# Patient Record
Sex: FEMALE | Race: WHITE | NOT HISPANIC OR LATINO | Employment: PART TIME | URBAN - NONMETROPOLITAN AREA
[De-identification: names, ages, dates, MRNs, and addresses within clinical notes are randomized per-mention and may not be internally consistent; named-entity substitution may affect disease eponyms.]

---

## 2017-01-12 ENCOUNTER — HOSPITAL ENCOUNTER (EMERGENCY)
Facility: HOSPITAL | Age: 27
Discharge: HOME | End: 2017-01-12
Payer: COMMERCIAL

## 2017-01-12 VITALS
OXYGEN SATURATION: 99 % | RESPIRATION RATE: 16 BRPM | HEART RATE: 92 BPM | SYSTOLIC BLOOD PRESSURE: 129 MMHG | DIASTOLIC BLOOD PRESSURE: 80 MMHG

## 2017-01-12 DIAGNOSIS — S33.5XXA SPRAIN OF LUMBAR REGION, INITIAL ENCOUNTER: ICD-10-CM

## 2017-01-12 PROCEDURE — 25000125 ZZHC RX 250

## 2017-01-12 PROCEDURE — 99214 OFFICE O/P EST MOD 30 MIN: CPT | Mod: 25

## 2017-01-12 PROCEDURE — 99213 OFFICE O/P EST LOW 20 MIN: CPT

## 2017-01-12 PROCEDURE — 96372 THER/PROPH/DIAG INJ SC/IM: CPT

## 2017-01-12 RX ORDER — KETOROLAC TROMETHAMINE 10 MG/1
10 TABLET, FILM COATED ORAL EVERY 6 HOURS PRN
Qty: 20 TABLET | Refills: 0 | Status: SHIPPED | OUTPATIENT
Start: 2017-01-12 | End: 2017-02-27

## 2017-01-12 RX ORDER — KETOROLAC TROMETHAMINE 30 MG/ML
60 INJECTION, SOLUTION INTRAMUSCULAR; INTRAVENOUS ONCE
Status: COMPLETED | OUTPATIENT
Start: 2017-01-12 | End: 2017-01-12

## 2017-01-12 RX ADMIN — KETOROLAC TROMETHAMINE 60 MG: 30 INJECTION, SOLUTION INTRAMUSCULAR; INTRAVENOUS at 18:49

## 2017-01-12 ASSESSMENT — ENCOUNTER SYMPTOMS
FEVER: 0
CONFUSION: 0
ABDOMINAL PAIN: 0
ARTHRALGIAS: 0
EYE REDNESS: 0
DIFFICULTY URINATING: 0
HEADACHES: 0
BACK PAIN: 1
SHORTNESS OF BREATH: 0
NECK STIFFNESS: 0
COLOR CHANGE: 0

## 2017-01-12 NOTE — ED AVS SNAPSHOT
[facility]    [address]    [place] [address]    Phone:  [phone]                                       Jennifer Tatum   MRN: [id number]    Department:  [facility]   Date of Visit:  1/12/2017           Patient Information     Date Of Birth          1990        Your diagnoses for this visit were:     Sprain of lumbar region, initial encounter        You were seen by Brittany Hurtado APRN FNCIRILO.      Follow-up Information     Follow up with Cornell Herron MD In 1 week.    Specialty:  Family Practice    Why:  if not improving or back to baseline    Contact information:    [facility]  [address]  [place] [address] [phone]          Follow up with [facility].    Specialty:  EMERGENCY MEDICINE    Why:  As needed, If symptoms worsen    Contact information:    [address] [place] [address] [phone]    Additional information:    From [facility]: Take [address]. Turn left at [address]. Turn left at the first stoplight on [address]. At the first stop sign, take a right onto [address]. Take a left into the parking lot and continue through until you reach the North enterance of the building.       From [place]: Take [address]. Take the [address] ramp towards [place]. Turn left onto [address]. Take a slight right onto [address]. Turn left at the first stoplight on [address]. At the first stop sign, take a right onto [address]. Take a left into the parking lot and continue through until you reach the North enterance of the building.       From [place]: Take [address]. Take a right at [address]. At the first stop sign, take a right onto [address]. Take a left into the parking lot and continue through until you reach the North enterance of the building.         Discharge Instructions       See attached for home care  Rest,  ice, stretch  Take medications as needed as prescribed  PT - they will call you   F/U with PCP if not improving or back to base line in 1 week  Return to  with worsening symptoms.           Back Sprain or Strain    Injury to the muscles (strain) or ligaments (sprain) around the spine can be troubling. Injury may occur after a sudden forceful twisting or bending force such as in a car accident, after a simple awkward movement, or after lifting something heavy with poor body positioning. In any case, muscle spasm is often present and adds to the pain.  Thankfully, most people feel better in 1 to 2 weeks, and most of the rest in 1 to 2 months. Most people can remain active. Unless you had a forceful physical injury such as a car accident or fall, X-rays are usually not ordered for the first evaluation of a back sprain or strain. If pain continues and does not respond to medical treatment, your healthcare provider may order X-rays and other tests.  Home care  The following guidelines will help you care for your injury at home:    When in bed, try to find a comfortable position. A firm mattress is best. Try lying flat on your back with pillows under your knees. You can also try lying on your side with your knees bent up toward your chest and a pillow between your knees.    Don't sit for long periods. Try not to take long car rides or take other trips that have you sitting for a long time. This puts more stress on the lower back than standing or walking.    During the first 24 to 72 hours after an injury or flare-up, apply an ice pack to the painful area for 20 minutes. Then remove it for 20 minutes. Do this for 60 to 90 minutes, or several times a day, This will reduce swelling and pain. Be sure to wrap the ice pack in a thin towel or plastic to protect your skin.    You can start with ice, then switch to heat. Heat from a hot shower, hot bath, or heating pad reduces swelling and pain and works well for muscle spasms.  Put heat on the painful area for 20 minutes, then remove for 20 minutes. Do this for 60 to 90 minutes, or several times a day. Do not use a heating pad while sleeping. It can burn the skin.    You can alternate the ice and heat. Talk with your healthcare provider to find out the best treatment or therapy for your back pain.    Therapeutic massage will help relax the back muscles without stretching them.    Be aware of safe lifting methods. Do not lift anything over 15 pounds until all of the pain is gone.  Medicines  Talk to your healthcare provider before using medicines, especially if you have other health problems or are taking other medicines.    You may use acetaminophen or ibuprofen to control pain, unless another pain medicine was prescribed. If you have chronic conditions like diabetes, liver or kidney disease, stomach ulcers, or gastrointestinal bleeding, or are taking blood-thinner medicines, talk with your doctor before taking any medicines.    Be careful if you are given prescription medicines, narcotics, or medicine for muscle spasm. They can cause drowsiness, and affect your coordination, reflexes, and judgment. Do not drive or operate heavy machinery.  Follow-up care  Follow up with your healthcare provider or this facility as advised. You may need physical therapy or more tests if your symptoms get worse.  If you had X-rays today, they didn t show any broken bones, breaks, or fractures. Sometimes fractures don t show up on the first X-ray. Bruises and sprains can sometimes hurt as much as a fracture. These injuries can take time to heal completely. If your symptoms don t improve or they get worse, talk with your healthcare provider. You may need a repeat X-ray.  Call 911  Call for emergency care if any of the following occur:    Trouble breathing    Confused    Very drowsy or trouble awakening    Fainting or loss of consciousness    Rapid or very slow heart rate    Loss of bowel or bladder  control  When to seek medical advice  Call your healthcare provider right away if any of the following occur:    Pain gets worse or spreads to your arms or legs    Weakness or numbness in one or both arms or legs    Numbness in the groin or genital area    2507-2862 [employer]. [address] [place] [address]. All rights reserved. This information is not intended as a substitute for professional medical care. Always follow your healthcare professional's instructions.          Future Appointments        Provider Department Dept Phone Center    2/3/2017 9:00 AM Cornell Herron MD [facility] [phone] [facility]         Review of your medicines      START taking        Dose / Directions Last dose taken    ketorolac 10 MG tablet   Commonly known as:  TORADOL   Dose:  10 mg   Quantity:  20 tablet        Take 1 tablet (10 mg) by mouth every 6 hours as needed for moderate pain   Refills:  0        tiZANidine 4 MG tablet   Commonly known as:  ZANAFLEX   Dose:  4 mg   Quantity:  20 tablet        Take 1 tablet (4 mg) by mouth 3 times daily as needed for muscle spasms (MUSCLE SPASM)   Refills:  0          Our records show that you are taking the medicines listed below. If these are incorrect, please call your family doctor or clinic.        Dose / Directions Last dose taken    * amphetamine-dextroamphetamine 20 MG per 24 hr capsule   Commonly known as:  ADDERALL XR   Dose:  20 mg   Quantity:  30 capsule        Take 1 capsule (20 mg) by mouth daily   Refills:  0        * amphetamine-dextroamphetamine 20 MG per 24 hr capsule   Commonly known as:  ADDERALL XR   Dose:  20 mg   Quantity:  30 capsule        Take 1 capsule (20 mg) by mouth daily   Refills:  0        * amphetamine-dextroamphetamine 20 MG per 24 hr capsule   Commonly known as:  ADDERALL XR   Dose:  20 mg   Quantity:  60 capsule        Take 1 capsule (20 mg) by mouth daily   Refills:  0        citalopram 40 MG tablet  "  Commonly known as:  celeXA   Dose:  40 mg   Quantity:  30 tablet        Take 1 tablet (40 mg) by mouth daily   Refills:  5        IBUPROFEN PO        Refills:  0        traZODone 50 MG tablet   Commonly known as:  DESYREL   Quantity:  90 tablet        1/2-3 TAB AT BEDTIME AS DIRECTED   Refills:  3        TYLENOL PO        Refills:  0        * Notice:  This list has 3 medication(s) that are the same as other medications prescribed for you. Read the directions carefully, and ask your doctor or other care provider to review them with you.            Prescriptions were sent or printed at these locations (2 Prescriptions)                   [id number] [place] [facility] [address]   [address], [place] [address]    Telephone:  [phone]   Fax:  [phone]   Hours:                  E-Prescribed (2 of 2)         ketorolac (TORADOL) 10 MG tablet               tiZANidine (ZANAFLEX) 4 MG tablet                Orders Needing Specimen Collection     None      Pending Results     No orders found from 1/11/2017 to 1/13/2017.            Pending Culture Results     No orders found from 1/11/2017 to 1/13/2017.            Thank you for choosing [facility]       Thank you for choosing [facility] for your care. Our goal is always to provide you with excellent care. Hearing back from our patients is one way we can continue to improve our services. Please take a few minutes to complete the written survey that you may receive in the mail after you visit with us. Thank you!        [employer] Information     [employer] lets you send messages to your doctor, view your test results, renew your prescriptions, schedule appointments and more. To sign up, go to [email or web address] . Click on \"Log in\" on the left side of the screen, which will take you to the Welcome page. Then click on \"Sign up Now\" on the right side of the page.     You will be asked to enter the access code listed below, as well as some personal information. " Please follow the directions to create your username and password.     Your access code is: [email or web address]  Expires: 2017  6:55 PM     Your access code will  in 90 days. If you need help or a new code, please call your [facility] clinic or [phone].        Care EveryWhere ID     This is your Care EveryWhere ID. This could be used by other organizations to access your [facility] medical records  [id number]        After Visit Summary       This is your record. Keep this with you and show to your community pharmacist(s) and doctor(s) at your next visit.

## 2017-01-12 NOTE — ED AVS SNAPSHOT
[facility]    [address]    [place] [address]    Phone:  [phone]                                       Jennifer Tatum   MRN: [id number]    Department:  [facility]   Date of Visit:  1/12/2017           After Visit Summary Signature Page     I have received my discharge instructions, and my questions have been answered. I have discussed any challenges I see with this plan with the nurse or doctor.    ..........................................................................................................................................  Patient/Patient Representative Signature      ..........................................................................................................................................  Patient Representative Print Name and Relationship to Patient    ..................................................               ................................................  Date                                            Time    ..........................................................................................................................................  Reviewed by Signature/Title    ...................................................              ..............................................  Date                                                            Time

## 2017-01-13 NOTE — ED NOTES
Pt presents with lower back pain after lying on Aunt's couch. States while driving her right foot was going numb. Has a hx of back problems.

## 2017-01-13 NOTE — DISCHARGE INSTRUCTIONS
See attached for home care  Rest, ice, stretch  Take medications as needed as prescribed  PT - they will call you   F/U with PCP if not improving or back to base line in 1 week  Return to  with worsening symptoms.           Back Sprain or Strain    Injury to the muscles (strain) or ligaments (sprain) around the spine can be troubling. Injury may occur after a sudden forceful twisting or bending force such as in a car accident, after a simple awkward movement, or after lifting something heavy with poor body positioning. In any case, muscle spasm is often present and adds to the pain.  Thankfully, most people feel better in 1 to 2 weeks, and most of the rest in 1 to 2 months. Most people can remain active. Unless you had a forceful physical injury such as a car accident or fall, X-rays are usually not ordered for the first evaluation of a back sprain or strain. If pain continues and does not respond to medical treatment, your healthcare provider may order X-rays and other tests.  Home care  The following guidelines will help you care for your injury at home:    When in bed, try to find a comfortable position. A firm mattress is best. Try lying flat on your back with pillows under your knees. You can also try lying on your side with your knees bent up toward your chest and a pillow between your knees.    Don't sit for long periods. Try not to take long car rides or take other trips that have you sitting for a long time. This puts more stress on the lower back than standing or walking.    During the first 24 to 72 hours after an injury or flare-up, apply an ice pack to the painful area for 20 minutes. Then remove it for 20 minutes. Do this for 60 to 90 minutes, or several times a day, This will reduce swelling and pain. Be sure to wrap the ice pack in a thin towel or plastic to protect your skin.    You can start with ice, then switch to heat. Heat from a hot shower, hot bath, or heating pad reduces swelling and pain  and works well for muscle spasms. Put heat on the painful area for 20 minutes, then remove for 20 minutes. Do this for 60 to 90 minutes, or several times a day. Do not use a heating pad while sleeping. It can burn the skin.    You can alternate the ice and heat. Talk with your healthcare provider to find out the best treatment or therapy for your back pain.    Therapeutic massage will help relax the back muscles without stretching them.    Be aware of safe lifting methods. Do not lift anything over 15 pounds until all of the pain is gone.  Medicines  Talk to your healthcare provider before using medicines, especially if you have other health problems or are taking other medicines.    You may use acetaminophen or ibuprofen to control pain, unless another pain medicine was prescribed. If you have chronic conditions like diabetes, liver or kidney disease, stomach ulcers, or gastrointestinal bleeding, or are taking blood-thinner medicines, talk with your doctor before taking any medicines.    Be careful if you are given prescription medicines, narcotics, or medicine for muscle spasm. They can cause drowsiness, and affect your coordination, reflexes, and judgment. Do not drive or operate heavy machinery.  Follow-up care  Follow up with your healthcare provider or this facility as advised. You may need physical therapy or more tests if your symptoms get worse.  If you had X-rays today, they didn t show any broken bones, breaks, or fractures. Sometimes fractures don t show up on the first X-ray. Bruises and sprains can sometimes hurt as much as a fracture. These injuries can take time to heal completely. If your symptoms don t improve or they get worse, talk with your healthcare provider. You may need a repeat X-ray.  Call 911  Call for emergency care if any of the following occur:    Trouble breathing    Confused    Very drowsy or trouble awakening    Fainting or loss of consciousness    Rapid or very slow heart  rate    Loss of bowel or bladder control  When to seek medical advice  Call your healthcare provider right away if any of the following occur:    Pain gets worse or spreads to your arms or legs    Weakness or numbness in one or both arms or legs    Numbness in the groin or genital area    9959-0764 [employer]. [address] [place] [address]. All rights reserved. This information is not intended as a substitute for professional medical care. Always follow your healthcare professional's instructions.

## 2017-01-13 NOTE — ED PROVIDER NOTES
History     Chief Complaint   Patient presents with     Back Pain     The history is provided by the patient. No  was used.     Jennifer Tatum is a 26 year old female who presents to  for evaluation of low back pain. Onset of sx 2 days ago, no injury, states she slept on her aunt's couch, woke up with pain, now rated 8 on 1-10 scale, with some radiation down right leg. Denies saddle paresthesia, no change in bowel or bladder, no fever. Has tried ibuprofen, tylenol, heat, minimal improvement. Does report recent hx of work related back injury, however did have full recovery.     I have reviewed the Medications, Allergies, Past Medical and Surgical History, and Social History in the Epic system.    Review of Systems   Constitutional: Negative for fever.   HENT: Negative for congestion.    Eyes: Negative for redness.   Respiratory: Negative for shortness of breath.    Cardiovascular: Negative for chest pain.   Gastrointestinal: Negative for abdominal pain.   Genitourinary: Negative for difficulty urinating.   Musculoskeletal: Positive for back pain. Negative for arthralgias and neck stiffness.   Skin: Negative for color change.   Neurological: Negative for headaches.   Psychiatric/Behavioral: Negative for confusion.       Physical Exam   BP: 129/80 mmHg  Pulse: 92  Resp: 16  SpO2: 99 %  Physical Exam   Constitutional: She is oriented to person, place, and time. No distress.   HENT:   Head: Normocephalic and atraumatic.   Eyes: Conjunctivae are normal.   Neck: Normal range of motion.   Cardiovascular: Normal rate and regular rhythm.    Pulmonary/Chest: Effort normal. No respiratory distress.   Abdominal: Soft.   Musculoskeletal: She exhibits tenderness. She exhibits no edema.        Lumbar back: She exhibits decreased range of motion, tenderness and pain.        Back:    PTP area as illustrated. Decreased ROM.    Neurological: She is alert and oriented to person, place, and time. She has normal  "strength. No sensory deficit.   Reflex Scores:       Patellar reflexes are 2+ on the right side and 2+ on the left side.  Skin: Skin is warm and dry. She is not diaphoretic.   Nursing note and vitals reviewed.      ED Course   Procedures          Assessments & Plan (with Medical Decision Making)   Pt presents with low back pain, no mechanism of injury, \"slept on aunt's couch\". Exam as above. Findings consistent with strain. IM toradol given while in the UC. rx for home. Referral for PT. [facility] discharge instructions given. Pt discharged in stable condition.     I have reviewed the nursing notes.    I have reviewed the findings, diagnosis, plan and need for follow up with the patient.    Discharge Medication List as of 1/12/2017  6:55 PM      START taking these medications    Details   ketorolac (TORADOL) 10 MG tablet Take 1 tablet (10 mg) by mouth every 6 hours as needed for moderate pain, Disp-20 tablet, R-0, E-Prescribe      tiZANidine (ZANAFLEX) 4 MG tablet Take 1 tablet (4 mg) by mouth 3 times daily as needed for muscle spasms (MUSCLE SPASM), Disp-20 tablet, R-0, E-Prescribe             Final diagnoses:   Sprain of lumbar region, initial encounter     See attached for home care  Rest, ice, stretch  Take medications as needed as prescribed  PT - they will call you   F/U with PCP if not improving or back to base line in 1 week  Return to UC with worsening symptoms.   1/12/2017   [facility]      Brittany Hurtado APRN FNP  01/13/17 1334  "

## 2017-01-23 ENCOUNTER — TELEPHONE (OUTPATIENT)
Dept: FAMILY MEDICINE | Facility: OTHER | Age: 27
End: 2017-01-23

## 2017-01-23 DIAGNOSIS — F43.10 PTSD (POST-TRAUMATIC STRESS DISORDER): ICD-10-CM

## 2017-01-23 DIAGNOSIS — F33.9 RECURRENT MAJOR DEPRESSIVE DISORDER, REMISSION STATUS UNSPECIFIED (H): Primary | ICD-10-CM

## 2017-01-25 RX ORDER — CITALOPRAM HYDROBROMIDE 40 MG/1
TABLET ORAL
Qty: 30 TABLET | Refills: 1 | Status: SHIPPED | OUTPATIENT
Start: 2017-01-25 | End: 2017-02-27

## 2017-01-25 NOTE — TELEPHONE ENCOUNTER
Celexa 40mg tab     Last Written Prescription Date: 11/28/2016  Last Fill Quantity: 30, # refills: 0  Last Office Visit with [facility] primary care provider:  11/23/2016   Next 5 appointments (look out 90 days)     Feb 03, 2017  9:00 AM   (Arrive by 8:45 AM)   Office Visit with Cornell Herron MD   [facility]    [address] [place] [address] [facility] [address]   [phone]                   Last PHQ-9 score on record=   PHQ-9 SCORE 11/3/2016   Total Score -   Total Score 14

## 2017-02-06 ENCOUNTER — OFFICE VISIT (OUTPATIENT)
Dept: FAMILY MEDICINE | Facility: OTHER | Age: 27
End: 2017-02-06
Attending: FAMILY MEDICINE
Payer: COMMERCIAL

## 2017-02-06 VITALS
SYSTOLIC BLOOD PRESSURE: 124 MMHG | TEMPERATURE: 98.4 F | HEART RATE: 73 BPM | BODY MASS INDEX: 30.95 KG/M2 | WEIGHT: 186 LBS | DIASTOLIC BLOOD PRESSURE: 74 MMHG | RESPIRATION RATE: 16 BRPM

## 2017-02-06 DIAGNOSIS — F33.0 MAJOR DEPRESSIVE DISORDER, RECURRENT EPISODE, MILD (H): ICD-10-CM

## 2017-02-06 DIAGNOSIS — F41.9 ANXIETY: ICD-10-CM

## 2017-02-06 DIAGNOSIS — F90.0 ATTENTION DEFICIT HYPERACTIVITY DISORDER (ADHD), PREDOMINANTLY INATTENTIVE TYPE: Primary | ICD-10-CM

## 2017-02-06 DIAGNOSIS — R41.3 MEMORY DEFICITS: ICD-10-CM

## 2017-02-06 DIAGNOSIS — M54.41 ACUTE RIGHT-SIDED LOW BACK PAIN WITH RIGHT-SIDED SCIATICA: ICD-10-CM

## 2017-02-06 LAB
BASOPHILS # BLD AUTO: 0.1 10E9/L (ref 0–0.2)
BASOPHILS NFR BLD AUTO: 0.5 %
DIFFERENTIAL METHOD BLD: NORMAL
EOSINOPHIL # BLD AUTO: 0.1 10E9/L (ref 0–0.7)
EOSINOPHIL NFR BLD AUTO: 0.8 %
ERYTHROCYTE [DISTWIDTH] IN BLOOD BY AUTOMATED COUNT: 13.2 % (ref 10–15)
HCG SERPL QL: NEGATIVE
HCT VFR BLD AUTO: 39.5 % (ref 35–47)
HGB BLD-MCNC: 13.2 G/DL (ref 11.7–15.7)
IMM GRANULOCYTES # BLD: 0 10E9/L (ref 0–0.4)
IMM GRANULOCYTES NFR BLD: 0.2 %
LYMPHOCYTES # BLD AUTO: 2.6 10E9/L (ref 0.8–5.3)
LYMPHOCYTES NFR BLD AUTO: 24.2 %
MCH RBC QN AUTO: 28.8 PG (ref 26.5–33)
MCHC RBC AUTO-ENTMCNC: 33.4 G/DL (ref 31.5–36.5)
MCV RBC AUTO: 86 FL (ref 78–100)
MONOCYTES # BLD AUTO: 0.7 10E9/L (ref 0–1.3)
MONOCYTES NFR BLD AUTO: 6.3 %
NEUTROPHILS # BLD AUTO: 7.2 10E9/L (ref 1.6–8.3)
NEUTROPHILS NFR BLD AUTO: 68 %
NRBC # BLD AUTO: 0 10*3/UL
NRBC BLD AUTO-RTO: 0 /100
PLATELET # BLD AUTO: 236 10E9/L (ref 150–450)
RBC # BLD AUTO: 4.59 10E12/L (ref 3.8–5.2)
TSH SERPL DL<=0.05 MIU/L-ACNC: 1.19 MU/L (ref 0.4–4)
WBC # BLD AUTO: 10.7 10E9/L (ref 4–11)

## 2017-02-06 PROCEDURE — 84443 ASSAY THYROID STIM HORMONE: CPT | Performed by: FAMILY MEDICINE

## 2017-02-06 PROCEDURE — 85025 COMPLETE CBC W/AUTO DIFF WBC: CPT | Performed by: FAMILY MEDICINE

## 2017-02-06 PROCEDURE — 99000 SPECIMEN HANDLING OFFICE-LAB: CPT | Performed by: FAMILY MEDICINE

## 2017-02-06 PROCEDURE — 82746 ASSAY OF FOLIC ACID SERUM: CPT | Performed by: FAMILY MEDICINE

## 2017-02-06 PROCEDURE — 84703 CHORIONIC GONADOTROPIN ASSAY: CPT | Performed by: FAMILY MEDICINE

## 2017-02-06 PROCEDURE — 82306 VITAMIN D 25 HYDROXY: CPT | Performed by: FAMILY MEDICINE

## 2017-02-06 PROCEDURE — 82607 VITAMIN B-12: CPT | Performed by: FAMILY MEDICINE

## 2017-02-06 PROCEDURE — 99215 OFFICE O/P EST HI 40 MIN: CPT | Performed by: FAMILY MEDICINE

## 2017-02-06 PROCEDURE — 99212 OFFICE O/P EST SF 10 MIN: CPT

## 2017-02-06 PROCEDURE — 36415 COLL VENOUS BLD VENIPUNCTURE: CPT | Performed by: FAMILY MEDICINE

## 2017-02-06 PROCEDURE — 72100 X-RAY EXAM L-S SPINE 2/3 VWS: CPT | Mod: TC

## 2017-02-06 RX ORDER — DEXTROAMPHETAMINE SACCHARATE, AMPHETAMINE ASPARTATE MONOHYDRATE, DEXTROAMPHETAMINE SULFATE AND AMPHETAMINE SULFATE 5; 5; 5; 5 MG/1; MG/1; MG/1; MG/1
20 CAPSULE, EXTENDED RELEASE ORAL DAILY
Qty: 30 CAPSULE | Refills: 0 | Status: SHIPPED | OUTPATIENT
Start: 2017-02-06 | End: 2017-02-27

## 2017-02-06 RX ORDER — DEXTROAMPHETAMINE SACCHARATE, AMPHETAMINE ASPARTATE MONOHYDRATE, DEXTROAMPHETAMINE SULFATE AND AMPHETAMINE SULFATE 5; 5; 5; 5 MG/1; MG/1; MG/1; MG/1
20 CAPSULE, EXTENDED RELEASE ORAL DAILY
Qty: 30 CAPSULE | Refills: 0 | Status: SHIPPED | OUTPATIENT
Start: 2017-03-06 | End: 2017-02-27

## 2017-02-06 RX ORDER — DEXTROAMPHETAMINE SACCHARATE, AMPHETAMINE ASPARTATE MONOHYDRATE, DEXTROAMPHETAMINE SULFATE AND AMPHETAMINE SULFATE 5; 5; 5; 5 MG/1; MG/1; MG/1; MG/1
20 CAPSULE, EXTENDED RELEASE ORAL DAILY
Qty: 60 CAPSULE | Refills: 0 | Status: SHIPPED | OUTPATIENT
Start: 2017-04-06 | End: 2017-02-27

## 2017-02-06 ASSESSMENT — PAIN SCALES - GENERAL: PAINLEVEL: NO PAIN (0)

## 2017-02-06 NOTE — PROGRESS NOTES
SUBJECTIVE:                                                    Jennifer Tatum is a 26 year old female who presents to clinic today for the following health issues:      Medication Followup of Adderall    Taking Medication as prescribed: yes    Side Effects:  None    Medication Helping Symptoms:  Yes    Last several months--  More mood swings   Edgy- more irritable   Some increase Short term memory issues- seems to be related aways of how she got home and what happened after work.  What to sleep more- more fatigue.   No stressors  No head trauma   Some HA- same intensity and same frequency  Pt is stressed- first anniversary of her still born child   and her starting to try for another child       Pt c/o increase back pain and pain in right leg. Had it in past and got some better now worse in last several months  Always has low back pain with periodic pain in right leg to foot at times- no weakness  Uses heat on back and has been to PT- does stretches.   Wants something done.     Pt is more depressed and  anxious- seems to be stemming from still born child's death a year ago.   Has been to counseling in past and has not helped.Taking all her meds and feels that her adderall is working all day          Problem list and histories reviewed & adjusted, as indicated.  Additional history: as documented    Problem list, Medication list, Allergies, and Medical/Social/Surgical histories reviewed in [facility] and updated as appropriate.    ROS:  C: NEGATIVE for fever, chills, change in weight  E/M: NEGATIVE for ear, mouth and throat problems  R: NEGATIVE for significant cough or SOB  CV: NEGATIVE for chest pain, palpitations or peripheral edema    OBJECTIVE:                                                    /74 mmHg  Pulse 73  Temp(Src) 98.4  F (36.9  C)  Resp 16  Wt 186 lb (84.369 kg)  Body mass index is 30.95 kg/(m^2).   GENERAL: healthy, alert, well nourished, well hydrated, no distress  MS: extremities- no  gross deformities noted, no edema strength and DTR equal and symmetrical   BACK: no CVA tenderness, midline and  paralumbar tenderness, stiffness on ROM   PSYCH: Alert and oriented times 3; speech- coherent , normal rate and volume; able to articulate logical thoughts, able to abstract reason, no tangential thoughts, no hallucinations or delusions, affect- anxious and cries with talking with her on dtr .     Results for orders placed or performed in visit on 02/06/17 (from the past 24 hour(s))   CBC with platelets differential   Result Value Ref Range    WBC 10.7 4.0 - 11.0 10e9/L    RBC Count 4.59 3.8 - 5.2 10e12/L    Hemoglobin 13.2 11.7 - 15.7 g/dL    Hematocrit 39.5 35.0 - 47.0 %    MCV 86 78 - 100 fl    MCH 28.8 26.5 - 33.0 pg    MCHC 33.4 31.5 - 36.5 g/dL    RDW 13.2 10.0 - 15.0 %    Platelet Count 236 150 - 450 10e9/L    Diff Method Automated Method     % Neutrophils 68.0 %    % Lymphocytes 24.2 %    % Monocytes 6.3 %    % Eosinophils 0.8 %    % Basophils 0.5 %    % Immature Granulocytes 0.2 %    Nucleated RBCs 0 0 /100    Absolute Neutrophil 7.2 1.6 - 8.3 10e9/L    Absolute Lymphocytes 2.6 0.8 - 5.3 10e9/L    Absolute Monocytes 0.7 0.0 - 1.3 10e9/L    Absolute Eosinophils 0.1 0.0 - 0.7 10e9/L    Absolute Basophils 0.1 0.0 - 0.2 10e9/L    Abs Immature Granulocytes 0.0 0 - 0.4 10e9/L    Absolute Nucleated RBC 0.0      XR lumbar spine - negative      ASSESSMENT/PLAN:                                                    1. Attention deficit hyperactivity disorder (ADHD), predominantly inattentive type  Stable-- overall.  Will keep on current dose.   - amphetamine-dextroamphetamine (ADDERALL XR) 20 MG per 24 hr capsule; Take 1 capsule (20 mg) by mouth daily  Dispense: 60 capsule; Refill: 0  - amphetamine-dextroamphetamine (ADDERALL XR) 20 MG per 24 hr capsule; Take 1 capsule (20 mg) by mouth daily  Dispense: 30 capsule; Refill: 0    2. 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  Discussed counseling and she deferred.  Discussed not increasing meds now and just get thru this week and anniversity this Friday. Try to do something memorable for family .  Work on exercise/stress reduction etc  Will check few labs.   - Vitamin B12  - Vitamin D Deficiency  - TSH  - Folate  - CBC with platelets differential    3. Acute right-sided low back pain with right-sided sciatica  XR ok - discussed MRI/NICOLAS/Meds/more PT and if she wants to be pregnant then some can not be done or have to hold on pregnancy. She will talk with erh SO and call if wants to do something that we talked about  - HCG qualitative  - XR Lumbar Spine 2/3 Views    4. Major depressive disorder, recurrent episode, mild (H)  Some worse - discussed - see above.  Need to get thru first Birthday of stillborn     5. Anxiety  As above.     50minutes was spent with patient and over 50%  of this time was spent on counseling patient regarding  illness, medication and / or treatment  options, coordinating further cares and follow ups that are needed along with resource material that will be helpful in the treatment of these issues.         See Patient Instructions    Cornell Herron MD  [facility]

## 2017-02-06 NOTE — MR AVS SNAPSHOT
After Visit Summary   2/6/2017    Jennifer Tatum    MRN: [id number]           Patient Information     Date Of Birth          1990        Visit Information        Provider Department      2/6/2017 3:00 PM Cornell Herron MD [facility]        Today's Diagnoses     Attention deficit hyperactivity disorder (ADHD), predominantly inattentive type    -  1     Memory deficits         Acute right-sided low back pain with right-sided sciatica         Major depressive disorder, recurrent episode, mild (H)         Anxiety           Care Instructions    Call or return if getting worse.         Follow-ups after your visit        Your next 10 appointments already scheduled     May 02, 2017  3:00 PM   (Arrive by 2:45 PM)   Office Visit with Cornell Herron MD   [facility] ([facility])    [address]  [facility] [address]   [phone]           Bring a current list of meds and any records pertaining to this visit.  For Physicals, please bring immunization records and any forms needing to be filled out.  Please arrive 10 minutes early to complete paperwork.              Who to contact     If you have questions or need follow up information about today's clinic visit or your schedule please contact [facility] directly at [phone].  Normal or non-critical lab and imaging results will be communicated to you by MyChart, letter or phone within 4 business days after the clinic has received the results. If you do not hear from us within 7 days, please contact the clinic through MyChart or phone. If you have a critical or abnormal lab result, we will notify you by phone as soon as possible.  Submit refill requests through [employer] or call your pharmacy and they will forward the refill request to us. Please allow 3 business days for your refill to be completed.          Additional Information About Your Visit        MyChart Information     [employer] lets you send  "messages to your doctor, view your test results, renew your prescriptions, schedule appointments and more. To sign up, go to [email or web address] . Click on \"Log in\" on the left side of the screen, which will take you to the Welcome page. Then click on \"Sign up Now\" on the right side of the page.     You will be asked to enter the access code listed below, as well as some personal information. Please follow the directions to create your username and password.     Your access code is: [email or web address]  Expires: 2017  6:55 PM     Your access code will  in 90 days. If you need help or a new code, please call your [place] clinic or [phone].        Care EveryWhere ID     This is your [facility] EveryWhere ID. This could be used by other organizations to access your [place] medical records  [id number]        Your Vitals Were     Pulse Temperature Respirations             73 98.4  F (36.9  C) 16          Blood Pressure from Last 3 Encounters:   17 124/74   17 129/80   16 122/75    Weight from Last 3 Encounters:   17 186 lb (84.369 kg)   16 186 lb (84.369 kg)   16 182 lb (82.555 kg)              We Performed the Following     CBC with platelets differential     Folate     HCG qualitative     TSH     Vitamin B12     Vitamin D Deficiency     XR Lumbar Spine 2/3 Views          Where to get your medicines      Some of these will need a paper prescription and others can be bought over the counter.  Ask your nurse if you have questions.     Bring a paper prescription for each of these medications    - amphetamine-dextroamphetamine 20 MG per 24 hr capsule  - amphetamine-dextroamphetamine 20 MG per 24 hr capsule  - amphetamine-dextroamphetamine 20 MG per 24 hr capsule       Primary Care Provider Office Phone # Fax #    Cornell Herron [phone]       [facility] [address] [place] [address]        Thank you!     Thank you for choosing [facility]  for your care. Our goal is always to provide you with excellent care. Hearing back from our patients is one way we can continue to improve our services. Please take a few minutes to complete the written survey that you may receive in the mail after your visit with us. Thank you!             Your Updated Medication List - Protect others around you: Learn how to safely use, store and throw away your medicines at www.disposemymeds.org.          This list is accurate as of: 2/6/17  4:55 PM.  Always use your most recent med list.                   Brand Name Dispense Instructions for use    * amphetamine-dextroamphetamine 20 MG per 24 hr capsule    ADDERALL XR    30 capsule    Take 1 capsule (20 mg) by mouth daily       * amphetamine-dextroamphetamine 20 MG per 24 hr capsule   Start taking on:  3/6/2017    ADDERALL XR    30 capsule    Take 1 capsule (20 mg) by mouth daily       * amphetamine-dextroamphetamine 20 MG per 24 hr capsule   Start taking on:  4/6/2017    ADDERALL XR    60 capsule    Take 1 capsule (20 mg) by mouth daily       citalopram 40 MG tablet    celeXA    30 tablet    TAKE ONE TABLET BY MOUTH ONCE DAILY       IBUPROFEN PO          ketorolac 10 MG tablet    TORADOL    20 tablet    Take 1 tablet (10 mg) by mouth every 6 hours as needed for moderate pain       traZODone 50 MG tablet    DESYREL    90 tablet    1/2-3 TAB AT BEDTIME AS DIRECTED       TYLENOL PO          * Notice:  This list has 3 medication(s) that are the same as other medications prescribed for you. Read the directions carefully, and ask your doctor or other care provider to review them with you.

## 2017-02-06 NOTE — TELEPHONE ENCOUNTER
adderall      Last Written Prescription Date: 1/7/17  Last Fill Quantity: 60,  # refills: 0   Last Office Visit with G, P or [facility] prescribing provider: 11/23/16                                         Next 5 appointments (look out 90 days)     Feb 06, 2017  3:00 PM   (Arrive by 2:45 PM)   Office Visit with Cornell Herron MD   [facility] ([facility])    [address] [place] [address]   [phone]

## 2017-02-06 NOTE — NURSING NOTE
"Chief Complaint   Patient presents with     Attention Deficit Disorder       Initial /74 mmHg  Pulse 73  Temp(Src) 98.4  F (36.9  C)  Resp 16  Wt 186 lb (84.369 kg) Estimated body mass index is 30.95 kg/(m^2) as calculated from the following:    Height as of 11/3/16: 5' 5\" (1.651 m).    Weight as of this encounter: 186 lb (84.369 kg).  Medication Reconciliation: complete  "

## 2017-02-07 LAB
FOLATE SERPL-MCNC: 18.5 NG/ML
VIT B12 SERPL-MCNC: 492 PG/ML (ref 193–986)

## 2017-02-08 LAB — DEPRECATED CALCIDIOL+CALCIFEROL SERPL-MC: 39 UG/L (ref 20–75)

## 2017-02-27 ENCOUNTER — TELEPHONE (OUTPATIENT)
Dept: FAMILY MEDICINE | Facility: OTHER | Age: 27
End: 2017-02-27

## 2017-02-27 ENCOUNTER — OFFICE VISIT (OUTPATIENT)
Dept: FAMILY MEDICINE | Facility: OTHER | Age: 27
End: 2017-02-27
Attending: FAMILY MEDICINE
Payer: COMMERCIAL

## 2017-02-27 VITALS
TEMPERATURE: 99.1 F | HEIGHT: 65 IN | DIASTOLIC BLOOD PRESSURE: 70 MMHG | BODY MASS INDEX: 31.65 KG/M2 | WEIGHT: 190 LBS | SYSTOLIC BLOOD PRESSURE: 102 MMHG | HEART RATE: 78 BPM

## 2017-02-27 DIAGNOSIS — F33.9 RECURRENT MAJOR DEPRESSIVE DISORDER, REMISSION STATUS UNSPECIFIED (H): ICD-10-CM

## 2017-02-27 DIAGNOSIS — Z33.1 PREGNANT STATE, INCIDENTAL: ICD-10-CM

## 2017-02-27 DIAGNOSIS — N91.2 AMENORRHEA: Primary | ICD-10-CM

## 2017-02-27 DIAGNOSIS — O09.291 H/O INCOMPETENT CERVIX, CURRENTLY PREGNANT, FIRST TRIMESTER: ICD-10-CM

## 2017-02-27 DIAGNOSIS — F43.10 PTSD (POST-TRAUMATIC STRESS DISORDER): ICD-10-CM

## 2017-02-27 LAB — HCG SERPL QL: POSITIVE

## 2017-02-27 PROCEDURE — 84703 CHORIONIC GONADOTROPIN ASSAY: CPT | Performed by: FAMILY MEDICINE

## 2017-02-27 PROCEDURE — 99214 OFFICE O/P EST MOD 30 MIN: CPT | Performed by: FAMILY MEDICINE

## 2017-02-27 PROCEDURE — 99212 OFFICE O/P EST SF 10 MIN: CPT

## 2017-02-27 RX ORDER — CITALOPRAM HYDROBROMIDE 40 MG/1
40 TABLET ORAL DAILY
Qty: 90 TABLET | Refills: 3 | Status: SHIPPED | OUTPATIENT
Start: 2017-02-27 | End: 2017-05-30

## 2017-02-27 RX ORDER — SWAB
1 SWAB, NON-MEDICATED MISCELLANEOUS DAILY
Qty: 90 EACH | Refills: 3 | COMMUNITY
Start: 2017-02-27 | End: 2017-05-11

## 2017-02-27 ASSESSMENT — PAIN SCALES - GENERAL: PAINLEVEL: NO PAIN (0)

## 2017-02-27 NOTE — NURSING NOTE
"Chief Complaint   Patient presents with     Amenorrhea       Initial /70 (BP Location: Right arm, Cuff Size: Adult Regular)  Pulse 78  Temp 99.1  F (37.3  C)  Ht 5' 5\" (1.651 m)  Wt 190 lb (86.2 kg)  LMP 01/23/2017 (Exact Date)  BMI 31.62 kg/m2 Estimated body mass index is 31.62 kg/(m^2) as calculated from the following:    Height as of this encounter: 5' 5\" (1.651 m).    Weight as of this encounter: 190 lb (86.2 kg).  Medication Reconciliation: complete     Gilbert Titus      "

## 2017-02-27 NOTE — MR AVS SNAPSHOT
After Visit Summary   2/27/2017    Jennifer Tatum    MRN: [id number]           Patient Information     Date Of Birth          1990        Visit Information        Provider Department      2/27/2017 11:45 AM Cornell Herron MD [facility]        Today's Diagnoses     Amenorrhea    -  1    Pregnant state, incidental        Recurrent major depressive disorder, remission status unspecified (H)        PTSD (post-traumatic stress disorder)        H/O incompetent cervix, currently pregnant, first trimester          Care Instructions    5 weeks gestation     Due date October 31,2017    CONGRATS!!!        Follow-ups after your visit        Additional Services     OB/GYN REFERRAL       Your provider has referred you to: Dr Hill     Please be aware that coverage of these services is subject to the terms and limitations of your health insurance plan.  Call member services at your health plan with any benefit or coverage questions.      Please bring the following with you to your appointment:    (1) Any X-Rays, CTs or MRIs which have been performed.  Contact the facility where they were done to arrange for  prior to your scheduled appointment.   (2) List of current medications   (3) This referral request   (4) Any documents/labs given to you for this referral                  Your next 10 appointments already scheduled     May 02, 2017  3:00 PM CDT   (Arrive by 2:45 PM)   Office Visit with MD Sud[facility] Gleason ([facility])    [address] [place] [address]  [place] [address]   [phone]           Bring a current list of meds and any records pertaining to this visit.  For Physicals, please bring immunization records and any forms needing to be filled out.  Please arrive 10 minutes early to complete paperwork.              Who to contact     If you have questions or need follow up information about today's clinic visit or your schedule please contact [facility] [place] directly at [phone].  Normal or non-critical lab and imaging results will be communicated to you by MyChart, letter or phone within 4 business days after the clinic has received the results. If you do not hear from us within 7 days, please contact the clinic through MyChart or phone. If you have a critical or abnormal lab result, we will notify you by phone as soon as possible.  Submit refill requests through [employer] or call your pharmacy and they will forward the refill request to us. Please allow 3 business days for your refill to be completed.          Additional Information About Your Visit        [employer] Information     [employer] lets you send messages to your doctor, view your test results, renew your prescriptions, schedule appointments and more. To sign up, go to [email or web address] . Click on \"Log in\" on the left side of the screen, which will take you to the Welcome page. Then click on \"Sign up Now\" on the right side of the page.     You will be asked to enter the access code listed below, as well as some personal information. Please follow the directions to create your username and password.     Your access code is: [email or web address]  Expires: 2017  6:55 PM     Your access code will  in 90 days. If you need help or a new code, please call your [place] clinic or [phone].        Care EveryWhere ID     This is your Care EveryWhere ID. This could be used by other organizations to access your [place] medical records  [id number]        Your Vitals Were     Pulse Temperature Height Last Period BMI (Body Mass Index)       78 99.1  F (37.3  C) 5' 5\" (1.651 m) 2017 (Exact Date) 31.62 kg/m2        Blood Pressure from Last 3 Encounters:   17 102/70   17 124/74   17 129/80    Weight from Last 3 Encounters:   17 190 lb (86.2 kg)   17 186 lb (84.4 kg)   16 186 lb (84.4 kg)              We Performed the Following     HCG qualitative     " OB/GYN REFERRAL          Today's Medication Changes          These changes are accurate as of: 2/27/17 12:15 PM.  If you have any questions, ask your nurse or doctor.               These medicines have changed or have updated prescriptions.        Dose/Directions    citalopram 40 MG tablet   Commonly known as:  celeXA   This may have changed:  See the new instructions.   Used for:  Recurrent major depressive disorder, remission status unspecified (H), PTSD (post-traumatic stress disorder)   Changed by:  Cornell Herron MD        Dose:  40 mg   Take 1 tablet (40 mg) by mouth daily   Quantity:  90 tablet   Refills:  3         Stop taking these medicines if you haven't already. Please contact your care team if you have questions.     amphetamine-dextroamphetamine 20 MG per 24 hr capsule   Commonly known as:  ADDERALL XR   Stopped by:  Cornell Herron MD           IBUPROFEN PO   Stopped by:  Cornell Herron MD           ketorolac 10 MG tablet   Commonly known as:  TORADOL   Stopped by:  Cornell Herron MD           traZODone 50 MG tablet   Commonly known as:  DESYREL   Stopped by:  Cornell Herron MD                Where to get your medicines      These medications were sent to [id number] - [place], [place] - [address]   [address] , [place] [address]     Phone:  [phone]     citalopram 40 MG tablet                Primary Care Provider Office Phone # Fax #    Cornell Herron [phone]       [facility] [address] [place], [place] [address] [address]        Thank you!     Thank you for choosing [facility]  for your care. Our goal is always to provide you with excellent care. Hearing back from our patients is one way we can continue to improve our services. Please take a few minutes to complete the written survey that you may receive in the mail after your visit with us. Thank you!             Your Updated Medication List - Protect others around you: Learn how to safely  use, store and throw away your medicines at www.disposemymeds.org.          This list is accurate as of: 2/27/17 12:15 PM.  Always use your most recent med list.                   Brand Name Dispense Instructions for use    citalopram 40 MG tablet    celeXA    90 tablet    Take 1 tablet (40 mg) by mouth daily       prenatal multivitamin  with iron 28-0.8 MG Tabs     90 each    Take 1 tablet by mouth daily       TYLENOL PO

## 2017-02-27 NOTE — PROGRESS NOTES
"  SUBJECTIVE:                                                    Jennifer Tatum is a 26 year old female who presents to clinic today for the following health issues:      Pregnancy screen      Duration: last menstrual period 1/23/17    Description (location/character/radiation): nipple pain    Intensity:  moderate    Accompanying signs and symptoms: none    History (similar episodes/previous evaluation): missed period    Precipitating or alleviating factors: None    Therapies tried and outcome: None    Pt is trying to get pregnant    Last pregnancy ended up with stillborn/IUFD in later 3rd trimester     Autopsy unremarkable for cause of IUFD           Problem list and histories reviewed & adjusted, as indicated.  Additional history: as documented    Problem list, Medication list, Allergies, and Medical/Social/Surgical histories reviewed in [facility] and updated as appropriate.    ROS:  C: NEGATIVE for fever, chills, change in weight    OBJECTIVE:                                                    /70 (BP Location: Right arm, Cuff Size: Adult Regular)  Pulse 78  Temp 99.1  F (37.3  C)  Ht 5' 5\" (1.651 m)  Wt 190 lb (86.2 kg)  LMP 01/23/2017 (Exact Date)  BMI 31.62 kg/m2  Body mass index is 31.62 kg/(m^2).   GENERAL: healthy, alert, well nourished, well hydrated, no distress  PSYCH: Alert and oriented times 3; speech- coherent , normal rate and volume; able to articulate logical thoughts, able to abstract reason, no tangential thoughts, no hallucinations or delusions, affect- normal but little anxious          Results for orders placed or performed in visit on 02/27/17 (from the past 24 hour(s))   HCG qualitative   Result Value Ref Range    HCG Qualitative Serum Positive (A) NEG       ASSESSMENT/PLAN:                                                        ICD-10-CM    1. Amenorrhea N91.2 HCG qualitative     HCG qualitative   2. Pregnant state, incidental Z33.1 Prenatal Vit-Fe Fumarate-FA (PRENATAL MULTIVITAMIN  WITH " IRON) 28-0.8 MG TABS     OB/GYN REFERRAL   3. Recurrent major depressive disorder, remission status unspecified (H) F33.9 citalopram (CELEXA) 40 MG tablet   4. PTSD (post-traumatic stress disorder) F43.10 citalopram (CELEXA) 40 MG tablet   5. H/O incompetent cervix, currently pregnant, first trimester O09.291 OB/GYN REFERRAL     5 WEEKS PREGNANT AND HAS DUE DATE OF 10/31/17.  PT IS ON PNV.  SHE IS QUITTING SMOKING. SHE IS TO TO STOP ADDERAL AND TRAZODONE. HIGHLY RECOMMEND STAYING ON CELEXA AND NEED TO WATCH CLOSELY SINCE HAD TERRIBLE LAST PREGNANCY. SHE IS HAPPY AND TRIED TO GET PREGNANT BUT IS LEGIMATELY SCARED. STILL HAS SOME PSTD/O DUE TO LAST PREGNANCY.     PT HAS HAD PREVIOUS VERY COMPLICATED LAST SURGERY WITH INCOMPETENT CERVIX / HYDRONEPHROSIS / NEPHROLITHIASIS- S/P NEPHROSTOMY TUBE AND WHICH ENDED UP WITH IUFD DURING EMERGENCY C/S.      PT WAS SEEING DR WOLFF LAST PREGNANCY AND NOW WANT TO GO TO DIFFERENT OB/GYN- SHE ASKED  FOR DR MARSHALL.  WILL GET HER APPT IN NEXT SEVERAL WEEKS DUE TO H/O CERVIX ISSUE AND THE ABOVE.      30 minutes was spent with patient and over 50%  of this time was spent on counseling patient regarding  illness, medication and / or treatment  options, coordinating further cares and follow ups that are needed along with resource material that will be helpful in the treatment of these issues.       RETURN TO ME AS NEEDED-- CONGRATS.       See Patient Instructions    Cornell Herron MD  [facility]

## 2017-02-27 NOTE — TELEPHONE ENCOUNTER
8:34 AM    Reason for Call: OVERBOOK    Patient is having the following symptoms: PT took a pregnancy test yesterday, and it was positive, she would like to be seen as soon as possible to confirm this, next available was 03/07/2017    The patient is requesting an appointment for as soon as possible with Dr. Sargent    Was an appointment offered for this call? yes    Preferred method for responding to this message:[phone]    If we cannot reach you directly, may we leave a detailed response at the number you provided? yes    Can this message wait until your PCP/provider returns, if unavailable today? PCP is in    Ana Lujan

## 2017-03-08 ENCOUNTER — TRANSFERRED RECORDS (OUTPATIENT)
Dept: HEALTH INFORMATION MANAGEMENT | Facility: CLINIC | Age: 27
End: 2017-03-08

## 2017-03-08 LAB
HPV ABSTRACT: NORMAL
PAP-ABSTRACT: NORMAL

## 2017-04-06 ENCOUNTER — OFFICE VISIT (OUTPATIENT)
Dept: FAMILY MEDICINE | Facility: OTHER | Age: 27
End: 2017-04-06
Attending: FAMILY MEDICINE
Payer: COMMERCIAL

## 2017-04-06 VITALS
WEIGHT: 195 LBS | BODY MASS INDEX: 32.45 KG/M2 | OXYGEN SATURATION: 97 % | DIASTOLIC BLOOD PRESSURE: 72 MMHG | SYSTOLIC BLOOD PRESSURE: 122 MMHG | HEART RATE: 95 BPM | RESPIRATION RATE: 17 BRPM | TEMPERATURE: 98.2 F

## 2017-04-06 DIAGNOSIS — O03.9 SPONTANEOUS MISCARRIAGE: Primary | ICD-10-CM

## 2017-04-06 PROCEDURE — 99213 OFFICE O/P EST LOW 20 MIN: CPT | Performed by: FAMILY MEDICINE

## 2017-04-06 PROCEDURE — 99212 OFFICE O/P EST SF 10 MIN: CPT

## 2017-04-06 ASSESSMENT — PAIN SCALES - GENERAL: PAINLEVEL: NO PAIN (0)

## 2017-04-06 NOTE — MR AVS SNAPSHOT
"              After Visit Summary   4/6/2017    Jennifer Tatum    MRN: [id number]           Patient Information     Date Of Birth          1990        Visit Information        Provider Department      4/6/2017 10:30 AM Cornell Herron MD [facility]        Today's Diagnoses     Spontaneous miscarriage    -  1       Follow-ups after your visit        Your next 10 appointments already scheduled     May 02, 2017  3:00 PM CDT   (Arrive by 2:45 PM)   Office Visit with Cornell Herron MD   [facility] ([facility])    [address] [place] [address]   [phone]           Bring a current list of meds and any records pertaining to this visit.  For Physicals, please bring immunization records and any forms needing to be filled out.  Please arrive 10 minutes early to complete paperwork.              Who to contact     If you have questions or need follow up information about today's clinic visit or your schedule please contact [facility] directly at [phone].  Normal or non-critical lab and imaging results will be communicated to you by Opeeplhart, letter or phone within 4 business days after the clinic has received the results. If you do not hear from us within 7 days, please contact the clinic through [employer] or phone. If you have a critical or abnormal lab result, we will notify you by phone as soon as possible.  Submit refill requests through Airpowered or call your pharmacy and they will forward the refill request to us. Please allow 3 business days for your refill to be completed.          Additional Information About Your Visit        [email or web address] Information     Airpowered lets you send messages to your doctor, view your test results, renew your prescriptions, schedule appointments and more. To sign up, go to [email or web address] . Click on \"Log in\" on the left side of the screen, which will take you to the Welcome page. Then click on \"Sign up Now\" on the right " side of the page.     You will be asked to enter the access code listed below, as well as some personal information. Please follow the directions to create your username and password.     Your access code is: [email or web address]  Expires: 2017  7:55 PM     Your access code will  in 90 days. If you need help or a new code, please call your [place] clinic or [phone].        Care EveryWhere ID     This is your Care EveryWhere ID. This could be used by other organizations to access your [place] medical records  [id number]        Your Vitals Were     Pulse Temperature Respirations Pulse Oximetry BMI (Body Mass Index)       95 98.2  F (36.8  C) 17 97% 32.45 kg/m2        Blood Pressure from Last 3 Encounters:   17 122/72   17 102/70   17 124/74    Weight from Last 3 Encounters:   17 195 lb (88.5 kg)   17 190 lb (86.2 kg)   17 186 lb (84.4 kg)              Today, you had the following     No orders found for display       Primary Care Provider Office Phone # Fax #    Cornell Herron [phone]       [address]  [place] [address]        Thank you!     Thank you for choosing [facility]  for your care. Our goal is always to provide you with excellent care. Hearing back from our patients is one way we can continue to improve our services. Please take a few minutes to complete the written survey that you may receive in the mail after your visit with us. Thank you!             Your Updated Medication List - Protect others around you: Learn how to safely use, store and throw away your medicines at www.disposemymeds.org.          This list is accurate as of: 17 10:58 AM.  Always use your most recent med list.                   Brand Name Dispense Instructions for use    citalopram 40 MG tablet    celeXA    90 tablet    Take 1 tablet (40 mg) by mouth daily       prenatal multivitamin  with iron 28-0.8 MG Tabs     90 each     Take 1 tablet by mouth daily Reported on 4/6/2017       TYLENOL PO

## 2017-04-06 NOTE — PROGRESS NOTES
SUBJECTIVE:                                                    Jennifer Tatum is a 26 year old female who presents to clinic today for the following health issues:        pregnancy       Duration: 1/23/17    Description (location/character/radiation): n/a    Intensity:  mild    Accompanying signs and symptoms: patient thought she had miscarriage and now is still pregnant, would like ultrasound ordered as she is leaving for [place] tomorrow AM    History (similar episodes/previous evaluation): [facility]    Precipitating or alleviating factors: None    Therapies tried and outcome: None    CareEvery where reviewed    Pt is confused on if still pregnant or not.      US and BHCG levels show she miscarried and that was discussed.             Problem list and histories reviewed & adjusted, as indicated.  Additional history: as documented    Labs reviewed in EPIC    ROS:  C: NEGATIVE for fever, chills, change in weight    OBJECTIVE:                                                    /72  Pulse 95  Temp 98.2  F (36.8  C)  Resp 17  Wt 195 lb (88.5 kg)  SpO2 97%  BMI 32.45 kg/m2  Body mass index is 32.45 kg/(m^2).   GENERAL: healthy, alert, well nourished, well hydrated, no distress  PSYCH: Alert and oriented times 3; speech- coherent , normal rate and volume; able to articulate logical thoughts, able to abstract reason, no tangential thoughts, no hallucinations or delusions, affect- anxious but good insight          ASSESSMENT/PLAN:                                                    (O03.9) Spontaneous miscarriage  (primary encounter diagnosis)  Comment: discussed findings and she understood better.   Plan: Reassurance and sympathy given . Hope she will feel better after [place] trip. F/u 5/2 as scheduled. No US needed and she is to f/u for one last BHCG next week with her OB/GYN when back from vacation .             Cornell Herron MD  [facility]  [place]

## 2017-04-06 NOTE — NURSING NOTE
"Chief Complaint   Patient presents with     Personal       Initial /72  Pulse 95  Temp 98.2  F (36.8  C)  Resp 17  Wt 195 lb (88.5 kg)  SpO2 97%  BMI 32.45 kg/m2 Estimated body mass index is 32.45 kg/(m^2) as calculated from the following:    Height as of 2/27/17: 5' 5\" (1.651 m).    Weight as of this encounter: 195 lb (88.5 kg).  Medication Reconciliation: complete  "

## 2017-05-02 ENCOUNTER — OFFICE VISIT (OUTPATIENT)
Dept: FAMILY MEDICINE | Facility: OTHER | Age: 27
End: 2017-05-02
Attending: FAMILY MEDICINE
Payer: COMMERCIAL

## 2017-05-02 VITALS
WEIGHT: 190 LBS | HEART RATE: 78 BPM | BODY MASS INDEX: 31.65 KG/M2 | TEMPERATURE: 98.5 F | DIASTOLIC BLOOD PRESSURE: 60 MMHG | HEIGHT: 65 IN | SYSTOLIC BLOOD PRESSURE: 110 MMHG

## 2017-05-02 DIAGNOSIS — F33.0 MAJOR DEPRESSIVE DISORDER, RECURRENT EPISODE, MILD (H): ICD-10-CM

## 2017-05-02 DIAGNOSIS — F90.0 ATTENTION-DEFICIT HYPERACTIVITY DISORDER, PREDOMINANTLY INATTENTIVE TYPE: Primary | ICD-10-CM

## 2017-05-02 DIAGNOSIS — F43.10 PTSD (POST-TRAUMATIC STRESS DISORDER): ICD-10-CM

## 2017-05-02 PROCEDURE — 99212 OFFICE O/P EST SF 10 MIN: CPT

## 2017-05-02 PROCEDURE — 99214 OFFICE O/P EST MOD 30 MIN: CPT | Performed by: FAMILY MEDICINE

## 2017-05-02 RX ORDER — DEXTROAMPHETAMINE SACCHARATE, AMPHETAMINE ASPARTATE MONOHYDRATE, DEXTROAMPHETAMINE SULFATE AND AMPHETAMINE SULFATE 5; 5; 5; 5 MG/1; MG/1; MG/1; MG/1
20 CAPSULE, EXTENDED RELEASE ORAL DAILY
Qty: 30 CAPSULE | Refills: 0 | Status: SHIPPED | OUTPATIENT
Start: 2017-05-02 | End: 2017-06-01

## 2017-05-02 RX ORDER — DEXTROAMPHETAMINE SACCHARATE, AMPHETAMINE ASPARTATE MONOHYDRATE, DEXTROAMPHETAMINE SULFATE AND AMPHETAMINE SULFATE 5; 5; 5; 5 MG/1; MG/1; MG/1; MG/1
20 CAPSULE, EXTENDED RELEASE ORAL DAILY
Qty: 30 CAPSULE | Refills: 0 | Status: SHIPPED | OUTPATIENT
Start: 2017-07-03 | End: 2017-08-02

## 2017-05-02 RX ORDER — BUPROPION HYDROCHLORIDE 150 MG/1
150 TABLET ORAL EVERY MORNING
Qty: 30 TABLET | Refills: 1 | Status: SHIPPED | OUTPATIENT
Start: 2017-05-02 | End: 2017-05-11

## 2017-05-02 RX ORDER — DEXTROAMPHETAMINE SACCHARATE, AMPHETAMINE ASPARTATE MONOHYDRATE, DEXTROAMPHETAMINE SULFATE AND AMPHETAMINE SULFATE 5; 5; 5; 5 MG/1; MG/1; MG/1; MG/1
20 CAPSULE, EXTENDED RELEASE ORAL DAILY
Qty: 30 CAPSULE | Refills: 0 | Status: SHIPPED | OUTPATIENT
Start: 2017-06-02 | End: 2017-07-02

## 2017-05-02 ASSESSMENT — ANXIETY QUESTIONNAIRES
6. BECOMING EASILY ANNOYED OR IRRITABLE: NEARLY EVERY DAY
2. NOT BEING ABLE TO STOP OR CONTROL WORRYING: NEARLY EVERY DAY
IF YOU CHECKED OFF ANY PROBLEMS ON THIS QUESTIONNAIRE, HOW DIFFICULT HAVE THESE PROBLEMS MADE IT FOR YOU TO DO YOUR WORK, TAKE CARE OF THINGS AT HOME, OR GET ALONG WITH OTHER PEOPLE: EXTREMELY DIFFICULT
3. WORRYING TOO MUCH ABOUT DIFFERENT THINGS: NEARLY EVERY DAY
7. FEELING AFRAID AS IF SOMETHING AWFUL MIGHT HAPPEN: NEARLY EVERY DAY
1. FEELING NERVOUS, ANXIOUS, OR ON EDGE: NEARLY EVERY DAY
GAD7 TOTAL SCORE: 21
5. BEING SO RESTLESS THAT IT IS HARD TO SIT STILL: NEARLY EVERY DAY

## 2017-05-02 ASSESSMENT — PATIENT HEALTH QUESTIONNAIRE - PHQ9: 5. POOR APPETITE OR OVEREATING: NEARLY EVERY DAY

## 2017-05-02 ASSESSMENT — PAIN SCALES - GENERAL: PAINLEVEL: NO PAIN (0)

## 2017-05-02 NOTE — NURSING NOTE
"Chief Complaint   Patient presents with     A.D.H.D     Depression       Initial /60  Pulse 78  Temp 98.5  F (36.9  C)  Ht 5' 5\" (1.651 m)  Wt 190 lb (86.2 kg)  BMI 31.62 kg/m2 Estimated body mass index is 31.62 kg/(m^2) as calculated from the following:    Height as of this encounter: 5' 5\" (1.651 m).    Weight as of this encounter: 190 lb (86.2 kg).  Medication Reconciliation: complete     Gilbert Titus      "

## 2017-05-02 NOTE — PROGRESS NOTES
"  SUBJECTIVE:                                                    Jennifer Tatum is a 26 year old female who presents to clinic today for the following health issues:      Abnormal Mood Symptoms      Duration: years    Description:  Depression: YES  Anxiety: YES  Panic attacks: YES     Accompanying signs and symptoms: see PHQ-9 and VINI scores    History (similar episodes/previous evaluation): recent miscarriage    Precipitating or alleviating factors: None    Therapies tried and outcome: Celexa (Citalopram)     PHQ-9 SCORE 6/17/2016 11/3/2016 5/2/2017   Total Score - - -   Total Score 15 14 23     VINI-7 SCORE 11/3/2016 5/2/2017   Total Score 17 21   Worse after miscarriage - had  Fetal demise  prior to that about a year ago.   Worsening depression and increase irritability at home and work  Poor sleep unless uses trazodone  Poor energy     Has been on Celexa for over a year  Was on Wellbutrin younger age  No daily exercise  Not seen counselor - is planning to call [facility]          Problem list and histories reviewed & adjusted, as indicated.  Additional history: as documented    Labs reviewed in EPIC    Reviewed and updated as needed this visit by clinical staff  Tobacco  Allergies  Meds  Med Hx  Surg Hx  Fam Hx  Soc Hx      Reviewed and updated as needed this visit by Provider         ROS:  C: NEGATIVE for fever, chills, change in weight  E/M: NEGATIVE for ear, mouth and throat problems  R: NEGATIVE for significant cough or SOB  CV: NEGATIVE for chest pain, palpitations or peripheral edema    OBJECTIVE:                                                    /60  Pulse 78  Temp 98.5  F (36.9  C)  Ht 5' 5\" (1.651 m)  Wt 190 lb (86.2 kg)  BMI 31.62 kg/m2  Body mass index is 31.62 kg/(m^2).   GENERAL: healthy, alert, well nourished, well hydrated, no distress  PSYCH: Alert and oriented times 3; speech- coherent , normal rate and volume; able to articulate logical thoughts, able to abstract reason, no " tangential thoughts, no hallucinations or delusions, affect- depressed/teary eyed          ASSESSMENT/PLAN:                                                    (F90.0) Attention-deficit hyperactivity disorder, predominantly inattentive type  (primary encounter diagnosis)  Comment: overall stable - main  Issue below. Will refill meds.   Plan: amphetamine-dextroamphetamine (ADDERALL XR) 20         MG per 24 hr capsule,         amphetamine-dextroamphetamine (ADDERALL XR) 20         MG per 24 hr capsule,         amphetamine-dextroamphetamine (ADDERALL XR) 20         MG per 24 hr capsule        F/u in 3 months     (F43.10) PTSD (post-traumatic stress disorder)  Comment: still playing a role here gabriele after miscarriage.   Plan: buPROPion (WELLBUTRIN XL) 150 MG 24 hr tablet        On sleeper- will confirm dose and if it is trazadone-- pt will call in. May still use and is working.  Needs counselign and will be calling [facility] tomorrow.     (F33.0) Major depressive disorder, recurrent episode, mild (H)  Comment: Will add Wellbutrin- on max dose overall of celexa. Need to be carefull since now on 3-4 agents for sleep/depression and adhd  Plan: buPROPion (WELLBUTRIN XL) 150 MG 24 hr tablet        Counseling ASAP, need to get daily exercise and full activities  Back in life.  She says will work on that.  F/u in10-14 days.       *25-30 minutes was spent with patient and over 50%  of this time was spent on counseling patient regarding  illness, medication and / or treatment  options, coordinating further cares and follow ups that are needed along with resource material that will be helpful in the treatment of these issues.     See Patient Instructions    Cornell Herron MD  [facility]

## 2017-05-02 NOTE — MR AVS SNAPSHOT
After Visit Summary   5/2/2017    Jennifer Tatum    MRN: [id number]           Patient Information     Date Of Birth          1990        Visit Information        Provider Department      5/2/2017 3:00 PM Cornell Herron MD [facility]        Today's Diagnoses     Attention-deficit hyperactivity disorder, predominantly inattentive type    -  1    PTSD (post-traumatic stress disorder)        Major depressive disorder, recurrent episode, mild (H)          Care Instructions    Call if getting worse    Call for counseling at [facility]    Call with dose of Trazadone.         Follow-ups after your visit        Your next 10 appointments already scheduled     May 16, 2017  9:30 AM CDT   (Arrive by 9:15 AM)   SHORT with Cornell Herron MD   [facility] ([facility])    [address] [address]   [phone]            Jul 31, 2017 10:30 AM CDT   (Arrive by 10:15 AM)   SHORT with Cornell Herron MD   [facility] ([facility])    [address] [address]   [phone]              Who to contact     If you have questions or need follow up information about today's clinic visit or your schedule please contact [facility] directly at [phone].  Normal or non-critical lab and imaging results will be communicated to you by Tradyohart, letter or phone within 4 business days after the clinic has received the results. If you do not hear from us within 7 days, please contact the clinic through Tradyohart or phone. If you have a critical or abnormal lab result, we will notify you by phone as soon as possible.  Submit refill requests through StudyEdge or call your pharmacy and they will forward the refill request to us. Please allow 3 business days for your refill to be completed.          Additional Information About Your Visit        TradyoharVR1 Information     StudyEdge lets you send messages to your doctor, view your test results,  "renew your prescriptions, schedule appointments and more. To sign up, go to [email or web address] . Click on \"Log in\" on the left side of the screen, which will take you to the Welcome page. Then click on \"Sign up Now\" on the right side of the page.     You will be asked to enter the access code listed below, as well as some personal information. Please follow the directions to create your username and password.     Your access code is: [email or web address]  Expires: 2017  4:02 PM     Your access code will  in 90 days. If you need help or a new code, please call your [place] clinic or [phone].        Care EveryWhere ID     This is your Care EveryWhere ID. This could be used by other organizations to access your [place] medical records  [id number]        Your Vitals Were     Pulse Temperature Height BMI (Body Mass Index)          78 98.5  F (36.9  C) 5' 5\" (1.651 m) 31.62 kg/m2         Blood Pressure from Last 3 Encounters:   17 110/60   17 122/72   17 102/70    Weight from Last 3 Encounters:   17 190 lb (86.2 kg)   17 195 lb (88.5 kg)   17 190 lb (86.2 kg)              Today, you had the following     No orders found for display         Today's Medication Changes          These changes are accurate as of: 17  5:06 PM.  If you have any questions, ask your nurse or doctor.               Start taking these medicines.        Dose/Directions    * amphetamine-dextroamphetamine 20 MG per 24 hr capsule   Commonly known as:  ADDERALL XR   Used for:  Attention-deficit hyperactivity disorder, predominantly inattentive type   Started by:  Cornell Herron MD        Dose:  20 mg   Take 1 capsule (20 mg) by mouth daily   Quantity:  30 capsule   Refills:  0       * amphetamine-dextroamphetamine 20 MG per 24 hr capsule   Commonly known as:  ADDERALL XR   Used for:  Attention-deficit hyperactivity disorder, predominantly inattentive type   Started by:  Cornell Herron MD        " Dose:  20 mg   Start taking on:  6/2/2017   Take 1 capsule (20 mg) by mouth daily   Quantity:  30 capsule   Refills:  0       * amphetamine-dextroamphetamine 20 MG per 24 hr capsule   Commonly known as:  ADDERALL XR   Used for:  Attention-deficit hyperactivity disorder, predominantly inattentive type   Started by:  Cornell Herron MD        Dose:  20 mg   Start taking on:  7/3/2017   Take 1 capsule (20 mg) by mouth daily   Quantity:  30 capsule   Refills:  0       buPROPion 150 MG 24 hr tablet   Commonly known as:  WELLBUTRIN XL   Used for:  PTSD (post-traumatic stress disorder), Major depressive disorder, recurrent episode, mild (H)   Started by:  Cornell Herron MD        Dose:  150 mg   Take 1 tablet (150 mg) by mouth every morning   Quantity:  30 tablet   Refills:  1       * Notice:  This list has 3 medication(s) that are the same as other medications prescribed for you. Read the directions carefully, and ask your doctor or other care provider to review them with you.         Where to get your medicines      These medications were sent to [id number] - [place] - [id number]   [address], [place] [address]     Phone:  [phone]     buPROPion 150 MG 24 hr tablet         Some of these will need a paper prescription and others can be bought over the counter.  Ask your nurse if you have questions.     Bring a paper prescription for each of these medications     amphetamine-dextroamphetamine 20 MG per 24 hr capsule    amphetamine-dextroamphetamine 20 MG per 24 hr capsule    amphetamine-dextroamphetamine 20 MG per 24 hr capsule                Primary Care Provider Office Phone # Fax #    Cornell Herron [phone]       [address]  [place] [address]        Thank you!     Thank you for choosing [facility]  for your care. Our goal is always to provide you with excellent care. Hearing back from our patients is one way we can continue to  improve our services. Please take a few minutes to complete the written survey that you may receive in the mail after your visit with us. Thank you!             Your Updated Medication List - Protect others around you: Learn how to safely use, store and throw away your medicines at www.disposemymeds.org.          This list is accurate as of: 5/2/17  5:06 PM.  Always use your most recent med list.                   Brand Name Dispense Instructions for use    * amphetamine-dextroamphetamine 20 MG per 24 hr capsule    ADDERALL XR    30 capsule    Take 1 capsule (20 mg) by mouth daily       * amphetamine-dextroamphetamine 20 MG per 24 hr capsule   Start taking on:  6/2/2017    ADDERALL XR    30 capsule    Take 1 capsule (20 mg) by mouth daily       * amphetamine-dextroamphetamine 20 MG per 24 hr capsule   Start taking on:  7/3/2017    ADDERALL XR    30 capsule    Take 1 capsule (20 mg) by mouth daily       buPROPion 150 MG 24 hr tablet    WELLBUTRIN XL    30 tablet    Take 1 tablet (150 mg) by mouth every morning       citalopram 40 MG tablet    celeXA    90 tablet    Take 1 tablet (40 mg) by mouth daily       prenatal multivitamin  with iron 28-0.8 MG Tabs     90 each    Take 1 tablet by mouth daily Reported on 4/6/2017       TYLENOL PO          * Notice:  This list has 3 medication(s) that are the same as other medications prescribed for you. Read the directions carefully, and ask your doctor or other care provider to review them with you.

## 2017-05-03 ASSESSMENT — PATIENT HEALTH QUESTIONNAIRE - PHQ9: SUM OF ALL RESPONSES TO PHQ QUESTIONS 1-9: 23

## 2017-05-03 ASSESSMENT — ANXIETY QUESTIONNAIRES: GAD7 TOTAL SCORE: 21

## 2017-05-09 ENCOUNTER — TELEPHONE (OUTPATIENT)
Dept: FAMILY MEDICINE | Facility: OTHER | Age: 27
End: 2017-05-09

## 2017-05-09 NOTE — TELEPHONE ENCOUNTER
I called. Took wellbutrin today.  Stop wellbutrin starting tomorrow( wed) .  Call me on how feels on Friday 3 days from now.  Consider Effexor at low dose. Will have to explain that would need to wean off before delivery if and when gets pregnant.

## 2017-05-09 NOTE — TELEPHONE ENCOUNTER
Pt is calling to talk to Dr. johnson's nurse about the new medication she got put on. Please call pt back

## 2017-05-09 NOTE — TELEPHONE ENCOUNTER
Has started to feel very paranoid with new medication wellbutrin feels like someone is watching her all the time, wakes up in middle of night and feels like someone is standing over her.

## 2017-05-10 ENCOUNTER — TELEPHONE (OUTPATIENT)
Dept: FAMILY MEDICINE | Facility: OTHER | Age: 27
End: 2017-05-10

## 2017-05-10 NOTE — TELEPHONE ENCOUNTER
Pt would like Dr. Herron's nurse to call her back. If she does not answer please leave a message.  Message sent to to pool.

## 2017-05-11 ENCOUNTER — OFFICE VISIT (OUTPATIENT)
Dept: FAMILY MEDICINE | Facility: OTHER | Age: 27
End: 2017-05-11
Attending: FAMILY MEDICINE
Payer: COMMERCIAL

## 2017-05-11 VITALS
TEMPERATURE: 97.6 F | HEART RATE: 88 BPM | DIASTOLIC BLOOD PRESSURE: 62 MMHG | BODY MASS INDEX: 31.65 KG/M2 | WEIGHT: 190 LBS | SYSTOLIC BLOOD PRESSURE: 104 MMHG | HEIGHT: 65 IN

## 2017-05-11 DIAGNOSIS — F43.10 PTSD (POST-TRAUMATIC STRESS DISORDER): ICD-10-CM

## 2017-05-11 DIAGNOSIS — F33.0 MAJOR DEPRESSIVE DISORDER, RECURRENT EPISODE, MILD (H): ICD-10-CM

## 2017-05-11 DIAGNOSIS — F41.9 ANXIETY: ICD-10-CM

## 2017-05-11 DIAGNOSIS — J01.20 ACUTE NON-RECURRENT ETHMOIDAL SINUSITIS: Primary | ICD-10-CM

## 2017-05-11 DIAGNOSIS — H65.01 RIGHT ACUTE SEROUS OTITIS MEDIA, RECURRENCE NOT SPECIFIED: ICD-10-CM

## 2017-05-11 PROCEDURE — 99212 OFFICE O/P EST SF 10 MIN: CPT

## 2017-05-11 PROCEDURE — 99214 OFFICE O/P EST MOD 30 MIN: CPT | Performed by: FAMILY MEDICINE

## 2017-05-11 RX ORDER — VENLAFAXINE HYDROCHLORIDE 37.5 MG/1
CAPSULE, EXTENDED RELEASE ORAL
Qty: 30 CAPSULE | Refills: 1 | Status: SHIPPED | OUTPATIENT
Start: 2017-05-11 | End: 2017-05-30

## 2017-05-11 RX ORDER — CEFPROZIL 500 MG/1
500 TABLET, FILM COATED ORAL 2 TIMES DAILY
Qty: 20 TABLET | Refills: 0 | Status: SHIPPED | OUTPATIENT
Start: 2017-05-11 | End: 2017-05-30

## 2017-05-11 ASSESSMENT — ANXIETY QUESTIONNAIRES
6. BECOMING EASILY ANNOYED OR IRRITABLE: NEARLY EVERY DAY
3. WORRYING TOO MUCH ABOUT DIFFERENT THINGS: NEARLY EVERY DAY
IF YOU CHECKED OFF ANY PROBLEMS ON THIS QUESTIONNAIRE, HOW DIFFICULT HAVE THESE PROBLEMS MADE IT FOR YOU TO DO YOUR WORK, TAKE CARE OF THINGS AT HOME, OR GET ALONG WITH OTHER PEOPLE: EXTREMELY DIFFICULT
7. FEELING AFRAID AS IF SOMETHING AWFUL MIGHT HAPPEN: NEARLY EVERY DAY
5. BEING SO RESTLESS THAT IT IS HARD TO SIT STILL: NEARLY EVERY DAY
1. FEELING NERVOUS, ANXIOUS, OR ON EDGE: NEARLY EVERY DAY
GAD7 TOTAL SCORE: 21
2. NOT BEING ABLE TO STOP OR CONTROL WORRYING: NEARLY EVERY DAY

## 2017-05-11 ASSESSMENT — PAIN SCALES - GENERAL: PAINLEVEL: NO PAIN (0)

## 2017-05-11 ASSESSMENT — PATIENT HEALTH QUESTIONNAIRE - PHQ9: 5. POOR APPETITE OR OVEREATING: NEARLY EVERY DAY

## 2017-05-11 NOTE — PATIENT INSTRUCTIONS
Depression Affects Your Mind and Body  Everyone feels sad or  blue  from time to time for a few days or weeks. Depression is when these feelings don't go away and they interfere with daily life.  Depression is a real illness. It makes you feel sad and helpless. It gets in the way of your life and relationships. It inhibits your ability to think and act. But, with help, you can feel better again.      When I was depressed, I felt awful. I was so tired all the time I could hardly think, but at night I couldn t fall asleep. My head hurt. My stomach hurt. I didn t know what was wrong with me.    Depression affects your whole body  Brain chemicals affect your body as well as your mood. So depression may do more than just make you feel low. You may also feel bad physically. Depression can:    Cause trouble with mental tasks such as remembering, concentrating, or making decisions    Make you feel nervous and jumpy    Cause trouble sleeping. Or you may sleep too much    Change your appetite    Cause headaches, stomachaches, or other aches and pains    Drain your body of energy  Depression and other illness  It is common for people who have chronic health problems to also have depression. It can often be hard to tell which one caused the other. A person might become depressed after finding out they have a health problem. But some studies suggest being depressed may make certain health problems more likely. And some depressed people stop taking care of themselves. This may make them more likely to get sick.    2381-0364 [employer]. [address] [place] [address]. All rights reserved. This information is not intended as a substitute for professional medical care. Always follow your healthcare professional's instructions.        Sinusitis (Antibiotic Treatment)    The sinuses are air-filled spaces within the bones of the face. They connect to the inside of the nose. Sinusitis is an inflammation of  the tissue lining the sinus cavity. Sinus inflammation can occur during a cold. It can also be due to allergies to pollens and other particles in the air. Sinusitis can cause symptoms of sinus congestion and fullness. A sinus infection causes fever, headache and facial pain. There is often green or yellow drainage from the nose or into the back of the throat (post-nasal drip). You have been given antibiotics to treat this condition.  Home care:    Take the full course of antibiotics as instructed. Do not stop taking them, even if you feel better.    Drink plenty of water, hot tea, and other liquids. This may help thin mucus. It also may promote sinus drainage.    Heat may help soothe painful areas of the face. Use a towel soaked in hot water. Or,  the shower and direct the hot spray onto your face. Using a vaporizer along with a menthol rub at night may also help.     An expectorant containing guaifenesin may help thin the mucus and promote drainage from the sinuses.    Over-the-counter decongestants may be used unless a similar medicine was prescribed. Nasal sprays work the fastest. Use one that contains phenylephrine or oxymetazoline. First blow the nose gently. Then use the spray. Do not use these medicines more often than directed on the label or symptoms may get worse. You may also use tablets containing pseudoephedrine. Avoid products that combine ingredients, because side effects may be increased. Read labels. You can also ask the pharmacist for help. (NOTE: Persons with high blood pressure should not use decongestants. They can raise blood pressure.)    Over-the-counter antihistamines may help if allergies contributed to your sinusitis.      Do not use nasal rinses or irrigation during an acute sinus infection, unless told to by your health care provider. Rinsing may spread the infection to other sinuses.    Use acetaminophen or ibuprofen to control pain, unless another pain medicine was prescribed.  (If you have chronic liver or kidney disease or ever had a stomach ulcer, talk with your doctor before using these medicines. Aspirin should never be used in anyone under 18 years of age who is ill with a fever. It may cause severe liver damage.)    Don't smoke. This can worsen symptoms.  Follow-up care  Follow up with your healthcare provider or our staff if you are not improving within the next week.  When to seek medical advice  Call your healthcare provider if any of these occur:    Facial pain or headache becoming more severe    Stiff neck    Unusual drowsiness or confusion    Swelling of the forehead or eyelids    Vision problems, including blurred or double vision    Fever of 100.4 F (38 C) or higher, or as directed by your healthcare provider    Seizure    Breathing problems    Symptoms not resolving within 10 days    1199-9811 [employer]. [address] [place] [address], [place] [address]. All rights reserved. This information is not intended as a substitute for professional medical care. Always follow your healthcare professional's instructions.

## 2017-05-11 NOTE — PROGRESS NOTES
"  SUBJECTIVE:                                                    Jennifer Tatum is a 26 year old female who presents to clinic today for the following health issues:      RESPIRATORY SYMPTOMS      Duration: 3 weeks    Description  nasal congestion, rhinorrhea, cough, fever, chills, ear pain right and hoarse voice    Severity: moderate    Accompanying signs and symptoms: None    History (predisposing factors):  tobacco abuse    Precipitating or alleviating factors: None    Therapies tried and outcome:  rest and fluids     Abnormal Mood Symptoms      Duration: years    Description:  Depression: YES  Anxiety: YES  Panic attacks: YES     Accompanying signs and symptoms: see PHQ-9 and VINI scores    History (similar episodes/previous evaluation): None    Precipitating or alleviating factors: None    Therapies tried and outcome: Celexa (Citalopram) and Wellbutrin (Bupropion)    Started on wellbutrin and got real paranoid     Paranoia better in last 2 days since off  PHQ-9 SCORE 11/3/2016 5/2/2017 5/11/2017   Total Score - - -   Total Score 14 23 25       VINI-7 SCORE 11/3/2016 5/2/2017 5/11/2017   Total Score 17 21 21           Starts counseling at [facility] on Monday next week            Problem list and histories reviewed & adjusted, as indicated.  Additional history: as documented        Reviewed and updated as needed this visit by clinical staff  Tobacco  Allergies  Meds  Med Hx  Surg Hx  Fam Hx  Soc Hx      Reviewed and updated as needed this visit by Provider         ROS:  C: NEGATIVE for fever, chills, change in weight  CV: NEGATIVE for chest pain, palpitations or peripheral edema    OBJECTIVE:                                                    /62  Pulse 88  Temp 97.6  F (36.4  C)  Ht 5' 5\" (1.651 m)  Wt 190 lb (86.2 kg)  BMI 31.62 kg/m2  Body mass index is 31.62 kg/(m^2).   GENERAL: healthy, alert, well nourished, well hydrated, no distress  HENT: ear canals- normal; TMs- normal; Nose- normal; " Mouth- no ulcers, no lesions  NECK: no tenderness, no adenopathy, no asymmetry, no masses, no stiffness; thyroid- normal to palpation  RESP: lungs clear to auscultation - no rales, no rhonchi, no wheezes  PSYCH: Alert and oriented times 3; speech- coherent , normal rate and volume; able to articulate logical thoughts, able to abstract reason, no tangential thoughts, no hallucinations or delusions, affect- depressed and flat affect.  Some anxiety. No SI         ASSESSMENT/PLAN:                                                    (J01.20) Acute non-recurrent ethmoidal sinusitis  (primary encounter diagnosis)  Comment: Will treat.   Plan: cefPROZIL (CEFZIL) 500 MG tablet        Symptomatic treatment was discussed along what is available for OTC medications for symptomatic relief.   Symptomatic treatment was discussed along when patient should call and/or come back into the clinic or go to ER/Urgent care. All questions answered.     (H65.01) Right acute serous otitis media, recurrence not specified  Comment: discussed   Plan: cefPROZIL (CEFZIL) 500 MG tablet        Afrin and how to use with ET exercises.     (F33.0) Major depressive disorder, recurrent episode, mild (H)  Comment: Long discussion on this and med options.   Plan: venlafaxine (EFFEXOR-XR) 37.5 MG 24 hr capsule        Has stopped Wellbutrin - feels better. Add Effexor as adjunct to celexa.  Risk and benefits of Effexor  was discussed and verbal consent to proceed was given.   F/u in 2-3 weeks     (F43.10) PTSD (post-traumatic stress disorder)  Comment: seein counselor on Monday - GREAT   Plan: venlafaxine (EFFEXOR-XR) 37.5 MG 24 hr capsule            (F41.9) Anxiety  Comment: as above   Plan: venlafaxine (EFFEXOR-XR) 37.5 MG 24 hr capsule                See Patient Instructions    Cornell Herron MD  [facility]

## 2017-05-11 NOTE — MR AVS SNAPSHOT
After Visit Summary   5/11/2017    Jennifer Tatum    MRN: [id number]           Patient Information     Date Of Birth          1990        Visit Information        Provider Department      5/11/2017 7:45 AM Cornell Herron MD [facility]        Today's Diagnoses     Acute non-recurrent ethmoidal sinusitis    -  1    Right acute serous otitis media, recurrence not specified        Major depressive disorder, recurrent episode, mild (H)        PTSD (post-traumatic stress disorder)        Anxiety          Care Instructions      Depression Affects Your Mind and Body  Everyone feels sad or  blue  from time to time for a few days or weeks. Depression is when these feelings don't go away and they interfere with daily life.  Depression is a real illness. It makes you feel sad and helpless. It gets in the way of your life and relationships. It inhibits your ability to think and act. But, with help, you can feel better again.      When I was depressed, I felt awful. I was so tired all the time I could hardly think, but at night I couldn t fall asleep. My head hurt. My stomach hurt. I didn t know what was wrong with me.    Depression affects your whole body  Brain chemicals affect your body as well as your mood. So depression may do more than just make you feel low. You may also feel bad physically. Depression can:    Cause trouble with mental tasks such as remembering, concentrating, or making decisions    Make you feel nervous and jumpy    Cause trouble sleeping. Or you may sleep too much    Change your appetite    Cause headaches, stomachaches, or other aches and pains    Drain your body of energy  Depression and other illness  It is common for people who have chronic health problems to also have depression. It can often be hard to tell which one caused the other. A person might become depressed after finding out they have a health problem. But some studies suggest being depressed may make  certain health problems more likely. And some depressed people stop taking care of themselves. This may make them more likely to get sick.    6593-8723 [employer]. [address] [place] [address], [place] [address]. All rights reserved. This information is not intended as a substitute for professional medical care. Always follow your healthcare professional's instructions.        Sinusitis (Antibiotic Treatment)    The sinuses are air-filled spaces within the bones of the face. They connect to the inside of the nose. Sinusitis is an inflammation of the tissue lining the sinus cavity. Sinus inflammation can occur during a cold. It can also be due to allergies to pollens and other particles in the air. Sinusitis can cause symptoms of sinus congestion and fullness. A sinus infection causes fever, headache and facial pain. There is often green or yellow drainage from the nose or into the back of the throat (post-nasal drip). You have been given antibiotics to treat this condition.  Home care:    Take the full course of antibiotics as instructed. Do not stop taking them, even if you feel better.    Drink plenty of water, hot tea, and other liquids. This may help thin mucus. It also may promote sinus drainage.    Heat may help soothe painful areas of the face. Use a towel soaked in hot water. Or,  the shower and direct the hot spray onto your face. Using a vaporizer along with a menthol rub at night may also help.     An expectorant containing guaifenesin may help thin the mucus and promote drainage from the sinuses.    Over-the-counter decongestants may be used unless a similar medicine was prescribed. Nasal sprays work the fastest. Use one that contains phenylephrine or oxymetazoline. First blow the nose gently. Then use the spray. Do not use these medicines more often than directed on the label or symptoms may get worse. You may also use tablets containing pseudoephedrine. Avoid products that combine  ingredients, because side effects may be increased. Read labels. You can also ask the pharmacist for help. (NOTE: Persons with high blood pressure should not use decongestants. They can raise blood pressure.)    Over-the-counter antihistamines may help if allergies contributed to your sinusitis.      Do not use nasal rinses or irrigation during an acute sinus infection, unless told to by your health care provider. Rinsing may spread the infection to other sinuses.    Use acetaminophen or ibuprofen to control pain, unless another pain medicine was prescribed. (If you have chronic liver or kidney disease or ever had a stomach ulcer, talk with your doctor before using these medicines. Aspirin should never be used in anyone under 18 years of age who is ill with a fever. It may cause severe liver damage.)    Don't smoke. This can worsen symptoms.  Follow-up care  Follow up with your healthcare provider or our staff if you are not improving within the next week.  When to seek medical advice  Call your healthcare provider if any of these occur:    Facial pain or headache becoming more severe    Stiff neck    Unusual drowsiness or confusion    Swelling of the forehead or eyelids    Vision problems, including blurred or double vision    Fever of 100.4 F (38 C) or higher, or as directed by your healthcare provider    Seizure    Breathing problems    Symptoms not resolving within 10 days    7217-8204 [employer]. [address] [place] [address]. All rights reserved. This information is not intended as a substitute for professional medical care. Always follow your healthcare professional's instructions.              Follow-ups after your visit        Your next 10 appointments already scheduled     May 16, 2017  9:30 AM CDT   (Arrive by 9:15 AM)   SHORT with Cornell Herron MD   [facility] ([facility])    [address] [place] [address] [place] [address]   [phone]            Jul 31, 2017  "10:30 AM CDT   (Arrive by 10:15 AM)   SHORT with Cornell Herron MD   [facility] ([facility])    [place] [address]   [phone]              Who to contact     If you have questions or need follow up information about today's clinic visit or your schedule please contact [facility] directly at [phone].  Normal or non-critical lab and imaging results will be communicated to you by [employer], letter or phone within 4 business days after the clinic has received the results. If you do not hear from us within 7 days, please contact the clinic through [employer] or phone. If you have a critical or abnormal lab result, we will notify you by phone as soon as possible.  Submit refill requests through [employer] or call your pharmacy and they will forward the refill request to us. Please allow 3 business days for your refill to be completed.          Additional Information About Your Visit        [employer] Information     [employer] lets you send messages to your doctor, view your test results, renew your prescriptions, schedule appointments and more. To sign up, go to [email or web address] . Click on \"Log in\" on the left side of the screen, which will take you to the Welcome page. Then click on \"Sign up Now\" on the right side of the page.     You will be asked to enter the access code listed below, as well as some personal information. Please follow the directions to create your username and password.     Your access code is: [email or web address]  Expires: 2017  4:02 PM     Your access code will  in 90 days. If you need help or a new code, please call your [place] clinic or [phone].        Care EveryWhere ID     This is your Care EveryWhere ID. This could be used by other organizations to access your [place] medical records  [id number]        Your Vitals Were     Pulse Temperature Height BMI (Body Mass Index)          88 97.6  F (36.4  C) 5' 5\" (1.651 m) 31.62 " kg/m2         Blood Pressure from Last 3 Encounters:   05/11/17 104/62   05/02/17 110/60   04/06/17 122/72    Weight from Last 3 Encounters:   05/11/17 190 lb (86.2 kg)   05/02/17 190 lb (86.2 kg)   04/06/17 195 lb (88.5 kg)              Today, you had the following     No orders found for display         Today's Medication Changes          These changes are accurate as of: 5/11/17  8:13 AM.  If you have any questions, ask your nurse or doctor.               Start taking these medicines.        Dose/Directions    cefPROZIL 500 MG tablet   Commonly known as:  CEFZIL   Used for:  Acute non-recurrent ethmoidal sinusitis, Right acute serous otitis media, recurrence not specified   Started by:  Cornell Herron MD        Dose:  500 mg   Take 1 tablet (500 mg) by mouth 2 times daily   Quantity:  20 tablet   Refills:  0       venlafaxine 37.5 MG 24 hr capsule   Commonly known as:  EFFEXOR-XR   Used for:  Major depressive disorder, recurrent episode, mild (H), PTSD (post-traumatic stress disorder), Anxiety   Started by:  Cornell Herron MD        Take 1 capsule daily   Quantity:  30 capsule   Refills:  1         Stop taking these medicines if you haven't already. Please contact your care team if you have questions.     buPROPion 150 MG 24 hr tablet   Commonly known as:  WELLBUTRIN XL   Stopped by:  Cornell Herron MD           prenatal multivitamin  with iron 28-0.8 MG Tabs   Stopped by:  Cornell Herron MD                Where to get your medicines      These medications were sent to [id number] - [place] - [id number]   [address], [place] [address]     Phone:  [phone]     cefPROZIL 500 MG tablet    venlafaxine 37.5 MG 24 hr capsule                Primary Care Provider Office Phone # Fax #    Cornell Herron [phone]       [facility] [address] [facility]  [place] [address]        Thank you!     Thank you for choosing [facility]  for your care. Our goal is  always to provide you with excellent care. Hearing back from our patients is one way we can continue to improve our services. Please take a few minutes to complete the written survey that you may receive in the mail after your visit with us. Thank you!             Your Updated Medication List - Protect others around you: Learn how to safely use, store and throw away your medicines at www.disposemymeds.org.          This list is accurate as of: 5/11/17  8:13 AM.  Always use your most recent med list.                   Brand Name Dispense Instructions for use    * amphetamine-dextroamphetamine 20 MG per 24 hr capsule    ADDERALL XR    30 capsule    Take 1 capsule (20 mg) by mouth daily       * amphetamine-dextroamphetamine 20 MG per 24 hr capsule   Start taking on:  6/2/2017    ADDERALL XR    30 capsule    Take 1 capsule (20 mg) by mouth daily       * amphetamine-dextroamphetamine 20 MG per 24 hr capsule   Start taking on:  7/3/2017    ADDERALL XR    30 capsule    Take 1 capsule (20 mg) by mouth daily       cefPROZIL 500 MG tablet    CEFZIL    20 tablet    Take 1 tablet (500 mg) by mouth 2 times daily       citalopram 40 MG tablet    celeXA    90 tablet    Take 1 tablet (40 mg) by mouth daily       TRAZODONE HCL PO      Take 50 mg by mouth At Bedtime       TYLENOL PO          venlafaxine 37.5 MG 24 hr capsule    EFFEXOR-XR    30 capsule    Take 1 capsule daily       * Notice:  This list has 3 medication(s) that are the same as other medications prescribed for you. Read the directions carefully, and ask your doctor or other care provider to review them with you.

## 2017-05-11 NOTE — NURSING NOTE
"Chief Complaint   Patient presents with     Ear Problem     Depression       Initial /62  Pulse 88  Temp 97.6  F (36.4  C)  Ht 5' 5\" (1.651 m)  Wt 190 lb (86.2 kg)  BMI 31.62 kg/m2 Estimated body mass index is 31.62 kg/(m^2) as calculated from the following:    Height as of this encounter: 5' 5\" (1.651 m).    Weight as of this encounter: 190 lb (86.2 kg).  Medication Reconciliation: complete     Gilbert Titus      "

## 2017-05-12 ASSESSMENT — PATIENT HEALTH QUESTIONNAIRE - PHQ9: SUM OF ALL RESPONSES TO PHQ QUESTIONS 1-9: 25

## 2017-05-12 ASSESSMENT — ANXIETY QUESTIONNAIRES: GAD7 TOTAL SCORE: 21

## 2017-05-15 ENCOUNTER — TRANSFERRED RECORDS (OUTPATIENT)
Dept: HEALTH INFORMATION MANAGEMENT | Facility: HOSPITAL | Age: 27
End: 2017-05-15

## 2017-05-30 ENCOUNTER — OFFICE VISIT (OUTPATIENT)
Dept: FAMILY MEDICINE | Facility: OTHER | Age: 27
End: 2017-05-30
Attending: FAMILY MEDICINE
Payer: COMMERCIAL

## 2017-05-30 VITALS
WEIGHT: 195 LBS | BODY MASS INDEX: 32.45 KG/M2 | RESPIRATION RATE: 17 BRPM | HEART RATE: 84 BPM | DIASTOLIC BLOOD PRESSURE: 70 MMHG | SYSTOLIC BLOOD PRESSURE: 121 MMHG | OXYGEN SATURATION: 96 % | TEMPERATURE: 96.7 F

## 2017-05-30 DIAGNOSIS — F43.10 PTSD (POST-TRAUMATIC STRESS DISORDER): ICD-10-CM

## 2017-05-30 DIAGNOSIS — M26.609 TMJ (TEMPOROMANDIBULAR JOINT SYNDROME): Primary | ICD-10-CM

## 2017-05-30 DIAGNOSIS — F33.9 RECURRENT MAJOR DEPRESSIVE DISORDER, REMISSION STATUS UNSPECIFIED (H): ICD-10-CM

## 2017-05-30 PROCEDURE — 99214 OFFICE O/P EST MOD 30 MIN: CPT | Performed by: FAMILY MEDICINE

## 2017-05-30 PROCEDURE — 99212 OFFICE O/P EST SF 10 MIN: CPT

## 2017-05-30 RX ORDER — CITALOPRAM HYDROBROMIDE 40 MG/1
20 TABLET ORAL DAILY
Qty: 90 TABLET | Refills: 3 | COMMUNITY
Start: 2017-05-30 | End: 2017-06-20

## 2017-05-30 RX ORDER — VENLAFAXINE HYDROCHLORIDE 75 MG/1
75 CAPSULE, EXTENDED RELEASE ORAL DAILY
Qty: 30 CAPSULE | Refills: 1 | Status: SHIPPED | OUTPATIENT
Start: 2017-05-30 | End: 2017-07-26

## 2017-05-30 RX ORDER — CYCLOBENZAPRINE HCL 10 MG
TABLET ORAL
Qty: 30 TABLET | Refills: 1 | Status: SHIPPED | OUTPATIENT
Start: 2017-05-30 | End: 2017-07-26

## 2017-05-30 ASSESSMENT — ANXIETY QUESTIONNAIRES
IF YOU CHECKED OFF ANY PROBLEMS ON THIS QUESTIONNAIRE, HOW DIFFICULT HAVE THESE PROBLEMS MADE IT FOR YOU TO DO YOUR WORK, TAKE CARE OF THINGS AT HOME, OR GET ALONG WITH OTHER PEOPLE: EXTREMELY DIFFICULT
6. BECOMING EASILY ANNOYED OR IRRITABLE: NEARLY EVERY DAY
GAD7 TOTAL SCORE: 21
1. FEELING NERVOUS, ANXIOUS, OR ON EDGE: NEARLY EVERY DAY
2. NOT BEING ABLE TO STOP OR CONTROL WORRYING: NEARLY EVERY DAY
5. BEING SO RESTLESS THAT IT IS HARD TO SIT STILL: NEARLY EVERY DAY
3. WORRYING TOO MUCH ABOUT DIFFERENT THINGS: NEARLY EVERY DAY
7. FEELING AFRAID AS IF SOMETHING AWFUL MIGHT HAPPEN: NEARLY EVERY DAY

## 2017-05-30 ASSESSMENT — PATIENT HEALTH QUESTIONNAIRE - PHQ9: 5. POOR APPETITE OR OVEREATING: NEARLY EVERY DAY

## 2017-05-30 ASSESSMENT — PAIN SCALES - GENERAL: PAINLEVEL: NO PAIN (0)

## 2017-05-30 NOTE — PATIENT INSTRUCTIONS
Self-Care for Temporomandibular Disorders (TMD)  You have temporomandibular disorder (TMD). This term describes a group of problems related to the temporomandibular joint (TMJ) and nearby muscles. The TMJ is located where the upper and lower jaws meet. Treatment will get your jaw back to normal function. But your care doesn t end there. Once you ve had TMD, it s important to avoid reinjury. Get in the habit of doing self-checks. This can make you aware of any symptoms that begin to return, so you can take action right away.    Doing self-checks  Make it a habit to assess your body a few times each day. Try writing yourself a reminder. Or set an alarm on your watch or computer. When doing a self-check, ask yourself:    Do I feel stressed?    Are my muscles tense?    Am I grinding or clenching my teeth?    Is my posture healthy for my body?    Is there anything I can do to make myself more comfortable?  If you answer  yes  to any of the questions above, you need to take action. Adjusting your posture or taking a short break can help prevent or relieve TMD symptoms.  Listening to your body  Many people get used to ignoring pain. But pain is a signal that your body needs care. To maintain your TMJ health:    Avoid hard or chewy foods. Even if you feel fine, eating such foods can trigger symptoms again.    Be aware of your body. Don t ignore TMD symptoms. The nagging pain in your neck or jaw may be a sign that you need care.    Be sure to keep follow-up appointments with your health care team.  Managing stress  Stress is a key factor in TMD. Stress can cause you to clench your muscles or grind your teeth. It can also affect your sleep, reducing your body s ability to heal. Here are a few tips to manage stress:    Learn ways to relax. Try listening to music or gently stretching. Take a few slow deep breaths. Or, close your eyes and imagine a place or object that is calming.    Get plenty of rest and sleep.    Set goals  you know you can attain.    Make time for people and things you enjoy.    Ask for help if you need it. Friends and family can run errands and cook meals for you.   Staying active  Activity helps the body in many ways. You stay looser and more relaxed. It also helps keep muscles and tissues conditioned. That way you can heal faster and make reinjury less likely. Here are some tips to get you started:    Talk to your health care provider before starting an exercise program.    Always warm up and stretch before each activity. This helps prevent injury.    Try walking or swimming. These activities are easy on your joints. They also benefit your heart and lungs.    Try yoga or esdras chi. These are relaxing activities known for reducing stress.    8638-4876 [employer]. [address] [place] [address], [place] [address]. All rights reserved. This information is not intended as a substitute for professional medical care. Always follow your healthcare professional's instructions.        Pain Relief Methods for Temporomandibular Disorders (TMD)  You have been diagnosed with temporomandibular disorder (TMD). This term describes a group of problems related to the temporomandibular joint (TMJ)and nearby muscles. The TMJ is located where the upper and lower jaws meet. TMD can cause painful and frustrating symptoms. But your health care provider can recommend various pain relief methods as part of your treatment. These may include medicines and certain types of therapy, like massage or gentle exercise.  Using medicines    Medicines may be prescribed to treat TMD. Others may be available over-the-counter. The medicine type and dosage will depend on the problem you have. For your safety, tell your health care provider if you are currently taking any medicines. Also mention any herbs or supplements you are using. Common medicines used to treat TMD include:    Anti-inflammatories and analgesics. These treat pain, inflammation,  osteoarthritis, and rheumatoid arthritis. Anti-inflammatories reduce swelling, heat, redness, and pain. They also help restore function. Analgesics reduce pain. Nonsteroidal anti-inflammatories (NSAIDs) relieve inflammation as well as pain.    Muscle relaxants. These treat myofascial pain. This is pain that occurs in the soft tissues or muscles around the TMJ. Muscle relaxants help ease muscle tension. This reduces pressure on the TMJ from tight jaw muscles.    Antidepressants. These can be used to reduce pain or bruxism (teeth grinding). At higher dosages, these medicines are used to treat depression. Given at low dosages, antidepressants help relieve TMD symptoms. They can reduce muscle pain. They also raise the level of serotonin, a body chemical that improves sleep. This in turn can decrease bruxism during the night.  Treating painful muscles  A trigger point is a painful spot in a tight muscle. It is often painful to the touch and may refer pain to other places. Your health care provider can focus on trigger points using:    Massage, both inside and outside the mouth. This relaxes muscles and improves circulation.    Palpation, which is applying pressure to points of the jaw and face with the fingers.    Cold spray and stretching of the muscles to relax them.    An anesthetic for pain relief. This may be given as an injection by your dentist.  Treating the joint  Therapy may focus directly on the TMJ. There are different ways to treat the joint:    A self-care program helps you treat and manage symptoms on your own. Your program may include exercises. It may also include using ice and heat to relieve pain.    Gentle manipulation reduces pain and restores range of motion. The health care provider uses his or her hands to relax muscles and ligaments around the joint.    Exercises strengthen muscles in the jaw and face.    Ultrasound uses sound waves to reduce pain and swelling. It also improves pain and  swelling.  Treating inflammation  When the joint is inflamed, movement becomes difficult -- even impossible at times. Your health care provider can help. Treatment may include:    Rest and gentle exercise to increase range of motion. One common exercise is to apply pressure to the jaw and resist the movement (isometric exercise).    A gel pack or ice wrapped in a towel applied for 10 to 20 minutes repeated 3 or 4 times a day. This eases swelling and reduces pain.    Massage and gentle manipulation as described above.    1242-0642 [employer]. [address] [place] [id number]. All rights reserved. This information is not intended as a substitute for professional medical care. Always follow your healthcare professional's instructions.        Dental Treatment for Temporomandibular Disorders (TMD)  You have been diagnosed with temporomandibular disorder (TMD).This term describes a group of problems related to the temporomandibular joint (TMJ) and nearby muscles. The TMJ is located where the upper and lower jaws meet and is part of a structural system that includes the teeth. Because the joint and teeth work together, a problem with your teeth and bite can be linked to TMD. If you grind your teeth or if you have a bad bite, your dentist may be able to help. If your teeth need to be realigned to improve your bite, you may be referred to an orthodontist.  If you grind or clench your teeth    Bruxism (teeth grinding) or clenching strains the TMJ and related muscles. If you have these habits during the day, doing self-checks can help you stop. But it s hard to control these habits when you re asleep. That s when the use of a splint can often help:    How a splint works. A splint is an appliance that fits in the mouth. It may also be called an orthotic or . There are different kinds of splints for different kinds of needs. A splint can keep the upper and lower teeth apart. This helps protect  tooth surfaces from grinding. A splint can also be made to reduce strain on the area.    Wearing and caring for your splint. To make a splint, your dentist or orthodontist may take impressions of your teeth. Then a splint will be made to fit your mouth. A splint:    May be worn during the day or only at night. Be sure to ask when and how often you should wear your splint.    Should be cleaned before you put it in and after you take it out. Ask your dentist or orthodontist how to clean the splint.    Should be kept in a protective case, away from the reach of children and pets. This helps keep the splint from getting dirty or broken.  If your bite is incorrect  Malocclusion means the jaws or teeth don t fit together properly. This can result in pain and problems with jaw function. If your jaws or teeth are out of alignment, orthodontic treatment may help. If your bad bite is due to missing or damaged teeth, you may receive restorative treatment.    Restorative treatment. A bad bite can be caused by missing or damaged teeth. A dentist can restore teeth in many ways:    A crown is made of ceramic, metal, or porcelain and metal. It is cemented in place to repair a broken or damaged tooth.    A bridge is a false tooth fused between 2 crowns.    A dental implant is an artificial tooth root. It is attached to the jawbone as a base for an artificial tooth.    Orthodontic treatment. Sometimes the upper and lower jaws are out of alignment. Or teeth are out of line, turned, crowded, or spaced too far apart. Your orthodontist can align teeth with braces and other devices. This helps provide a more comfortable bite.  If surgery is needed  Surgery is rarely needed for TMD. However, if other treatments haven t worked, you may be referred to an oral and maxillofacial surgeon. Talk to your dentist about whether surgery might be right for you.     [id number] [employer]. [address] [place] [address], [place] [address]. All  rights reserved. This information is not intended as a substitute for professional medical care. Always follow your healthcare professional's instructions.

## 2017-05-30 NOTE — MR AVS SNAPSHOT
After Visit Summary   5/30/2017    Jennifer Tatum    MRN: [id number]           Patient Information     Date Of Birth          1990        Visit Information        Provider Department      5/30/2017 8:30 AM Cornell Herron MD [facility]        Today's Diagnoses     TMJ (temporomandibular joint syndrome)    -  1    Recurrent major depressive disorder, remission status unspecified (H)        PTSD (post-traumatic stress disorder)          Care Instructions      Self-Care for Temporomandibular Disorders (TMD)  You have temporomandibular disorder (TMD). This term describes a group of problems related to the temporomandibular joint (TMJ) and nearby muscles. The TMJ is located where the upper and lower jaws meet. Treatment will get your jaw back to normal function. But your care doesn t end there. Once you ve had TMD, it s important to avoid reinjury. Get in the habit of doing self-checks. This can make you aware of any symptoms that begin to return, so you can take action right away.    Doing self-checks  Make it a habit to assess your body a few times each day. Try writing yourself a reminder. Or set an alarm on your watch or computer. When doing a self-check, ask yourself:    Do I feel stressed?    Are my muscles tense?    Am I grinding or clenching my teeth?    Is my posture healthy for my body?    Is there anything I can do to make myself more comfortable?  If you answer  yes  to any of the questions above, you need to take action. Adjusting your posture or taking a short break can help prevent or relieve TMD symptoms.  Listening to your body  Many people get used to ignoring pain. But pain is a signal that your body needs care. To maintain your TMJ health:    Avoid hard or chewy foods. Even if you feel fine, eating such foods can trigger symptoms again.    Be aware of your body. Don t ignore TMD symptoms. The nagging pain in your neck or jaw may be a sign that you need care.    Be  sure to keep follow-up appointments with your health care team.  Managing stress  Stress is a key factor in TMD. Stress can cause you to clench your muscles or grind your teeth. It can also affect your sleep, reducing your body s ability to heal. Here are a few tips to manage stress:    Learn ways to relax. Try listening to music or gently stretching. Take a few slow deep breaths. Or, close your eyes and imagine a place or object that is calming.    Get plenty of rest and sleep.    Set goals you know you can attain.    Make time for people and things you enjoy.    Ask for help if you need it. Friends and family can run errands and cook meals for you.   Staying active  Activity helps the body in many ways. You stay looser and more relaxed. It also helps keep muscles and tissues conditioned. That way you can heal faster and make reinjury less likely. Here are some tips to get you started:    Talk to your health care provider before starting an exercise program.    Always warm up and stretch before each activity. This helps prevent injury.    Try walking or swimming. These activities are easy on your joints. They also benefit your heart and lungs.    Try yoga or esdras chi. These are relaxing activities known for reducing stress.    3732-8926 The [employer]. [address] [place] [address], [place] [id number]. All rights reserved. This information is not intended as a substitute for professional medical care. Always follow your healthcare professional's instructions.        Pain Relief Methods for Temporomandibular Disorders (TMD)  You have been diagnosed with temporomandibular disorder (TMD). This term describes a group of problems related to the temporomandibular joint (TMJ)and nearby muscles. The TMJ is located where the upper and lower jaws meet. TMD can cause painful and frustrating symptoms. But your health care provider can recommend various pain relief methods as part of your treatment. These may include  medicines and certain types of therapy, like massage or gentle exercise.  Using medicines    Medicines may be prescribed to treat TMD. Others may be available over-the-counter. The medicine type and dosage will depend on the problem you have. For your safety, tell your health care provider if you are currently taking any medicines. Also mention any herbs or supplements you are using. Common medicines used to treat TMD include:    Anti-inflammatories and analgesics. These treat pain, inflammation, osteoarthritis, and rheumatoid arthritis. Anti-inflammatories reduce swelling, heat, redness, and pain. They also help restore function. Analgesics reduce pain. Nonsteroidal anti-inflammatories (NSAIDs) relieve inflammation as well as pain.    Muscle relaxants. These treat myofascial pain. This is pain that occurs in the soft tissues or muscles around the TMJ. Muscle relaxants help ease muscle tension. This reduces pressure on the TMJ from tight jaw muscles.    Antidepressants. These can be used to reduce pain or bruxism (teeth grinding). At higher dosages, these medicines are used to treat depression. Given at low dosages, antidepressants help relieve TMD symptoms. They can reduce muscle pain. They also raise the level of serotonin, a body chemical that improves sleep. This in turn can decrease bruxism during the night.  Treating painful muscles  A trigger point is a painful spot in a tight muscle. It is often painful to the touch and may refer pain to other places. Your health care provider can focus on trigger points using:    Massage, both inside and outside the mouth. This relaxes muscles and improves circulation.    Palpation, which is applying pressure to points of the jaw and face with the fingers.    Cold spray and stretching of the muscles to relax them.    An anesthetic for pain relief. This may be given as an injection by your dentist.  Treating the joint  Therapy may focus directly on the TMJ. There are  different ways to treat the joint:    A self-care program helps you treat and manage symptoms on your own. Your program may include exercises. It may also include using ice and heat to relieve pain.    Gentle manipulation reduces pain and restores range of motion. The health care provider uses his or her hands to relax muscles and ligaments around the joint.    Exercises strengthen muscles in the jaw and face.    Ultrasound uses sound waves to reduce pain and swelling. It also improves pain and swelling.  Treating inflammation  When the joint is inflamed, movement becomes difficult -- even impossible at times. Your health care provider can help. Treatment may include:    Rest and gentle exercise to increase range of motion. One common exercise is to apply pressure to the jaw and resist the movement (isometric exercise).    A gel pack or ice wrapped in a towel applied for 10 to 20 minutes repeated 3 or 4 times a day. This eases swelling and reduces pain.    Massage and gentle manipulation as described above.    0258-4425 The [employer]. [address] [place] [address]. All rights reserved. This information is not intended as a substitute for professional medical care. Always follow your healthcare professional's instructions.        Dental Treatment for Temporomandibular Disorders (TMD)  You have been diagnosed with temporomandibular disorder (TMD).This term describes a group of problems related to the temporomandibular joint (TMJ) and nearby muscles. The TMJ is located where the upper and lower jaws meet and is part of a structural system that includes the teeth. Because the joint and teeth work together, a problem with your teeth and bite can be linked to TMD. If you grind your teeth or if you have a bad bite, your dentist may be able to help. If your teeth need to be realigned to improve your bite, you may be referred to an orthodontist.  If you grind or clench your teeth    Bruxism (teeth  grinding) or clenching strains the TMJ and related muscles. If you have these habits during the day, doing self-checks can help you stop. But it s hard to control these habits when you re asleep. That s when the use of a splint can often help:    How a splint works. A splint is an appliance that fits in the mouth. It may also be called an orthotic or . There are different kinds of splints for different kinds of needs. A splint can keep the upper and lower teeth apart. This helps protect tooth surfaces from grinding. A splint can also be made to reduce strain on the area.    Wearing and caring for your splint. To make a splint, your dentist or orthodontist may take impressions of your teeth. Then a splint will be made to fit your mouth. A splint:    May be worn during the day or only at night. Be sure to ask when and how often you should wear your splint.    Should be cleaned before you put it in and after you take it out. Ask your dentist or orthodontist how to clean the splint.    Should be kept in a protective case, away from the reach of children and pets. This helps keep the splint from getting dirty or broken.  If your bite is incorrect  Malocclusion means the jaws or teeth don t fit together properly. This can result in pain and problems with jaw function. If your jaws or teeth are out of alignment, orthodontic treatment may help. If your bad bite is due to missing or damaged teeth, you may receive restorative treatment.    Restorative treatment. A bad bite can be caused by missing or damaged teeth. A dentist can restore teeth in many ways:    A crown is made of ceramic, metal, or porcelain and metal. It is cemented in place to repair a broken or damaged tooth.    A bridge is a false tooth fused between 2 crowns.    A dental implant is an artificial tooth root. It is attached to the jawbone as a base for an artificial tooth.    Orthodontic treatment. Sometimes the upper and lower jaws are out of  alignment. Or teeth are out of line, turned, crowded, or spaced too far apart. Your orthodontist can align teeth with braces and other devices. This helps provide a more comfortable bite.  If surgery is needed  Surgery is rarely needed for TMD. However, if other treatments haven t worked, you may be referred to an oral and maxillofacial surgeon. Talk to your dentist about whether surgery might be right for you.     7597-4210 [employer]. [address] [place] [address]. All rights reserved. This information is not intended as a substitute for professional medical care. Always follow your healthcare professional's instructions.                Follow-ups after your visit        Your next 10 appointments already scheduled     May 30, 2017  8:30 AM CDT   (Arrive by 8:15 AM)   SHORT with Cornell Herron MD   [facility] ([facility])    [address] [place] [address]   [phone]            Jul 31, 2017 10:30 AM CDT   (Arrive by 10:15 AM)   SHORT with Cornell Herron MD   [facility] ([facility])    [address] [place] [address]   [phone]              Who to contact     If you have questions or need follow up information about today's clinic visit or your schedule please contact [facility] directly at [phone].  Normal or non-critical lab and imaging results will be communicated to you by MyChart, letter or phone within 4 business days after the clinic has received the results. If you do not hear from us within 7 days, please contact the clinic through MyChart or phone. If you have a critical or abnormal lab result, we will notify you by phone as soon as possible.  Submit refill requests through [employer] or call your pharmacy and they will forward the refill request to us. Please allow 3 business days for your refill to be completed.          Additional Information About Your Visit        MyChart Information     [employer]  "lets you send messages to your doctor, view your test results, renew your prescriptions, schedule appointments and more. To sign up, go to [email or web address] . Click on \"Log in\" on the left side of the screen, which will take you to the Welcome page. Then click on \"Sign up Now\" on the right side of the page.     You will be asked to enter the access code listed below, as well as some personal information. Please follow the directions to create your username and password.     Your access code is: [email or web address]  Expires: 2017  4:02 PM     Your access code will  in 90 days. If you need help or a new code, please call your [facility] clinic or [phone].        Care EveryWhere ID     This is your Care EveryWhere ID. This could be used by other organizations to access your [facility] medical records  [id number]        Your Vitals Were     Pulse Temperature Respirations Pulse Oximetry BMI (Body Mass Index)       84 96.7  F (35.9  C) 17 96% 32.45 kg/m2        Blood Pressure from Last 3 Encounters:   17 121/70   17 104/62   17 110/60    Weight from Last 3 Encounters:   17 195 lb (88.5 kg)   17 190 lb (86.2 kg)   17 190 lb (86.2 kg)              Today, you had the following     No orders found for display         Today's Medication Changes          These changes are accurate as of: 17  8:27 AM.  If you have any questions, ask your nurse or doctor.               Start taking these medicines.        Dose/Directions    cyclobenzaprine 10 MG tablet   Commonly known as:  FLEXERIL   Used for:  TMJ (temporomandibular joint syndrome)   Started by:  Cornell Herron MD        2-1 tab at bedtime   Quantity:  30 tablet   Refills:  1         These medicines have changed or have updated prescriptions.        Dose/Directions    citalopram 40 MG tablet   Commonly known as:  celeXA   This may have changed:  how much to take   Used for:  Recurrent major depressive disorder, remission " status unspecified (H), PTSD (post-traumatic stress disorder)   Changed by:  Cornell Herron MD        Dose:  20 mg   Take 0.5 tablets (20 mg) by mouth daily   Quantity:  90 tablet   Refills:  3       venlafaxine 75 MG 24 hr capsule   Commonly known as:  EFFEXOR-XR   This may have changed:    - medication strength  - how much to take  - how to take this  - when to take this  - additional instructions   Used for:  Recurrent major depressive disorder, remission status unspecified (H), PTSD (post-traumatic stress disorder)   Changed by:  Cornell Herron MD        Dose:  75 mg   Take 1 capsule (75 mg) by mouth daily   Quantity:  30 capsule   Refills:  1            Where to get your medicines      These medications were sent to [id number] - [place] - [address] [address]  [address] [address], [place] [address]     Phone:  [phone]     cyclobenzaprine 10 MG tablet    venlafaxine 75 MG 24 hr capsule                Primary Care Provider Office Phone # Fax #    Cornell Herron [phone]       [facility] [address] [place] [address]  [place] [address]        Thank you!     Thank you for choosing [facility]  for your care. Our goal is always to provide you with excellent care. Hearing back from our patients is one way we can continue to improve our services. Please take a few minutes to complete the written survey that you may receive in the mail after your visit with us. Thank you!             Your Updated Medication List - Protect others around you: Learn how to safely use, store and throw away your medicines at www.disposemymeds.org.          This list is accurate as of: 5/30/17  8:27 AM.  Always use your most recent med list.                   Brand Name Dispense Instructions for use    * amphetamine-dextroamphetamine 20 MG per 24 hr capsule    ADDERALL XR    30 capsule    Take 1 capsule (20 mg) by mouth daily       * amphetamine-dextroamphetamine 20 MG per 24 hr capsule   Start  taking on:  6/2/2017    ADDERALL XR    30 capsule    Take 1 capsule (20 mg) by mouth daily       * amphetamine-dextroamphetamine 20 MG per 24 hr capsule   Start taking on:  7/3/2017    ADDERALL XR    30 capsule    Take 1 capsule (20 mg) by mouth daily       citalopram 40 MG tablet    celeXA    90 tablet    Take 0.5 tablets (20 mg) by mouth daily       cyclobenzaprine 10 MG tablet    FLEXERIL    30 tablet    1/2-1 tab at bedtime       TRAZODONE HCL PO      Take 50 mg by mouth At Bedtime       TYLENOL PO          venlafaxine 75 MG 24 hr capsule    EFFEXOR-XR    30 capsule    Take 1 capsule (75 mg) by mouth daily       * Notice:  This list has 3 medication(s) that are the same as other medications prescribed for you. Read the directions carefully, and ask your doctor or other care provider to review them with you.

## 2017-05-30 NOTE — NURSING NOTE
"Chief Complaint   Patient presents with     Depression       Initial /70  Pulse 84  Temp 96.7  F (35.9  C)  Resp 17  Wt 195 lb (88.5 kg)  SpO2 96%  BMI 32.45 kg/m2 Estimated body mass index is 32.45 kg/(m^2) as calculated from the following:    Height as of 5/11/17: 5' 5\" (1.651 m).    Weight as of this encounter: 195 lb (88.5 kg).  Medication Reconciliation: complete  "

## 2017-05-30 NOTE — PROGRESS NOTES
SUBJECTIVE:                                                    Jennifer Tatum is a 26 year old female who presents to clinic today for the following health issues:        Depression and Anxiety Follow-Up    Status since last visit: No change, bad     Other associated symptoms:None    Complicating factors:     Significant life event: No     Current substance abuse: None    Tried Wellbutrin with celexa - got side effects    Added low dose Effexor    Second appt [facility] today    Increase irritability at work- got in trouble several times at work    Anger and irritability worse        PHQ-9 SCORE 11/3/2016 5/2/2017 5/11/2017   Total Score - - -   Total Score 14 23 25     VINI-7 SCORE 11/3/2016 5/2/2017 5/11/2017   Total Score 17 21 21        PHQ-9  English      PHQ-9   Any Language     GAD7     PHQ-9 SCORE 5/2/2017 5/11/2017 5/30/2017   Total Score - - -   Total Score 23 25 25     VINI-7 SCORE 5/2/2017 5/11/2017 5/30/2017   Total Score 21 21 21         Increase TMJ sx R>L and grinding teeth at night more.       Amount of exercise or physical activity: 4-5 days/week for an average of 45-60 minutes    Problems taking medications regularly: No    Medication side effects: none    Diet: regular (no restrictions)          Problem list and histories reviewed & adjusted, as indicated.  Additional history: as documented        ROS:  C: NEGATIVE for fever, chills, change in weight    OBJECTIVE:                                                    /70  Pulse 84  Temp 96.7  F (35.9  C)  Resp 17  Wt 195 lb (88.5 kg)  SpO2 96%  BMI 32.45 kg/m2  Body mass index is 32.45 kg/(m^2).   GENERAL: healthy, alert, well nourished, well hydrated, no distress  PSYCH: Alert and oriented times 3; speech- coherent , normal rate and volume; able to articulate logical thoughts, able to abstract reason, no tangential thoughts, no hallucinations or delusions, affect- sad/anxious - lots on plate.  Right TMJ tenderness.             ASSESSMENT/PLAN:                                                        ICD-10-CM    1. TMJ (temporomandibular joint syndrome) M26.609 cyclobenzaprine (FLEXERIL) 10 MG tablet   2. Recurrent major depressive disorder, remission status unspecified (H) F33.9 citalopram (CELEXA) 40 MG tablet     venlafaxine (EFFEXOR-XR) 75 MG 24 hr capsule   3. PTSD (post-traumatic stress disorder) F43.10 citalopram (CELEXA) 40 MG tablet     venlafaxine (EFFEXOR-XR) 75 MG 24 hr capsule     Will decrease Celexa to 20mg from 40mg/ Increase Effeexor. Continue counseling- weekly.  Work on positive behaviors.  Need night splint for TMJ. Add Flexeril.  Soft foods etc.  handouts given  Work note given  to give leniency. F/u in 3 weeks. 25 minutes was spent with patient and over 50%  of this time was spent on counseling patient regarding  illness, medication and / or treatment  options, coordinating further cares and follow ups that are needed along with resource material that will be helpful in the treatment of these issues.     See Patient Instructions    Cornell Herron MD  [facility]

## 2017-05-31 ASSESSMENT — PATIENT HEALTH QUESTIONNAIRE - PHQ9: SUM OF ALL RESPONSES TO PHQ QUESTIONS 1-9: 25

## 2017-05-31 ASSESSMENT — ANXIETY QUESTIONNAIRES: GAD7 TOTAL SCORE: 21

## 2017-06-20 ENCOUNTER — OFFICE VISIT (OUTPATIENT)
Dept: FAMILY MEDICINE | Facility: OTHER | Age: 27
End: 2017-06-20
Attending: FAMILY MEDICINE
Payer: COMMERCIAL

## 2017-06-20 VITALS
DIASTOLIC BLOOD PRESSURE: 70 MMHG | HEIGHT: 65 IN | BODY MASS INDEX: 32.49 KG/M2 | HEART RATE: 60 BPM | WEIGHT: 195 LBS | SYSTOLIC BLOOD PRESSURE: 104 MMHG | TEMPERATURE: 97.7 F

## 2017-06-20 DIAGNOSIS — F33.0 MAJOR DEPRESSIVE DISORDER, RECURRENT EPISODE, MILD (H): Primary | ICD-10-CM

## 2017-06-20 DIAGNOSIS — F43.10 PTSD (POST-TRAUMATIC STRESS DISORDER): ICD-10-CM

## 2017-06-20 PROCEDURE — 99213 OFFICE O/P EST LOW 20 MIN: CPT | Performed by: FAMILY MEDICINE

## 2017-06-20 PROCEDURE — 99212 OFFICE O/P EST SF 10 MIN: CPT

## 2017-06-20 RX ORDER — VENLAFAXINE HYDROCHLORIDE 150 MG/1
150 CAPSULE, EXTENDED RELEASE ORAL DAILY
Qty: 30 CAPSULE | Refills: 1 | Status: SHIPPED | OUTPATIENT
Start: 2017-06-20 | End: 2017-09-01

## 2017-06-20 RX ORDER — VENLAFAXINE HYDROCHLORIDE 37.5 MG/1
CAPSULE, EXTENDED RELEASE ORAL
Qty: 7 CAPSULE | Refills: 0 | Status: SHIPPED | OUTPATIENT
Start: 2017-06-20 | End: 2017-07-26

## 2017-06-20 ASSESSMENT — ANXIETY QUESTIONNAIRES
5. BEING SO RESTLESS THAT IT IS HARD TO SIT STILL: NEARLY EVERY DAY
2. NOT BEING ABLE TO STOP OR CONTROL WORRYING: SEVERAL DAYS
3. WORRYING TOO MUCH ABOUT DIFFERENT THINGS: NEARLY EVERY DAY
7. FEELING AFRAID AS IF SOMETHING AWFUL MIGHT HAPPEN: NEARLY EVERY DAY
6. BECOMING EASILY ANNOYED OR IRRITABLE: NEARLY EVERY DAY
1. FEELING NERVOUS, ANXIOUS, OR ON EDGE: NEARLY EVERY DAY
IF YOU CHECKED OFF ANY PROBLEMS ON THIS QUESTIONNAIRE, HOW DIFFICULT HAVE THESE PROBLEMS MADE IT FOR YOU TO DO YOUR WORK, TAKE CARE OF THINGS AT HOME, OR GET ALONG WITH OTHER PEOPLE: VERY DIFFICULT
GAD7 TOTAL SCORE: 19

## 2017-06-20 ASSESSMENT — PATIENT HEALTH QUESTIONNAIRE - PHQ9: 5. POOR APPETITE OR OVEREATING: NEARLY EVERY DAY

## 2017-06-20 ASSESSMENT — PAIN SCALES - GENERAL: PAINLEVEL: NO PAIN (0)

## 2017-06-20 NOTE — MR AVS SNAPSHOT
After Visit Summary   6/20/2017    Jennifer Tatum    MRN: [id number]           Patient Information     Date Of Birth          1990        Visit Information        Provider Department      6/20/2017 7:45 AM Cornell Herron MD [facility]        Today's Diagnoses     Major depressive disorder, recurrent episode, mild (H)    -  1    PTSD (post-traumatic stress disorder)          Care Instructions      Depression: Tips to Help Yourself  As your healthcare providers help treat your depression, you can also help yourself. Keep in mind that your illness affects you emotionally, physically, mentally, and socially. So full recovery will take time. Take care of your body and your soul, and be patient with yourself as you get better.    Self-care    Educate yourself. Read about treatment and medicine options. If you have the energy, attend local conferences or support groups. Keep a list of useful websites and helpful books and use them as needed. This illness is not your fault. Don t blame yourself for your depression.    Manage early symptoms. If you notice symptoms returning, experience triggers, or identify other factors that may lead to a depressive episode, get help as soon as possible. Ask trusted friends and family to monitor your behavior and let you know if they see anything of concern.    Work with your provider. Find a provider you can trust. Communicate honestly with that person and share information on your treatment for depression and your reaction to medicines.    Be prepared for a crisis. Know what to do if you experience a crisis. Keep the phone number of a crisis hotline and know the location of your community's urgent care centers and the closest emergency department.    Hold off on big decisions. Depression can cloud your judgment. So wait until you feel better before making major life decisions, such as changing jobs, moving, or getting  or .    Be patient.  Recovering from depression is a process. Don t be discouraged if it takes some time to feel better.    Keep it simple. Depression saps your energy and concentration. So you won t be able to do all the things you used to do. Set small goals and do what you can.    Be with others. Don t isolate yourself--you ll only feel worse. Try to be with other people. And take part in fun activities when you can. Go to a movie, ballgame, Yazdanism service, or social event. Talk openly with people you can trust. And accept help when it s offered.  Take care of your body  People with depression often lose the desire to take care of themselves. That only makes their problems worse. During treatment and afterward, make a point to:    Exercise. It s a great way to take care of your body. And studies have shown that exercise helps fight depression.    Avoid drugs and alcohol. These may ease the pain in the short term. But they ll only make your problems worse in the long run.    Get relief from stress. Ask your healthcare provider for relaxation exercises and techniques to help relieve stress.    Eat right. A balanced and healthy diet helps keep your body healthy.  Date Last Reviewed: 1/1/2017 2000-2017 [employer]. [address] [place] [address]. All rights reserved. This information is not intended as a substitute for professional medical care. Always follow your healthcare professional's instructions.                Follow-ups after your visit        Your next 10 appointments already scheduled     Jul 31, 2017 10:30 AM CDT   (Arrive by 10:15 AM)   SHORT with Cornell Herron MD   [place] [facility] ([facility] )    [address] [place] [address]   [phone]              Who to contact     If you have questions or need follow up information about today's clinic visit or your schedule please contact [facility] directly at [phone].  Normal or non-critical  "lab and imaging results will be communicated to you by MyChart, letter or phone within 4 business days after the clinic has received the results. If you do not hear from us within 7 days, please contact the clinic through [employer] or phone. If you have a critical or abnormal lab result, we will notify you by phone as soon as possible.  Submit refill requests through [employer] or call your pharmacy and they will forward the refill request to us. Please allow 3 business days for your refill to be completed.          Additional Information About Your Visit        [employer] Information     [employer] lets you send messages to your doctor, view your test results, renew your prescriptions, schedule appointments and more. To sign up, go to [email or web address] . Click on \"Log in\" on the left side of the screen, which will take you to the Welcome page. Then click on \"Sign up Now\" on the right side of the page.     You will be asked to enter the access code listed below, as well as some personal information. Please follow the directions to create your username and password.     Your access code is: [email or web address]  Expires: 2017  4:02 PM     Your access code will  in 90 days. If you need help or a new code, please call your [place] clinic or [phone].        Care EveryWhere ID     This is your Care EveryWhere ID. This could be used by other organizations to access your [place] medical records  [id number]        Your Vitals Were     Pulse Temperature Height BMI (Body Mass Index)          60 97.7  F (36.5  C) 5' 5\" (1.651 m) 32.45 kg/m2         Blood Pressure from Last 3 Encounters:   17 104/70   17 121/70   17 104/62    Weight from Last 3 Encounters:   17 195 lb (88.5 kg)   17 195 lb (88.5 kg)   17 190 lb (86.2 kg)              Today, you had the following     No orders found for display         Today's Medication Changes          These changes are accurate as of: 17  8:04 " AM.  If you have any questions, ask your nurse or doctor.               These medicines have changed or have updated prescriptions.        Dose/Directions    * venlafaxine 75 MG 24 hr capsule   Commonly known as:  EFFEXOR-XR   This may have changed:  Another medication with the same name was added. Make sure you understand how and when to take each.   Used for:  Recurrent major depressive disorder, remission status unspecified (H), PTSD (post-traumatic stress disorder)   Changed by:  Cornell Herron MD        Dose:  75 mg   Take 1 capsule (75 mg) by mouth daily   Quantity:  30 capsule   Refills:  1       * venlafaxine 37.5 MG 24 hr capsule   Commonly known as:  EFFEXOR-XR   This may have changed:  You were already taking a medication with the same name, and this prescription was added. Make sure you understand how and when to take each.   Used for:  Major depressive disorder, recurrent episode, mild (H), PTSD (post-traumatic stress disorder)   Changed by:  Cornell Herron MD        Take 1 capsule daily for 7days with 75 mg capsule then go up to 150 mg capsule   Quantity:  7 capsule   Refills:  0       * venlafaxine 150 MG 24 hr capsule   Commonly known as:  EFFEXOR-XR   This may have changed:  You were already taking a medication with the same name, and this prescription was added. Make sure you understand how and when to take each.   Used for:  Major depressive disorder, recurrent episode, mild (H), PTSD (post-traumatic stress disorder)   Changed by:  Cornell Herron MD        Dose:  150 mg   Take 1 capsule (150 mg) by mouth daily   Quantity:  30 capsule   Refills:  1       * Notice:  This list has 3 medication(s) that are the same as other medications prescribed for you. Read the directions carefully, and ask your doctor or other care provider to review them with you.         Where to get your medicines      These medications were sent to [facility] [address] [place] [address], [place] - [address]   [id number] ,  [place] [address]     Phone:  [phone]     venlafaxine 150 MG 24 hr capsule    venlafaxine 37.5 MG 24 hr capsule                Primary Care Provider Office Phone # Fax #    Cornell Herron [phone]       [facility] [address] [place] [address]        Thank you!     Thank you for choosing [facility]  for your care. Our goal is always to provide you with excellent care. Hearing back from our patients is one way we can continue to improve our services. Please take a few minutes to complete the written survey that you may receive in the mail after your visit with us. Thank you!             Your Updated Medication List - Protect others around you: Learn how to safely use, store and throw away your medicines at www.disposemymeds.org.          This list is accurate as of: 6/20/17  8:04 AM.  Always use your most recent med list.                   Brand Name Dispense Instructions for use    * amphetamine-dextroamphetamine 20 MG per 24 hr capsule    ADDERALL XR    30 capsule    Take 1 capsule (20 mg) by mouth daily       * amphetamine-dextroamphetamine 20 MG per 24 hr capsule   Start taking on:  7/3/2017    ADDERALL XR    30 capsule    Take 1 capsule (20 mg) by mouth daily       citalopram 40 MG tablet    celeXA    90 tablet    Take 0.5 tablets (20 mg) by mouth daily       cyclobenzaprine 10 MG tablet    FLEXERIL    30 tablet    1/2-1 tab at bedtime       TRAZODONE HCL PO      Take 50 mg by mouth At Bedtime       TYLENOL PO          * venlafaxine 75 MG 24 hr capsule    EFFEXOR-XR    30 capsule    Take 1 capsule (75 mg) by mouth daily       * venlafaxine 37.5 MG 24 hr capsule    EFFEXOR-XR    7 capsule    Take 1 capsule daily for 7days with 75 mg capsule then go up to 150 mg capsule       * venlafaxine 150 MG 24 hr capsule    EFFEXOR-XR    30 capsule    Take 1 capsule (150 mg) by mouth daily       * Notice:  This list has 5 medication(s) that are the same as other  medications prescribed for you. Read the directions carefully, and ask your doctor or other care provider to review them with you.

## 2017-06-20 NOTE — PROGRESS NOTES
"  SUBJECTIVE:                                                    Jennifer Tatum is a 26 year old female who presents to clinic today for the following health issues:      Abnormal Mood Symptoms      Duration: months    Description:  Depression: YES  Anxiety: YES  Panic attacks: YES     Accompanying signs and symptoms: see PHQ-9 and IVNI scores    History (similar episodes/previous evaluation): None    Precipitating or alleviating factors: None    Therapies tried and outcome: Celexa (Citalopram) and Effexor XR (Venafaxine)     PHQ-9 SCORE 5/11/2017 5/30/2017 6/20/2017   Total Score - - -   Total Score 25 25 18     VINI-7 SCORE 5/11/2017 5/30/2017 6/20/2017   Total Score 21 21 19     [facility] counseling helping  No Side Effects from meds.  Overall some better     Problem list and histories reviewed & adjusted, as indicated.  Additional history: as documented        Reviewed and updated as needed this visit by clinical staff  Tobacco  Allergies  Meds  Med Hx  Surg Hx  Fam Hx  Soc Hx      Reviewed and updated as needed this visit by Provider         ROS:  C: NEGATIVE for fever, chills, change in weight  R: NEGATIVE for significant cough or SOB  CV: NEGATIVE for chest pain, palpitations or peripheral edema    OBJECTIVE:                                                    /70  Pulse 60  Temp 97.7  F (36.5  C)  Ht 5' 5\" (1.651 m)  Wt 195 lb (88.5 kg)  BMI 32.45 kg/m2  Body mass index is 32.45 kg/(m^2).   GENERAL: healthy, alert, well nourished, well hydrated, no distress  PSYCH: Alert and oriented times 3; speech- coherent , normal rate and volume; able to articulate logical thoughts, able to abstract reason, no tangential thoughts, no hallucinations or delusions, affect- normal- much improved insight and attitude- smiling          ASSESSMENT/PLAN:                                                        ICD-10-CM    1. Major depressive disorder, recurrent episode, mild (H) F33.0 venlafaxine (EFFEXOR-XR) 37.5 " MG 24 hr capsule     venlafaxine (EFFEXOR-XR) 150 MG 24 hr capsule   2. PTSD (post-traumatic stress disorder) F43.10 venlafaxine (EFFEXOR-XR) 37.5 MG 24 hr capsule     venlafaxine (EFFEXOR-XR) 150 MG 24 hr capsule     Doing much better - still suboptimal. Will stop Celexa and add Effexor or increase  To 150 mg.  F/u in 3-4 weeks. Continue counseling     See Patient Instructions    Cornell Herron MD  [facility]

## 2017-06-20 NOTE — NURSING NOTE
"Chief Complaint   Patient presents with     MOOD CHANGES       Initial /70  Pulse 60  Temp 97.7  F (36.5  C)  Ht 5' 5\" (1.651 m)  Wt 195 lb (88.5 kg)  BMI 32.45 kg/m2 Estimated body mass index is 32.45 kg/(m^2) as calculated from the following:    Height as of this encounter: 5' 5\" (1.651 m).    Weight as of this encounter: 195 lb (88.5 kg).  Medication Reconciliation: complete     Gilbert Titus      "

## 2017-06-20 NOTE — PATIENT INSTRUCTIONS
Depression: Tips to Help Yourself  As your healthcare providers help treat your depression, you can also help yourself. Keep in mind that your illness affects you emotionally, physically, mentally, and socially. So full recovery will take time. Take care of your body and your soul, and be patient with yourself as you get better.    Self-care    Educate yourself. Read about treatment and medicine options. If you have the energy, attend local conferences or support groups. Keep a list of useful websites and helpful books and use them as needed. This illness is not your fault. Don t blame yourself for your depression.    Manage early symptoms. If you notice symptoms returning, experience triggers, or identify other factors that may lead to a depressive episode, get help as soon as possible. Ask trusted friends and family to monitor your behavior and let you know if they see anything of concern.    Work with your provider. Find a provider you can trust. Communicate honestly with that person and share information on your treatment for depression and your reaction to medicines.    Be prepared for a crisis. Know what to do if you experience a crisis. Keep the phone number of a crisis hotline and know the location of your community's urgent care centers and the closest emergency department.    Hold off on big decisions. Depression can cloud your judgment. So wait until you feel better before making major life decisions, such as changing jobs, moving, or getting  or .    Be patient. Recovering from depression is a process. Don t be discouraged if it takes some time to feel better.    Keep it simple. Depression saps your energy and concentration. So you won t be able to do all the things you used to do. Set small goals and do what you can.    Be with others. Don t isolate yourself--you ll only feel worse. Try to be with other people. And take part in fun activities when you can. Go to a movie, ballgame,  Sikh service, or social event. Talk openly with people you can trust. And accept help when it s offered.  Take care of your body  People with depression often lose the desire to take care of themselves. That only makes their problems worse. During treatment and afterward, make a point to:    Exercise. It s a great way to take care of your body. And studies have shown that exercise helps fight depression.    Avoid drugs and alcohol. These may ease the pain in the short term. But they ll only make your problems worse in the long run.    Get relief from stress. Ask your healthcare provider for relaxation exercises and techniques to help relieve stress.    Eat right. A balanced and healthy diet helps keep your body healthy.  Date Last Reviewed: 1/1/2017 2000-2017 [employer] [address] [place] [address], [place] [address]. All rights reserved. This information is not intended as a substitute for professional medical care. Always follow your healthcare professional's instructions.

## 2017-06-21 ASSESSMENT — PATIENT HEALTH QUESTIONNAIRE - PHQ9: SUM OF ALL RESPONSES TO PHQ QUESTIONS 1-9: 18

## 2017-06-21 ASSESSMENT — ANXIETY QUESTIONNAIRES: GAD7 TOTAL SCORE: 19

## 2017-07-26 ENCOUNTER — OFFICE VISIT (OUTPATIENT)
Dept: FAMILY MEDICINE | Facility: OTHER | Age: 27
End: 2017-07-26
Attending: FAMILY MEDICINE
Payer: COMMERCIAL

## 2017-07-26 VITALS
SYSTOLIC BLOOD PRESSURE: 102 MMHG | BODY MASS INDEX: 33.82 KG/M2 | HEIGHT: 65 IN | HEART RATE: 78 BPM | WEIGHT: 203 LBS | DIASTOLIC BLOOD PRESSURE: 68 MMHG | TEMPERATURE: 97.4 F

## 2017-07-26 DIAGNOSIS — F90.0 ATTENTION DEFICIT HYPERACTIVITY DISORDER (ADHD), PREDOMINANTLY INATTENTIVE TYPE: Primary | ICD-10-CM

## 2017-07-26 DIAGNOSIS — G47.00 PERSISTENT INSOMNIA: ICD-10-CM

## 2017-07-26 DIAGNOSIS — F43.21 COMPLICATED GRIEVING: ICD-10-CM

## 2017-07-26 DIAGNOSIS — M26.609 TMJ (TEMPOROMANDIBULAR JOINT SYNDROME): ICD-10-CM

## 2017-07-26 DIAGNOSIS — F33.0 MAJOR DEPRESSIVE DISORDER, RECURRENT EPISODE, MILD (H): ICD-10-CM

## 2017-07-26 PROCEDURE — 99212 OFFICE O/P EST SF 10 MIN: CPT

## 2017-07-26 PROCEDURE — 99214 OFFICE O/P EST MOD 30 MIN: CPT | Performed by: FAMILY MEDICINE

## 2017-07-26 RX ORDER — TRAZODONE HYDROCHLORIDE 50 MG/1
50 TABLET, FILM COATED ORAL AT BEDTIME
Qty: 90 TABLET | Refills: 3 | Status: SHIPPED | OUTPATIENT
Start: 2017-07-26 | End: 2017-09-18

## 2017-07-26 RX ORDER — CYCLOBENZAPRINE HCL 10 MG
TABLET ORAL
Qty: 30 TABLET | Refills: 5 | Status: SHIPPED | OUTPATIENT
Start: 2017-07-26 | End: 2017-09-11

## 2017-07-26 RX ORDER — CYCLOBENZAPRINE HCL 10 MG
TABLET ORAL
Qty: 30 TABLET | Refills: 5 | Status: SHIPPED | OUTPATIENT
Start: 2017-07-26 | End: 2017-07-26

## 2017-07-26 RX ORDER — TRAZODONE HYDROCHLORIDE 50 MG/1
50 TABLET, FILM COATED ORAL AT BEDTIME
Qty: 90 TABLET | Refills: 3 | Status: SHIPPED | OUTPATIENT
Start: 2017-07-26 | End: 2017-07-26

## 2017-07-26 RX ORDER — DEXTROAMPHETAMINE SACCHARATE, AMPHETAMINE ASPARTATE MONOHYDRATE, DEXTROAMPHETAMINE SULFATE AND AMPHETAMINE SULFATE 5; 5; 5; 5 MG/1; MG/1; MG/1; MG/1
20 CAPSULE, EXTENDED RELEASE ORAL DAILY
Qty: 30 CAPSULE | Refills: 0 | Status: SHIPPED | OUTPATIENT
Start: 2017-09-01 | End: 2017-09-11

## 2017-07-26 RX ORDER — DEXTROAMPHETAMINE SACCHARATE, AMPHETAMINE ASPARTATE MONOHYDRATE, DEXTROAMPHETAMINE SULFATE AND AMPHETAMINE SULFATE 5; 5; 5; 5 MG/1; MG/1; MG/1; MG/1
20 CAPSULE, EXTENDED RELEASE ORAL DAILY
Qty: 30 CAPSULE | Refills: 0 | Status: SHIPPED | OUTPATIENT
Start: 2017-10-01 | End: 2017-10-31

## 2017-07-26 RX ORDER — DEXTROAMPHETAMINE SACCHARATE, AMPHETAMINE ASPARTATE MONOHYDRATE, DEXTROAMPHETAMINE SULFATE AND AMPHETAMINE SULFATE 5; 5; 5; 5 MG/1; MG/1; MG/1; MG/1
20 CAPSULE, EXTENDED RELEASE ORAL DAILY
Qty: 30 CAPSULE | Refills: 0 | Status: SHIPPED | OUTPATIENT
Start: 2017-08-01 | End: 2017-08-31

## 2017-07-26 ASSESSMENT — ANXIETY QUESTIONNAIRES
IF YOU CHECKED OFF ANY PROBLEMS ON THIS QUESTIONNAIRE, HOW DIFFICULT HAVE THESE PROBLEMS MADE IT FOR YOU TO DO YOUR WORK, TAKE CARE OF THINGS AT HOME, OR GET ALONG WITH OTHER PEOPLE: SOMEWHAT DIFFICULT
7. FEELING AFRAID AS IF SOMETHING AWFUL MIGHT HAPPEN: NEARLY EVERY DAY
5. BEING SO RESTLESS THAT IT IS HARD TO SIT STILL: NEARLY EVERY DAY
2. NOT BEING ABLE TO STOP OR CONTROL WORRYING: MORE THAN HALF THE DAYS
6. BECOMING EASILY ANNOYED OR IRRITABLE: NEARLY EVERY DAY
4. TROUBLE RELAXING: NEARLY EVERY DAY
1. FEELING NERVOUS, ANXIOUS, OR ON EDGE: MORE THAN HALF THE DAYS
3. WORRYING TOO MUCH ABOUT DIFFERENT THINGS: MORE THAN HALF THE DAYS
GAD7 TOTAL SCORE: 18

## 2017-07-26 ASSESSMENT — PAIN SCALES - GENERAL: PAINLEVEL: EXTREME PAIN (8)

## 2017-07-26 NOTE — TELEPHONE ENCOUNTER
Flexeril  Last office visit: 07/26/17 today  Last refill: 07/26/17 #30 with 5 refills  E-prescribing error occurred when medication was last sent to pharmacy. Please review and re-sign if appropriate.    Thank you.

## 2017-07-26 NOTE — PROGRESS NOTES
SUBJECTIVE:                                                    Jennifer Tatum is a 26 year old female who presents to clinic today for the following health issues:      Depression and Anxiety Follow-Up    Status since last visit: No change, was doing good and this last week went down again    Other associated symptoms:None    Complicating factors:     Significant life event: No     Current substance abuse: None    Had menses and may play a role    Stopped counseling     Feels that talking with a friend who has had 2 still- borns is more helpful    PHQ-9 SCORE 5/11/2017 5/30/2017 6/20/2017   Total Score - - -   Total Score 25 25 18     VINI-7 SCORE 5/11/2017 5/30/2017 6/20/2017   Total Score 21 21 19     PHQ-9 SCORE 5/30/2017 6/20/2017 7/26/2017   Total Score - - -   Total Score 25 18 19     VINI-7 SCORE 5/30/2017 6/20/2017 7/26/2017   Total Score 21 19 18         PHQ-9  English  PHQ-9   Any Language  GAD7      Amount of exercise or physical activity: 4-5 days/week for an average of 45-60 minutes    Problems taking medications regularly: No    Medication side effects: none  Diet: regular (no restrictions)    Medication Followup of adderall    Taking Medication as prescribed: yes    Side Effects:  None    Medication Helping Symptoms:  yes     Musculoskeletal problem/pain      Duration: months    Description  Location: right sided jaw pain    Intensity:  moderate    Accompanying signs and symptoms: unable to open mouth far, neck pain    History  Previous similar problem: YES  Previous evaluation:  none    Precipitating or alleviating factors:  Trauma or overuse: no   Aggravating factors include: overuse    Therapies tried and outcome: heat, ice, massage, stretching and mouth guard and chiropractor   Hard time opening mouth   Worse in last several months  Long h/o tmj issues  Not  Seen Pt in past    Ran out flexeril in past month     Chews lots of gum                 Problem list and histories reviewed & adjusted, as  "indicated.  Additional history: as documented        Reviewed and updated as needed this visit by clinical staffTobacco  Allergies  Meds  Med Hx  Surg Hx  Fam Hx  Soc Hx      Reviewed and updated as needed this visit by Provider         ROS:  C: NEGATIVE for fever, chills, change in weight  E/M: NEGATIVE for ear, mouth and throat problems  R: NEGATIVE for significant cough or SOB  CV: NEGATIVE for chest pain, palpitations or peripheral edema    OBJECTIVE:                                                    /68  Pulse 78  Temp 97.4  F (36.3  C)  Ht 5' 5\" (1.651 m)  Wt 203 lb (92.1 kg)  BMI 33.78 kg/m2  Body mass index is 33.78 kg/(m^2).   GENERAL: healthy, alert, well nourished, well hydrated, no distress  HENT: ear canals- normal; TMs- normal; Nose- normal; Mouth- no ulcers, no lesions  NECK: no tenderness, no adenopathy, no asymmetry, no masses, no stiffness; thyroid- normal to palpation  PSYCH: Alert and oriented times 3; speech- coherent , normal rate and volume; able to articulate logical thoughts, able to abstract reason, no tangential thoughts, no hallucinations or delusions, affect- normal         ASSESSMENT/PLAN:                                                    (F90.0) Attention deficit hyperactivity disorder (ADHD), predominantly inattentive type  (primary encounter diagnosis)  Comment: dong well and stable on meds   Plan: amphetamine-dextroamphetamine (ADDERALL XR) 20         MG per 24 hr capsule,         amphetamine-dextroamphetamine (ADDERALL XR) 20         MG per 24 hr capsule,         amphetamine-dextroamphetamine (ADDERALL XR) 20         MG per 24 hr capsule        See back in 3months    (M26.609) TMJ (temporomandibular joint syndrome)  Comment: Right side worse   Plan: cyclobenzaprine (FLEXERIL) 10 MG tablet,         PHYSICAL THERAPY REFERRAL        Add PT  NO GUM.  Continue current medications and behavioral changes.     (G47.00) Persistent insomnia  Comment: RF done  Plan: " traZODone (DESYREL) 50 MG tablet            (F43.29,  Z63.4) Complicated grieving  Comment: still issue.    Plan: hand outs on support group in [place]  And websites given.     MDD- stay on Effexor and see if improves with menses now resolving      See Patient Instructions    Cornell Herron MD  [facility]

## 2017-07-26 NOTE — NURSING NOTE
"Chief Complaint   Patient presents with     Depression     A.D.H.D       Initial /68  Pulse 78  Temp 97.4  F (36.3  C)  Ht 5' 5\" (1.651 m)  Wt 203 lb (92.1 kg)  BMI 33.78 kg/m2 Estimated body mass index is 33.78 kg/(m^2) as calculated from the following:    Height as of this encounter: 5' 5\" (1.651 m).    Weight as of this encounter: 203 lb (92.1 kg).  Medication Reconciliation: complete     Gilbert Titus      "

## 2017-07-26 NOTE — MR AVS SNAPSHOT
After Visit Summary   7/26/2017    Jennifer Tatum    MRN: [id number]           Patient Information     Date Of Birth          1990        Visit Information        Provider Department      7/26/2017 8:45 AM Cornell Herron MD [facility]        Today's Diagnoses     Attention deficit hyperactivity disorder (ADHD), predominantly inattentive type    -  1    TMJ (temporomandibular joint syndrome)        Persistent insomnia        Complicated grieving           Follow-ups after your visit        Additional Services     PHYSICAL THERAPY REFERRAL       *This order will print in the [facility] Central Scheduling Office*    [facility] provides Physical Therapy evaluation and treatment and many specialty services across the [place] system.  If requesting a specialty program, please choose from the list below.    Call [phone] to schedule [facility] at all locations, with the exception of [facility], please call [phone].     Treatment: Evaluation & Treatment  Special Instructions/Modalities: evaluate and treat   Special Programs: Good HEP     Please be aware that coverage of these services is subject to the terms and limitations of your health insurance plan.  Call member services at your health plan with any benefit or coverage questions.      **Note to Provider** To refer patients to therapy outside of the location list, change the order class to External Referral in the order composer.                  Follow-up notes from your care team     Return in 6 months (on 1/26/2018) for ADHD CHECK UP AND EXAM REQUIRED EVERY 6 MONTHS.      Your next 10 appointments already scheduled     Oct 30, 2017  9:45 AM CDT   (Arrive by 9:30 AM)   SHORT with Cornell Herron MD   [facility] ([facility] )    [address] [place] [address]   [phone]              Who to contact     If you  "have questions or need follow up information about today's clinic visit or your schedule please contact [facility] directly at [phone].  Normal or non-critical lab and imaging results will be communicated to you by MyChart, letter or phone within 4 business days after the clinic has received the results. If you do not hear from us within 7 days, please contact the clinic through [employer] or phone. If you have a critical or abnormal lab result, we will notify you by phone as soon as possible.  Submit refill requests through [employer] or call your pharmacy and they will forward the refill request to us. Please allow 3 business days for your refill to be completed.          Additional Information About Your Visit        [employer] Information     [employer] lets you send messages to your doctor, view your test results, renew your prescriptions, schedule appointments and more. To sign up, go to [email or web address] . Click on \"Log in\" on the left side of the screen, which will take you to the Welcome page. Then click on \"Sign up Now\" on the right side of the page.     You will be asked to enter the access code listed below, as well as some personal information. Please follow the directions to create your username and password.     Your access code is: [email or web address]  Expires: 2017  4:02 PM     Your access code will  in 90 days. If you need help or a new code, please call your [place] clinic or [phone].        Care EveryWhere ID     This is your Care EveryWhere ID. This could be used by other organizations to access your [place] medical records  [id number]        Your Vitals Were     Pulse Temperature Height BMI (Body Mass Index)          78 97.4  F (36.3  C) 5' 5\" (1.651 m) 33.78 kg/m2         Blood Pressure from Last 3 Encounters:   17 102/68   17 104/70   17 121/70    Weight from Last 3 Encounters:   17 203 lb (92.1 kg)   17 195 lb (88.5 kg)   17 195 " lb (88.5 kg)              We Performed the Following     PHYSICAL THERAPY REFERRAL          Today's Medication Changes          These changes are accurate as of: 7/26/17  9:30 AM.  If you have any questions, ask your nurse or doctor.               These medicines have changed or have updated prescriptions.        Dose/Directions    * amphetamine-dextroamphetamine 20 MG per 24 hr capsule   Commonly known as:  ADDERALL XR   This may have changed:  Another medication with the same name was added. Make sure you understand how and when to take each.   Used for:  Attention-deficit hyperactivity disorder, predominantly inattentive type   Changed by:  Cornell Herron MD        Dose:  20 mg   Take 1 capsule (20 mg) by mouth daily   Quantity:  30 capsule   Refills:  0       * amphetamine-dextroamphetamine 20 MG per 24 hr capsule   Commonly known as:  ADDERALL XR   This may have changed:  You were already taking a medication with the same name, and this prescription was added. Make sure you understand how and when to take each.   Used for:  Attention deficit hyperactivity disorder (ADHD), predominantly inattentive type   Changed by:  Cornell Herron MD        Dose:  20 mg   Start taking on:  8/1/2017   Take 1 capsule (20 mg) by mouth daily   Quantity:  30 capsule   Refills:  0       * amphetamine-dextroamphetamine 20 MG per 24 hr capsule   Commonly known as:  ADDERALL XR   This may have changed:  You were already taking a medication with the same name, and this prescription was added. Make sure you understand how and when to take each.   Used for:  Attention deficit hyperactivity disorder (ADHD), predominantly inattentive type   Changed by:  Cornell Herron MD        Dose:  20 mg   Start taking on:  9/1/2017   Take 1 capsule (20 mg) by mouth daily   Quantity:  30 capsule   Refills:  0       * amphetamine-dextroamphetamine 20 MG per 24 hr capsule   Commonly known as:  ADDERALL XR   This may have changed:  You were already  taking a medication with the same name, and this prescription was added. Make sure you understand how and when to take each.   Used for:  Attention deficit hyperactivity disorder (ADHD), predominantly inattentive type   Changed by:  Cornell Herron MD        Dose:  20 mg   Start taking on:  10/1/2017   Take 1 capsule (20 mg) by mouth daily   Quantity:  30 capsule   Refills:  0       venlafaxine 150 MG 24 hr capsule   Commonly known as:  EFFEXOR-XR   This may have changed:  Another medication with the same name was removed. Continue taking this medication, and follow the directions you see here.   Used for:  Major depressive disorder, recurrent episode, mild (H), PTSD (post-traumatic stress disorder)   Changed by:  Cornell Herron MD        Dose:  150 mg   Take 1 capsule (150 mg) by mouth daily   Quantity:  30 capsule   Refills:  1       * Notice:  This list has 4 medication(s) that are the same as other medications prescribed for you. Read the directions carefully, and ask your doctor or other care provider to review them with you.         Where to get your medicines      These medications were sent to [id number] - [place], [place] - [address] [facility] [place] [address] [id number] [facility] [address], [place] [address]     Phone:  [phone]     cyclobenzaprine 10 MG tablet    traZODone 50 MG tablet         Some of these will need a paper prescription and others can be bought over the counter.  Ask your nurse if you have questions.     Bring a paper prescription for each of these medications     amphetamine-dextroamphetamine 20 MG per 24 hr capsule    amphetamine-dextroamphetamine 20 MG per 24 hr capsule    amphetamine-dextroamphetamine 20 MG per 24 hr capsule                Primary Care Provider Office Phone # Fax #    Cornell Herron [phone]       [facility] [place] [address] [facility] [address] [place] [address]        Equal Access to Services     FAUSTINO YBARRA AH: Karen Jacobson, karlene castillo, nora pike  wade longkerwin zacharytrisha joya'aan ah. So [facility] [phone].    ATENCIÓN: Si habla cecilia, tiene a chen disposición servicios gratuitos de asistencia lingüística. Melani al [phone].    We comply with applicable federal civil rights laws and [place] laws. We do not discriminate on the basis of race, color, national origin, age, disability sex, sexual orientation or gender identity.            Thank you!     Thank you for choosing [facility]  for your care. Our goal is always to provide you with excellent care. Hearing back from our patients is one way we can continue to improve our services. Please take a few minutes to complete the written survey that you may receive in the mail after your visit with us. Thank you!             Your Updated Medication List - Protect others around you: Learn how to safely use, store and throw away your medicines at www.disposemymeds.org.          This list is accurate as of: 7/26/17  9:30 AM.  Always use your most recent med list.                   Brand Name Dispense Instructions for use Diagnosis    * amphetamine-dextroamphetamine 20 MG per 24 hr capsule    ADDERALL XR    30 capsule    Take 1 capsule (20 mg) by mouth daily    Attention-deficit hyperactivity disorder, predominantly inattentive type       * amphetamine-dextroamphetamine 20 MG per 24 hr capsule   Start taking on:  8/1/2017    ADDERALL XR    30 capsule    Take 1 capsule (20 mg) by mouth daily    Attention deficit hyperactivity disorder (ADHD), predominantly inattentive type       * amphetamine-dextroamphetamine 20 MG per 24 hr capsule   Start taking on:  9/1/2017    ADDERALL XR    30 capsule    Take 1 capsule (20 mg) by mouth daily    Attention deficit hyperactivity disorder (ADHD), predominantly inattentive type       * amphetamine-dextroamphetamine 20 MG per 24 hr capsule   Start taking on:  10/1/2017    ADDERALL XR    30 capsule    Take 1 capsule (20 mg) by mouth daily    Attention  deficit hyperactivity disorder (ADHD), predominantly inattentive type       cyclobenzaprine 10 MG tablet    FLEXERIL    30 tablet    1/2-1 tab at bedtime    TMJ (temporomandibular joint syndrome)       traZODone 50 MG tablet    DESYREL    90 tablet    Take 1 tablet (50 mg) by mouth At Bedtime    Persistent insomnia       TYLENOL PO           venlafaxine 150 MG 24 hr capsule    EFFEXOR-XR    30 capsule    Take 1 capsule (150 mg) by mouth daily    Major depressive disorder, recurrent episode, mild (H), PTSD (post-traumatic stress disorder)       * Notice:  This list has 4 medication(s) that are the same as other medications prescribed for you. Read the directions carefully, and ask your doctor or other care provider to review them with you.

## 2017-07-27 ASSESSMENT — PATIENT HEALTH QUESTIONNAIRE - PHQ9: SUM OF ALL RESPONSES TO PHQ QUESTIONS 1-9: 19

## 2017-07-27 ASSESSMENT — ANXIETY QUESTIONNAIRES: GAD7 TOTAL SCORE: 18

## 2017-08-03 ENCOUNTER — HOSPITAL ENCOUNTER (OUTPATIENT)
Dept: PHYSICAL THERAPY | Facility: HOSPITAL | Age: 27
Setting detail: THERAPIES SERIES
End: 2017-08-03
Attending: FAMILY MEDICINE
Payer: COMMERCIAL

## 2017-08-03 PROCEDURE — 97530 THERAPEUTIC ACTIVITIES: CPT | Mod: GP,59

## 2017-08-03 PROCEDURE — 97140 MANUAL THERAPY 1/> REGIONS: CPT | Mod: GP

## 2017-08-03 PROCEDURE — 97163 PT EVAL HIGH COMPLEX 45 MIN: CPT | Mod: GP

## 2017-08-03 PROCEDURE — 40000718 ZZHC STATISTIC PT DEPARTMENT ORTHO VISIT

## 2017-08-03 NOTE — PROGRESS NOTES
"   08/03/17 1512   General Information   Type of Visit Initial OP Ortho PT Evaluation   Start of Care Date 08/03/17   Referring Physician Cornell Herron MD   Orders Evaluate and Treat   Orders Comment \"good HEP\"   Date of Order 07/26/17   Insurance Type Blue Cross   Insurance Comments/Visits Authorized Blue Plus 40 visits authorized   Medical Diagnosis TMJ syndrome   Surgical/Medical history reviewed Yes   Precautions/Limitations no known precautions/limitations   Body Part(s)   Body Part(s) TMJ   Presentation and Etiology   Pertinent history of current problem (include personal factors and/or comorbidities that impact the POC) C/O TMJ issues, ongoing over entire lifetime. The past month it has worsened. Can hardly open her mouth now. Over the last five years both sides have clicked when she eats and now she cannot open the right side. She grinds her teeth. She got a mouthguard from [employer] and fit it to mouth, but it doesn't seem to help. She feels like she is choking on it, so she doesn't use it now. She used it faithfully for about 2 weeks but nothing changed. She gets headaches a lot. She just tries to go to sleep and hope she wakes up without a headache. She takes Excedrin when it is really really bad. She has not been to the dentist often. 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 She avoids eating hotdogs and burgers unless she can cut everything in small pieces. She also has a bad LB. She goes to the chirpractor. Last appointment was yesterday.    How/Where did it occur From insidious onset   Onset date of current episode/exacerbation 07/03/17   Chronicity Chronic   Pain rating (0-10 point scale) Best (/10);Worst (/10);Other   Best (/10) 1   Worst (/10) 4   Pain rating comment 5-6/10 right now.    Pain quality C. Aching   Frequency of pain/symptoms A. Constant   Pain/symptoms exacerbated by M. Other   Pain exacerbation comment opening mouth to eat   Pain/symptoms eased by D. Nothing "   Progression of symptoms since onset: Worsened   Fall Risk Screen   Fall screen completed by PT   Per patient - Fall 2 or more times in past year? No   Per patient - Fall with injury in past year? Yes   Is patient a fall risk? No   Functional Scales   Functional Scales Other   Other Scales  TMJ Disability Questionnaire, score = 58% = severe disability   TMJ Objective Findings   Joint noises Yes with pain   Joint lock Closed   Pain Opening;Brushing, flossing;Yawning   Associated symptoms Other  (headache and neck pain, LBP)   Headache Migraine   Habits Clenching   TMJ ROM Comments Right temporal bone cranially extended/internally rotated. Occiput cranially flexed/sheared anteriorly on C1 (19:7). Left SI jt locked and left LE 1 inch longer than right. Left ASIS, IC, PSIS, and IT all inferior relative to right. Right sacral sulcus deep and right SPENCER prominent. C7-L5=NSRRL.    Associated symptoms comment Patient's LBP relates direct to the TMJ dysfunction, as the foundation over which the jaw must function is un-level   Planned Therapy Interventions   Planned Therapy Interventions manual therapy;neuromuscular re-education   Planned Therapy Interventions Comment To achieve appropriate TMJ mechanics we must first level the patient's foundation at the pelvis, sacrum, and leg lengths   Clinical Impression   Criteria for Skilled Therapeutic Interventions Met yes, treatment indicated   PT Diagnosis TMJ syndrome mediated by mechanical dysfunction at temporal and occipital bones, pelvic, sacral, lumbar, thoracic, and cervical segments with functional leg length difference.   Clinical Presentation Evolving/Changing   Clinical Presentation Rationale TMJ Disability Questionnaire   Clinical Decision Making (Complexity) High complexity   Therapy Frequency 2 times/Week   Predicted Duration of Therapy Intervention (days/wks) 6 weeks   Risk & Benefits of therapy have been explained Yes   Patient, Family & other staff in agreement with  plan of care Yes   Clinical Impression Comments Findings consistent with cranial dysfunction compounded by the presence of an un-level foundation.   Education Assessment   Barriers to Learning No barriers   ORTHO GOALS   PT Ortho Eval Goals 1;2   Ortho Goal 1   Goal Identifier 1 Functional   Goal Description Improved TMJ Disability Questionnaire score to 40% or better   Target Date 09/14/17   Ortho Goal 2   Goal Identifier 2 Outcome   Goal Description Resolution of mechanical dysfunction   Target Date 09/14/17   Total Evaluation Time   Total Evaluation Time 15'     Giselle Munoz DPT

## 2017-08-16 ENCOUNTER — HOSPITAL ENCOUNTER (OUTPATIENT)
Dept: PHYSICAL THERAPY | Facility: HOSPITAL | Age: 27
Setting detail: THERAPIES SERIES
End: 2017-08-16
Attending: FAMILY MEDICINE
Payer: COMMERCIAL

## 2017-08-16 PROCEDURE — 40000718 ZZHC STATISTIC PT DEPARTMENT ORTHO VISIT

## 2017-08-16 PROCEDURE — 97140 MANUAL THERAPY 1/> REGIONS: CPT | Mod: GP

## 2017-08-31 ENCOUNTER — HOSPITAL ENCOUNTER (OUTPATIENT)
Dept: PHYSICAL THERAPY | Facility: HOSPITAL | Age: 27
Setting detail: THERAPIES SERIES
End: 2017-08-31
Attending: FAMILY MEDICINE
Payer: COMMERCIAL

## 2017-08-31 PROCEDURE — 97140 MANUAL THERAPY 1/> REGIONS: CPT | Mod: GP

## 2017-08-31 PROCEDURE — 40000718 ZZHC STATISTIC PT DEPARTMENT ORTHO VISIT

## 2017-09-01 DIAGNOSIS — F33.0 MAJOR DEPRESSIVE DISORDER, RECURRENT EPISODE, MILD (H): ICD-10-CM

## 2017-09-01 DIAGNOSIS — F43.10 PTSD (POST-TRAUMATIC STRESS DISORDER): ICD-10-CM

## 2017-09-01 RX ORDER — VENLAFAXINE HYDROCHLORIDE 150 MG/1
CAPSULE, EXTENDED RELEASE ORAL
Qty: 30 CAPSULE | Refills: 1 | Status: SHIPPED | OUTPATIENT
Start: 2017-09-01 | End: 2017-10-30

## 2017-09-05 ENCOUNTER — HOSPITAL ENCOUNTER (OUTPATIENT)
Dept: PHYSICAL THERAPY | Facility: HOSPITAL | Age: 27
Setting detail: THERAPIES SERIES
End: 2017-09-05
Attending: FAMILY MEDICINE
Payer: COMMERCIAL

## 2017-09-05 PROCEDURE — 97140 MANUAL THERAPY 1/> REGIONS: CPT | Mod: GP

## 2017-09-05 PROCEDURE — 40000718 ZZHC STATISTIC PT DEPARTMENT ORTHO VISIT

## 2017-09-11 ENCOUNTER — OFFICE VISIT (OUTPATIENT)
Dept: BEHAVIORAL HEALTH | Facility: OTHER | Age: 27
End: 2017-09-11
Attending: SOCIAL WORKER
Payer: COMMERCIAL

## 2017-09-11 ENCOUNTER — OFFICE VISIT (OUTPATIENT)
Dept: FAMILY MEDICINE | Facility: OTHER | Age: 27
End: 2017-09-11
Attending: FAMILY MEDICINE
Payer: COMMERCIAL

## 2017-09-11 VITALS
BODY MASS INDEX: 31.99 KG/M2 | HEART RATE: 102 BPM | OXYGEN SATURATION: 97 % | RESPIRATION RATE: 18 BRPM | DIASTOLIC BLOOD PRESSURE: 82 MMHG | SYSTOLIC BLOOD PRESSURE: 126 MMHG | TEMPERATURE: 97.6 F | HEIGHT: 65 IN | WEIGHT: 192 LBS

## 2017-09-11 DIAGNOSIS — F33.0 MAJOR DEPRESSIVE DISORDER, RECURRENT EPISODE, MILD (H): Primary | ICD-10-CM

## 2017-09-11 DIAGNOSIS — Z71.6 ENCOUNTER FOR SMOKING CESSATION COUNSELING: ICD-10-CM

## 2017-09-11 DIAGNOSIS — R69 DIAGNOSIS DEFERRED: Primary | ICD-10-CM

## 2017-09-11 PROCEDURE — 99212 OFFICE O/P EST SF 10 MIN: CPT

## 2017-09-11 PROCEDURE — 99213 OFFICE O/P EST LOW 20 MIN: CPT | Performed by: NURSE PRACTITIONER

## 2017-09-11 RX ORDER — VENLAFAXINE HYDROCHLORIDE 75 MG/1
225 CAPSULE, EXTENDED RELEASE ORAL DAILY
Qty: 90 CAPSULE | Refills: 1 | Status: CANCELLED | OUTPATIENT
Start: 2017-09-11

## 2017-09-11 RX ORDER — VENLAFAXINE HYDROCHLORIDE 75 MG/1
75 CAPSULE, EXTENDED RELEASE ORAL DAILY
Qty: 30 CAPSULE | Refills: 1 | Status: SHIPPED | OUTPATIENT
Start: 2017-09-11 | End: 2017-10-30

## 2017-09-11 ASSESSMENT — ANXIETY QUESTIONNAIRES
2. NOT BEING ABLE TO STOP OR CONTROL WORRYING: NEARLY EVERY DAY
1. FEELING NERVOUS, ANXIOUS, OR ON EDGE: NEARLY EVERY DAY
GAD7 TOTAL SCORE: 21
6. BECOMING EASILY ANNOYED OR IRRITABLE: NEARLY EVERY DAY
IF YOU CHECKED OFF ANY PROBLEMS ON THIS QUESTIONNAIRE, HOW DIFFICULT HAVE THESE PROBLEMS MADE IT FOR YOU TO DO YOUR WORK, TAKE CARE OF THINGS AT HOME, OR GET ALONG WITH OTHER PEOPLE: EXTREMELY DIFFICULT
3. WORRYING TOO MUCH ABOUT DIFFERENT THINGS: NEARLY EVERY DAY
5. BEING SO RESTLESS THAT IT IS HARD TO SIT STILL: NEARLY EVERY DAY
4. TROUBLE RELAXING: NEARLY EVERY DAY
7. FEELING AFRAID AS IF SOMETHING AWFUL MIGHT HAPPEN: NEARLY EVERY DAY

## 2017-09-11 ASSESSMENT — PATIENT HEALTH QUESTIONNAIRE - PHQ9: SUM OF ALL RESPONSES TO PHQ QUESTIONS 1-9: 25

## 2017-09-11 ASSESSMENT — PAIN SCALES - GENERAL: PAINLEVEL: NO PAIN (0)

## 2017-09-11 NOTE — MR AVS SNAPSHOT
After Visit Summary   9/11/2017    Jennifer Tatum    MRN: [id number]           Patient Information     Date Of Birth          1990        Visit Information        Provider Department      9/11/2017 11:30 AM Sab[facility] Veronica [facility]        Today's Diagnoses     Diagnosis deferred    -  1       Follow-ups after your visit        Your next 10 appointments already scheduled     Sep 13, 2017 11:00 AM CDT   (Arrive by 10:45 AM)   New Visit with Catherine Arcos [facility] ([facility] )    [address] [place], [place] [address] [address]   [phone]            Sep 14, 2017  3:00 PM CDT   Treatment with Giselle Munoz, PT   [facility] ([facility] )    [address] [place] [address]   [phone]            Sep 19, 2017 11:00 AM CDT   Treatment with Giselle Munoz, PT   [facility] ([facility] )    [address] [place] [address]   [phone]            Sep 19, 2017 11:30 AM CDT   (Arrive by 11:15 AM)   New Visit with JAK Mosqueda   [facility] ([facility] )    [address] [place], [place] [address] [address]   [phone]            Sep 21, 2017 11:00 AM CDT   Treatment with Giselle Munoz, PT   [facility] ([facility] )    [address] [place] [address]   [phone]            Sep 26, 2017 11:00 AM CDT   Treatment with Giselle Munoz, PT   [facility] ([facility] )    [address] [place] [address]   [phone]            Oct 02, 2017 11:20 AM CDT   (Arrive by 11:05 AM)   SHORT with JOHNSON Solo CNP   [facility] ([facility] )    [address] [place], [place] [address] [address]   [phone]            Oct 30, 2017  9:40 AM CDT   (Arrive by 9:25 AM)   SHORT with Cornell Herron MD   [facility] ([facility] )     "[address] [place] [address]   [phone]              Who to contact     If you have questions or need follow up information about today's clinic visit or your schedule please contact [facility] directly at [phone].  Normal or non-critical lab and imaging results will be communicated to you by MyChart, letter or phone within 4 business days after the clinic has received the results. If you do not hear from us within 7 days, please contact the clinic through MyChart or phone. If you have a critical or abnormal lab result, we will notify you by phone as soon as possible.  Submit refill requests through [employer]atplaces or call your pharmacy and they will forward the refill request to us. Please allow 3 business days for your refill to be completed.          Additional Information About Your Visit        [employer] Information     shopatplaces lets you send messages to your doctor, view your test results, renew your prescriptions, schedule appointments and more. To sign up, go to [email or web address] . Click on \"Log in\" on the left side of the screen, which will take you to the Welcome page. Then click on \"Sign up Now\" on the right side of the page.     You will be asked to enter the access code listed below, as well as some personal information. Please follow the directions to create your username and password.     Your access code is: [email or web address]  Expires: 12/10/2017 11:45 AM     Your access code will  in 90 days. If you need help or a new code, please call your [place] clinic or [phone].        Care EveryWhere ID     This is your Care EveryWhere ID. This could be used by other organizations to access your [place] medical records  [id number]         Blood Pressure from Last 3 Encounters:   17 126/82   17 102/68   17 104/70    Weight from Last 3 Encounters:   17 192 lb (87.1 kg)   17 203 lb (92.1 kg)   17 195 lb (88.5 kg)              Today, you had the " following     No orders found for display         Today's Medication Changes          These changes are accurate as of: 9/11/17  1:51 PM.  If you have any questions, ask your nurse or doctor.               These medicines have changed or have updated prescriptions.        Dose/Directions    * venlafaxine 150 MG 24 hr capsule   Commonly known as:  EFFEXOR-XR   This may have changed:  Another medication with the same name was added. Make sure you understand how and when to take each.   Used for:  Major depressive disorder, recurrent episode, mild (H), PTSD (post-traumatic stress disorder)   Changed by:  Cornell Herron MD        TAKE ONE CAPSULE BY MOUTH ONCE DAILY   Quantity:  30 capsule   Refills:  1       * venlafaxine 75 MG 24 hr capsule   Commonly known as:  EFFEXOR-XR   This may have changed:  You were already taking a medication with the same name, and this prescription was added. Make sure you understand how and when to take each.   Used for:  Major depressive disorder, recurrent episode, mild (H)   Changed by:  Nesha Penn APRN CNP        Dose:  75 mg   Take 1 capsule (75 mg) by mouth daily Add to the 150 mg for a total daily dose of 225 mg   Quantity:  30 capsule   Refills:  1       * Notice:  This list has 2 medication(s) that are the same as other medications prescribed for you. Read the directions carefully, and ask your doctor or other care provider to review them with you.      Stop taking these medicines if you haven't already. Please contact your care team if you have questions.     cyclobenzaprine 10 MG tablet   Commonly known as:  FLEXERIL   Stopped by:  Nesha Penn APRN CNP                Where to get your medicines      These medications were sent to [id number] - [place], [place] - [address]  [address], [place] [address]     Phone:  [phone]     venlafaxine 75 MG 24 hr capsule                Primary Care Provider Office Phone # Fax #    Cornell Herron  [phone]       [facility] [address]  [place] [address]        Equal Access to Services     MATT YBARRA : Hadii aad ku hadandresoscar Jacobson, jeniferda carloskanuha, roxyherbert kanathanda mercedes, waxhollie pedroin hayaajp blairkerwin gill lanimajp hoffman. So [facility] [phone].    ATENCIÓN: Si habla español, tiene a chne disposición servicios gratuitos de asistencia lingüística. Llame al [phone].    We comply with applicable federal civil rights laws and [place] laws. We do not discriminate on the basis of race, color, national origin, age, disability sex, sexual orientation or gender identity.            Thank you!     Thank you for choosing [facility]  for your care. Our goal is always to provide you with excellent care. Hearing back from our patients is one way we can continue to improve our services. Please take a few minutes to complete the written survey that you may receive in the mail after your visit with us. Thank you!             Your Updated Medication List - Protect others around you: Learn how to safely use, store and throw away your medicines at www.disposemymeds.org.          This list is accurate as of: 9/11/17  1:51 PM.  Always use your most recent med list.                   Brand Name Dispense Instructions for use Diagnosis    amphetamine-dextroamphetamine 20 MG per 24 hr capsule   Start taking on:  10/1/2017    ADDERALL XR    30 capsule    Take 1 capsule (20 mg) by mouth daily    Attention deficit hyperactivity disorder (ADHD), predominantly inattentive type       traZODone 50 MG tablet    DESYREL    90 tablet    Take 1 tablet (50 mg) by mouth At Bedtime    Persistent insomnia       TYLENOL PO           * venlafaxine 150 MG 24 hr capsule    EFFEXOR-XR    30 capsule    TAKE ONE CAPSULE BY MOUTH ONCE DAILY    Major depressive disorder, recurrent episode, mild (H), PTSD (post-traumatic stress disorder)       * venlafaxine 75 MG 24 hr capsule    EFFEXOR-XR    30 capsule     Take 1 capsule (75 mg) by mouth daily Add to the 150 mg for a total daily dose of 225 mg    Major depressive disorder, recurrent episode, mild (H)       * Notice:  This list has 2 medication(s) that are the same as other medications prescribed for you. Read the directions carefully, and ask your doctor or other care provider to review them with you.

## 2017-09-11 NOTE — PROGRESS NOTES
SUBJECTIVE:   Jennifer Tatum is a 27 year old female who presents to clinic today for the following health issues:      Abnormal Mood Symptoms      Duration: lifetime    Description:  Depression: YES- lost daughter to stillbirth last year  Anxiety: YES  Panic attacks: no      Accompanying signs and symptoms: see PHQ-9 and VINI scores  PHQ-9 SCORE 6/20/2017 7/26/2017 9/11/2017   Total Score - - -   Total Score 18 19 25       VINI-7 SCORE 6/20/2017 7/26/2017 9/11/2017   Total Score 19 18 21       History (similar episodes/previous evaluation): feels like the Effexor help at the beginning and then doesn't work at all    Precipitating or alleviating factors: None    Therapies tried and outcome: Ativan (Lorezepam)-worked well, Effexor XR (Venafaxine)-doesn't like and Wellbutrin (Bupropion)-bad side effects    Jennifer is currently not in therapy does not feel like it helped. [facility] past.    ADHD      Duration: lifetime    Description (location/character/radiation): starting into the nursing program and wants to use Adderal again, used when in high school    Intensity:      Accompanying signs and symptoms: lack of concentration    History (similar episodes/previous evaluation): used in high school    Precipitating or alleviating factors: None    Therapies tried and outcome: None               Problem list and histories reviewed & adjusted, as indicated.  Additional history: as documented    Patient Active Problem List   Diagnosis     Anxiety     Smoker     Attention deficit hyperactivity disorder (ADHD), predominantly inattentive type     PTSD (post-traumatic stress disorder)     Hydronephrosis     ACP (advance care planning)     Acute right-sided low back pain with right-sided sciatica     Spontaneous miscarriage     Major depressive disorder, recurrent episode, mild (H)     Complicated grieving     Past Surgical History:   Procedure Laterality Date     CERCLAGE CERVICAL N/A 11/10/2015     "Procedure: CERCLAGE CERVICAL;  Surgeon: Tucker Barragan MD;  Location: [facility] L[facility]D      SECTION  2012    Pregnancy full-term; \"Oleksandr\"       SECTION N/A 2016    Procedure:  SECTION;  Surgeon: Nisha Mcleod MD;  Location: [facility]     HC NEPHROSTOMY PERCUTUTANEOUS      left     impacted wisdom teeth removal         Social History   Substance Use Topics     Smoking status: Current Every Day Smoker     Packs/day: 0.50     Years: 10.00     Types: Cigarettes     Smokeless tobacco: Never Used     Alcohol use No     Family History   Problem Relation Age of Onset     Alcohol/Drug Father      alcoholism     Psychotic Disorder Father      Bipolar     CEREBROVASCULAR DISEASE Other      Cerebrovascular accident     C.A.D. Other      Hypertension Other      DIABETES Other          Current Outpatient Prescriptions   Medication Sig Dispense Refill     venlafaxine (EFFEXOR-XR) 75 MG 24 hr capsule Take 1 capsule (75 mg) by mouth daily Add to the 150 mg for a total daily dose of 225 mg 30 capsule 1     venlafaxine (EFFEXOR-XR) 150 MG 24 hr capsule TAKE ONE CAPSULE BY MOUTH ONCE DAILY 30 capsule 1     [START ON 10/1/2017] amphetamine-dextroamphetamine (ADDERALL XR) 20 MG per 24 hr capsule Take 1 capsule (20 mg) by mouth daily 30 capsule 0     traZODone (DESYREL) 50 MG tablet Take 1 tablet (50 mg) by mouth At Bedtime 90 tablet 3     Acetaminophen (TYLENOL PO)        Allergies   Allergen Reactions     Wellbutrin [Bupropion] Other (See Comments)     Mood changes         Reviewed and updated as needed this visit by clinical staffTobacco  Allergies  Meds  Med Hx  Surg Hx  Fam Hx  Soc Hx      Reviewed and updated as needed this visit by Provider         ROS:  C: NEGATIVE for fever, chills, change in weight  E/M: NEGATIVE for ear, mouth and throat problems  R: NEGATIVE for significant cough or SOB  CV: NEGATIVE for chest pain, palpitations or peripheral edema  PSYCHIATRIC: worsening anxiety and " "depression    OBJECTIVE:     /82 (BP Location: Left arm, Patient Position: Sitting, Cuff Size: Adult Regular)  Pulse 102  Temp 97.6  F (36.4  C) (Tympanic)  Resp 18  Ht 5' 5\" (1.651 m)  Wt 192 lb (87.1 kg)  SpO2 97%  BMI 31.95 kg/m2  Body mass index is 31.95 kg/(m^2).   GENERAL: healthy, alert and no distress  NECK: no adenopathy, no asymmetry, masses, or scars and thyroid normal to palpation  RESP: lungs clear to auscultation - no rales, rhonchi or wheezes  CV: regular rate and rhythm, normal S1 S2, no S3 or S4, no murmur, click or rub, no peripheral edema and peripheral pulses strong  ABDOMEN: soft, nontender, no hepatosplenomegaly, no masses and bowel sounds normal  PSYCH: mentation appears normal and anxious  LYMPH: normal ant/post cervical, supraclavicular nodes    Diagnostic Test Results:  none     ASSESSMENT:       PLAN:   ASSESSMENT / PLAN:  (Z71.6,  Z72.0) Encounter for smoking cessation counseling  (primary encounter diagnosis)  Comment:   Plan:  Tobacco Cessation - for Health Maintenance  Jennifer will consider Tobacco cessation in the near future, but at this time she would like to improve on her anxiety and depression            (F33.0) Major depressive disorder, recurrent episode, mild (H)  Comment:   Plan:  venlafaxine (EFFEXOR-XR) 75 MG 24 hr capsule increase to 225 mg total  Will increase Effexor to a maximum of 225 mg. If this does not help can consider referral to [facility] for medication management. Jennifer is encouraged to start counseling again. [facility]. She will see Catherine Arcos for counseling this week. Jennifer should follow up in 2 weeks to see how she is doing. Jennifer agrees with this plan of care.                Tobacco Cessation:   reports that she has been smoking Cigarettes.  She has a 5.00 pack-year smoking history. She has never used smokeless tobacco.  Tobacco Cessation Action Plan: Information " offered: Patient not interested at this time  Self help information given to patient        See Patient Instructions    JOHNSON Cuevas [facility]

## 2017-09-11 NOTE — MR AVS SNAPSHOT
After Visit Summary   9/11/2017    Jennifer Tatum    MRN: [id number]           Patient Information     Date Of Birth          1990        Visit Information        Provider Department      9/11/2017 11:00 AM Nesha Penn APRN [facility] [place]        Today's Diagnoses     Encounter for smoking cessation counseling    -  1    Major depressive disorder, recurrent episode, mild (H)        PTSD (post-traumatic stress disorder)          Care Instructions    Psychologists/ counselors  [place]  [phone]  Dr. Dioni Sutton [phone]  [facility]  [phone]  [facility] [phone]  Davion Manuel  [phone]   [place]  [phone]  (kids)  [place] [phone]  (teens)  [place]   [phone]  Counseling  [facility]      [phone]  [facility] [phone]    [facility]     [phone]   [facility] counseling [phone]  [facility]  [phone]  Zachariah Hull [phone]  Della Chow [phone]  [facility]     [phone]  [facility]     [phone]  [facility]     [phone]  [facility]     [phone]  [place]  Clay Galvan  [phone]  [facility]     [phone]  [facility] Dr. MAURICE Ngo     [phone]  [facility]     [phone]                HOW TO QUIT SMOKING  Smoking is one of the hardest habits to break. About half of all those who have ever smoked have been able to quit, and most of those (about 70%) who still smoke want to quit. Here are some of the best ways to stop smoking.     KEEP TRYING:  It takes most smokers about 8 tries before they are finally able to fully quit. So, the more often you try and fail, the better your chance of quitting the next time! So, don't give up!    GO  COLD TURKEY:  Most ex-smokers quit cold turkey. Trying to cut back gradually doesn't seem to work as well, perhaps because it continues the smoking habit. Also, it is possible to fool yourself by inhaling more while smoking fewer cigarettes. This results in the same amount of nicotine in your body!    GET SUPPORT:  Support programs can make an important difference, especially for the heavy smoker. These groups offer lectures, methods to change your behavior and peer support. Call the free national Quitline for more information. 800-QUIT-NOW (784-356-9810). Low-cost or free programs are offered by many hospitals, local chapters of the American Lung Association (484-496-2313) and the American Cancer Society (000-853-9133). Support at home is important too. Non-smokers can help by offering praise and encouragement. If the smoker fails to quit, encourage them to try again!    OVER-THE-COUNTER MEDICINES:  For those who can't quit on their own, Nicotine Replacement Therapy (NRT) may make quitting much easier. Certain aids such as the nicotine patch, gum and lozenge are available without a prescription. However, it is best to use these under the guidance of your doctor. The skin patch provides a steady supply of nicotine to the body. Nicotine gum and lozenge gives temporary bursts of low levels of nicotine. Both methods take the edge off the craving for cigarettes. WARNING: If you feel symptoms of nicotine overdose, such as nausea, vomiting, dizziness, weakness, or fast heartbeat, stop using these and see your doctor.    PRESCRIPTION MEDICINES:  After evaluating your smoking patterns and prior attempts at quitting, your doctor may offer a prescription medicine such as bupropion (Zyban, Wellbutrin), varenicline (Chantix, Champix), a niocotine inhaler or nasal spray. Each has its unique advantage and side effects which your doctor can review with you.    HEALTH BENEFITS OF QUITTING:  The benefits of quitting start right away  and keep improving the longer you go without smokin minutes: blood pressure and pulse return to normal  8 hours: oxygen levels return to normal  2 days: ability to smell and taste begins to improve as damaged nerves start to regrow  2-3 weeks: circulation and lung function improves  1-9 months: decreased cough, congestion and shortness of breath; less tired  1 year: risk of heart attack decreases by half  5 years: risk of lung cancer decreases by half; risk of stroke becomes the same as a non-smoker  For information about how to quit smoking, visit the following links:  National Cancer [place] ,   Clearing the Air, Quit Smoking Today   - an online booklet. http://www.smokefree.gov/pubs/clearing_the_air.pdf  Smokefree.gov http://smokefree.gov/  QuitNet http://www.quitnet.com/    1767-7774 Franki Choi, [address] [place] [address], [place] [address]. All rights reserved. This information is not intended as a substitute for professional medical care. Always follow your healthcare professional's instructions.    The Benefits of Living Smoke Free  What do you want to gain from quitting? Check off some reasons to quit.  Health Benefits  ___ Reduce my risk of lung cancer, heart disease, chronic lung disease  ___ Have fewer wrinkles and softer skin  ___ Improve my sense of taste and smell  ___ For pregnant women--reduce the risk of having a miscarriage, stillbirth, premature birth, or low-birth-weight baby  Personal Benefits  ___ Feel more in control of my life  ___ Have better-smelling hair, breath, clothes, home, and car  ___ Save time by not having to take smoke breaks, buy cigarettes, or hunt for a light  ___ Have whiter teeth  Family Benefits  ___ Reduce my children s respiratory tract infections  ___ Set a good example for my children  ___ Reduce my family s cancer risk  Financial Benefits  ___ Save hundreds of dollars each year that would be spent on cigarettes  ___ Save money on medical bills  ___ Save on  life, health, and car insurance premiums    Those Dollars Add Up!  Cigarettes are expensive, and getting more expensive all the time. Do you realize how much money you are spending on cigarettes per year? What is the average amount you spend on a pack of cigarettes? What is the average number of packs that you smoke per day? Using your answers to these questions, fill in this formula to help you find out:  ($ _____ per pack) ×  ( _____ number of packs per day) × (365 days) =  $ _____ yearly cost of smoking  Besides tobacco, there are other costs, including extra cleaning bills and replacement costs for clothing and furniture; medical expenses for smoking-related illnesses; and higher health, life, and car insurance premiums.    Cigars and Pipes Count Too!  Cigars and pipes are also dangerous. So are smokeless (chewing) tobacco and snuff. All of these products contain nicotine, a highly addictive substance that has harmful effects on your body. Quitting smoking means giving up all tobacco products.      5848-1625 [facility], [address] [place] [address]. All rights reserved. This information is not intended as a substitute for professional medical care. Always follow your healthcare professional's instructions.          Follow-ups after your visit        Follow-up notes from your care team     Return in about 2 weeks (around 9/25/2017).      Your next 10 appointments already scheduled     Sep 13, 2017 11:00 AM CDT   (Arrive by 10:45 AM)   [facility]   [facility] ([facility] )    [address] [place] [phone]            Sep 14, 2017  3:00 PM CDT   Treatment with Giselle Munoz PT   [facility] ([facility] )    [address] [address]   [phone]            Sep 19, 2017 11:00 AM CDT   Treatment with Giselle Munoz PT   [facility] ([facility] )    [address]  [place] [address]   [phone]            Sep 19, 2017 11:30 AM CDT   (Arrive by 11:15 AM)   New Visit with JAK Mosqueda   [facility] [place] ([facility] [place] )    [address] [facility]  [place] [address]   [phone]            Sep 21, 2017 11:00 AM CDT   Treatment with Giselle Munoz, PT   [facility] ([facility] )    [address] [place] [address]   [phone]            Sep 26, 2017 11:00 AM CDT   Treatment with Giselle Munoz, PT   [facility] ([facility] )    [address] [place] [address]   [phone]            Oct 02, 2017 11:20 AM CDT   (Arrive by 11:05 AM)   SHORT with JOHNSON Solo CNP   [facility] [place] ([facility] [place] )    [address]  [place] [address]   [phone]            Oct 30, 2017  9:40 AM CDT   (Arrive by 9:25 AM)   SHORT with Cornell [facility]on [facility]   [facility] [place] )    [address]  [place] [address]   [phone]              Who to contact     If you have questions or need follow up information about today's clinic visit or your schedule please contact [facility] directly at [phone].  Normal or non-critical lab and imaging results will be communicated to you by InstaJobhart, letter or phone within 4 business days after the clinic has received the results. If you do not hear from us within 7 days, please contact the clinic through InstaJobhart or phone. If you have a critical or abnormal lab result, we will notify you by phone as soon as possible.  Submit refill requests through Gnammo or call your pharmacy and they will forward the refill request to us. Please allow 3 business days for your refill to be completed.          Additional Information About Your Visit        InstaJobharWildfire [place] Information     Gnammo lets you send messages to your doctor, view your test results, renew your  "prescriptions, schedule appointments and more. To sign up, go to [email or web address] . Click on \"Log in\" on the left side of the screen, which will take you to the Welcome page. Then click on \"Sign up Now\" on the right side of the page.     You will be asked to enter the access code listed below, as well as some personal information. Please follow the directions to create your username and password.     Your access code is: [email or web address]  Expires: 12/10/2017 11:45 AM     Your access code will  in 90 days. If you need help or a new code, please call your [facility] clinic or [phone].        Care EveryWhere ID     This is your Care EveryWhere ID. This could be used by other organizations to access your [facility] medical records  [id number]        Your Vitals Were     Pulse Temperature Respirations Height Pulse Oximetry BMI (Body Mass Index)    102 97.6  F (36.4  C) (Tympanic) 18 5' 5\" (1.651 m) 97% 31.95 kg/m2       Blood Pressure from Last 3 Encounters:   17 126/82   17 102/68   17 104/70    Weight from Last 3 Encounters:   17 192 lb (87.1 kg)   17 203 lb (92.1 kg)   17 195 lb (88.5 kg)              We Performed the Following     Tobacco Cessation - for Health Maintenance          Today's Medication Changes          These changes are accurate as of: 17 11:48 AM.  If you have any questions, ask your nurse or doctor.               These medicines have changed or have updated prescriptions.        Dose/Directions    * venlafaxine 150 MG 24 hr capsule   Commonly known as:  EFFEXOR-XR   This may have changed:  Another medication with the same name was added. Make sure you understand how and when to take each.   Used for:  Major depressive disorder, recurrent episode, mild (H), PTSD (post-traumatic stress disorder)   Changed by:  Cornell Herron MD        TAKE ONE CAPSULE BY MOUTH ONCE DAILY   Quantity:  30 capsule   Refills:  1       * venlafaxine 75 MG 24 hr capsule "   Commonly known as:  EFFEXOR-XR   This may have changed:  You were already taking a medication with the same name, and this prescription was added. Make sure you understand how and when to take each.   Used for:  Major depressive disorder, recurrent episode, mild (H)   Changed by:  Nesha Penn APRN CNP        Dose:  75 mg   Take 1 capsule (75 mg) by mouth daily Add to the 150 mg for a total daily dose of 225 mg   Quantity:  30 capsule   Refills:  1       * Notice:  This list has 2 medication(s) that are the same as other medications prescribed for you. Read the directions carefully, and ask your doctor or other care provider to review them with you.      Stop taking these medicines if you haven't already. Please contact your care team if you have questions.     cyclobenzaprine 10 MG tablet   Commonly known as:  FLEXERIL   Stopped by:  Nesha Penn APRN CNP                Where to get your medicines      These medications were sent to [id number] - [place], [place] - [address]   [address] [facility] [address], [place] [address]     Phone:  [phone]     venlafaxine 75 MG 24 hr capsule                Primary Care Provider Office Phone # Fax #    Cornell Herron [phone]       [facility] [address]  [place] [address]        Equal Access to Services     MATT YBARRA AH: Hadii melania pollack hadasho Soomaali, waaxda luqadaha, qaybta kaalmada adeegyada, wade wiggins hayverona biswas . So [facility] [phone].    ATENCIÓN: Si habla español, tiene a chen disposición servicios gratuitos de asistencia lingüística. Llame al [phone].    We comply with applicable federal civil rights laws and [place] laws. We do not discriminate on the basis of race, color, national origin, age, disability sex, sexual orientation or gender identity.            Thank you!     Thank you for choosing [facility]  for your care. Our goal is always to provide you with excellent  care. Hearing back from our patients is one way we can continue to improve our services. Please take a few minutes to complete the written survey that you may receive in the mail after your visit with us. Thank you!             Your Updated Medication List - Protect others around you: Learn how to safely use, store and throw away your medicines at www.disposemymeds.org.          This list is accurate as of: 9/11/17 11:48 AM.  Always use your most recent med list.                   Brand Name Dispense Instructions for use Diagnosis    amphetamine-dextroamphetamine 20 MG per 24 hr capsule   Start taking on:  10/1/2017    ADDERALL XR    30 capsule    Take 1 capsule (20 mg) by mouth daily    Attention deficit hyperactivity disorder (ADHD), predominantly inattentive type       traZODone 50 MG tablet    DESYREL    90 tablet    Take 1 tablet (50 mg) by mouth At Bedtime    Persistent insomnia       TYLENOL PO           * venlafaxine 150 MG 24 hr capsule    EFFEXOR-XR    30 capsule    TAKE ONE CAPSULE BY MOUTH ONCE DAILY    Major depressive disorder, recurrent episode, mild (H), PTSD (post-traumatic stress disorder)       * venlafaxine 75 MG 24 hr capsule    EFFEXOR-XR    30 capsule    Take 1 capsule (75 mg) by mouth daily Add to the 150 mg for a total daily dose of 225 mg    Major depressive disorder, recurrent episode, mild (H)       * Notice:  This list has 2 medication(s) that are the same as other medications prescribed for you. Read the directions carefully, and ask your doctor or other care provider to review them with you.

## 2017-09-11 NOTE — NURSING NOTE
"Chief Complaint   Patient presents with     Depression     A.D.H.D       Initial /82 (BP Location: Left arm, Patient Position: Sitting, Cuff Size: Adult Regular)  Pulse 102  Temp 97.6  F (36.4  C) (Tympanic)  Resp 18  Ht 5' 5\" (1.651 m)  Wt 192 lb (87.1 kg)  SpO2 97%  BMI 31.95 kg/m2 Estimated body mass index is 31.95 kg/(m^2) as calculated from the following:    Height as of this encounter: 5' 5\" (1.651 m).    Weight as of this encounter: 192 lb (87.1 kg).  Medication Reconciliation: complete   Gely Zhang      "

## 2017-09-11 NOTE — PROGRESS NOTES
PAM CHOUDHURY introduced and explained integrated health model, brief therapy interventions and referral/ support services for ongoing therapy interventions.  Discussed [facility] services, patient interested and scheduled follow up appointment for therapy on 9/13/17 and [facility] enrollment on 9/19/17.  Presenting Issue: Patient had a stillborn baby girl last year and a miscarriage more recently and is having a hard time coping with those.    September 11, 2017 HASMUKH Harrington

## 2017-09-11 NOTE — PATIENT INSTRUCTIONS
Psychologists/ counselors  Reji Peguero  [phone]  Dr. Dioni Sutton [phone]  [facility]  [phone]  [facility] [phone]  Davion Manuel  [phone]   [facility]  [phone]  (kids)  [facility] [phone]  (teens)  [facility]   [phone]  Counseling  [facility]      [phone]  [facility] [phone]    [facility]     [phone]   [facility] counseling [phone]  [facility]  [phone]  Zachariah Hull [phone]  Della Chow [phone]  [facility]     [phone]  [facility]     [phone]  [facility]     [phone]  [facility]     [phone]  Mariluz Galvan  [phone]  [facility]     [phone]  [facility] Dr. MAURICE Ngo     [phone]  [facility]     [phone]                HOW TO QUIT SMOKING  Smoking is one of the hardest habits to break. About half of all those who have ever smoked have been able to quit, and most of those (about 70%) who still smoke want to quit. Here are some of the best ways to stop smoking.     KEEP TRYING:  It takes most smokers about 8 tries before they are finally able to fully quit. So, the more often you try and fail, the better your chance of quitting the next time! So, don't give up!    GO COLD TURKEY:  Most ex-smokers quit cold turkey. Trying to cut back gradually doesn't seem to work as well, perhaps because it continues the smoking habit. Also, it is possible to fool yourself by inhaling more while smoking fewer cigarettes. This results in the same amount of nicotine in your body!    GET SUPPORT:  Support programs can make an important difference, especially for the heavy smoker. These groups offer lectures, methods to change your behavior and peer support. Call the free  national Quitline for more information. 800-QUIT-NOW (058-339-6998). Low-cost or free programs are offered by many hospitals, local chapters of the American Lung Association (281-632-3731) and the American Cancer Society (615-449-3349). Support at home is important too. Non-smokers can help by offering praise and encouragement. If the smoker fails to quit, encourage them to try again!    OVER-THE-COUNTER MEDICINES:  For those who can't quit on their own, Nicotine Replacement Therapy (NRT) may make quitting much easier. Certain aids such as the nicotine patch, gum and lozenge are available without a prescription. However, it is best to use these under the guidance of your doctor. The skin patch provides a steady supply of nicotine to the body. Nicotine gum and lozenge gives temporary bursts of low levels of nicotine. Both methods take the edge off the craving for cigarettes. WARNING: If you feel symptoms of nicotine overdose, such as nausea, vomiting, dizziness, weakness, or fast heartbeat, stop using these and see your doctor.    PRESCRIPTION MEDICINES:  After evaluating your smoking patterns and prior attempts at quitting, your doctor may offer a prescription medicine such as bupropion (Zyban, Wellbutrin), varenicline (Chantix, Champix), a niocotine inhaler or nasal spray. Each has its unique advantage and side effects which your doctor can review with you.    HEALTH BENEFITS OF QUITTING:  The benefits of quitting start right away and keep improving the longer you go without smokin minutes: blood pressure and pulse return to normal  8 hours: oxygen levels return to normal  2 days: ability to smell and taste begins to improve as damaged nerves start to regrow  2-3 weeks: circulation and lung function improves  1-9 months: decreased cough, congestion and shortness of breath; less tired  1 year: risk of heart attack decreases by half  5 years: risk of lung cancer decreases by half; risk of stroke becomes the  same as a non-smoker  For information about how to quit smoking, visit the following links:  National Cancer Two Dot ,   Clearing the Air, Quit Smoking Today   - an online booklet. http://www.smokefree.gov/pubs/clearing_the_air.pdf  Smokefree.gov http://smokefree.gov/  QuitNet http://www.quitnet.com/    0308-7841 Franki Choi, [address] [place] [address], [place] [address]. All rights reserved. This information is not intended as a substitute for professional medical care. Always follow your healthcare professional's instructions.    The Benefits of Living Smoke Free  What do you want to gain from quitting? Check off some reasons to quit.  Health Benefits  ___ Reduce my risk of lung cancer, heart disease, chronic lung disease  ___ Have fewer wrinkles and softer skin  ___ Improve my sense of taste and smell  ___ For pregnant women reduce the risk of having a miscarriage, stillbirth, premature birth, or low-birth-weight baby  Personal Benefits  ___ Feel more in control of my life  ___ Have better-smelling hair, breath, clothes, home, and car  ___ Save time by not having to take smoke breaks, buy cigarettes, or hunt for a light  ___ Have whiter teeth  Family Benefits  ___ Reduce my children s respiratory tract infections  ___ Set a good example for my children  ___ Reduce my family s cancer risk  Financial Benefits  ___ Save hundreds of dollars each year that would be spent on cigarettes  ___ Save money on medical bills  ___ Save on life, health, and car insurance premiums    Those Dollars Add Up!  Cigarettes are expensive, and getting more expensive all the time. Do you realize how much money you are spending on cigarettes per year? What is the average amount you spend on a pack of cigarettes? What is the average number of packs that you smoke per day? Using your answers to these questions, fill in this formula to help you find out:  ($ _____ per pack) ×  ( _____ number of packs per day) × (365 days) =  $ _____ yearly  cost of smoking  Besides tobacco, there are other costs, including extra cleaning bills and replacement costs for clothing and furniture; medical expenses for smoking-related illnesses; and higher health, life, and car insurance premiums.    Cigars and Pipes Count Too!  Cigars and pipes are also dangerous. So are smokeless (chewing) tobacco and snuff. All of these products contain nicotine, a highly addictive substance that has harmful effects on your body. Quitting smoking means giving up all tobacco products.      5729-6893 [facility], [address] [place] [address], [place] [address]. All rights reserved. This information is not intended as a substitute for professional medical care. Always follow your healthcare professional's instructions.

## 2017-09-12 ASSESSMENT — ANXIETY QUESTIONNAIRES: GAD7 TOTAL SCORE: 21

## 2017-09-13 ENCOUNTER — OFFICE VISIT (OUTPATIENT)
Dept: BEHAVIORAL HEALTH | Facility: OTHER | Age: 27
End: 2017-09-13
Attending: SOCIAL WORKER
Payer: COMMERCIAL

## 2017-09-13 DIAGNOSIS — F43.21 COMPLICATED GRIEVING: Primary | ICD-10-CM

## 2017-09-13 DIAGNOSIS — F33.0 MAJOR DEPRESSIVE DISORDER, RECURRENT EPISODE, MILD (H): ICD-10-CM

## 2017-09-13 PROCEDURE — 90832 PSYTX W PT 30 MINUTES: CPT | Performed by: SOCIAL WORKER

## 2017-09-13 NOTE — PROGRESS NOTES
Behavioral Health Home Diagnostic Assessment Review      PATIENT'S NAME: Jennifer Tatum  MRN:   [id number]  :   1990  DATE OF Review 2017    Face to Face in Clinic    Date of Diagnostic Assessment:  5/15/17  Mental Health Provider and Clinic: [facility]  A Release of Information was been obtained on: 17.    DSM5 Diagnoses: (or copy from DA)  Behavioral and Medical Diagnosis: 296.32 (F33.1) Major Depressive Disorder, Recurrent Episode, Moderate - rule out  Adjustment Disorders  309.28 (F43.23) With mixed anxiety and depressed mood- by history    Significant Functional Status Documented:  Yes/no    If not, in reviewing other documentation or consultation with [facility] stated that her symptoms have resulted in the following functional impairments: academic performance, childcare / parenting, health maintenance, management of the household and or completion of tasks, relationship(s), self-care and social interactions    Did the Diagnostic Assessment include:   Pyschocontextual Factors: mental health symptoms, occupational / vocational stress, parent-child stress and relationship stress   WHODAS Score: [facility] CM or C will complete at next face to face     Cage-AID score is: Negative Based on Cage-Aid score and clinical interview there    are not indications of drug or alcohol abuse.  If not, [facility] CM or C will complete at next face to face     Chemical dependency recommendations: Maintain Sobriety    [facility] RECOMMENDATIONS/PLAN: Begin EMDR therapy with [facility], work with supports in [facility].      RACHEL AppleSW

## 2017-09-13 NOTE — MR AVS SNAPSHOT
After Visit Summary   9/13/2017    Jennifer Tatum    MRN: [id number]           Patient Information     Date Of Birth          1990        Visit Information        Provider Department      9/13/2017 11:00 AM Catherine Arcos, [facility]        Today's Diagnoses     Complicated grieving    -  1    Major depressive disorder, recurrent episode, mild (H)           Follow-ups after your visit        Your next 10 appointments already scheduled     Sep 14, 2017  3:00 PM CDT   Treatment with Giselle Munoz, PT   [facility] ([facility] )    [address] [address]   [phone]            Sep 19, 2017 11:00 AM CDT   Treatment with Giselle Munoz, PT   [facility] ([facility] )    [address] [address]   [phone]            Sep 19, 2017 11:30 AM CDT   (Arrive by 11:15 AM)   New Visit with JAK Mosqueda   [facility] ([facility] )    [address] [place] [id number] [phone]            Sep 21, 2017 11:00 AM CDT   Treatment with Giselle Munoz, PT   [facility] ([facility] )    [address] [address]   [phone]            Sep 26, 2017 11:00 AM CDT   Treatment with Giselle Munoz, PT   [facility] ([facility] )    [address] [address]   [phone]            Oct 02, 2017 11:20 AM CDT   (Arrive by 11:05 AM)   SHORT with JOHNSON Solo CNP   [facility] ([facility] )    [address] [place] [address]   [phone]            Oct 30, 2017  9:40 AM CDT   (Arrive by 9:25 AM)   SHORT [facility] Cornell [facility]on MD   [facility] )    [address] [place] [address]   [phone]              Who to contact     If you have questions or need follow up information about today's clinic visit  "or your schedule please contact [facility] directly at [phone].  Normal or non-critical lab and imaging results will be communicated to you by MyChart, letter or phone within 4 business days after the clinic has received the results. If you do not hear from us within 7 days, please contact the clinic through [facility] or phone. If you have a critical or abnormal lab result, we will notify you by phone as soon as possible.  Submit refill requests through [employer]APP or call your pharmacy and they will forward the refill request to us. Please allow 3 business days for your refill to be completed.          Additional Information About Your Visit        [facility] Information     POPAPP lets you send messages to your doctor, view your test results, renew your prescriptions, schedule appointments and more. To sign up, go to [email or web address] . Click on \"Log in\" on the left side of the screen, which will take you to the Welcome page. Then click on \"Sign up Now\" on the right side of the page.     You will be asked to enter the access code listed below, as well as some personal information. Please follow the directions to create your username and password.     Your access code is: [email or web address]  Expires: 12/10/2017 11:45 AM     Your access code will  in 90 days. If you need help or a new code, please call your [facility] clinic or [phone].        Care EveryWhere ID     This is your Care EveryWhere ID. This could be used by other organizations to access your [facility] medical records  [id number]         Blood Pressure from Last 3 Encounters:   17 126/82   17 102/68   17 104/70    Weight from Last 3 Encounters:   17 192 lb (87.1 kg)   17 203 lb (92.1 kg)   17 195 lb (88.5 kg)              Today, you had the following     No orders found for display       Primary Care Provider Office Phone # Fax #    Cornell Herron [phone]       [facility] [address]  [facility] [address]        Equal Access to Services     SEBASTIANFAUSTINO AMADA : Hadii melania ku hadandreso Soansonali, waaxda luqadaha, qaybta kaalmada neenacastillotoyin, wade wiggins hayverona sharmaconnersunitha hoffman. So [facility] [phone].    ATENCIÓN: Si habla español, tiene a chen disposición servicios gratuitos de asistencia lingüística. Llame al [phone].    We comply with applicable federal civil rights laws and [place] laws. We do not discriminate on the basis of race, color, national origin, age, disability sex, sexual orientation or gender identity.            Thank you!     Thank you for choosing [facility]  for your care. Our goal is always to provide you with excellent care. Hearing back from our patients is one way we can continue to improve our services. Please take a few minutes to complete the written survey that you may receive in the mail after your visit with us. Thank you!             Your Updated Medication List - Protect others around you: Learn how to safely use, store and throw away your medicines at www.disposemymeds.org.          This list is accurate as of: 9/13/17 12:57 PM.  Always use your most recent med list.                   Brand Name Dispense Instructions for use Diagnosis    amphetamine-dextroamphetamine 20 MG per 24 hr capsule   Start taking on:  10/1/2017    ADDERALL XR    30 capsule    Take 1 capsule (20 mg) by mouth daily    Attention deficit hyperactivity disorder (ADHD), predominantly inattentive type       traZODone 50 MG tablet    DESYREL    90 tablet    Take 1 tablet (50 mg) by mouth At Bedtime    Persistent insomnia       TYLENOL PO           * venlafaxine 150 MG 24 hr capsule    EFFEXOR-XR    30 capsule    TAKE ONE CAPSULE BY MOUTH ONCE DAILY    Major depressive disorder, recurrent episode, mild (H), PTSD (post-traumatic stress disorder)       * venlafaxine 75 MG 24 hr capsule    EFFEXOR-XR    30 capsule    Take 1 capsule (75 mg) by mouth daily Add to the 150  mg for a total daily dose of 225 mg    Major depressive disorder, recurrent episode, mild (H)       * Notice:  This list has 2 medication(s) that are the same as other medications prescribed for you. Read the directions carefully, and ask your doctor or other care provider to review them with you.

## 2017-09-13 NOTE — PROGRESS NOTES
[facility]  September 13, 2017      Behavioral Health Clinician Progress Note    Patient Name: Jennifer Tatum           Service Type:  Individual      Service Location:   Face to Face in Clinic     Session Start Time: 11:00  Session End Time: 11:30      Session Length: 16 - 37      Attendees: Patient    Visit Activities (Refresh list every visit): NEW and [facility] Only    Diagnostic Assessment Date: 5/15/17  Treatment Plan Review Date: Next 3 visits  See Flowsheets for  PHQ-9 and VINI-7 results  Previous PHQ-9:   PHQ-9 SCORE 6/20/2017 7/26/2017 9/11/2017   Total Score - - -   Total Score 18 19 25     Previous VINI-7:   VINI-7 SCORE 6/20/2017 7/26/2017 9/11/2017   Total Score 19 18 21       ESTHER LEVEL:  No flowsheet data found.    DATA  Extended Session (60+ minutes): No  Interactive Complexity: No  Crisis: No  [facility] Patient: No but is interested and will enroll next week.    Treatment Objective(s) Addressed in This Session:  Target Behavior(s): disease management/lifestyle changes related to depression    Depressed Mood: Increase interest, engagement, and pleasure in doing things  Identify negative self-talk and behaviors: challenge core beliefs, myths, and actions  Grief / Loss: will increase understanding of normal grieving process, will engage in effective approach to address and resolve grief/loss issues and will process grief/loss issues in an adaptive manner    Current Stressors / Issues:  Patient reports she's been suffering with grief depression since the stillbirth of her daughter Marilin in 2016.  Had a miscarriage in April of this year which triggered her into a depression and complicated grief over Marilin's death.  Is seeking counseling to process her emotions and feelings associated with this.  Reviewed therapy techniques this [facility] can provide, but also discussed recommendation for EMDR.  Discussed partnership and therapy relationship with EMDR therapist at [facility].  Patient in  agreement with plan and signed HERNAN for [facility] to release DA to [facility].  Patient still interested in [facility] services.  Explained services available through [facility].    Reviewed and taught deep breathing techniques.  Educated on Ayurvedic breathing (alternating nostril breathing), square breathing techniques, and regular deep breathing techniques.  Also educating on thinking of a personal escape, safe place.  Patient had not learned about this technique in the past, but did recognize a peaceful place she could build on.    Progress on Treatment Objective(s) / Homework:  New Objective established this session - PRECONTEMPLATION (Not seeing need for change); Intervened by educating the patient about the effects of current behavior on health.  Evoked information about reasons to continue behavior, express concern / recommendations, and explored any change talk    Motivational Interviewing    MI Intervention: Expressed Empathy/Understanding, Supported Autonomy, Collaboration, Evocation and Open-ended questions     Change Talk Expressed by the Patient: Desire to change Ability to change Reasons to change    Provider Response to Change Talk: E - Evoked more info from patient about behavior change, A - Affirmed patient's thoughts, decisions, or attempts at behavior change, R - Reflected patient's change talk and S - Summarized patient's change talk statements        Care Plan review completed: No    Medication Review:  No changes to current psychiatric medication(s)    Medication Compliance:  Yes    Changes in Health Issues:   None reported    Chemical Use Review:   Substance Use: Not reviewed this session     Tobacco Use: Not reviewed this session.    Assessment: Current Emotional / Mental Status (status of significant symptoms):  Risk status (Self / Other harm or suicidal ideation)  Patient denies a history of suicidal ideation, suicide attempts, self-injurious behavior, homicidal ideation, homicidal behavior and and  other safety concerns  Patient denies current fears or concerns for personal safety.  Patient denies current or recent suicidal ideation or behaviors.  Patient denies current or recent homicidal ideation or behaviors.  Patient denies current or recent self injurious behavior or ideation.  Patient denies other safety concerns.  A safety and risk management plan has not been developed at this time, however patient was encouraged to call [facility] should there be a change in any of these risk factors.    Appearance:   Appropriate   Eye Contact:   Fair   Psychomotor Behavior: Normal   Attitude:   Cooperative   Orientation:   All  Speech   Rate / Production: Normal    Volume:  Normal   Mood:    Depressed  Normal Sad   Affect:    Appropriate   Thought Content:  Clear   Thought Form:  Coherent  Logical   Insight:    Fair     Diagnoses:1. Complicated grieving    2. Major depressive disorder, recurrent episode, mild (H)        Collateral Reports Completed:  Authorization for Release of Information Completed for [facility] to share DA with [facility]  Communicated with: Saray montesinos [facility]    Plan: (Homework, other):  Patient was given information about behavioral services and encouraged to schedule a follow up appointment with the clinic [facility] as needed.  She was also given deep Breathing Strategies to practice when experiencing anxiety and depression.    Catherine Arcos, [facility], [facility]

## 2017-09-14 ENCOUNTER — HOSPITAL ENCOUNTER (OUTPATIENT)
Dept: PHYSICAL THERAPY | Facility: HOSPITAL | Age: 27
Setting detail: THERAPIES SERIES
End: 2017-09-14
Attending: FAMILY MEDICINE
Payer: COMMERCIAL

## 2017-09-14 PROCEDURE — 40000268 ZZH STATISTIC NO CHARGES

## 2017-09-14 NOTE — PROGRESS NOTES
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 She's sore. We may not be getting anywhere. Her back is better and she's had back pain since school. Pain rated 7/10 in the jaw. The jaw still locks.    Objective Measurements:  Objective Measure: Somatic dysfunction  Details: Occiput, temporal and sphenoid bones mobility WFL today. Mandibular tracking laterally to right with opening. Leg lengths equal, pelvis level and SI jts equally mobile. Sacral and lumbar segments WFL of mobility and alignment.         Outcome Measures (most recent score):  TMJ Disability Questionnaire, 17 score 66%    Goals:  Goal Identifier 1 Functional   Goal Description Improved TMJ Disability Questionnaire score to 40% or better   Target Date 17   Date Met      Progress:     Goal Identifier 2 Outcome   Goal Description Resolution of mechanical dysfunction   Target Date 17   Date Met  17   Progress:     Plan:  Other: Patient will return to her primary care practitioner to inquire about further objective testing    Discharge:  No, we will leave this episode open for several weeks to see what other options might be available, then discharge it.    Giselle Munoz DPT

## 2017-09-18 ENCOUNTER — OFFICE VISIT (OUTPATIENT)
Dept: FAMILY MEDICINE | Facility: OTHER | Age: 27
End: 2017-09-18
Attending: FAMILY MEDICINE
Payer: COMMERCIAL

## 2017-09-18 VITALS
WEIGHT: 195.4 LBS | OXYGEN SATURATION: 98 % | SYSTOLIC BLOOD PRESSURE: 124 MMHG | HEART RATE: 101 BPM | TEMPERATURE: 98 F | HEIGHT: 65 IN | BODY MASS INDEX: 32.55 KG/M2 | DIASTOLIC BLOOD PRESSURE: 80 MMHG

## 2017-09-18 DIAGNOSIS — M26.609 TMJ (TEMPOROMANDIBULAR JOINT SYNDROME): Primary | ICD-10-CM

## 2017-09-18 DIAGNOSIS — F33.0 MAJOR DEPRESSIVE DISORDER, RECURRENT EPISODE, MILD (H): ICD-10-CM

## 2017-09-18 DIAGNOSIS — F43.21 COMPLICATED GRIEVING: ICD-10-CM

## 2017-09-18 PROCEDURE — 99214 OFFICE O/P EST MOD 30 MIN: CPT | Performed by: FAMILY MEDICINE

## 2017-09-18 PROCEDURE — 99212 OFFICE O/P EST SF 10 MIN: CPT

## 2017-09-18 RX ORDER — DIAZEPAM 2 MG
TABLET ORAL
Qty: 20 TABLET | Refills: 0 | Status: SHIPPED | OUTPATIENT
Start: 2017-09-18 | End: 2017-10-02

## 2017-09-18 ASSESSMENT — PATIENT HEALTH QUESTIONNAIRE - PHQ9: SUM OF ALL RESPONSES TO PHQ QUESTIONS 1-9: 24

## 2017-09-18 ASSESSMENT — ANXIETY QUESTIONNAIRES
GAD7 TOTAL SCORE: 21
1. FEELING NERVOUS, ANXIOUS, OR ON EDGE: NEARLY EVERY DAY
3. WORRYING TOO MUCH ABOUT DIFFERENT THINGS: NEARLY EVERY DAY
6. BECOMING EASILY ANNOYED OR IRRITABLE: NEARLY EVERY DAY
5. BEING SO RESTLESS THAT IT IS HARD TO SIT STILL: NEARLY EVERY DAY
2. NOT BEING ABLE TO STOP OR CONTROL WORRYING: NEARLY EVERY DAY
7. FEELING AFRAID AS IF SOMETHING AWFUL MIGHT HAPPEN: NEARLY EVERY DAY
4. TROUBLE RELAXING: NEARLY EVERY DAY
IF YOU CHECKED OFF ANY PROBLEMS ON THIS QUESTIONNAIRE, HOW DIFFICULT HAVE THESE PROBLEMS MADE IT FOR YOU TO DO YOUR WORK, TAKE CARE OF THINGS AT HOME, OR GET ALONG WITH OTHER PEOPLE: EXTREMELY DIFFICULT

## 2017-09-18 ASSESSMENT — PAIN SCALES - GENERAL: PAINLEVEL: SEVERE PAIN (7)

## 2017-09-18 NOTE — MR AVS SNAPSHOT
After Visit Summary   9/18/2017    Jennifer Tatum    MRN: [id number]           Patient Information     Date Of Birth          1990        Visit Information        Provider Department      9/18/2017 4:00 PM Cornell Herron MD [facility]        Today's Diagnoses     TMJ (temporomandibular joint syndrome)    -  1    Major depressive disorder, recurrent episode, mild (H)        Complicated grieving           Follow-ups after your visit        Your next 10 appointments already scheduled     Sep 19, 2017 11:30 AM CDT   (Arrive by 11:15 AM)   New Visit with JAK Mosqueda   [facility] [place] ([facility] [place] [facility]    [address]  [place] [address] [phone]            Oct 02, 2017 11:15 AM CDT   (Arrive by 11:00 AM)   SHORT with Cornell Herron MD   [facility] [place] ([facility] [place] )    [address]  [place] [address]   [phone]            Oct 30, 2017  9:40 AM CDT   (Arrive by 9:25 AM)   SHORT with Cornell Herron MD   [facility] ([facility] [place] )    [address]  [place] [address]   [phone]              Who to contact     If you have questions or need follow up information about today's clinic visit or your schedule please contact [facility] directly at [phone].  Normal or non-critical lab and imaging results will be communicated to you by MyChart, letter or phone within 4 business days after the clinic has received the results. If you do not hear from us within 7 days, please contact the clinic through MyChart or phone. If you have a critical or abnormal lab result, we will notify you by phone as soon as possible.  Submit refill requests through [employer] or call your pharmacy and they will forward the refill request to us. Please allow 3 business days for your refill to be completed.          Additional Information About Your Visit        MyChart  "Information     [employer] lets you send messages to your doctor, view your test results, renew your prescriptions, schedule appointments and more. To sign up, go to [email or web address] . Click on \"Log in\" on the left side of the screen, which will take you to the Welcome page. Then click on \"Sign up Now\" on the right side of the page.     You will be asked to enter the access code listed below, as well as some personal information. Please follow the directions to create your username and password.     Your access code is: [email or web address]  Expires: 12/10/2017 11:45 AM     Your access code will  in 90 days. If you need help or a new code, please call your [place] clinic or [phone].        Care EveryWhere ID     This is your [facility] EveryWhere ID. This could be used by other organizations to access your [place] medical records  [id number]        Your Vitals Were     Pulse Temperature Height Pulse Oximetry BMI (Body Mass Index)       101 98  F (36.7  C) (Tympanic) 5' 5\" (1.651 m) 98% 32.52 kg/m2        Blood Pressure from Last 3 Encounters:   17 124/80   17 126/82   17 102/68    Weight from Last 3 Encounters:   17 195 lb 6.4 oz (88.6 kg)   17 192 lb (87.1 kg)   17 203 lb (92.1 kg)              Today, you had the following     No orders found for display         Today's Medication Changes          These changes are accurate as of: 17  5:21 PM.  If you have any questions, ask your nurse or doctor.               Start taking these medicines.        Dose/Directions    diazepam 2 MG tablet   Commonly known as:  VALIUM   Used for:  TMJ (temporomandibular joint syndrome)   Started by:  Cornell Herron MD        1 tab orally one hour before bedtime   Quantity:  20 tablet   Refills:  0         Stop taking these medicines if you haven't already. Please contact your care team if you have questions.     traZODone 50 MG tablet   Commonly known as:  DESYREL   Stopped by:  Gopal" Cornell MARTINEZ MD                Where to get your medicines      Some of these will need a paper prescription and others can be bought over the counter.  Ask your nurse if you have questions.     Bring a paper prescription for each of these medications     diazepam 2 MG tablet                Primary Care Provider Office Phone # Fax #    Cornell Herron [phone]       [facility] [address] [address]        Equal Access to Services     [facility]: Hadii aad ku hadasho Soomaali, waaxda luqadaha, qaybta kaalmada adeegyada, waxay idiin hayaan adeeg kharash la'aan ah. So [facility] [phone].    ATENCIÓN: Si habla espdaniela, tiene a chen disposición servicios gratuitos de asistencia lingüística. Llame al [phone].    We comply with applicable federal civil rights laws and [place] laws. We do not discriminate on the basis of race, color, national origin, age, disability sex, sexual orientation or gender identity.            Thank you!     Thank you for choosing [facility]  for your care. Our goal is always to provide you with excellent care. Hearing back from our patients is one way we can continue to improve our services. Please take a few minutes to complete the written survey that you may receive in the mail after your visit with us. Thank you!             Your Updated Medication List - Protect others around you: Learn how to safely use, store and throw away your medicines at www.disposemymeds.org.          This list is accurate as of: 9/18/17  5:21 PM.  Always use your most recent med list.                   Brand Name Dispense Instructions for use Diagnosis    amphetamine-dextroamphetamine 20 MG per 24 hr capsule   Start taking on:  10/1/2017    ADDERALL XR    30 capsule    Take 1 capsule (20 mg) by mouth daily    Attention deficit hyperactivity disorder (ADHD), predominantly inattentive type       diazepam 2 MG tablet    VALIUM    20 tablet    1 tab orally one  hour before bedtime    TMJ (temporomandibular joint syndrome)       TYLENOL PO           * venlafaxine 150 MG 24 hr capsule    EFFEXOR-XR    30 capsule    TAKE ONE CAPSULE BY MOUTH ONCE DAILY    Major depressive disorder, recurrent episode, mild (H), PTSD (post-traumatic stress disorder)       * venlafaxine 75 MG 24 hr capsule    EFFEXOR-XR    30 capsule    Take 1 capsule (75 mg) by mouth daily Add to the 150 mg for a total daily dose of 225 mg    Major depressive disorder, recurrent episode, mild (H)       * Notice:  This list has 2 medication(s) that are the same as other medications prescribed for you. Read the directions carefully, and ask your doctor or other care provider to review them with you.

## 2017-09-18 NOTE — NURSING NOTE
"Chief Complaint   Patient presents with     TMJ       Initial /80 (BP Location: Left arm, Patient Position: Chair, Cuff Size: Adult Regular)  Pulse 101  Temp 98  F (36.7  C) (Tympanic)  Ht 5' 5\" (1.651 m)  Wt 195 lb 6.4 oz (88.6 kg)  SpO2 98%  BMI 32.52 kg/m2 Estimated body mass index is 32.52 kg/(m^2) as calculated from the following:    Height as of this encounter: 5' 5\" (1.651 m).    Weight as of this encounter: 195 lb 6.4 oz (88.6 kg).  Medication Reconciliation: complete     Liv Wong   "

## 2017-09-18 NOTE — PROGRESS NOTES
SUBJECTIVE:   Jennifer Tatum is a 27 year old female who presents to clinic today for the following health issues:      TMJ      Duration: years    Description (location/character/radiation): Jaw pain started getting increasingly worse over the last 6 months    Intensity:  Currently 7/10    Accompanying signs and symptoms: went to dentist today, and its way worse. Has head aches every day/    History (similar episodes/previous evaluation): Physical therapy - not helping    Precipitating or alleviating factors: None    Therapies tried and outcome: mouth guard - didn't help.     Seen PT about  5-6 times and did not help-- sounds like she was doing fine touch and no stretching / HEP/ Ultrasound    Less gum and softer foods    Mouth guard not helping    Flexeril not helping    Still dealing with stress of school and grieving             Depression Followup    Status since last visit: Stable not improved yet and maybe more side effects of nausea and motion sickness with larger dose of Effexor    See PHQ-9 for current symptoms.  Other associated symptoms: None    Complicating factors:   Significant life event:  Yes-  Prolong grieving    Current substance abuse:  None  Anxiety or Panic symptoms:  Yes-  Some anxiety     PHQ-9  English  PHQ-9   Any Language    PHQ-9 SCORE 7/26/2017 9/11/2017 9/18/2017   Total Score - - -   Total Score 19 25 24     VINI-7 SCORE 7/26/2017 9/11/2017 9/18/2017   Total Score 18 21 21             Problem list and histories reviewed & adjusted, as indicated.  Additional history: as documented    Labs reviewed in EPIC    Reviewed and updated as needed this visit by clinical staffTobacco  Allergies  Meds  Med Hx  Surg Hx  Fam Hx  Soc Hx      Reviewed and updated as needed this visit by Provider         ROS:  C: NEGATIVE for fever, chills, change in weight  E/M: NEGATIVE for ear, mouth and throat problems  R: NEGATIVE for significant cough or SOB    OBJECTIVE:                                     "                /80 (BP Location: Left arm, Patient Position: Chair, Cuff Size: Adult Regular)  Pulse 101  Temp 98  F (36.7  C) (Tympanic)  Ht 5' 5\" (1.651 m)  Wt 195 lb 6.4 oz (88.6 kg)  SpO2 98%  BMI 32.52 kg/m2  Body mass index is 32.52 kg/(m^2).   GENERAL: healthy, alert, well nourished, well hydrated, no distress  HENT: ear canals- normal; TMs- normal; Nose- normal; Mouth- no ulcers, no lesions  Very tender right tmj with difficult opening jaw  NECK: no tenderness, no adenopathy, no asymmetry, no masses, no stiffness; thyroid- normal to palpation  PSYCH: Alert and oriented times 3; speech- coherent , normal rate and volume; able to articulate logical thoughts, able to abstract reason, no tangential thoughts, no hallucinations or delusions, affect- stressed - not teary eyed like in past.           ASSESSMENT/PLAN:                                                    (M26.399) TMJ (temporomandibular joint syndrome)  (primary encounter diagnosis)  Comment: Discussed options.  Right now will try low dose valium at night.  NO gum and soft foods  Plan: diazepam (VALIUM) 2 MG tablet        Will research if oral surg. Can help or where nearest TMJ clinic.  Also - will ask PT if other options.  Has been seeing Maylin LEON-- minimal hands on. No US or HEP or stretching given to pt.     (F33.0) Major depressive disorder, recurrent episode, mild (H)  Comment: still major issue.  Needs to work with counseling more- has upcoming appt.    Plan: discussed meds and possible side effects. She wants to continue same dose for now. F/u in 2 weeks.     (F43.29,  Z63.4) Complicated grieving  Comment: needs aggressive and comprehensive counseling.  Plan: has appt coming soon.     *30  minutes was spent with patient and over 50%  of this time was spent on counseling patient regarding  illness, medication and / or treatment  options, coordinating further cares and follow ups that are needed along with resource material that will " be helpful in the treatment of these issues.         See Patient Instructions    Cornell Herron MD  [facility]

## 2017-09-19 ENCOUNTER — OFFICE VISIT (OUTPATIENT)
Dept: BEHAVIORAL HEALTH | Facility: OTHER | Age: 27
End: 2017-09-19
Attending: FAMILY MEDICINE
Payer: COMMERCIAL

## 2017-09-19 DIAGNOSIS — R69 DIAGNOSIS DEFERRED: Primary | ICD-10-CM

## 2017-09-19 ASSESSMENT — ANXIETY QUESTIONNAIRES: GAD7 TOTAL SCORE: 21

## 2017-09-19 NOTE — PROGRESS NOTES
Patient had come in to do a HAP assessment to enroll in the program, but after hearing more of what we offered, she decided she did not need the service and did not want to enroll.  This worker gave patient business cards for [facility] personnel in case she might need services in the future.

## 2017-09-19 NOTE — MR AVS SNAPSHOT
"              After Visit Summary   9/19/2017    Jennifer Tatum    MRN: [id number]           Patient Information     Date Of Birth          1990        Visit Information        Provider Department      9/19/2017 11:30 AM Sabine Veronica LSW [facility]        Today's Diagnoses     Diagnosis deferred    -  1       Follow-ups after your visit        Your next 10 appointments already scheduled     Oct 02, 2017 11:15 AM CDT   (Arrive by 11:00 AM)   SHORT with Cornell Herron MD   [facility] [place] )    [address]  [place] [address]   [phone]            Oct 30, 2017  9:40 AM CDT   (Arrive by 9:25 AM)   SHORT with Cornell Herron MD   [facility] ([facility] [place] )    [address]  [place] [address]   [phone]              Who to contact     If you have questions or need follow up information about today's clinic visit or your schedule please contact [facility] directly at [phone].  Normal or non-critical lab and imaging results will be communicated to you by MyChart, letter or phone within 4 business days after the clinic has received the results. If you do not hear from us within 7 days, please contact the clinic through "[employer] or phone. If you have a critical or abnormal lab result, we will notify you by phone as soon as possible.  Submit refill requests through [employer] or call your pharmacy and they will forward the refill request to us. Please allow 3 business days for your refill to be completed.          Additional Information About Your Visit        MyChart Information     [employer] lets you send messages to your doctor, view your test results, renew your prescriptions, schedule appointments and more. To sign up, go to [email or web address] . Click on \"Log in\" on the left side of the screen, which will take you to the Welcome page. Then click on \"Sign up Now\" on the right side of the " page.     You will be asked to enter the access code listed below, as well as some personal information. Please follow the directions to create your username and password.     Your access code is: [email or web address]  Expires: 12/10/2017 11:45 AM     Your access code will  in 90 days. If you need help or a new code, please call your [place] clinic or [phone].        Care EveryWhere ID     This is your Care EveryWhere ID. This could be used by other organizations to access your [place] medical records  [id number]         Blood Pressure from Last 3 Encounters:   17 124/80   17 126/82   17 102/68    Weight from Last 3 Encounters:   17 195 lb 6.4 oz (88.6 kg)   17 192 lb (87.1 kg)   17 203 lb (92.1 kg)              Today, you had the following     No orders found for display       Primary Care Provider Office Phone # Fax #    Cornell Herron [phone]       [facility] [address]  [facility] [address]        Equal Access to Services     [facility]: Hadii aad ku hadasho Soomaali, waaxda luqadaha, qaybta kaalmada adeegyada, waxay pedroin haystacyn neena gill lasara . So [facility] [phone].    ATENCIÓN: Si habla español, tiene a chen disposición servicios gratuitos de asistencia lingüística. Llame al [phone].    We comply with applicable federal civil rights laws and [place] laws. We do not discriminate on the basis of race, color, national origin, age, disability sex, sexual orientation or gender identity.            Thank you!     Thank you for choosing [facility]  for your care. Our goal is always to provide you with excellent care. Hearing back from our patients is one way we can continue to improve our services. Please take a few minutes to complete the written survey that you may receive in the mail after your visit with us. Thank you!             Your Updated Medication List - Protect others around you: Learn how to  safely use, store and throw away your medicines at www.disposemymeds.org.          This list is accurate as of: 9/19/17 11:48 AM.  Always use your most recent med list.                   Brand Name Dispense Instructions for use Diagnosis    amphetamine-dextroamphetamine 20 MG per 24 hr capsule   Start taking on:  10/1/2017    ADDERALL XR    30 capsule    Take 1 capsule (20 mg) by mouth daily    Attention deficit hyperactivity disorder (ADHD), predominantly inattentive type       diazepam 2 MG tablet    VALIUM    20 tablet    1 tab orally one hour before bedtime    TMJ (temporomandibular joint syndrome)       TYLENOL PO           * venlafaxine 150 MG 24 hr capsule    EFFEXOR-XR    30 capsule    TAKE ONE CAPSULE BY MOUTH ONCE DAILY    Major depressive disorder, recurrent episode, mild (H), PTSD (post-traumatic stress disorder)       * venlafaxine 75 MG 24 hr capsule    EFFEXOR-XR    30 capsule    Take 1 capsule (75 mg) by mouth daily Add to the 150 mg for a total daily dose of 225 mg    Major depressive disorder, recurrent episode, mild (H)       * Notice:  This list has 2 medication(s) that are the same as other medications prescribed for you. Read the directions carefully, and ask your doctor or other care provider to review them with you.

## 2017-09-26 NOTE — PROGRESS NOTES
Outpatient Physical Therapy Discharge Note     Patient: Jennifer Tatum  : 1990    Beginning/End Dates of Reporting Period:  2017 to 2017    Referring Provider: Cornell Herron MD    Therapy Diagnosis: TMJ syndrome mediated by mechanical dysfunction at cranial, cervical, thoracic, lumbar, sacral, and pelvic segments with functional leg length difference     Client Self Report: Patient seen [phone] for C/O TMJ locking right side. She's sore. We may not be getting anywhere. Her back is better and she's had back pain since school. Pain rated 7/10 in the jaw. The jaw still locks.    Objective Measurements:  Objective Measure: Somatic dysfunction  Details: Occiput, temporal and sphenoid bones mobility WFL today. Mandibular tracking laterally to right with opening. Leg lengths equal, pelvis level and SI jts equally mobile. Sacral and lumbar segments WFL of mobility and alignment.         Outcome Measures (most recent score):  TMJ Disability Questionnaire score 66%, severe disability    Goals:  Goal Identifier 1 Functional   Goal Description Improved TMJ Disability Questionnaire score to 40% or better   Target Date 17   Date Met      Progress:     Goal Identifier 2 Outcome   Goal Description Resolution of mechanical dysfunction   Target Date 17   Date Met  17   Progress:     Progress Toward Goals:   Patient was not improving subjectively despite objective changes. Was to return to her medical practitioner to inquire about other tx options.    Plan:  Discharge from therapy.    Discharge:    Reason for Discharge: No further expectation of progress.    Equipment Issued: n/a    Discharge Plan: Other services: return to physician. Current status of patient is unknown.     Giselle Munoz DPT

## 2017-10-02 ENCOUNTER — OFFICE VISIT (OUTPATIENT)
Dept: FAMILY MEDICINE | Facility: OTHER | Age: 27
End: 2017-10-02
Attending: FAMILY MEDICINE
Payer: COMMERCIAL

## 2017-10-02 VITALS
HEART RATE: 110 BPM | SYSTOLIC BLOOD PRESSURE: 118 MMHG | WEIGHT: 195 LBS | HEIGHT: 65 IN | TEMPERATURE: 98.1 F | DIASTOLIC BLOOD PRESSURE: 68 MMHG | BODY MASS INDEX: 32.49 KG/M2

## 2017-10-02 DIAGNOSIS — Z30.09 BIRTH CONTROL COUNSELING: ICD-10-CM

## 2017-10-02 DIAGNOSIS — N92.6 MISSED MENSES: Primary | ICD-10-CM

## 2017-10-02 DIAGNOSIS — M26.609 TMJ (TEMPOROMANDIBULAR JOINT SYNDROME): ICD-10-CM

## 2017-10-02 DIAGNOSIS — F90.0 ATTENTION DEFICIT HYPERACTIVITY DISORDER (ADHD), PREDOMINANTLY INATTENTIVE TYPE: ICD-10-CM

## 2017-10-02 LAB — HCG SERPL QL: NEGATIVE

## 2017-10-02 PROCEDURE — 99212 OFFICE O/P EST SF 10 MIN: CPT

## 2017-10-02 PROCEDURE — 99214 OFFICE O/P EST MOD 30 MIN: CPT | Performed by: FAMILY MEDICINE

## 2017-10-02 PROCEDURE — 84703 CHORIONIC GONADOTROPIN ASSAY: CPT | Mod: ZL | Performed by: FAMILY MEDICINE

## 2017-10-02 RX ORDER — DIAZEPAM 2 MG
TABLET ORAL
Qty: 30 TABLET | Refills: 1 | Status: SHIPPED | OUTPATIENT
Start: 2017-10-02 | End: 2017-10-30

## 2017-10-02 ASSESSMENT — ANXIETY QUESTIONNAIRES
4. TROUBLE RELAXING: NEARLY EVERY DAY
6. BECOMING EASILY ANNOYED OR IRRITABLE: NEARLY EVERY DAY
1. FEELING NERVOUS, ANXIOUS, OR ON EDGE: NEARLY EVERY DAY
7. FEELING AFRAID AS IF SOMETHING AWFUL MIGHT HAPPEN: NEARLY EVERY DAY
GAD7 TOTAL SCORE: 21
IF YOU CHECKED OFF ANY PROBLEMS ON THIS QUESTIONNAIRE, HOW DIFFICULT HAVE THESE PROBLEMS MADE IT FOR YOU TO DO YOUR WORK, TAKE CARE OF THINGS AT HOME, OR GET ALONG WITH OTHER PEOPLE: EXTREMELY DIFFICULT
2. NOT BEING ABLE TO STOP OR CONTROL WORRYING: NEARLY EVERY DAY
3. WORRYING TOO MUCH ABOUT DIFFERENT THINGS: NEARLY EVERY DAY
5. BEING SO RESTLESS THAT IT IS HARD TO SIT STILL: NEARLY EVERY DAY

## 2017-10-02 ASSESSMENT — PATIENT HEALTH QUESTIONNAIRE - PHQ9: SUM OF ALL RESPONSES TO PHQ QUESTIONS 1-9: 25

## 2017-10-02 ASSESSMENT — PAIN SCALES - GENERAL: PAINLEVEL: SEVERE PAIN (7)

## 2017-10-02 NOTE — MR AVS SNAPSHOT
After Visit Summary   10/2/2017    Jennifer Tatum    MRN: [id number]           Patient Information     Date Of Birth          1990        Visit Information        Provider Department      10/2/2017 11:15 AM Cornell Herron MD [facility]        Today's Diagnoses     Missed menses    -  1    TMJ (temporomandibular joint syndrome)        Attention deficit hyperactivity disorder (ADHD), predominantly inattentive type        Birth control counseling           Follow-ups after your visit        Your next 10 appointments already scheduled     Oct 03, 2017 11:00 AM CDT   Evaluation with Leila Felder PT   [facility] ([facility] )    [address] [address]   [phone]            Oct 30, 2017  3:20 PM CDT   (Arrive by 3:05 PM)   SHORT with Cornell Herron MD   [facility] ([facility] )    [address] [place] [address] [facility] [address]   [phone]              Who to contact     If you have questions or need follow up information about today's clinic visit or your schedule please contact [facility] directly at [phone].  Normal or non-critical lab and imaging results will be communicated to you by MyChart, letter or phone within 4 business days after the clinic has received the results. If you do not hear from us within 7 days, please contact the clinic through [employer] or phone. If you have a critical or abnormal lab result, we will notify you by phone as soon as possible.  Submit refill requests through [employer] or call your pharmacy and they will forward the refill request to us. Please allow 3 business days for your refill to be completed.          Additional Information About Your Visit        MyChart Information     Rapid7 lets you send messages to your doctor, view your test results, renew your prescriptions, schedule appointments and more. To sign up, go to [email or web address] . Click on  "\"Log in\" on the left side of the screen, which will take you to the Welcome page. Then click on \"Sign up Now\" on the right side of the page.     You will be asked to enter the access code listed below, as well as some personal information. Please follow the directions to create your username and password.     Your access code is: [email or web address]  Expires: 12/10/2017 11:45 AM     Your access code will  in 90 days. If you need help or a new code, please call your [facility] clinic or [phone].        Care EveryWhere ID     This is your Care EveryWhere ID. This could be used by other organizations to access your [facility] medical records  [id number]        Your Vitals Were     Pulse Temperature Height BMI (Body Mass Index)          110 98.1  F (36.7  C) 5' 5\" (1.651 m) 32.45 kg/m2         Blood Pressure from Last 3 Encounters:   10/02/17 118/68   17 124/80   17 126/82    Weight from Last 3 Encounters:   10/02/17 195 lb (88.5 kg)   17 195 lb 6.4 oz (88.6 kg)   17 192 lb (87.1 kg)              We Performed the Following     HCG qualitative          Where to get your medicines      Some of these will need a paper prescription and others can be bought over the counter.  Ask your nurse if you have questions.     Bring a paper prescription for each of these medications     diazepam 2 MG tablet          Primary Care Provider Office Phone # Fax #    Cornell Herron [phone]       [place] [address] [facility] [address]        Equal Access to Services     [facility] CenterMAURICE : Hadbinh Jacobson, karlene castillo, qaybwade zavala. So [facility] [phone].    ATENCIÓN: Si habla español, tiene a chen disposición servicios gratuitos de asistencia lingüística. Llame al [phone].    We comply with applicable federal civil rights laws and [place] laws. We do not discriminate on the basis of race, color, " national origin, age, disability, sex, sexual orientation, or gender identity.            Thank you!     Thank you for choosing [facility]  for your [facility]. Our goal is always to provide you with excellent care. Hearing back from our patients is one way we can continue to improve our services. Please take a few minutes to complete the written survey that you may receive in the mail after your visit with us. Thank you!             Your Updated Medication List - Protect others around you: Learn how to safely use, store and throw away your medicines at www.disposemymeds.org.          This list is accurate as of: 10/2/17 12:13 PM.  Always use your most recent med list.                   Brand Name Dispense Instructions for use Diagnosis    amphetamine-dextroamphetamine 20 MG per 24 hr capsule    ADDERALL XR    30 capsule    Take 1 capsule (20 mg) by mouth daily    Attention deficit hyperactivity disorder (ADHD), predominantly inattentive type       diazepam 2 MG tablet    VALIUM    30 tablet    1 tab orally one hour before bedtime    TMJ (temporomandibular joint syndrome)       TYLENOL PO           * venlafaxine 150 MG 24 hr capsule    EFFEXOR-XR    30 capsule    TAKE ONE CAPSULE BY MOUTH ONCE DAILY    Major depressive disorder, recurrent episode, mild (H), PTSD (post-traumatic stress disorder)       * venlafaxine 75 MG 24 hr capsule    EFFEXOR-XR    30 capsule    Take 1 capsule (75 mg) by mouth daily Add to the 150 mg for a total daily dose of 225 mg    Major depressive disorder, recurrent episode, mild (H)       * Notice:  This list has 2 medication(s) that are the same as other medications prescribed for you. Read the directions carefully, and ask your doctor or other care provider to review them with you.

## 2017-10-02 NOTE — PROGRESS NOTES
SUBJECTIVE:   Jennifer Tatum is a 27 year old female who presents to clinic today for the following health issues:      Depression and Anxiety Follow-Up    Status since last visit: No change    Other associated symptoms:None    Complicating factors:     Significant life event: Yes-grieving     Current substance abuse: None    Pt is holding her own- making some tough decisions to improve herself and work on taking care of her and son and not others    Some controlling and trust issues with SO- they split up    Counseling starts again tomorrow.   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  PHQ-9 SCORE 9/11/2017 9/18/2017 10/2/2017   Total Score - - -   Total Score 25 24 25     VINI-7 SCORE 9/11/2017 9/18/2017 10/2/2017   Total Score 21 21 21       PHQ-9  English  PHQ-9   Any Language  GAD7      Amount of exercise or physical activity: None    Problems taking medications regularly: No    Medication side effects: none  Diet: regular (no restrictions)      Musculoskeletal problem/pain      Duration: years    Description  Location: right sided jaw    Intensity:  moderate    Accompanying signs and symptoms: none    History  Previous similar problem: YES  Previous evaluation:  none    Precipitating or alleviating factors:  Trauma or overuse: no   Aggravating factors include: none    Therapies tried and outcome: stretching      Has had missed menses but under lots of stress. Last time intercourse - - this weekend. No using  anything.   Has been on IUD/Nuva ring-gained wt she says.   Pt is due for physical and pap - she is aware.       Pt is dealing with her Adderal running out before end of school days 3/ 5 days. Has later class in that last til 2 - feels like Adderal runs out between 12-1 - Real struggle.  Insurance with not pay for BID dosing.       Problem list and histories  "reviewed & adjusted, as indicated.  Additional history: as documented        Reviewed and updated as needed this visit by clinical staffTobacco  Allergies  Meds  Med Hx  Surg Hx  Fam Hx  Soc Hx      Reviewed and updated as needed this visit by Provider         ROS:  C: NEGATIVE for fever, chills, change in weight  E/M: NEGATIVE for ear, mouth and throat problems  R: NEGATIVE for significant cough or SOB  CV: NEGATIVE for chest pain, palpitations or peripheral edema    LMP - she is late--  Under lots of stress.  LMP- 8/25- no pregnancy sx   OBJECTIVE:                                                    /68  Pulse 110  Temp 98.1  F (36.7  C)  Ht 5' 5\" (1.651 m)  Wt 195 lb (88.5 kg)  BMI 32.45 kg/m2  Body mass index is 32.45 kg/(m^2).   GENERAL: healthy, alert, well nourished, well hydrated, no distress  HENT: some better movement of right TMJ  PSYCH: Alert and oriented times 3; speech- coherent , normal rate and volume; able to articulate logical thoughts, able to abstract reason, no tangential thoughts, no hallucinations or delusions, affect- some flat but more in control and not teary eye- some changes made seem to be good            Results for orders placed or performed in visit on 10/02/17 (from the past 24 hour(s))   HCG qualitative   Result Value Ref Range    HCG Qualitative Serum Negative NEG^Negative       ASSESSMENT/PLAN:                                                    (N92.6) Missed menses  (primary encounter diagnosis)  Comment:Pregnacy test pending.  Probable due to lots of stress in life   Plan: HCG qualitative        Discussed.  See below. Normal TSH in Feb.     (M26.609) TMJ (temporomandibular joint syndrome)  Comment: better with valium at night   Plan: diazepam (VALIUM) 2 MG tablet        Has first PT appt with new therapist. Will refill Valium . Pt is aware of R/B on daily valium even though on low dose.     (F90.0) Attention deficit hyperactivity disorder (ADHD), predominantly " inattentive type  Comment: discussed and options of short acting Adderal on longer days vs try increase dose.   Plan: Wants latter- she says she can wait since just filled Adderal.     (Z30.09) Birth control counseling  Comment: discussed options. And she is need of pap in future. Discussed wt gain issue caused by increase appetite.   Plan: Pregnancy test today and then wait till next menses- would repeat preg. Test at that time and if negative start BCP is her wish. Lower estrogen such as Desogen/orthocept/triphasal would be options.          See Patient Instructions    Cornell Herron MD  [facility])

## 2017-10-02 NOTE — NURSING NOTE
"Chief Complaint   Patient presents with     Depression       Initial /68  Pulse 110  Temp 98.1  F (36.7  C)  Ht 5' 5\" (1.651 m)  Wt 195 lb (88.5 kg)  BMI 32.45 kg/m2 Estimated body mass index is 32.45 kg/(m^2) as calculated from the following:    Height as of this encounter: 5' 5\" (1.651 m).    Weight as of this encounter: 195 lb (88.5 kg).  Medication Reconciliation: complete     Gilbert Titus      "

## 2017-10-03 ENCOUNTER — HOSPITAL ENCOUNTER (OUTPATIENT)
Dept: PHYSICAL THERAPY | Facility: HOSPITAL | Age: 27
Setting detail: THERAPIES SERIES
End: 2017-10-03
Attending: FAMILY MEDICINE
Payer: COMMERCIAL

## 2017-10-03 PROCEDURE — 97530 THERAPEUTIC ACTIVITIES: CPT | Mod: GP,59

## 2017-10-03 PROCEDURE — 97140 MANUAL THERAPY 1/> REGIONS: CPT | Mod: GP

## 2017-10-03 PROCEDURE — 97035 APP MDLTY 1+ULTRASOUND EA 15: CPT | Mod: GP

## 2017-10-03 PROCEDURE — 97162 PT EVAL MOD COMPLEX 30 MIN: CPT | Mod: GP

## 2017-10-03 PROCEDURE — 40000718 ZZHC STATISTIC PT DEPARTMENT ORTHO VISIT

## 2017-10-03 ASSESSMENT — ANXIETY QUESTIONNAIRES: GAD7 TOTAL SCORE: 21

## 2017-10-03 NOTE — PROGRESS NOTES
10/03/17 1100   General Information   Type of Visit Initial OP Ortho PT Evaluation   Start of Care Date 10/03/17   Referring Physician Dr. Herron   Patient/Family Goals Statement to be able to open mouth to eat a hamburger   Orders Evaluate and Treat   Date of Order 09/20/17   Insurance Type [employer]   Insurance Comments/Visits Authorized has 36 left   Medical Diagnosis TMJ syndrome   Surgical/Medical history reviewed Yes   Precautions/Limitations no known precautions/limitations   Presentation and Etiology   Pertinent history of current problem (include personal factors and/or comorbidities that impact the POC) Client has had issues with her jaw her entire life. 6 months ago she had a lot of stress with a still born and then a miscarraige in addition to working and going to school. SHe has noticed it is hard to eat to open her mouth as well as painful.   Impairments A. Pain;D. Decreased ROM;E. Decreased flexibility;N. Headaches;M. Locking or catching   Functional Limitations perform activities of daily living   Symptom Location left side of jaw but can be right to   How/Where did it occur With repetition/overuse   Onset date of current episode/exacerbation 02/01/17   Chronicity Recurrent   Pain rating (0-10 point scale) Best (/10);Worst (/10)   Best (/10) 5   Worst (/10) 9   Pain rating comment sharp   Pain quality A. Sharp;B. Dull;C. Aching   Frequency of pain/symptoms A. Constant   Pain/symptoms exacerbated by M. Other   Pain exacerbation comment opening mouth to eat   Pain/symptoms eased by D. Nothing   Progression of symptoms since onset: Worsened   Prior Level of Function   Prior Level of Function-Mobility has had issues for several years   Prior Level of Function-ADLs has had issues for several years   Current Level of Function   Patient role/employment history A. Employed;B. Student   Fall Risk Screen   Per patient - Fall 2 or more times in past year? No   Per patient - Fall with injury in past year? Yes    Is patient a fall risk? No   TMJ Objective Findings   Joint noises Yes with pain   Joint lock Open   Pain Opening;Brushing, flossing;Yawning   Associated symptoms Earache;Stress   Headache Migraine   Habits Gum chewing;Clenching;Caffeine;Unilateral chewing   Open click with opening   Observation no distress but does break down a bit while we are talking   Integumentary  no issues   Posture forward head and rounded shoulders   Bite comment chewing on one side    Difficulty swallowing No   Muscal Ridging No   Tongue Scalloping No   Accelerated Tooth Wear Yes   Overjet (mm) 2   Overbite (mm) 2   Underbite (mm) 0   Intraoral mouth appliance Yes - appliance does not help   Appliance wear Night only   Bite/Occlusion Bite feels off   Jaw muscle pain comment click and gets stuck open   Major dental work No   Tired or painful jaw muscles Yes   Opening click Yes   Closing click (sometimes)   Mandible Opening 35   Mandible Protrusion 2   Left Laterotrusion 6   Right Laterotrusion 7   Deviation with Opening to right   Early translation No   Passive testing - Distraction Hypomobile   Passive testing - Translation Hypomobile   Temporomandibular Ligament painful palpation   TMJ ROM Mandible Opening;Mandible Protrusion;Left Laterotrusion;Right Laterotrusion;Deviation with Opening   Accessory Motion (scalene very tight)   Planned Therapy Interventions   Planned Therapy Interventions joint mobilization;manual therapy;neuromuscular re-education;ROM;strengthening;stretching   Planned Modality Interventions   Planned Modality Interventions Ultrasound;Hot packs   Clinical Impression   Criteria for Skilled Therapeutic Interventions Met yes, treatment indicated   PT Diagnosis TMJ syndrome with poor posture and high stress behaviors   Influenced by the following impairments stress   Functional limitations due to impairments eating, mouth positioning   Clinical Presentation Evolving/Changing   Clinical Presentation Rationale TMJ Disability  Questionnaire   Clinical Decision Making (Complexity) High complexity   Therapy Frequency 2 times/Week   Predicted Duration of Therapy Intervention (days/wks) 12 weeks   Risk & Benefits of therapy have been explained Yes   Patient, Family & other staff in agreement with plan of care Yes   Clinical Impression Comments Client presents with limited mouth opening with deviation, pain and poor posture.   Education Assessment   Preferred Learning Style Listening;Reading;Demonstration;Pictures/video   Barriers to Learning No barriers   Ortho Goal 1   Goal Identifier short   Goal Description Client will increase opening of her jaw by 10 mm so she can eat a hamburger or hot dog.   Target Date 11/24/17   Ortho Goal 2   Goal Identifier short   Goal Description Client will decrease pain by 50% with talking and eating.   Target Date 11/24/17   Ortho Goal 3   Goal Identifier long   Goal Description Client will have fucntional opening/closing for eating and talking  as evidenced by improvemnt in TMJ disability to 20% or less.   Target Date 12/26/17   Total Evaluation Time   Total Evaluation Time 25

## 2017-10-05 ENCOUNTER — TELEPHONE (OUTPATIENT)
Dept: FAMILY MEDICINE | Facility: OTHER | Age: 27
End: 2017-10-05

## 2017-10-05 ENCOUNTER — HOSPITAL ENCOUNTER (OUTPATIENT)
Dept: PHYSICAL THERAPY | Facility: HOSPITAL | Age: 27
Setting detail: THERAPIES SERIES
End: 2017-10-05
Attending: FAMILY MEDICINE
Payer: COMMERCIAL

## 2017-10-05 DIAGNOSIS — N92.6 MISSED MENSES: ICD-10-CM

## 2017-10-05 DIAGNOSIS — N92.6 MISSED MENSES: Primary | ICD-10-CM

## 2017-10-05 DIAGNOSIS — Z30.011 BCP (BIRTH CONTROL PILLS) INITIATION: Primary | ICD-10-CM

## 2017-10-05 LAB — HCG UR QL: NEGATIVE

## 2017-10-05 PROCEDURE — 81025 URINE PREGNANCY TEST: CPT | Mod: ZL | Performed by: FAMILY MEDICINE

## 2017-10-05 PROCEDURE — 97140 MANUAL THERAPY 1/> REGIONS: CPT | Mod: GP

## 2017-10-05 PROCEDURE — 40000718 ZZHC STATISTIC PT DEPARTMENT ORTHO VISIT

## 2017-10-05 PROCEDURE — 97035 APP MDLTY 1+ULTRASOUND EA 15: CPT | Mod: GP

## 2017-10-05 RX ORDER — DESOGESTREL AND ETHINYL ESTRADIOL 0.15-0.03
1 KIT ORAL DAILY
Qty: 84 TABLET | Refills: 1 | Status: SHIPPED | OUTPATIENT
Start: 2017-10-05 | End: 2017-12-01

## 2017-10-05 NOTE — TELEPHONE ENCOUNTER
Pt does not remember if Dr. Herron told her to make an appt before or after her menstrual cycle. Please have nurse call back

## 2017-10-05 NOTE — TELEPHONE ENCOUNTER
Needs pregnancy test at time of menses - no appt needed. Place order. If negative- then will Rx BCP

## 2017-10-11 ENCOUNTER — HOSPITAL ENCOUNTER (OUTPATIENT)
Dept: PHYSICAL THERAPY | Facility: HOSPITAL | Age: 27
Setting detail: THERAPIES SERIES
End: 2017-10-11
Attending: FAMILY MEDICINE
Payer: COMMERCIAL

## 2017-10-11 PROCEDURE — 97110 THERAPEUTIC EXERCISES: CPT | Mod: GP

## 2017-10-11 PROCEDURE — 97035 APP MDLTY 1+ULTRASOUND EA 15: CPT | Mod: GP

## 2017-10-11 PROCEDURE — 40000718 ZZHC STATISTIC PT DEPARTMENT ORTHO VISIT

## 2017-10-13 ENCOUNTER — HOSPITAL ENCOUNTER (OUTPATIENT)
Dept: PHYSICAL THERAPY | Facility: HOSPITAL | Age: 27
Setting detail: THERAPIES SERIES
End: 2017-10-13
Attending: FAMILY MEDICINE
Payer: COMMERCIAL

## 2017-10-13 PROCEDURE — 97140 MANUAL THERAPY 1/> REGIONS: CPT | Mod: GP

## 2017-10-13 PROCEDURE — 97035 APP MDLTY 1+ULTRASOUND EA 15: CPT | Mod: GP

## 2017-10-13 PROCEDURE — 40000718 ZZHC STATISTIC PT DEPARTMENT ORTHO VISIT

## 2017-10-19 ENCOUNTER — HOSPITAL ENCOUNTER (OUTPATIENT)
Dept: PHYSICAL THERAPY | Facility: HOSPITAL | Age: 27
Setting detail: THERAPIES SERIES
End: 2017-10-19
Attending: FAMILY MEDICINE
Payer: COMMERCIAL

## 2017-10-19 PROCEDURE — 97140 MANUAL THERAPY 1/> REGIONS: CPT | Mod: GP

## 2017-10-19 PROCEDURE — 97035 APP MDLTY 1+ULTRASOUND EA 15: CPT | Mod: GP

## 2017-10-19 PROCEDURE — 40000718 ZZHC STATISTIC PT DEPARTMENT ORTHO VISIT

## 2017-10-24 ENCOUNTER — HOSPITAL ENCOUNTER (OUTPATIENT)
Dept: PHYSICAL THERAPY | Facility: HOSPITAL | Age: 27
Setting detail: THERAPIES SERIES
End: 2017-10-24
Attending: FAMILY MEDICINE
Payer: COMMERCIAL

## 2017-10-24 PROCEDURE — 97035 APP MDLTY 1+ULTRASOUND EA 15: CPT | Mod: GP

## 2017-10-24 PROCEDURE — 97140 MANUAL THERAPY 1/> REGIONS: CPT | Mod: GP

## 2017-10-24 PROCEDURE — 40000718 ZZHC STATISTIC PT DEPARTMENT ORTHO VISIT

## 2017-10-30 ENCOUNTER — OFFICE VISIT (OUTPATIENT)
Dept: FAMILY MEDICINE | Facility: OTHER | Age: 27
End: 2017-10-30
Attending: FAMILY MEDICINE
Payer: COMMERCIAL

## 2017-10-30 VITALS
WEIGHT: 195 LBS | BODY MASS INDEX: 32.49 KG/M2 | HEART RATE: 90 BPM | OXYGEN SATURATION: 97 % | DIASTOLIC BLOOD PRESSURE: 78 MMHG | TEMPERATURE: 98.3 F | SYSTOLIC BLOOD PRESSURE: 124 MMHG | HEIGHT: 65 IN

## 2017-10-30 DIAGNOSIS — F90.0 ATTENTION DEFICIT HYPERACTIVITY DISORDER (ADHD), PREDOMINANTLY INATTENTIVE TYPE: Primary | ICD-10-CM

## 2017-10-30 DIAGNOSIS — F33.0 MAJOR DEPRESSIVE DISORDER, RECURRENT EPISODE, MILD (H): ICD-10-CM

## 2017-10-30 DIAGNOSIS — R10.9 LEFT FLANK PAIN: ICD-10-CM

## 2017-10-30 DIAGNOSIS — F43.10 PTSD (POST-TRAUMATIC STRESS DISORDER): ICD-10-CM

## 2017-10-30 DIAGNOSIS — M26.609 TMJ (TEMPOROMANDIBULAR JOINT SYNDROME): ICD-10-CM

## 2017-10-30 PROCEDURE — 99212 OFFICE O/P EST SF 10 MIN: CPT

## 2017-10-30 PROCEDURE — 99214 OFFICE O/P EST MOD 30 MIN: CPT | Performed by: FAMILY MEDICINE

## 2017-10-30 RX ORDER — DIAZEPAM 2 MG
TABLET ORAL
Qty: 30 TABLET | Refills: 2 | Status: SHIPPED | OUTPATIENT
Start: 2017-10-30 | End: 2017-12-01

## 2017-10-30 RX ORDER — DEXTROAMPHETAMINE SACCHARATE, AMPHETAMINE ASPARTATE MONOHYDRATE, DEXTROAMPHETAMINE SULFATE AND AMPHETAMINE SULFATE 5; 5; 5; 5 MG/1; MG/1; MG/1; MG/1
20 CAPSULE, EXTENDED RELEASE ORAL DAILY
Qty: 30 CAPSULE | Refills: 0 | Status: SHIPPED | OUTPATIENT
Start: 2017-12-31 | End: 2017-12-01

## 2017-10-30 RX ORDER — VENLAFAXINE HYDROCHLORIDE 150 MG/1
CAPSULE, EXTENDED RELEASE ORAL
Qty: 90 CAPSULE | Refills: 3 | Status: SHIPPED | OUTPATIENT
Start: 2017-10-30 | End: 2018-11-01

## 2017-10-30 RX ORDER — DEXTROAMPHETAMINE SACCHARATE, AMPHETAMINE ASPARTATE MONOHYDRATE, DEXTROAMPHETAMINE SULFATE AND AMPHETAMINE SULFATE 5; 5; 5; 5 MG/1; MG/1; MG/1; MG/1
20 CAPSULE, EXTENDED RELEASE ORAL DAILY
Qty: 30 CAPSULE | Refills: 0 | Status: SHIPPED | OUTPATIENT
Start: 2017-10-30 | End: 2017-11-29

## 2017-10-30 RX ORDER — DEXTROAMPHETAMINE SACCHARATE, AMPHETAMINE ASPARTATE MONOHYDRATE, DEXTROAMPHETAMINE SULFATE AND AMPHETAMINE SULFATE 5; 5; 5; 5 MG/1; MG/1; MG/1; MG/1
20 CAPSULE, EXTENDED RELEASE ORAL DAILY
Qty: 30 CAPSULE | Refills: 0 | Status: SHIPPED | OUTPATIENT
Start: 2017-11-30 | End: 2017-12-01

## 2017-10-30 ASSESSMENT — ANXIETY QUESTIONNAIRES
2. NOT BEING ABLE TO STOP OR CONTROL WORRYING: MORE THAN HALF THE DAYS
6. BECOMING EASILY ANNOYED OR IRRITABLE: NEARLY EVERY DAY
5. BEING SO RESTLESS THAT IT IS HARD TO SIT STILL: NEARLY EVERY DAY
1. FEELING NERVOUS, ANXIOUS, OR ON EDGE: NEARLY EVERY DAY
GAD7 TOTAL SCORE: 20
7. FEELING AFRAID AS IF SOMETHING AWFUL MIGHT HAPPEN: NEARLY EVERY DAY
IF YOU CHECKED OFF ANY PROBLEMS ON THIS QUESTIONNAIRE, HOW DIFFICULT HAVE THESE PROBLEMS MADE IT FOR YOU TO DO YOUR WORK, TAKE CARE OF THINGS AT HOME, OR GET ALONG WITH OTHER PEOPLE: SOMEWHAT DIFFICULT
3. WORRYING TOO MUCH ABOUT DIFFERENT THINGS: NEARLY EVERY DAY
4. TROUBLE RELAXING: NEARLY EVERY DAY

## 2017-10-30 ASSESSMENT — PATIENT HEALTH QUESTIONNAIRE - PHQ9: SUM OF ALL RESPONSES TO PHQ QUESTIONS 1-9: 20

## 2017-10-30 ASSESSMENT — PAIN SCALES - GENERAL: PAINLEVEL: NO PAIN (0)

## 2017-10-30 NOTE — PROGRESS NOTES
SUBJECTIVE:   Jennifer Tatum is a 27 year old female who presents to clinic today for the following health issues:      Medication Followup of  adderall    Taking Medication as prescribed: yes    Side Effects:  None    Medication Helping Symptoms:  Yes    Going to full time school-- 4.0 - doing great              Flank pain      Duration: 1.5 months    Description (location/character/radiation): off and on flank pain, back side (left)    Intensity:  moderate    Accompanying signs and symptoms: none    History (similar episodes/previous evaluation): history of kidney stones    Precipitating or alleviating factors: None    Therapies tried and outcome: midol    Feels similar to when had kidney stones    Comes and goes    Not radiate to groin    No /GYN sx    Possible MSK     Depression Followup    Status since last visit: Stable     See PHQ-9 for current symptoms.  Other associated symptoms: None    Complicating factors:   Significant life event:  No   Current substance abuse:  None  Anxiety or Panic symptoms:  Yes-  Anxiety sometimes  Doing better   Seeing [facility]- going well.     PHQ-9 Score and MyChart F/U Questions 9/18/2017 10/2/2017 10/30/2017   Total Score 24 25 20   Q9: Suicide Ideation Not at all Several days Not at all     VINI-7 SCORE 9/18/2017 10/2/2017 10/30/2017   Total Score 21 21 20           PHQ-9  English  PHQ-9   Any Language  Suicide Assessment Five-step Evaluation and Treatment (SAFE-T)      Amount of exercise or physical activity: 6-7 days/week for an average of greater than 60 minutes    Problems taking medications regularly: No    Medication side effects: none    Diet: regular (no restrictions)         Problem list and histories reviewed & adjusted, as indicated.  Additional history: as documented        Reviewed and updated as needed this visit by clinical staffTobacco  Allergies  Meds  Problems  Med Hx  Surg Hx  Fam Hx  Soc Hx        Reviewed and updated as needed this  "visit by Provider         ROS:  C: NEGATIVE for fever, chills, change in weight  E/M: NEGATIVE for ear, mouth and throat problems  R: NEGATIVE for significant cough or SOB  CV: NEGATIVE for chest pain, palpitations or peripheral edema    OBJECTIVE:                                                    /78 (BP Location: Right arm, Patient Position: Chair, Cuff Size: Adult Regular)  Pulse 90  Temp 98.3  F (36.8  C) (Tympanic)  Ht 5' 5\" (1.651 m)  Wt 195 lb (88.5 kg)  SpO2 97%  BMI 32.45 kg/m2  Body mass index is 32.45 kg/(m^2).   GENERAL: healthy, alert, well nourished, well hydrated, no distress  Back- unremarkable   PSYCH: Alert and oriented times 3; speech- coherent , normal rate and volume; able to articulate logical thoughts, able to abstract reason, no tangential thoughts, no hallucinations or delusions, affect- normal- better spirits          ASSESSMENT/PLAN:                                                    (F90.0) Attention deficit hyperactivity disorder (ADHD), predominantly inattentive type  (primary encounter diagnosis)  Comment: stable on meds.   3 month RF given   Plan: amphetamine-dextroamphetamine (ADDERALL XR) 20         MG per 24 hr capsule,         amphetamine-dextroamphetamine (ADDERALL XR) 20         MG per 24 hr capsule,         amphetamine-dextroamphetamine (ADDERALL XR) 20         MG per 24 hr capsule            (R10.9) Left flank pain  Comment: msk vs renal calculus - doubt infectious   Plan: Discuss CT scan and if positive of small calculus - would recommend then watch and wait.  Doubt it would be large calculus with no stones seen in 2016 CT scan.  She decided to watch and treat symptomatic .    (F33.0) Major depressive disorder, recurrent episode, mild (H)  Comment: Better- -- counseling helping .   Plan: venlafaxine (EFFEXOR-XR) 150 MG 24 hr capsule        Continue current medications and behavioral changes.       (F43.10) PTSD (post-traumatic stress disorder)  Comment: Better - " counseling helping   Plan: venlafaxine (EFFEXOR-XR) 150 MG 24 hr capsule            (M26.609) TMJ (temporomandibular joint syndrome)  Comment: BETTER some !!!  Stay on course with PT and meds  Plan: diazepam (VALIUM) 2 MG tablet            Needs preventive med exam - appt made in future.           See Patient Instructions    Cornell Herron MD  [facility]

## 2017-10-30 NOTE — NURSING NOTE
"Chief Complaint   Patient presents with     Depression     A.D.H.D     Flank Pain       Initial /78 (BP Location: Right arm, Patient Position: Chair, Cuff Size: Adult Regular)  Pulse 90  Temp 98.3  F (36.8  C) (Tympanic)  Ht 5' 5\" (1.651 m)  Wt 195 lb (88.5 kg)  SpO2 97%  BMI 32.45 kg/m2 Estimated body mass index is 32.45 kg/(m^2) as calculated from the following:    Height as of this encounter: 5' 5\" (1.651 m).    Weight as of this encounter: 195 lb (88.5 kg).  Medication Reconciliation: complete     Liv Wong     "

## 2017-10-30 NOTE — MR AVS SNAPSHOT
After Visit Summary   10/30/2017    Jennifer Tatum    MRN: [id number]           Patient Information     Date Of Birth          1990        Visit Information        Provider Department      10/30/2017 3:20 PM Cornell Herron MD [facility]        Today's Diagnoses     Attention deficit hyperactivity disorder (ADHD), predominantly inattentive type    -  1    Left flank pain        Major depressive disorder, recurrent episode, mild (H)        PTSD (post-traumatic stress disorder)        TMJ (temporomandibular joint syndrome)           Follow-ups after your visit        Your next 10 appointments already scheduled     Dec 01, 2017  8:15 AM CST   (Arrive by 8:00 AM)   PHYSICAL with Cornell Herron MD   [facility] ([facility] )    [address]  [place] [address]   [phone]            Jan 26, 2018  8:30 AM CST   (Arrive by 8:15 AM)   SHORT with Cornell Herron MD   [facility] ([facility] )    [address]  [place] [address]   [phone]              Who to contact     If you have questions or need follow up information about today's clinic visit or your schedule please contact [facility] directly at [phone].  Normal or non-critical lab and imaging results will be communicated to you by MyChart, letter or phone within 4 business days after the clinic has received the results. If you do not hear from us within 7 days, please contact the clinic through "[employer]hart or phone. If you have a critical or abnormal lab result, we will notify you by phone as soon as possible.  Submit refill requests through [employer] or call your pharmacy and they will forward the refill request to us. Please allow 3 business days for your refill to be completed.          Additional Information About Your Visit        "[employer] Information     [employer] lets you send messages to your doctor, view your test results, renew your  "prescriptions, schedule appointments and more. To sign up, go to [email or web address] . Click on \"Log in\" on the left side of the screen, which will take you to the Welcome page. Then click on \"Sign up Now\" on the right side of the page.     You will be asked to enter the access code listed below, as well as some personal information. Please follow the directions to create your username and password.     Your access code is: [email or web address]  Expires: 12/10/2017 11:45 AM     Your access code will  in 90 days. If you need help or a new code, please call your [facility] clinic or [phone].        Care EveryWhere ID     This is your Care EveryWhere ID. This could be used by other organizations to access your [facility] medical records  [id number]        Your Vitals Were     Pulse Temperature Height Pulse Oximetry BMI (Body Mass Index)       90 98.3  F (36.8  C) (Tympanic) 5' 5\" (1.651 m) 97% 32.45 kg/m2        Blood Pressure from Last 3 Encounters:   10/30/17 124/78   10/02/17 118/68   17 124/80    Weight from Last 3 Encounters:   10/30/17 195 lb (88.5 kg)   10/02/17 195 lb (88.5 kg)   17 195 lb 6.4 oz (88.6 kg)              Today, you had the following     No orders found for display         Today's Medication Changes          These changes are accurate as of: 10/30/17  4:03 PM.  If you have any questions, ask your nurse or doctor.               These medicines have changed or have updated prescriptions.        Dose/Directions    * amphetamine-dextroamphetamine 20 MG per 24 hr capsule   Commonly known as:  ADDERALL XR   This may have changed:  Another medication with the same name was added. Make sure you understand how and when to take each.   Used for:  Attention deficit hyperactivity disorder (ADHD), predominantly inattentive type   Changed by:  Cornell Herron MD        Dose:  20 mg   Take 1 capsule (20 mg) by mouth daily   Quantity:  30 capsule   Refills:  0       * " amphetamine-dextroamphetamine 20 MG per 24 hr capsule   Commonly known as:  ADDERALL XR   This may have changed:  You were already taking a medication with the same name, and this prescription was added. Make sure you understand how and when to take each.   Used for:  Attention deficit hyperactivity disorder (ADHD), predominantly inattentive type   Changed by:  Cornell Herron MD        Dose:  20 mg   Take 1 capsule (20 mg) by mouth daily   Quantity:  30 capsule   Refills:  0       * amphetamine-dextroamphetamine 20 MG per 24 hr capsule   Commonly known as:  ADDERALL XR   This may have changed:  You were already taking a medication with the same name, and this prescription was added. Make sure you understand how and when to take each.   Used for:  Attention deficit hyperactivity disorder (ADHD), predominantly inattentive type   Changed by:  Cornell Herron MD        Dose:  20 mg   Start taking on:  11/30/2017   Take 1 capsule (20 mg) by mouth daily   Quantity:  30 capsule   Refills:  0       * amphetamine-dextroamphetamine 20 MG per 24 hr capsule   Commonly known as:  ADDERALL XR   This may have changed:  You were already taking a medication with the same name, and this prescription was added. Make sure you understand how and when to take each.   Used for:  Attention deficit hyperactivity disorder (ADHD), predominantly inattentive type   Changed by:  Cornell Herron MD        Dose:  20 mg   Start taking on:  12/31/2017   Take 1 capsule (20 mg) by mouth daily   Quantity:  30 capsule   Refills:  0       venlafaxine 150 MG 24 hr capsule   Commonly known as:  EFFEXOR-XR   This may have changed:  See the new instructions.   Used for:  Major depressive disorder, recurrent episode, mild (H), PTSD (post-traumatic stress disorder)   Changed by:  Cornell Herron MD        TAKE ONE CAPSULE BY MOUTH ONCE DAILY   Quantity:  90 capsule   Refills:  3       * Notice:  This list has 4 medication(s) that are the same as other  medications prescribed for you. Read the directions carefully, and ask your doctor or other care provider to review them with you.         Where to get your medicines      These medications were sent to [facility] - [place], [place] - [address]   [id number] , [place] [address]     Phone:  [phone]     venlafaxine 150 MG 24 hr capsule         Some of these will need a paper prescription and others can be bought over the counter.  Ask your nurse if you have questions.     Bring a paper prescription for each of these medications     amphetamine-dextroamphetamine 20 MG per 24 hr capsule    amphetamine-dextroamphetamine 20 MG per 24 hr capsule    amphetamine-dextroamphetamine 20 MG per 24 hr capsule    diazepam 2 MG tablet                Primary Care Provider Office Phone # Fax #    Cornell Herron [phone]       [facility] [address]  [place] [address]        Equal Access to Services     [facility]: Hadii melania pollack hadasho Soomaali, waaxda luqadaha, qaybta kaalmada adeegyada, wade wiggins hayverona biswas . So [facility] [phone].    ATENCIÓN: Si habla español, tiene a chen disposición servicios gratuitos de asistencia lingüística. Melani al [phone].    We comply with applicable federal civil rights laws and [place] laws. We do not discriminate on the basis of race, color, national origin, age, disability, sex, sexual orientation, or gender identity.            Thank you!     Thank you for choosing [facility]  for your care. Our goal is always to provide you with excellent care. Hearing back from our patients is one way we can continue to improve our services. Please take a few minutes to complete the written survey that you may receive in the mail after your visit with us. Thank you!             Your Updated Medication List - Protect others around you: Learn how to safely use, store and throw away your medicines at www.disposemymeds.org.           This list is accurate as of: 10/30/17  4:03 PM.  Always use your most recent med list.                   Brand Name Dispense Instructions for use Diagnosis    * amphetamine-dextroamphetamine 20 MG per 24 hr capsule    ADDERALL XR    30 capsule    Take 1 capsule (20 mg) by mouth daily    Attention deficit hyperactivity disorder (ADHD), predominantly inattentive type       * amphetamine-dextroamphetamine 20 MG per 24 hr capsule    ADDERALL XR    30 capsule    Take 1 capsule (20 mg) by mouth daily    Attention deficit hyperactivity disorder (ADHD), predominantly inattentive type       * amphetamine-dextroamphetamine 20 MG per 24 hr capsule   Start taking on:  11/30/2017    ADDERALL XR    30 capsule    Take 1 capsule (20 mg) by mouth daily    Attention deficit hyperactivity disorder (ADHD), predominantly inattentive type       * amphetamine-dextroamphetamine 20 MG per 24 hr capsule   Start taking on:  12/31/2017    ADDERALL XR    30 capsule    Take 1 capsule (20 mg) by mouth daily    Attention deficit hyperactivity disorder (ADHD), predominantly inattentive type       desogestrel-ethinyl estradiol 0.15-30 MG-MCG per tablet    APRI    84 tablet    Take 1 tablet by mouth daily    BCP (birth control pills) initiation       diazepam 2 MG tablet    VALIUM    30 tablet    1 tab orally one hour before bedtime    TMJ (temporomandibular joint syndrome)       TYLENOL PO           venlafaxine 150 MG 24 hr capsule    EFFEXOR-XR    90 capsule    TAKE ONE CAPSULE BY MOUTH ONCE DAILY    Major depressive disorder, recurrent episode, mild (H), PTSD (post-traumatic stress disorder)       * Notice:  This list has 4 medication(s) that are the same as other medications prescribed for you. Read the directions carefully, and ask your doctor or other care provider to review them with you.

## 2017-10-31 ASSESSMENT — ANXIETY QUESTIONNAIRES: GAD7 TOTAL SCORE: 20

## 2017-11-19 ENCOUNTER — HEALTH MAINTENANCE LETTER (OUTPATIENT)
Age: 27
End: 2017-11-19

## 2017-12-01 ENCOUNTER — OFFICE VISIT (OUTPATIENT)
Dept: FAMILY MEDICINE | Facility: OTHER | Age: 27
End: 2017-12-01
Attending: FAMILY MEDICINE
Payer: COMMERCIAL

## 2017-12-01 VITALS
HEART RATE: 95 BPM | OXYGEN SATURATION: 97 % | DIASTOLIC BLOOD PRESSURE: 76 MMHG | WEIGHT: 195.2 LBS | TEMPERATURE: 98.3 F | BODY MASS INDEX: 32.52 KG/M2 | SYSTOLIC BLOOD PRESSURE: 132 MMHG | HEIGHT: 65 IN

## 2017-12-01 DIAGNOSIS — Z00.00 ROUTINE GENERAL MEDICAL EXAMINATION AT A HEALTH CARE FACILITY: Primary | ICD-10-CM

## 2017-12-01 DIAGNOSIS — F33.0 MAJOR DEPRESSIVE DISORDER, RECURRENT EPISODE, MILD (H): ICD-10-CM

## 2017-12-01 DIAGNOSIS — O09.90 HIGH-RISK PREGNANCY, UNSPECIFIED TRIMESTER: ICD-10-CM

## 2017-12-01 DIAGNOSIS — F43.10 PTSD (POST-TRAUMATIC STRESS DISORDER): ICD-10-CM

## 2017-12-01 DIAGNOSIS — N92.6 MISSED MENSES: ICD-10-CM

## 2017-12-01 DIAGNOSIS — Z63.0 RELATIONSHIP PROBLEM BETWEEN PARTNERS: ICD-10-CM

## 2017-12-01 DIAGNOSIS — Z32.01 PREGNANCY TEST POSITIVE: ICD-10-CM

## 2017-12-01 DIAGNOSIS — R21 RASH AND NONSPECIFIC SKIN ERUPTION: ICD-10-CM

## 2017-12-01 LAB
BASOPHILS # BLD AUTO: 0.1 10E9/L (ref 0–0.2)
BASOPHILS NFR BLD AUTO: 0.7 %
DIFFERENTIAL METHOD BLD: NORMAL
EOSINOPHIL # BLD AUTO: 0.2 10E9/L (ref 0–0.7)
EOSINOPHIL NFR BLD AUTO: 2.1 %
ERYTHROCYTE [DISTWIDTH] IN BLOOD BY AUTOMATED COUNT: 13 % (ref 10–15)
HCG SERPL QL: POSITIVE
HCT VFR BLD AUTO: 41.9 % (ref 35–47)
HGB BLD-MCNC: 14.6 G/DL (ref 11.7–15.7)
IMM GRANULOCYTES # BLD: 0 10E9/L (ref 0–0.4)
IMM GRANULOCYTES NFR BLD: 0.3 %
LYMPHOCYTES # BLD AUTO: 2.4 10E9/L (ref 0.8–5.3)
LYMPHOCYTES NFR BLD AUTO: 34.1 %
MCH RBC QN AUTO: 30 PG (ref 26.5–33)
MCHC RBC AUTO-ENTMCNC: 34.8 G/DL (ref 31.5–36.5)
MCV RBC AUTO: 86 FL (ref 78–100)
MONOCYTES # BLD AUTO: 0.7 10E9/L (ref 0–1.3)
MONOCYTES NFR BLD AUTO: 9.8 %
NEUTROPHILS # BLD AUTO: 3.7 10E9/L (ref 1.6–8.3)
NEUTROPHILS NFR BLD AUTO: 53 %
NRBC # BLD AUTO: 0 10*3/UL
NRBC BLD AUTO-RTO: 0 /100
PLATELET # BLD AUTO: 223 10E9/L (ref 150–450)
RBC # BLD AUTO: 4.86 10E12/L (ref 3.8–5.2)
TSH SERPL DL<=0.005 MIU/L-ACNC: 1.83 MU/L (ref 0.4–4)
WBC # BLD AUTO: 7 10E9/L (ref 4–11)

## 2017-12-01 PROCEDURE — 99214 OFFICE O/P EST MOD 30 MIN: CPT | Performed by: FAMILY MEDICINE

## 2017-12-01 PROCEDURE — 36415 COLL VENOUS BLD VENIPUNCTURE: CPT | Mod: ZL | Performed by: FAMILY MEDICINE

## 2017-12-01 PROCEDURE — 84443 ASSAY THYROID STIM HORMONE: CPT | Mod: ZL | Performed by: FAMILY MEDICINE

## 2017-12-01 PROCEDURE — 84703 CHORIONIC GONADOTROPIN ASSAY: CPT | Mod: ZL | Performed by: FAMILY MEDICINE

## 2017-12-01 PROCEDURE — 99213 OFFICE O/P EST LOW 20 MIN: CPT

## 2017-12-01 PROCEDURE — 85025 COMPLETE CBC W/AUTO DIFF WBC: CPT | Mod: ZL | Performed by: FAMILY MEDICINE

## 2017-12-01 RX ORDER — PNV NO.95/FERROUS FUM/FOLIC AC 28MG-0.8MG
TABLET ORAL
COMMUNITY
Start: 2017-12-01 | End: 2018-11-01

## 2017-12-01 SDOH — SOCIAL STABILITY - SOCIAL INSECURITY: PROBLEMS IN RELATIONSHIP WITH SPOUSE OR PARTNER: Z63.0

## 2017-12-01 ASSESSMENT — ANXIETY QUESTIONNAIRES
6. BECOMING EASILY ANNOYED OR IRRITABLE: NEARLY EVERY DAY
7. FEELING AFRAID AS IF SOMETHING AWFUL MIGHT HAPPEN: NEARLY EVERY DAY
GAD7 TOTAL SCORE: 21
IF YOU CHECKED OFF ANY PROBLEMS ON THIS QUESTIONNAIRE, HOW DIFFICULT HAVE THESE PROBLEMS MADE IT FOR YOU TO DO YOUR WORK, TAKE CARE OF THINGS AT HOME, OR GET ALONG WITH OTHER PEOPLE: VERY DIFFICULT
2. NOT BEING ABLE TO STOP OR CONTROL WORRYING: NEARLY EVERY DAY
4. TROUBLE RELAXING: NEARLY EVERY DAY
3. WORRYING TOO MUCH ABOUT DIFFERENT THINGS: NEARLY EVERY DAY
5. BEING SO RESTLESS THAT IT IS HARD TO SIT STILL: NEARLY EVERY DAY
1. FEELING NERVOUS, ANXIOUS, OR ON EDGE: NEARLY EVERY DAY

## 2017-12-01 ASSESSMENT — PATIENT HEALTH QUESTIONNAIRE - PHQ9: SUM OF ALL RESPONSES TO PHQ QUESTIONS 1-9: 21

## 2017-12-01 ASSESSMENT — PAIN SCALES - GENERAL: PAINLEVEL: NO PAIN (0)

## 2017-12-01 NOTE — PROGRESS NOTES
SUBJECTIVE:   CC: Jennifer Tatum is an 27 year old woman who presents for preventive health visit.     Healthy Habits:    Do you get at least three servings of calcium containing foods daily (dairy, green leafy vegetables, etc.)? yes    Amount of exercise or daily activities, outside of work: 7 day(s) per week  60 minutes a day    Problems taking medications regularly No    Medication side effects: No    Have you had an eye exam in the past two years? yes    Do you see a dentist twice per year? Once a year    Do you have sleep apnea, excessive snoring or daytime drowsiness?yes  Snoring, Drowsiness        RED Bump      Duration: 2 weeks    Description (location/character/radiation): rt hand and rt inside of elbow    Intensity:  mild    Accompanying signs and symptoms: itching    History (similar episodes/previous evaluation): None    Precipitating or alleviating factors: None    Therapies tried and outcome: lotion, helps with itching         Today's PHQ-2 Score: PHQ-2 ( 1999 [employer]) 8/14/2013 7/31/2013   Q1: Little interest or pleasure in doing things 2 3   Q2: Feeling down, depressed or hopeless 3 3   PHQ-2 Score 5 6       PHQ-9 SCORE 10/2/2017 10/30/2017 12/1/2017   Total Score - - -   Total Score 25 20 21     VINI-7 SCORE 10/2/2017 10/30/2017 12/1/2017   Total Score 21 20 21       Pt has controlling SO and may be pregnant   Depression and anxiety up and down.  Seeing a counselor.  Making fair to sometimes bad decisions  Knows she has to make better decisions       Abuse: Current or Past(Physical, Sexual or Emotional)- No  Do you feel safe in your environment - Yes    Social History   Substance Use Topics     Smoking status: Current Every Day Smoker     Packs/day: 0.50     Years: 10.00     Types: Cigarettes     Smokeless tobacco: Never Used     Alcohol use No     The patient does not drink >3 drinks per day nor >7 drinks per week.    Reviewed orders with patient.  Reviewed health maintenance and updated orders  "accordingly - Yes  Labs reviewed in [facility]    Mammogram not appropriate for this patient based on age.    Pertinent mammograms are reviewed under the imaging tab.  History of abnormal Pap smear: NO - age 21-29 PAP every 3 years recommended    Reviewed and updated as needed this visit by clinical staff  Tobacco  Allergies  Meds         Reviewed and updated as needed this visit by Provider        Past Medical History:   Diagnosis Date     H/O nephrolithotomy with removal of calculi      History of  delivery 2015    sched for 3/21/15      Incompetent cervix 11/10/2015    To [place] per Dr. Evan RICKS      IUFD (intrauterine fetal death) 2016    Heart rate dropped during BPP and patient was taken for code pink c/s where baby was noted to be dead at birth      MDD (major depressive disorder)      Mild major depression (H) 2013     Nephrolithiasis     bilateral /complicating pregnancy- Seen Dr A[facility]      Smoker 2013    Patient is weaning       Past Surgical History:   Procedure Laterality Date     CERCLAGE CERVICAL N/A 11/10/2015    Procedure: CERCLAGE CERVICAL;  Surgeon: Tucker Barragan MD;  Location: [facility] L+D      SECTION  2012    Pregnancy full-term; \"Oleksandr\"       SECTION N/A 2016    Procedure:  SECTION;  Surgeon: Nisha Mcleod MD;  Location: [facility] OR      NEPHROSTOMY PERCUTUTANEOUS      left     impacted wisdom teeth removal         ROS:  C: NEGATIVE for fever, chills, change in weight  I: NEGATIVE for worrisome rashes, moles or lesions  E: NEGATIVE for vision changes or irritation  ENT: NEGATIVE for ear, mouth and throat problems  R: NEGATIVE for significant cough or SOB  B: NEGATIVE for masses, tenderness or discharge  CV: NEGATIVE for chest pain, palpitations or peripheral edema  GI: NEGATIVE for nausea, abdominal pain, heartburn, or change in bowel habits  : NEGATIVE for unusual urinary or vaginal symptoms. Periods are regular.  M: " "NEGATIVE for significant arthralgias or myalgia  N: NEGATIVE for weakness, dizziness or paresthesias  P: NEGATIVE for changes in mood or affect    OBJECTIVE:   /76 (BP Location: Left arm, Patient Position: Sitting, Cuff Size: Adult Regular)  Pulse 95  Temp 98.3  F (36.8  C) (Tympanic)  Ht 5' 5\" (1.651 m)  Wt 195 lb 3.2 oz (88.5 kg)  SpO2 97%  BMI 32.48 kg/m2  EXAM:  GENERAL: healthy, alert and no distress  SKIN: one smal red papule on antecubital region.  Nonspecific   PSYCH: mentation appears normal, affect normal/bright, better insight on her relationship with controlling BF     Results for orders placed or performed in visit on 12/01/17 (from the past 24 hour(s))   CBC with platelets differential   Result Value Ref Range    WBC 7.0 4.0 - 11.0 10e9/L    RBC Count 4.86 3.8 - 5.2 10e12/L    Hemoglobin 14.6 11.7 - 15.7 g/dL    Hematocrit 41.9 35.0 - 47.0 %    MCV 86 78 - 100 fl    MCH 30.0 26.5 - 33.0 pg    MCHC 34.8 31.5 - 36.5 g/dL    RDW 13.0 10.0 - 15.0 %    Platelet Count 223 150 - 450 10e9/L    Diff Method Automated Method     % Neutrophils 53.0 %    % Lymphocytes 34.1 %    % Monocytes 9.8 %    % Eosinophils 2.1 %    % Basophils 0.7 %    % Immature Granulocytes 0.3 %    Nucleated RBCs 0 0 /100    Absolute Neutrophil 3.7 1.6 - 8.3 10e9/L    Absolute Lymphocytes 2.4 0.8 - 5.3 10e9/L    Absolute Monocytes 0.7 0.0 - 1.3 10e9/L    Absolute Eosinophils 0.2 0.0 - 0.7 10e9/L    Absolute Basophils 0.1 0.0 - 0.2 10e9/L    Abs Immature Granulocytes 0.0 0 - 0.4 10e9/L    Absolute Nucleated RBC 0.0    TSH   Result Value Ref Range    TSH 1.83 0.40 - 4.00 mU/L   HCG qualitative   Result Value Ref Range    HCG Qualitative Serum Positive (A) NEG^Negative       ASSESSMENT/PLAN:       ICD-10-CM    1. Routine general medical examination at a health care facility Z00.00 CBC with platelets differential     TSH     HCG qualitative   2. Missed menses N92.6 CBC with platelets differential     TSH     HCG qualitative   3. " "Major depressive disorder, recurrent episode, mild (H) F33.0    4. PTSD (post-traumatic stress disorder) F43.10    5. Relationship problem between partners Z63.0    6. Pregnancy test positive Z32.01 OB/GYN REFERRAL   7. High-risk pregnancy, unspecified trimester O09.90 OB/GYN REFERRAL   8. Rash and nonspecific skin eruption R21      Exam cancelled and will see Ob/Gyn.  Stop all meds except Effexor for now. Continue counseling . Stay on Effexor til know viable pregnancy and will wean before delivery.  Needs to keep up with counseling .  Needs to make some major decisions with SO who is now the father of this baby and has one child with him before.  VERY controlling relationship and this poorly planned pregnancy seems to be a power play to keep her in this relationship.  HC for the rash.  F/u in8 weeks to see how things going. If Dr Morejon wants off effexor sooner - then will wean sooner. Pt is very fragile with mental status and R vs B of staying on Effexor discussed. Will stay on for now.  *30 minutes was spent with patient and over 50%  of this time was spent on counseling patient regarding  illness, medication and / or treatment  options, coordinating further cares and follow ups that are needed along with resource material that will be helpful in the treatment of these issues.     COUNSELING:   Reviewed preventive health counseling, as reflected in patient instructions       Regular exercise       Healthy diet/nutrition         reports that she has been smoking Cigarettes.  She has a 5.00 pack-year smoking history. She has never used smokeless tobacco.  Tobacco Cessation Action Plan: Information offered: Patient not interested at this time  Estimated body mass index is 32.48 kg/(m^2) as calculated from the following:    Height as of this encounter: 5' 5\" (1.651 m).    Weight as of this encounter: 195 lb 3.2 oz (88.5 kg).   Weight management plan: Discussed healthy diet and exercise guidelines and patient will " follow up in 12 months in clinic to re-evaluate.    Counseling Resources:  ATP IV Guidelines  Pooled Cohorts Equation Calculator  Breast Cancer Risk Calculator  FRAX Risk Assessment  ICSI Preventive Guidelines  Dietary Guidelines for Americans, 2010  USDA's MyPlate  ASA Prophylaxis  Lung CA Screening    Cornell Herron MD  [facility]

## 2017-12-01 NOTE — PATIENT INSTRUCTIONS
Preventive Health Recommendations  Female Ages 26 - 39  Yearly exam:   See your health care provider every year in order to    Review health changes.     Discuss preventive care.      Review your medicines if you your doctor has prescribed any.    Until age 30: Get a Pap test every three years (more often if you have had an abnormal result).    After age 30: Talk to your doctor about whether you should have a Pap test every 3 years or have a Pap test with HPV screening every 5 years.   You do not need a Pap test if your uterus was removed (hysterectomy) and you have not had cancer.  You should be tested each year for STDs (sexually transmitted diseases), if you're at risk.   Talk to your provider about how often to have your cholesterol checked.  If you are at risk for diabetes, you should have a diabetes test (fasting glucose).  Shots: Get a flu shot each year. Get a tetanus shot every 10 years.   Nutrition:     Eat at least 5 servings of fruits and vegetables each day.    Eat whole-grain bread, whole-wheat pasta and brown rice instead of white grains and rice.    Talk to your provider about Calcium and Vitamin D.     Lifestyle    Exercise at least 150 minutes a week (30 minutes a day, 5 days of the week). This will help you control your weight and prevent disease.    Limit alcohol to one drink per day.    No smoking.     Wear sunscreen to prevent skin cancer.    See your dentist every six months for an exam and cleaning.    Results for orders placed or performed in visit on 12/01/17 (from the past 24 hour(s))   CBC with platelets differential   Result Value Ref Range    WBC 7.0 4.0 - 11.0 10e9/L    RBC Count 4.86 3.8 - 5.2 10e12/L    Hemoglobin 14.6 11.7 - 15.7 g/dL    Hematocrit 41.9 35.0 - 47.0 %    MCV 86 78 - 100 fl    MCH 30.0 26.5 - 33.0 pg    MCHC 34.8 31.5 - 36.5 g/dL    RDW 13.0 10.0 - 15.0 %    Platelet Count 223 150 - 450 10e9/L    Diff Method Automated Method     % Neutrophils 53.0 %    % Lymphocytes  34.1 %    % Monocytes 9.8 %    % Eosinophils 2.1 %    % Basophils 0.7 %    % Immature Granulocytes 0.3 %    Nucleated RBCs 0 0 /100    Absolute Neutrophil 3.7 1.6 - 8.3 10e9/L    Absolute Lymphocytes 2.4 0.8 - 5.3 10e9/L    Absolute Monocytes 0.7 0.0 - 1.3 10e9/L    Absolute Eosinophils 0.2 0.0 - 0.7 10e9/L    Absolute Basophils 0.1 0.0 - 0.2 10e9/L    Abs Immature Granulocytes 0.0 0 - 0.4 10e9/L    Absolute Nucleated RBC 0.0    TSH   Result Value Ref Range    TSH 1.83 0.40 - 4.00 mU/L   HCG qualitative   Result Value Ref Range    HCG Qualitative Serum Positive (A) NEG^Negative         4  3/7 weeks.     Due date 8/7/18

## 2017-12-01 NOTE — NURSING NOTE
"Chief Complaint   Patient presents with     Physical       Initial /76 (BP Location: Left arm, Patient Position: Sitting, Cuff Size: Adult Regular)  Pulse 95  Temp 98.3  F (36.8  C) (Tympanic)  Ht 5' 5\" (1.651 m)  Wt 195 lb 3.2 oz (88.5 kg)  SpO2 97%  BMI 32.48 kg/m2 Estimated body mass index is 32.48 kg/(m^2) as calculated from the following:    Height as of this encounter: 5' 5\" (1.651 m).    Weight as of this encounter: 195 lb 3.2 oz (88.5 kg).  Medication Reconciliation: complete     Cee Burns    "

## 2017-12-01 NOTE — MR AVS SNAPSHOT
After Visit Summary   12/1/2017    Jennifer Tatum    MRN: [id number]           Patient Information     Date Of Birth          1990        Visit Information        Provider Department      12/1/2017 8:15 AM Cornell Herron MD [facility]        Today's Diagnoses     Routine general medical examination at a health care facility    -  1    Missed menses        Major depressive disorder, recurrent episode, mild (H)        PTSD (post-traumatic stress disorder)        Relationship problem between partners        Pregnancy test positive        High-risk pregnancy, unspecified trimester          Care Instructions      Preventive Health Recommendations  Female Ages 26 - 39  Yearly exam:   See your health care provider every year in order to    Review health changes.     Discuss preventive care.      Review your medicines if you your doctor has prescribed any.    Until age 30: Get a Pap test every three years (more often if you have had an abnormal result).    After age 30: Talk to your doctor about whether you should have a Pap test every 3 years or have a Pap test with HPV screening every 5 years.   You do not need a Pap test if your uterus was removed (hysterectomy) and you have not had cancer.  You should be tested each year for STDs (sexually transmitted diseases), if you're at risk.   Talk to your provider about how often to have your cholesterol checked.  If you are at risk for diabetes, you should have a diabetes test (fasting glucose).  Shots: Get a flu shot each year. Get a tetanus shot every 10 years.   Nutrition:     Eat at least 5 servings of fruits and vegetables each day.    Eat whole-grain bread, whole-wheat pasta and brown rice instead of white grains and rice.    Talk to your provider about Calcium and Vitamin D.     Lifestyle    Exercise at least 150 minutes a week (30 minutes a day, 5 days of the week). This will help you control your weight and prevent disease.    Limit  alcohol to one drink per day.    No smoking.     Wear sunscreen to prevent skin cancer.    See your dentist every six months for an exam and cleaning.    Results for orders placed or performed in visit on 12/01/17 (from the past 24 hour(s))   CBC with platelets differential   Result Value Ref Range    WBC 7.0 4.0 - 11.0 10e9/L    RBC Count 4.86 3.8 - 5.2 10e12/L    Hemoglobin 14.6 11.7 - 15.7 g/dL    Hematocrit 41.9 35.0 - 47.0 %    MCV 86 78 - 100 fl    MCH 30.0 26.5 - 33.0 pg    MCHC 34.8 31.5 - 36.5 g/dL    RDW 13.0 10.0 - 15.0 %    Platelet Count 223 150 - 450 10e9/L    Diff Method Automated Method     % Neutrophils 53.0 %    % Lymphocytes 34.1 %    % Monocytes 9.8 %    % Eosinophils 2.1 %    % Basophils 0.7 %    % Immature Granulocytes 0.3 %    Nucleated RBCs 0 0 /100    Absolute Neutrophil 3.7 1.6 - 8.3 10e9/L    Absolute Lymphocytes 2.4 0.8 - 5.3 10e9/L    Absolute Monocytes 0.7 0.0 - 1.3 10e9/L    Absolute Eosinophils 0.2 0.0 - 0.7 10e9/L    Absolute Basophils 0.1 0.0 - 0.2 10e9/L    Abs Immature Granulocytes 0.0 0 - 0.4 10e9/L    Absolute Nucleated RBC 0.0    TSH   Result Value Ref Range    TSH 1.83 0.40 - 4.00 mU/L   HCG qualitative   Result Value Ref Range    HCG Qualitative Serum Positive (A) NEG^Negative         4  3/7 weeks.     Due date 8/7/18              Follow-ups after your visit        Additional Services     OB/GYN REFERRAL       Your provider has referred you to:  [facility]     Please be aware that coverage of these services is subject to the terms and limitations of your health insurance plan.  Call member services at your health plan with any benefit or coverage questions.      Please bring the following with you to your appointment:    (1) Any X-Rays, CTs or MRIs which have been performed.  Contact the facility where they were done to arrange for  prior to your scheduled appointment.   (2) List of current medications   (3) This referral request   (4) Any documents/labs given to you for  "this referral                  Your next 10 appointments already scheduled     2018  8:30 AM CST   (Arrive by 8:15 AM)   SHORT with Cornell Herron MD   [facility] ([facility] )    [place] [address]   [phone]              Who to contact     If you have questions or need follow up information about today's clinic visit or your schedule please contact [facility] directly at [phone].  Normal or non-critical lab and imaging results will be communicated to you by MyChart, letter or phone within 4 business days after the clinic has received the results. If you do not hear from us within 7 days, please contact the clinic through MyChart or phone. If you have a critical or abnormal lab result, we will notify you by phone as soon as possible.  Submit refill requests through [employer] or call your pharmacy and they will forward the refill request to us. Please allow 3 business days for your refill to be completed.          Additional Information About Your Visit        [facility] Information     [employer] lets you send messages to your doctor, view your test results, renew your prescriptions, schedule appointments and more. To sign up, go to [email or web address] . Click on \"Log in\" on the left side of the screen, which will take you to the Welcome page. Then click on \"Sign up Now\" on the right side of the page.     You will be asked to enter the access code listed below, as well as some personal information. Please follow the directions to create your username and password.     Your access code is: [email or web address]  Expires: 12/10/2017 10:45 AM     Your access code will  in 90 days. If you need help or a new code, please call your [facility] clinic or [phone].        Care EveryWhere ID     This is your Care EveryWhere ID. This could be used by other organizations to access your [facility] medical records  [id number]        Your Vitals Were  " "   Pulse Temperature Height Pulse Oximetry BMI (Body Mass Index)       95 98.3  F (36.8  C) (Tympanic) 5' 5\" (1.651 m) 97% 32.48 kg/m2        Blood Pressure from Last 3 Encounters:   12/01/17 132/76   10/30/17 124/78   10/02/17 118/68    Weight from Last 3 Encounters:   12/01/17 195 lb 3.2 oz (88.5 kg)   10/30/17 195 lb (88.5 kg)   10/02/17 195 lb (88.5 kg)              We Performed the Following     CBC with platelets differential     HCG qualitative     OB/GYN REFERRAL     TSH          Today's Medication Changes          These changes are accurate as of: 12/1/17  9:00 AM.  If you have any questions, ask your nurse or doctor.               Stop taking these medicines if you haven't already. Please contact your care team if you have questions.     amphetamine-dextroamphetamine 20 MG per 24 hr capsule   Commonly known as:  ADDERALL XR   Stopped by:  Cornell Herron MD           desogestrel-ethinyl estradiol 0.15-30 MG-MCG per tablet   Commonly known as:  APRI   Stopped by:  Cornell Herron MD           diazepam 2 MG tablet   Commonly known as:  VALIUM   Stopped by:  Cornell Herron MD                    Primary Care Provider Office Phone # Fax #    Cornell Herron [phone]       [facility] [address] [facility] [address]        Equal Access to Services     [facility] AH: Hadii aad ku hadasho Soomaali, waaxda luqadaha, qaybta kaalmada adeegyada, waxay idiin hayaan adekerwin sharmaarasunitha la'aan . So [facility] [phone].    ATENCIÓN: Si habla español, tiene a chen disposición servicios gratuitos de asistencia lingüística. Melani al [phone].    We comply with applicable federal civil rights laws and [place] laws. We do not discriminate on the basis of race, color, national origin, age, disability, sex, sexual orientation, or gender identity.            Thank you!     Thank you for choosing [facility]  for your care. Our goal is always to provide you with excellent care. " Hearing back from our patients is one way we can continue to improve our services. Please take a few minutes to complete the written survey that you may receive in the mail after your visit with us. Thank you!             Your Updated Medication List - Protect others around you: Learn how to safely use, store and throw away your medicines at www.disposemymeds.org.          This list is accurate as of: 12/1/17  9:00 AM.  Always use your most recent med list.                   Brand Name Dispense Instructions for use Diagnosis    Prenatal Vitamin 27-0.8 MG Tabs           TYLENOL PO           venlafaxine 150 MG 24 hr capsule    EFFEXOR-XR    90 capsule    TAKE ONE CAPSULE BY MOUTH ONCE DAILY    Major depressive disorder, recurrent episode, mild (H), PTSD (post-traumatic stress disorder)

## 2017-12-02 ASSESSMENT — ANXIETY QUESTIONNAIRES: GAD7 TOTAL SCORE: 21

## 2017-12-13 ENCOUNTER — HOSPITAL ENCOUNTER (EMERGENCY)
Facility: HOSPITAL | Age: 27
Discharge: HOME | End: 2017-12-13
Attending: FAMILY MEDICINE | Admitting: FAMILY MEDICINE
Payer: COMMERCIAL

## 2017-12-13 VITALS
DIASTOLIC BLOOD PRESSURE: 71 MMHG | RESPIRATION RATE: 16 BRPM | HEIGHT: 65 IN | OXYGEN SATURATION: 97 % | SYSTOLIC BLOOD PRESSURE: 133 MMHG | TEMPERATURE: 98.3 F

## 2017-12-13 DIAGNOSIS — O26.91 COMPLICATION OF PREGNANCY, ANTEPARTUM, FIRST TRIMESTER: ICD-10-CM

## 2017-12-13 LAB
ALBUMIN SERPL-MCNC: 3.4 G/DL (ref 3.4–5)
ALP SERPL-CCNC: 84 U/L (ref 40–150)
ALT SERPL W P-5'-P-CCNC: 27 U/L (ref 0–50)
ANION GAP SERPL CALCULATED.3IONS-SCNC: 5 MMOL/L (ref 3–14)
AST SERPL W P-5'-P-CCNC: 16 U/L (ref 0–45)
BASOPHILS # BLD AUTO: 0.1 10E9/L (ref 0–0.2)
BASOPHILS NFR BLD AUTO: 0.5 %
BILIRUB SERPL-MCNC: 0.1 MG/DL (ref 0.2–1.3)
BUN SERPL-MCNC: 12 MG/DL (ref 7–30)
CALCIUM SERPL-MCNC: 8.7 MG/DL (ref 8.5–10.1)
CHLORIDE SERPL-SCNC: 107 MMOL/L (ref 94–109)
CO2 SERPL-SCNC: 26 MMOL/L (ref 20–32)
CREAT SERPL-MCNC: 0.66 MG/DL (ref 0.52–1.04)
DIFFERENTIAL METHOD BLD: NORMAL
EOSINOPHIL # BLD AUTO: 0.2 10E9/L (ref 0–0.7)
EOSINOPHIL NFR BLD AUTO: 2.2 %
ERYTHROCYTE [DISTWIDTH] IN BLOOD BY AUTOMATED COUNT: 13.1 % (ref 10–15)
GFR SERPL CREATININE-BSD FRML MDRD: >90 ML/MIN/1.7M2
GLUCOSE SERPL-MCNC: 91 MG/DL (ref 70–99)
HCT VFR BLD AUTO: 40.2 % (ref 35–47)
HGB BLD-MCNC: 14 G/DL (ref 11.7–15.7)
IMM GRANULOCYTES # BLD: 0 10E9/L (ref 0–0.4)
IMM GRANULOCYTES NFR BLD: 0.3 %
LYMPHOCYTES # BLD AUTO: 3 10E9/L (ref 0.8–5.3)
LYMPHOCYTES NFR BLD AUTO: 28.2 %
MCH RBC QN AUTO: 30 PG (ref 26.5–33)
MCHC RBC AUTO-ENTMCNC: 34.8 G/DL (ref 31.5–36.5)
MCV RBC AUTO: 86 FL (ref 78–100)
MONOCYTES # BLD AUTO: 1 10E9/L (ref 0–1.3)
MONOCYTES NFR BLD AUTO: 9.2 %
NEUTROPHILS # BLD AUTO: 6.4 10E9/L (ref 1.6–8.3)
NEUTROPHILS NFR BLD AUTO: 59.6 %
NRBC # BLD AUTO: 0 10*3/UL
NRBC BLD AUTO-RTO: 0 /100
PLATELET # BLD AUTO: 226 10E9/L (ref 150–450)
POTASSIUM SERPL-SCNC: 3.9 MMOL/L (ref 3.4–5.3)
PROT SERPL-MCNC: 6.9 G/DL (ref 6.8–8.8)
RBC # BLD AUTO: 4.67 10E12/L (ref 3.8–5.2)
SODIUM SERPL-SCNC: 138 MMOL/L (ref 133–144)
WBC # BLD AUTO: 10.7 10E9/L (ref 4–11)

## 2017-12-13 PROCEDURE — 99284 EMERGENCY DEPT VISIT MOD MDM: CPT | Mod: 25

## 2017-12-13 PROCEDURE — 99284 EMERGENCY DEPT VISIT MOD MDM: CPT | Performed by: FAMILY MEDICINE

## 2017-12-13 PROCEDURE — 80053 COMPREHEN METABOLIC PANEL: CPT | Performed by: FAMILY MEDICINE

## 2017-12-13 PROCEDURE — 36415 COLL VENOUS BLD VENIPUNCTURE: CPT | Performed by: FAMILY MEDICINE

## 2017-12-13 PROCEDURE — 25000128 H RX IP 250 OP 636: Performed by: FAMILY MEDICINE

## 2017-12-13 PROCEDURE — 96360 HYDRATION IV INFUSION INIT: CPT

## 2017-12-13 PROCEDURE — 85025 COMPLETE CBC W/AUTO DIFF WBC: CPT | Performed by: FAMILY MEDICINE

## 2017-12-13 RX ORDER — SODIUM CHLORIDE 9 MG/ML
1000 INJECTION, SOLUTION INTRAVENOUS CONTINUOUS
Status: DISCONTINUED | OUTPATIENT
Start: 2017-12-13 | End: 2017-12-13 | Stop reason: HOSPADM

## 2017-12-13 RX ADMIN — SODIUM CHLORIDE 1000 ML: 9 INJECTION, SOLUTION INTRAVENOUS at 16:29

## 2017-12-13 NOTE — DISCHARGE INSTRUCTIONS
Adapting to Pregnancy: First Trimester  As your body adjusts, you may have to change or limit your daily activities. You ll need more rest. You may also need to use the energy you have more wisely.     Eat stomach-friendly foods like cottage cheese, crackers, or bread throughout the day.   Your changing body  Almost every part of your body is affected as you adapt to pregnancy. The uterus and cervix will begin to soften right away. You may not look very pregnant during the first 3 months. But you are likely to have some common signs of early pregnancy:    Nausea    Fatigue    Frequent urination    Mood swings    Bloating of the abdomen    Missed or light periods (first trimester bleeding)    Nipple or breast tenderness, breast swelling  It s not too late to start good habits  What matters most is protecting your baby from this moment on. If you smoke, drink alcohol, or use drugs, now is the time to stop. If you need help, talk with your healthcare provider.    Smoking increases the risk of  stillbirth or having a low-birth-weight baby. If you smoke, quit now.    Alcohol and drugs have been linked with miscarriage, birth defects, intellectual disability, and low birth weight. Do not drink alcohol or take drugs.  Tips to relieve nausea  Although nausea can happen at any time of the day, it may be worse in the morning. To help prevent nausea:    Eat small, light meals at frequent intervals.    Get up slowly. Eat a few unsalted crackers before you get out of bed.    Avoid smells that bother you.    Avoid spicy and fatty foods.    Eat an ice pop in your favorite flavor.    Get plenty of rest.    Ask your healthcare provider about taking rick or vitamin B6 for nausea and vomiting.    Talk with your healthcare provider if you take vitamins that upset your stomach.  Work concerns  The end of the first trimester is a good time to discuss working during pregnancy with your employer. Follow your healthcare provider s  "advice if your job requires you to stand for a long time, work with hazardous tools, or even sit at a desk all day. Your workspace, workload, or scheduled hours may need to be adjusted. Perhaps you can change body postures more often or take an extra break.  Advice for travel  Talk to your healthcare provider first, but the second trimester may be the best time for any travel. You may be advised to avoid certain trips while you re pregnant. Food and water can be concerns in developing countries. Travel by car is a good choice, as you can stop, get out, and stretch. Bring snacks and water along. Fasten the lap belt below your belly, low over your hips. Also be sure to wear the shoulder harness.  Intimacy  Unless your healthcare provider tells you to, there is no reason to stop having sex while you re pregnant. You or your partner may notice changes in desire. Desire may be less in the first trimester, due to nausea and fatigue. In the second trimester, sex may be very enjoyable. The third trimester can be a challenge comfort-wise. Try different positions and see what s best for you both.  Date Last Reviewed: 8/16/2015 2000-2017 [employer]. [address] [place] [address]. All rights reserved. This information is not intended as a substitute for professional medical care. Always follow your healthcare professional's instructions.          Pregnancy: Your First Trimester Changes  The first trimester is a time of rapid development for your baby. Because your baby is growing so quickly, it is important that you start a healthy lifestyle right away. By the end of the first trimester, your baby has formed all of its major body organs and weighs just over an ounce.     Actual size of baby is 1/4\"    Month 1 (Weeks 1 to 4)  The placenta (the organ that nourishes your baby) begins to form. The brain, spinal cord, heart, gastrointestinal tract, and lungs begin to develop. Your baby is about 1/4 inch " "long by the end of the first month.     Actual size of baby is 1\"    Month 2 (Weeks 5 to 8)  All of your baby s major body organs form. The face, fingers, toes, ears, and eyes appear. By the end of the month, your baby is about 1-inch long.     Actual size of baby is 4\"    Month 3 (Weeks 9 to 12)  Your baby can open and close its fists and mouth. The sexual organs begin to form. As the first trimester ends, your baby is about 3-inches long.  Date Last Reviewed: 8/16/2015 2000-2017 [employer]. [address] [place] [address], [place] [address]. All rights reserved. This information is not intended as a substitute for professional medical care. Always follow your healthcare professional's instructions.        "

## 2017-12-13 NOTE — ED AVS SNAPSHOT
[facility]    [address] [place] [address]    [place] [address]    Phone:  [phone]                                       Jennifer Tatum   MRN: [id number]    Department:  [facility]   Date of Visit:  12/13/2017           Patient Information     Date Of Birth          1990        Your diagnoses for this visit were:     Complication of pregnancy, antepartum, first trimester        You were seen by Shila Craft MD.      Follow-up Information     Follow up with Cornell Herron MD.    Specialty:  Family Practice    Contact information:    [facility]  [address] [facility] [place] [address]  [phone]          Discharge Instructions         Adapting to Pregnancy: First Trimester  As your body adjusts, you may have to change or limit your daily activities. You ll need more rest. You may also need to use the energy you have more wisely.     Eat stomach-friendly foods like cottage cheese, crackers, or bread throughout the day.   Your changing body  Almost every part of your body is affected as you adapt to pregnancy. The uterus and cervix will begin to soften right away. You may not look very pregnant during the first 3 months. But you are likely to have some common signs of early pregnancy:    Nausea    Fatigue    Frequent urination    Mood swings    Bloating of the abdomen    Missed or light periods (first trimester bleeding)    Nipple or breast tenderness, breast swelling  It s not too late to start good habits  What matters most is protecting your baby from this moment on. If you smoke, drink alcohol, or use drugs, now is the time to stop. If you need help, talk with your healthcare provider.    Smoking increases the risk of  stillbirth or having a low-birth-weight baby. If you smoke, quit now.    Alcohol and drugs have been linked with miscarriage, birth defects, intellectual disability, and low birth weight. Do not drink alcohol or take drugs.  Tips to relieve  nausea  Although nausea can happen at any time of the day, it may be worse in the morning. To help prevent nausea:    Eat small, light meals at frequent intervals.    Get up slowly. Eat a few unsalted crackers before you get out of bed.    Avoid smells that bother you.    Avoid spicy and fatty foods.    Eat an ice pop in your favorite flavor.    Get plenty of rest.    Ask your healthcare provider about taking rick or vitamin B6 for nausea and vomiting.    Talk with your healthcare provider if you take vitamins that upset your stomach.  Work concerns  The end of the first trimester is a good time to discuss working during pregnancy with your employer. Follow your healthcare provider s advice if your job requires you to stand for a long time, work with hazardous tools, or even sit at a desk all day. Your workspace, workload, or scheduled hours may need to be adjusted. Perhaps you can change body postures more often or take an extra break.  Advice for travel  Talk to your healthcare provider first, but the second trimester may be the best time for any travel. You may be advised to avoid certain trips while you re pregnant. Food and water can be concerns in developing countries. Travel by car is a good choice, as you can stop, get out, and stretch. Bring snacks and water along. Fasten the lap belt below your belly, low over your hips. Also be sure to wear the shoulder harness.  Intimacy  Unless your healthcare provider tells you to, there is no reason to stop having sex while you re pregnant. You or your partner may notice changes in desire. Desire may be less in the first trimester, due to nausea and fatigue. In the second trimester, sex may be very enjoyable. The third trimester can be a challenge comfort-wise. Try different positions and see what s best for you both.  Date Last Reviewed: 8/16/2015 2000-2017 [employer]. [address] [facility], [place] [address]. All rights reserved. This  "information is not intended as a substitute for professional medical care. Always follow your healthcare professional's instructions.          Pregnancy: Your First Trimester Changes  The first trimester is a time of rapid development for your baby. Because your baby is growing so quickly, it is important that you start a healthy lifestyle right away. By the end of the first trimester, your baby has formed all of its major body organs and weighs just over an ounce.     Actual size of baby is 1/4\"    Month 1 (Weeks 1 to 4)  The placenta (the organ that nourishes your baby) begins to form. The brain, spinal cord, heart, gastrointestinal tract, and lungs begin to develop. Your baby is about 1/4 inch long by the end of the first month.     Actual size of baby is 1\"    Month 2 (Weeks 5 to 8)  All of your baby s major body organs form. The face, fingers, toes, ears, and eyes appear. By the end of the month, your baby is about 1-inch long.     Actual size of baby is 4\"    Month 3 (Weeks 9 to 12)  Your baby can open and close its fists and mouth. The sexual organs begin to form. As the first trimester ends, your baby is about 3-inches long.  Date Last Reviewed: 8/16/2015 2000-2017 [employer]. [address] [place] [address]. All rights reserved. This information is not intended as a substitute for professional medical care. Always follow your healthcare professional's instructions.          Future Appointments        Provider Department Dept Phone Center    2/14/2018 1:45 PM Cornell Herron MD [facility] [phone] Rafiq Rivas         Review of your medicines      Our records show that you are taking the medicines listed below. If these are incorrect, please call your family doctor or clinic.        Dose / Directions Last dose taken    Prenatal Vitamin 27-0.8 MG Tabs        Refills:  0        TYLENOL PO        Refills:  0        venlafaxine 150 MG 24 hr capsule   Commonly known " "as:  EFFEXOR-XR   Quantity:  90 capsule        TAKE ONE CAPSULE BY MOUTH ONCE DAILY   Refills:  3                Procedures and tests performed during your visit     CBC with platelets differential    Comprehensive metabolic panel      Orders Needing Specimen Collection     None      Pending Results     No orders found from 2017 to 2017.            Pending Culture Results     No orders found from 2017 to 2017.            Thank you for choosing [facility]       Thank you for choosing [facility] for your care. Our goal is always to provide you with excellent care. Hearing back from our patients is one way we can continue to improve our services. Please take a few minutes to complete the written survey that you may receive in the mail after you visit with us. Thank you!        [employer] Information     [employer] lets you send messages to your doctor, view your test results, renew your prescriptions, schedule appointments and more. To sign up, go to [email or web address] . Click on \"Log in\" on the left side of the screen, which will take you to the Welcome page. Then click on \"Sign up Now\" on the right side of the page.     You will be asked to enter the access code listed below, as well as some personal information. Please follow the directions to create your username and password.     Your access code is: [email or web address]  Expires: 3/13/2018  5:50 PM     Your access code will  in 90 days. If you need help or a new code, please call your [facility] clinic or [phone].        Care EveryWhere ID     This is your Care EveryWhere ID. This could be used by other organizations to access your [facility] medical records  [id number]        Equal Access to Services     [facility]: Hadbinh Jacobson, waleilani lurell, qaybta kaalwade kirby . So [facility] [phone].    ATENCIÓN: Si habla español, tiene a chen disposición servicios gratuitos de asistencia " liborio Armentaheidy al [phone].    We comply with applicable federal civil rights laws and [place] laws. We do not discriminate on the basis of race, color, national origin, age, disability, sex, sexual orientation, or gender identity.            After Visit Summary       This is your record. Keep this with you and show to your community pharmacist(s) and doctor(s) at your next visit.

## 2017-12-13 NOTE — ED NOTES
Reviewed discharged instructions with pt and family, no questions asked. Copy of discharge instrucitons sent prior to leaving. Ambulatory upon discharge.

## 2017-12-13 NOTE — ED NOTES
Pt stated she is 4 weeks pregnant. Stated she had a HCG level yesterday and an ultrasound. Denies any abdominal pain or vaginal discharge.

## 2017-12-13 NOTE — PROGRESS NOTES
Met with this patient and several family people who are present with her.  She notes she is 4 weeks pregnant.  She slipped and fell hitting her head on her car door on Sat and later fell on her knees.  She says she is also nauseated.  She does work at [employer].  She sees Dr Sheppard for her OB care in [place].  I did offer to have contact with her as well.  Update provided to Dr Craft.

## 2017-12-13 NOTE — ED AVS SNAPSHOT
[facility]    [address]    [place] [address]    Phone:  [phone]                                       Jennifer Tatum   MRN: [id number]    Department:  [facility]   Date of Visit:  12/13/2017           After Visit Summary Signature Page     I have received my discharge instructions, and my questions have been answered. I have discussed any challenges I see with this plan with the nurse or doctor.    ..........................................................................................................................................  Patient/Patient Representative Signature      ..........................................................................................................................................  Patient Representative Print Name and Relationship to Patient    ..................................................               ................................................  Date                                            Time    ..........................................................................................................................................  Reviewed by Signature/Title    ...................................................              ..............................................  Date                                                            Time

## 2017-12-13 NOTE — ED PROVIDER NOTES
"eMERGENCY dEPARTMENT eNCOUnter        CHIEF COMPLAINT    Chief Complaint   Patient presents with     Head Injury     Pt states she fell twice on  \"once I hit the car door frame with my head and then an hour later I fell and hit arm and leg on cement\". Pt stated with intial head injury \"everything went black\". States she has had a headache since with nausea and this am she vomited. \"I feel really tired\".       HPI    Jennifer Tatum is a 27 year old female who presents with sleeping all the time, fatigue, a migraine and being 4 weeks pregnant after she fell in the garage 3 days ago.  She states she slipped on the ice and struck her head on the door frame.  No LOC.  Her fiance helped her inside.  Later that day she tripped on something in the garage and fell on her knee.  She had a OB appointment the following day, and an US yesterday. They told her to look for signs of concussion.  She and family were concerned abou tht eamount of sleeping and come in today.  She feels a little nauseated.  She also has had a headache.  No fevers, no vomiting.  She is a  with IUFD at term .  She has a 4 year old child.    REVIEW OF SYSTEMS    Neurologic: No LOC or stiff neck  Cardiac: No Chest Pain, No syncope  Respiratory: No cough or difficulty breathing  GI: No Bloody Stool or Diarrhea  : No Dysuria or Hematuria  General: No Fever  All other systems reviewed and are negative.    PAST MEDICAL & SURGICAL HISTORY    Past Medical History:   Diagnosis Date     H/O nephrolithotomy with removal of calculi      History of  delivery 2015    sched for 3/21/15      Incompetent cervix 11/10/2015    To [place] per Dr. Evan RICKS      IUFD (intrauterine fetal death) 2016    Heart rate dropped during BPP and patient was taken for code pink c/s where baby was noted to be dead at birth      MDD (major depressive disorder)      Mild major depression (H) 2013     Nephrolithiasis     bilateral /complicating " "pregnancy- Seen Dr Bennie Lucero      Smoker 2013    Patient is weaning      Past Surgical History:   Procedure Laterality Date     CERCLAGE CERVICAL N/A 11/10/2015    Procedure: CERCLAGE CERVICAL;  Surgeon: Tucker Barragan MD;  [facility]      SECTION  2012    Pregnancy full-term; \"Oleksandr\"       SECTION N/A 2016    Procedure:  SECTION;  Surgeon: Nisha Mcleod MD;  Location: [facility]      NEPHROSTOMY PERCUTUTANEOUS      left     impacted wisdom teeth removal         CURRENT MEDICATIONS    Current Outpatient Rx   Medication Sig Dispense Refill     Prenatal Vit-Fe Fumarate-FA (PRENATAL VITAMIN) 27-0.8 MG TABS        venlafaxine (EFFEXOR-XR) 150 MG 24 hr capsule TAKE ONE CAPSULE BY MOUTH ONCE DAILY 90 capsule 3     Acetaminophen (TYLENOL PO)          ALLERGIES    Allergies   Allergen Reactions     Wellbutrin [Bupropion] Other (See Comments)     Mood changes       SOCIAL & FAMILY HISTORY    Social History     Social History     Marital status: Single     Spouse name: N/A     Number of children: N/A     Years of education: N/A     Social History Main Topics     Smoking status: Current Every Day Smoker     Packs/day: 0.50     Years: 10.00     Types: Cigarettes     Smokeless tobacco: Never Used     Alcohol use No     Drug use: No     Sexual activity: Yes     Partners: Male     Birth control/ protection: None     Other Topics Concern     Blood Transfusions Yes     Parent/Sibling W/ Cabg, Mi Or Angioplasty Before 65f 55m? No     Social History Narrative     Family History   Problem Relation Age of Onset     Alcohol/Drug Father      alcoholism     Psychotic Disorder Father      Bipolar     CEREBROVASCULAR DISEASE Other      Cerebrovascular accident     C.A.D. Other      Hypertension Other      DIABETES Other        PHYSICAL EXAM    VITAL SIGNS: /71  Temp 98.3  F (36.8  C) (Oral)  Resp 16  Ht 1.651 m (5' 5\")  SpO2 97%   Constitutional:  Well developed, well " "nourished, no acute distress, she appears comfortable. Is hungry.   Eyes: Pupils equally round and react to light, extraocular movement are intact  Vascular: No temporal artery tenderness  HENT:  Atraumatic, moist mucus membranes, airway patent  Neck: supple, no JVD   Respiratory:  Lungs Clear, no retractions   Cardiovascular:  Reg rate, no murmurs  GI:  Soft, nontender, normal bowel sounds  Musculoskeletal:  No edema, no acute deformities  Integument:  Well hydrated, no petechiae   Neurologic:  Alert & oriented, no slurred speech, normal gross motor, normal finger to nose test bilaterally, patellar reflexes equal bilaterally  Psych: Pleasant affect, no hallucinations    ED COURSE & MEDICAL DECISION MAKING    See chart for details of medications given during the ED stay.  Reevaluation: after medications, the patients symptoms are much improved.    Vitals:    12/13/17 1346 12/13/17 1758   BP: 137/74 133/71   Resp: 16 16   Temp: 98.3  F (36.8  C) 98.3  F (36.8  C)   TempSrc: Tympanic Oral   SpO2: 95% 97%   Height: 1.651 m (5' 5\")          FINAL IMPRESSION    Fatigue in 1st trimester    PLAN  She has normal labs here, including a TSH done a month ago for fatigue.  This is not repeated.  I believe her fatigue is related to pregnancy and unrelated to her head bump.  Discharged to home.  She had US yesterday which was appropriate for dates.     Shila Craft MD  12/15/17 1963    "

## 2018-05-25 NOTE — PROGRESS NOTES
Outpatient Physical Therapy Discharge Note     Patient: Jennifer Tatum  : 1990    Beginning/End Dates of Reporting Period:  10/03/2017 to 10/24/2017    Referring Provider: Dr. Herron    Therapy Diagnosis: pain and decreased functional mobility     Client Self Report: states has been quite a bit of pain and difficulty opening after 6 visits is eating at will but still having some pain. Went to Doc on  and stated was helping but did not return to PT despite him telling her to.    Objective Measurements:                                    Client was eating better, opening jaw to functional level, still had some pain.      Outcome Measures (most recent score):      Goals:  Goal Identifier short   Goal Description Client will increase opening of her jaw by 10 mm so she can eat a hamburger or hot dog.   Target Date 17   Date Met      Progress:     Goal Identifier short   Goal Description Client will decrease pain by 50% with talking and eating.   Target Date 17   Date Met      Progress:     Goal Identifier long   Goal Description Client will have fucntional opening/closing for eating and talking  as evidenced by improvemnt in TMJ disability to 20% or less.   Target Date 17   Date Met      Progress:     Goal Identifier     Goal Description     Target Date     Date Met      Progress:     Goal Identifier     Goal Description     Target Date     Date Met      Progress:     Goal Identifier     Goal Description     Target Date     Date Met      Progress:     Goal Identifier     Goal Description     Target Date     Date Met      Progress:     Goal Identifier     Goal Description     Target Date     Date Met      Progress:     Progress Toward Goals:   Progress limited due to failure to reschedule anymore appointments.        Plan:  Discharge from therapy.    Discharge:    Reason for Discharge: Patient has failed to schedule further appointments.    Equipment Issued:     Discharge Plan: Patient  to continue home program.

## 2018-07-30 ENCOUNTER — TRANSFERRED RECORDS (OUTPATIENT)
Dept: HEALTH INFORMATION MANAGEMENT | Facility: CLINIC | Age: 28
End: 2018-07-30

## 2018-08-03 ENCOUNTER — TRANSFERRED RECORDS (OUTPATIENT)
Dept: HEALTH INFORMATION MANAGEMENT | Facility: CLINIC | Age: 28
End: 2018-08-03

## 2018-08-09 ENCOUNTER — HOSPITAL ENCOUNTER (EMERGENCY)
Facility: HOSPITAL | Age: 28
Discharge: HOME | End: 2018-08-09
Attending: NURSE PRACTITIONER | Admitting: NURSE PRACTITIONER
Payer: COMMERCIAL

## 2018-08-09 VITALS
SYSTOLIC BLOOD PRESSURE: 150 MMHG | DIASTOLIC BLOOD PRESSURE: 97 MMHG | OXYGEN SATURATION: 98 % | TEMPERATURE: 99.4 F | HEART RATE: 102 BPM | RESPIRATION RATE: 20 BRPM

## 2018-08-09 DIAGNOSIS — Z48.89 ENCOUNTER FOR POST SURGICAL WOUND CHECK: ICD-10-CM

## 2018-08-09 PROCEDURE — 99212 OFFICE O/P EST SF 10 MIN: CPT | Performed by: NURSE PRACTITIONER

## 2018-08-09 PROCEDURE — G0463 HOSPITAL OUTPT CLINIC VISIT: HCPCS

## 2018-08-09 NOTE — ED AVS SNAPSHOT
[facility]    [address]    [place] [address]    Phone:  [phone]                                       Jennifer Tatum   MRN: [id number]    Department:  [facility]   Date of Visit:  8/9/2018           After Visit Summary Signature Page     I have received my discharge instructions, and my questions have been answered. I have discussed any challenges I see with this plan with the nurse or doctor.    ..........................................................................................................................................  Patient/Patient Representative Signature      ..........................................................................................................................................  Patient Representative Print Name and Relationship to Patient    ..................................................               ................................................  Date                                            Time    ..........................................................................................................................................  Reviewed by Signature/Title    ...................................................              ..............................................  Date                                                            Time

## 2018-08-09 NOTE — ED AVS SNAPSHOT
[facility]    [address]    [place] [address]    Phone:  [phone]                                       Jennifer Tatum   MRN: [id number]    Department:  [facility]   Date of Visit:  8/9/2018           Patient Information     Date Of Birth          1990        Your diagnoses for this visit were:     Encounter for post surgical wound check        You were seen by Nesha Jaime NP.      Follow-up Information     Follow up with Cornell Herron MD.    Specialty:  Family Practice    Why:  As needed, If symptoms worsen    Contact information:    [address]  [place] [address]  [phone]          Follow up with [facility].    Specialty:  EMERGENCY MEDICINE    Why:  As needed, If symptoms worsen    Contact information:    [address]  [place] [address] [phone]    Additional information:    From [place]: Take [address]. Turn left at [address]. Turn left at the first stoplight on [address]. At the first stop sign, take a right onto [address]. Take a left into the parking lot and continue through until you reach the North enterance of the building.       From [place]: Take [address]. Take the [address] ramp towards [place]. Turn left onto [address]. Take a slight right onto [address]. Turn left at the first stoplight on [address]. At the first stop sign, take a right onto [address]. Take a left into the parking lot and continue through until you reach the North enterance of the building.       From [place]: Take [address]. Take a right at [address]. At the first stop sign, take a right onto [address]. Take a left into the parking lot and continue through until you reach the North enterance of the building.         Discharge Instructions       To early for staple removal.   Watch for signs of infection.   Follow up with  "OB/Gyn for staple removal.   Follow up with PCP with any increase in symptoms or concerns.   Return to urgent care or emergency department with any increase in symptoms or concerns.     Discharge References/Attachments     WOUND CHECK (NO INFECTION) (ENGLISH)         Review of your medicines      Our records show that you are taking the medicines listed below. If these are incorrect, please call your family doctor or clinic.        Dose / Directions Last dose taken    Prenatal Vitamin 27-0.8 MG Tabs        Refills:  0        TYLENOL PO        Refills:  0        venlafaxine 150 MG 24 hr capsule   Commonly known as:  EFFEXOR-XR   Quantity:  90 capsule        TAKE ONE CAPSULE BY MOUTH ONCE DAILY   Refills:  3                Orders Needing Specimen Collection     None      Pending Results     No orders found from 2018 to 8/10/2018.            Pending Culture Results     No orders found from 2018 to 8/10/2018.            Thank you for choosing [facility]       Thank you for choosing [facility] for your care. Our goal is always to provide you with excellent care. Hearing back from our patients is one way we can continue to improve our services. Please take a few minutes to complete the written survey that you may receive in the mail after you visit with us. Thank you!        [employer] Information     [employer] lets you send messages to your doctor, view your test results, renew your prescriptions, schedule appointments and more. To sign up, go to [email or web address] . Click on \"Log in\" on the left side of the screen, which will take you to the Welcome page. Then click on \"Sign up Now\" on the right side of the page.     You will be asked to enter the access code listed below, as well as some personal information. Please follow the directions to create your username and password.     Your access code is: [email or web address]  Expires: 2018  9:10 PM     Your access code will  in 90 days. If you need help or a new " code, please call your [facility] clinic or [phone].        Care EveryWhere ID     This is your Care EveryWhere ID. This could be used by other organizations to access your [facility] medical records  [id number]        Equal Access to Services     MATT YBARRA : Karen Jacobson, waaxda luqadaha, qaybta kaalmada mercedes, wade hoffman. So [facility] [phone].    ATENCIÓN: Si habla español, tiene a chen disposición servicios gratuitos de asistencia lingüística. Llame al [phone].    We comply with applicable federal civil rights laws and [place] laws. We do not discriminate on the basis of race, color, national origin, age, disability, sex, sexual orientation, or gender identity.            After Visit Summary       This is your record. Keep this with you and show to your community pharmacist(s) and doctor(s) at your next visit.

## 2018-08-10 NOTE — ED TRIAGE NOTES
Pt in for removal of staples. Pt reports she had a  on  and was told to follow up for removal and was unable to get into clinic to have them removed today.

## 2018-08-10 NOTE — ED PROVIDER NOTES
History     Chief Complaint   Patient presents with     Suture Removal     has  on . states she is due to have staples removed and was unable to get in to have it done.      The history is provided by the patient. No  was used.     Jennifer Tatum is a 27 year old female who presents for staple removal. She had a  5 days ago. She missed her post-op appointment and would like staples removed. She's had some drainage from incision. Things have been going well since delivery. No other requests or concerns.       Problem List:    Patient Active Problem List    Diagnosis Date Noted     Relationship problem between partners 2017     Priority: Medium     Complicated grieving 2017     Priority: Medium     Major depressive disorder, recurrent episode, mild (H) 2017     Priority: Medium     Spontaneous miscarriage 2017     Priority: Medium     Acute right-sided low back pain with right-sided sciatica 10/18/2016     Priority: Medium     ACP (advance care planning) 2016     Priority: Medium     Advance Care Planning 2016: ACP Review of Chart / Resources Provided:  Reviewed chart for advance care plan.  Jennifer Tatum has no plan or code status on file. Discussed available resources and provided with information.   Added by Gilbert Titus             Attention deficit hyperactivity disorder (ADHD), predominantly inattentive type 2016     Priority: Medium     PTSD (post-traumatic stress disorder) 2016     Priority: Medium     Hydronephrosis 2015     Priority: Medium     Anxiety 2013     Priority: Medium     Feels it is going away       Smoker 2013     Priority: Medium     Patient is weaning KWIT on 8/2/15          Past Medical History:    Past Medical History:   Diagnosis Date     H/O nephrolithotomy with removal of calculi      History of  delivery 2015     Incompetent cervix 11/10/2015     IUFD (intrauterine  "fetal death) 2016     MDD (major depressive disorder)      Mild major depression (H) 2013     Nephrolithiasis      Smoker 2013       Past Surgical History:    Past Surgical History:   Procedure Laterality Date     CERCLAGE CERVICAL N/A 11/10/2015    Procedure: CERCLAGE CERVICAL;  Surgeon: Tucker Barragan MD;  [facility]      SECTION  2012    Pregnancy full-term; \"Oleksandr\"       SECTION N/A 2016    Procedure:  SECTION;  Surgeon: Nisha Mcleod MD;  [facility]     HC NEPHROSTOMY PERCUTUTANEOUS      left     impacted wisdom teeth removal         Family History:    Family History   Problem Relation Age of Onset     Alcohol/Drug Father      alcoholism     Psychotic Disorder Father      Bipolar     Cerebrovascular Disease Other      Cerebrovascular accident     C.A.D. Other      Hypertension Other      Diabetes Other        Social History:  Marital Status:  Single [1]  Social History   Substance Use Topics     Smoking status: Current Every Day Smoker     Packs/day: 0.50     Years: 10.00     Types: Cigarettes     Smokeless tobacco: Never Used     Alcohol use No        Medications:      Acetaminophen (TYLENOL PO)   Prenatal Vit-Fe Fumarate-FA (PRENATAL VITAMIN) 27-0.8 MG TABS   venlafaxine (EFFEXOR-XR) 150 MG 24 hr capsule         Review of Systems   Constitutional: Negative for activity change, appetite change, chills and fever.   HENT: Negative for trouble swallowing.    Respiratory: Negative for cough.    Genitourinary: Negative for dysuria.   Skin: Positive for wound.        Staples to lower abdominal post .    Neurological: Negative for weakness.   Psychiatric/Behavioral: Negative.        Physical Exam   BP: 150/97  Pulse: 102  Temp: 99.4  F (37.4  C)  Resp: 20  SpO2: 98 %      Physical Exam   Constitutional: She is oriented to person, place, and time. She appears well-developed and well-nourished. No distress.   HENT:   Head: Normocephalic. "   Mouth/Throat: Oropharynx is clear and moist.   Cardiovascular: Normal rate, regular rhythm and normal heart sounds.    No murmur heard.  Pulmonary/Chest: Effort normal. No respiratory distress.   Abdominal: Soft. She exhibits no distension.   Musculoskeletal: Normal range of motion.   Neurological: She is alert and oriented to person, place, and time. She exhibits normal muscle tone.   Skin: Skin is warm and dry. No rash noted. She is not diaphoretic. There is erythema.   Incision to lower abdomen intact with staples. Slight erythema around staples. There are gaps to incision with slight open areas. No drainage. It is to early for staples to come out.    Psychiatric: She has a normal mood and affect. Her behavior is normal.   Nursing note and vitals reviewed.      ED Course     ED Course     Procedures      Assessments & Plan (with Medical Decision Making)     Discussed plan of care. She verbalized understanding. All questions answered.     I have reviewed the nursing notes.    I have reviewed the findings, diagnosis, plan and need for follow up with the patient.  Discharged in stable condition.     New Prescriptions    No medications on file       Final diagnoses:   Encounter for post surgical wound check     To early for staple removal.   Watch for signs of infection.   Follow up with OB/Gyn for staple removal.   Follow up with PCP with any increase in symptoms or concerns.   Return to urgent care or emergency department with any increase in symptoms or concerns.     TIMMY Lainez  8/9/2018  9:10 PM  [facility]       Nesha Jaime NP  08/11/18 7206

## 2018-08-10 NOTE — ED TRIAGE NOTES
Pt presents today with infant and family for staple removal, says Dr. Joyner was supposed to take them out today but she couldn't get into the appointment because [facility]

## 2018-08-11 ASSESSMENT — ENCOUNTER SYMPTOMS
WOUND: 1
DYSURIA: 0
APPETITE CHANGE: 0
WEAKNESS: 0
COUGH: 0
PSYCHIATRIC NEGATIVE: 1
TROUBLE SWALLOWING: 0
FEVER: 0
ACTIVITY CHANGE: 0
CHILLS: 0

## 2018-08-12 NOTE — DISCHARGE INSTRUCTIONS
To early for staple removal.   Watch for signs of infection.   Follow up with OB/Gyn for staple removal.   Follow up with PCP with any increase in symptoms or concerns.   Return to urgent care or emergency department with any increase in symptoms or concerns.

## 2018-10-25 NOTE — PATIENT INSTRUCTIONS

## 2018-10-25 NOTE — PROGRESS NOTES
SUBJECTIVE:   Jennifer Tatum is a 28 year old female who presents to clinic today for the following health issues:      Depression and Anxiety Follow-Up    Status since last visit: Worsened - poor energy / wants to sleep all the time/ decrease interest.     Other associated symptoms:None    Complicating factors:     Significant life event: No     Current substance abuse: None    Dr Sheppard switchted Effexor to zoloft times 6 weeks     PHQ 10/30/2017 12/1/2017 11/1/2018   PHQ-9 Total Score 20 21 19   Q9: Suicide Ideation Not at all Not at all Not at all     VINI-7 SCORE 10/30/2017 12/1/2017 11/1/2018   Total Score 20 21 17     Would like to get back on Adderall-- wants to start school  And feels will help at home   PHQ-9  English  PHQ-9   Any Language  VINI-7  Suicide Assessment Five-step Evaluation and Treatment (SAFE-T)    Amount of exercise or physical activity: None    Problems taking medications regularly: No    Medication side effects: none    Diet: regular (no restrictions)        Smoking cessations      Duration: 10+ years    Description (location/character/radiation): would like to try chantix    Intensity:  wellbutrin    Accompanying signs and symptoms: 0    History (similar episodes/previous evaluation): None    Precipitating or alleviating factors: None    Therapies tried and outcome: Wellbutrin     Pt wants to have Tubal done-- has 2 children and lost one as still birth      Problem list and histories reviewed & adjusted, as indicated.  Additional history: as documented    Labs reviewed in EPIC    Reviewed and updated as needed this visit by clinical staff       Reviewed and updated as needed this visit by Provider         ROS:  CONSTITUTIONAL: NEGATIVE for fever, chills, change in weight  ENT/MOUTH: NEGATIVE for ear, mouth and throat problems  RESP: NEGATIVE for significant cough or SOB  CV: NEGATIVE for chest pain, palpitations or peripheral edema  ROS otherwise negative    OBJECTIVE:                    "                                 /72  Pulse 81  Temp 97.7  F (36.5  C)  Ht 5' 5\" (1.651 m)  Wt 207 lb (93.9 kg)  SpO2 98%  BMI 34.45 kg/m2  Body mass index is 34.45 kg/(m^2).   GENERAL: healthy, alert, well nourished, well hydrated, no distress  PSYCH: Alert and oriented times 3; speech- coherent , normal rate and volume; able to articulate logical thoughts, able to abstract reason, no tangential thoughts, no hallucinations or delusions, affect- somewhat flat affect  Good attentive mother. No SI or HI        ASSESSMENT/PLAN:                                                      (Z23) Need for prophylactic vaccination and inoculation against influenza  (primary encounter diagnosis)  Comment: shot given   Plan: HC FLU VAC PRESRV FREE QUAD SPLIT VIR 3+YRS IM,        Vaccine Administration, Initial [96571]            (Z72.0) Tobacco abuse  Comment: will hold on meds to help stop.  Wean down.    Plan: Tobacco Cessation - Order to Satisfy Health         Maintenance        Got too many other issues     (Z71.6) Tobacco abuse counseling  Comment: as above   Plan: will hold for now.     (F33.0) Major depressive disorder, recurrent episode, mild (H)  Comment: worse -- bad relationship  Will increase Zoloft - highly recommend counseling and dealing with relation ship issue.  She defers on that recommendation   Plan: Tobacco Cessation - Order to Satisfy Health         Maintenance, sertraline (ZOLOFT) 100 MG tablet        F/u in 4 weeks     (Z63.0) Relationship problem between partners  Comment: big issue. If this is \"fixed\" she would do better   Plan: discussed     (F90.0) Attention deficit hyperactivity disorder (ADHD), predominantly inattentive type  Comment: did well in past - not breast feeding   Plan: amphetamine-dextroamphetamine (ADDERALL XR) 20         MG per 24 hr capsule        Will restart- may help[ MDD    (Z30.09) Birth control counseling  Comment: discussed r/b tubal in young lady with bad relationship.. "   Plan: She will hold on Tubal- has paragard             Cornell Herron MD  [facility] [place]    Injectable Influenza Immunization Documentation    1.  Is the person to be vaccinated sick today?   No    2. Does the person to be vaccinated have an allergy to a component   of the vaccine?   No  Egg Allergy Algorithm Link    3. Has the person to be vaccinated ever had a serious reaction   to influenza vaccine in the past?   No    4. Has the person to be vaccinated ever had Guillain-Barré syndrome?   No    Form completed by Gilbert Titus  Prior to injection verified patient identity using patient's name and date of birth.

## 2018-11-01 ENCOUNTER — OFFICE VISIT (OUTPATIENT)
Dept: FAMILY MEDICINE | Facility: OTHER | Age: 28
End: 2018-11-01
Attending: FAMILY MEDICINE
Payer: COMMERCIAL

## 2018-11-01 VITALS
SYSTOLIC BLOOD PRESSURE: 128 MMHG | HEART RATE: 81 BPM | DIASTOLIC BLOOD PRESSURE: 72 MMHG | TEMPERATURE: 97.7 F | HEIGHT: 65 IN | WEIGHT: 207 LBS | OXYGEN SATURATION: 98 % | BODY MASS INDEX: 34.49 KG/M2

## 2018-11-01 DIAGNOSIS — F33.0 MAJOR DEPRESSIVE DISORDER, RECURRENT EPISODE, MILD (H): ICD-10-CM

## 2018-11-01 DIAGNOSIS — Z63.0 RELATIONSHIP PROBLEM BETWEEN PARTNERS: ICD-10-CM

## 2018-11-01 DIAGNOSIS — Z71.6 TOBACCO ABUSE COUNSELING: ICD-10-CM

## 2018-11-01 DIAGNOSIS — Z72.0 TOBACCO ABUSE: ICD-10-CM

## 2018-11-01 DIAGNOSIS — Z30.09 BIRTH CONTROL COUNSELING: ICD-10-CM

## 2018-11-01 DIAGNOSIS — F90.0 ATTENTION DEFICIT HYPERACTIVITY DISORDER (ADHD), PREDOMINANTLY INATTENTIVE TYPE: ICD-10-CM

## 2018-11-01 DIAGNOSIS — Z23 NEED FOR PROPHYLACTIC VACCINATION AND INOCULATION AGAINST INFLUENZA: Primary | ICD-10-CM

## 2018-11-01 PROCEDURE — 90686 IIV4 VACC NO PRSV 0.5 ML IM: CPT | Performed by: FAMILY MEDICINE

## 2018-11-01 PROCEDURE — G0463 HOSPITAL OUTPT CLINIC VISIT: HCPCS | Mod: 25

## 2018-11-01 PROCEDURE — 90471 IMMUNIZATION ADMIN: CPT | Performed by: FAMILY MEDICINE

## 2018-11-01 PROCEDURE — 99214 OFFICE O/P EST MOD 30 MIN: CPT | Performed by: FAMILY MEDICINE

## 2018-11-01 RX ORDER — DEXTROAMPHETAMINE SACCHARATE, AMPHETAMINE ASPARTATE MONOHYDRATE, DEXTROAMPHETAMINE SULFATE AND AMPHETAMINE SULFATE 5; 5; 5; 5 MG/1; MG/1; MG/1; MG/1
20 CAPSULE, EXTENDED RELEASE ORAL DAILY
Qty: 30 CAPSULE | Refills: 0 | Status: SHIPPED | OUTPATIENT
Start: 2018-11-01 | End: 2018-11-26

## 2018-11-01 RX ORDER — SERTRALINE HYDROCHLORIDE 100 MG/1
100 TABLET, FILM COATED ORAL DAILY
Qty: 30 TABLET | Refills: 1 | Status: SHIPPED | OUTPATIENT
Start: 2018-11-01 | End: 2018-11-29

## 2018-11-01 SDOH — SOCIAL STABILITY - SOCIAL INSECURITY: PROBLEMS IN RELATIONSHIP WITH SPOUSE OR PARTNER: Z63.0

## 2018-11-01 ASSESSMENT — ANXIETY QUESTIONNAIRES
5. BEING SO RESTLESS THAT IT IS HARD TO SIT STILL: NEARLY EVERY DAY
7. FEELING AFRAID AS IF SOMETHING AWFUL MIGHT HAPPEN: SEVERAL DAYS
2. NOT BEING ABLE TO STOP OR CONTROL WORRYING: MORE THAN HALF THE DAYS
6. BECOMING EASILY ANNOYED OR IRRITABLE: NEARLY EVERY DAY
IF YOU CHECKED OFF ANY PROBLEMS ON THIS QUESTIONNAIRE, HOW DIFFICULT HAVE THESE PROBLEMS MADE IT FOR YOU TO DO YOUR WORK, TAKE CARE OF THINGS AT HOME, OR GET ALONG WITH OTHER PEOPLE: VERY DIFFICULT
1. FEELING NERVOUS, ANXIOUS, OR ON EDGE: NEARLY EVERY DAY
3. WORRYING TOO MUCH ABOUT DIFFERENT THINGS: MORE THAN HALF THE DAYS
GAD7 TOTAL SCORE: 17
4. TROUBLE RELAXING: NEARLY EVERY DAY

## 2018-11-01 ASSESSMENT — PATIENT HEALTH QUESTIONNAIRE - PHQ9: SUM OF ALL RESPONSES TO PHQ QUESTIONS 1-9: 19

## 2018-11-01 ASSESSMENT — PAIN SCALES - GENERAL: PAINLEVEL: NO PAIN (0)

## 2018-11-01 NOTE — MR AVS SNAPSHOT
After Visit Summary   11/1/2018    Jennifer Tatum    MRN: [id number]           Patient Information     Date Of Birth          1990        Visit Information        Provider Department      11/1/2018 8:00 AM Cornell Herron MD [facility] [place]        Today's Diagnoses     Need for prophylactic vaccination and inoculation against influenza    -  1    Tobacco abuse        Tobacco abuse counseling        Major depressive disorder, recurrent episode, mild (H)        Relationship problem between partners        Attention deficit hyperactivity disorder (ADHD), predominantly inattentive type        Birth control counseling          Care Instructions      HOW TO QUIT SMOKING  Smoking is one of the hardest habits to break. About half of all those who have ever smoked have been able to quit, and most of those (about 70%) who still smoke want to quit. Here are some of the best ways to stop smoking.     KEEP TRYING:  It takes most smokers about 8 tries before they are finally able to fully quit. So, the more often you try and fail, the better your chance of quitting the next time! So, don't give up!    GO COLD TURKEY:  Most ex-smokers quit cold turkey. Trying to cut back gradually doesn't seem to work as well, perhaps because it continues the smoking habit. Also, it is possible to fool yourself by inhaling more while smoking fewer cigarettes. This results in the same amount of nicotine in your body!    GET SUPPORT:  Support programs can make an important difference, especially for the heavy smoker. These groups offer lectures, methods to change your behavior and peer support. Call the free national Quitline for more information. 800-QUIT-NOW (661-943-5847). Low-cost or free programs are offered by many hospitals, local chapters of the American Lung Association (537-496-2918) and the American Cancer Society (660-163-9410). Support at home is important too. Non-smokers can help by offering  praise and encouragement. If the smoker fails to quit, encourage them to try again!    OVER-THE-COUNTER MEDICINES:  For those who can't quit on their own, Nicotine Replacement Therapy (NRT) may make quitting much easier. Certain aids such as the nicotine patch, gum and lozenge are available without a prescription. However, it is best to use these under the guidance of your doctor. The skin patch provides a steady supply of nicotine to the body. Nicotine gum and lozenge gives temporary bursts of low levels of nicotine. Both methods take the edge off the craving for cigarettes. WARNING: If you feel symptoms of nicotine overdose, such as nausea, vomiting, dizziness, weakness, or fast heartbeat, stop using these and see your doctor.    PRESCRIPTION MEDICINES:  After evaluating your smoking patterns and prior attempts at quitting, your doctor may offer a prescription medicine such as bupropion (Zyban, Wellbutrin), varenicline (Chantix, Champix), a niocotine inhaler or nasal spray. Each has its unique advantage and side effects which your doctor can review with you.    HEALTH BENEFITS OF QUITTING:  The benefits of quitting start right away and keep improving the longer you go without smokin minutes: blood pressure and pulse return to normal  8 hours: oxygen levels return to normal  2 days: ability to smell and taste begins to improve as damaged nerves start to regrow  2-3 weeks: circulation and lung function improves  1-9 months: decreased cough, congestion and shortness of breath; less tired  1 year: risk of heart attack decreases by half  5 years: risk of lung cancer decreases by half; risk of stroke becomes the same as a non-smoker  For information about how to quit smoking, visit the following links:  National Cancer [place] ,   Clearing the Air, Quit Smoking Today   - an online booklet. http://www.smokefree.gov/pubs/clearing_the_air.pdf  Smokefree.gov http://smokefree.gov/  QuitNet http://www.quitnet.com/     4469-4396 [facility], [address] [place] [address], [place] [address]. All rights reserved. This information is not intended as a substitute for professional medical care. Always follow your healthcare professional's instructions.    The Benefits of Living Smoke Free  What do you want to gain from quitting? Check off some reasons to quit.  Health Benefits  ___ Reduce my risk of lung cancer, heart disease, chronic lung disease  ___ Have fewer wrinkles and softer skin  ___ Improve my sense of taste and smell  ___ For pregnant women--reduce the risk of having a miscarriage, stillbirth, premature birth, or low-birth-weight baby  Personal Benefits  ___ Feel more in control of my life  ___ Have better-smelling hair, breath, clothes, home, and car  ___ Save time by not having to take smoke breaks, buy cigarettes, or hunt for a light  ___ Have whiter teeth  Family Benefits  ___ Reduce my children s respiratory tract infections  ___ Set a good example for my children  ___ Reduce my family s cancer risk  Financial Benefits  ___ Save hundreds of dollars each year that would be spent on cigarettes  ___ Save money on medical bills  ___ Save on life, health, and car insurance premiums    Those Dollars Add Up!  Cigarettes are expensive, and getting more expensive all the time. Do you realize how much money you are spending on cigarettes per year? What is the average amount you spend on a pack of cigarettes? What is the average number of packs that you smoke per day? Using your answers to these questions, fill in this formula to help you find out:  ($ _____ per pack) ×  ( _____ number of packs per day) × (365 days) =  $ _____ yearly cost of smoking  Besides tobacco, there are other costs, including extra cleaning bills and replacement costs for clothing and furniture; medical expenses for smoking-related illnesses; and higher health, life, and car insurance premiums.    Cigars and Pipes Count Too!  Cigars and pipes are also  "dangerous. So are smokeless (chewing) tobacco and snuff. All of these products contain nicotine, a highly addictive substance that has harmful effects on your body. Quitting smoking means giving up all tobacco products.      7533-3120 Franki Choi, [address] [place] [address], [place] [address]. All rights reserved. This information is not intended as a substitute for professional medical care. Always follow your healthcare professional's instructions.          Follow-ups after your visit        Who to contact     If you have questions or need follow up information about today's clinic visit or your schedule please contact [facility] directly at [phone].  Normal or non-critical lab and imaging results will be communicated to you by MyChart, letter or phone within 4 business days after the clinic has received the results. If you do not hear from us within 7 days, please contact the clinic through MyChart or phone. If you have a critical or abnormal lab result, we will notify you by phone as soon as possible.  Submit refill requests through [facility] or call your pharmacy and they will forward the refill request to us. Please allow 3 business days for your refill to be completed.          Additional Information About Your Visit        Care EveryWhere ID     This is your Care EveryWhere ID. This could be used by other organizations to access your [place] medical records  [id number]        Your Vitals Were     Pulse Temperature Height Pulse Oximetry BMI (Body Mass Index)       81 97.7  F (36.5  C) 5' 5\" (1.651 m) 98% 34.45 kg/m2        Blood Pressure from Last 3 Encounters:   11/01/18 128/72   08/09/18 150/97   12/13/17 133/71    Weight from Last 3 Encounters:   11/01/18 207 lb (93.9 kg)   12/01/17 195 lb 3.2 oz (88.5 kg)   10/30/17 195 lb (88.5 kg)              We Performed the Following     HC FLU VAC PRESRV FREE QUAD SPLIT VIR 3+YRS IM     Tobacco Cessation - Order to Satisfy Health " Maintenance     Vaccine Administration, Initial [46837]          Today's Medication Changes          These changes are accurate as of 11/1/18  8:41 AM.  If you have any questions, ask your nurse or doctor.               Start taking these medicines.        Dose/Directions    amphetamine-dextroamphetamine 20 MG per 24 hr capsule   Commonly known as:  ADDERALL XR   Used for:  Attention deficit hyperactivity disorder (ADHD), predominantly inattentive type   Started by:  Cornell Herron MD        Dose:  20 mg   Take 1 capsule (20 mg) by mouth daily   Quantity:  30 capsule   Refills:  0         These medicines have changed or have updated prescriptions.        Dose/Directions    sertraline 100 MG tablet   Commonly known as:  ZOLOFT   This may have changed:    - medication strength  - how much to take   Used for:  Major depressive disorder, recurrent episode, mild (H)   Changed by:  Cornell Herron MD        Dose:  100 mg   Take 1 tablet (100 mg) by mouth daily   Quantity:  30 tablet   Refills:  1         Stop taking these medicines if you haven't already. Please contact your care team if you have questions.     Prenatal Vitamin 27-0.8 MG Tabs   Stopped by:  Cornell Herron MD           TYLENOL PO   Stopped by:  Cornell Herron MD           venlafaxine 150 MG 24 hr capsule   Commonly known as:  EFFEXOR-XR   Stopped by:  Cornell Herron MD                Where to get your medicines      These medications were sent to [id number] - [place], [place] - [address] [facility] [address], [place] [address]     Phone:  [phone]     sertraline 100 MG tablet         Some of these will need a paper prescription and others can be bought over the counter.  Ask your nurse if you have questions.     Bring a paper prescription for each of these medications     amphetamine-dextroamphetamine 20 MG per 24 hr capsule                Primary Care Provider Office Phone # Fax #    Cornell Herron [phone]  [facility] [address]        Equal Access to Services     MATT YBARRA : Hadii aad ku hadandresoscar Jacobson, walidiada jonathan, qaanaherbert arellanomatoyin long, wade sharmaconnersunitha hoffman. So [facility] [phone].    ATENCIÓN: Si habla español, tiene a chen disposición servicios gratuitos de asistencia lingüística. Llame al [phone].    We comply with applicable federal civil rights laws and [place] laws. We do not discriminate on the basis of race, color, national origin, age, disability, sex, sexual orientation, or gender identity.            Thank you!     Thank you for choosing [facility]  for your care. Our goal is always to provide you with excellent care. Hearing back from our patients is one way we can continue to improve our services. Please take a few minutes to complete the written survey that you may receive in the mail after your visit with us. Thank you!             Your Updated Medication List - Protect others around you: Learn how to safely use, store and throw away your medicines at www.disposemymeds.org.          This list is accurate as of 11/1/18  8:41 AM.  Always use your most recent med list.                   Brand Name Dispense Instructions for use Diagnosis    amphetamine-dextroamphetamine 20 MG per 24 hr capsule    ADDERALL XR    30 capsule    Take 1 capsule (20 mg) by mouth daily    Attention deficit hyperactivity disorder (ADHD), predominantly inattentive type       sertraline 100 MG tablet    ZOLOFT    30 tablet    Take 1 tablet (100 mg) by mouth daily    Major depressive disorder, recurrent episode, mild (H)

## 2018-11-01 NOTE — LETTER
My Depression Action Plan  Name: Jennifer Tatum   Date of Birth 1990  Date: 10/25/2018    My doctor: Cornell Herron   My clinic: [facility] [place]  [address] [place] [address]  [phone]          GREEN    ZONE   Good Control    What it looks like:     Things are going generally well. You have normal up s and down s. You may even feel depressed from time to time, but bad moods usually last less than a day.   What you need to do:  1. Continue to care for yourself (see self care plan)  2. Check your depression survival kit and update it as needed  3. Follow your physician s recommendations including any medication.  4. Do not stop taking medication unless you consult with your physician first.           YELLOW         ZONE Getting Worse    What it looks like:     Depression is starting to interfere with your life.     It may be hard to get out of bed; you may be starting to isolate yourself from others.    Symptoms of depression are starting to last most all day and this has happened for several days.     You may have suicidal thoughts but they are not constant.   What you need to do:     1. Call your care team, your response to treatment will improve if you keep your care team informed of your progress. Yellow periods are signs an adjustment may need to be made.     2. Continue your self-care, even if you have to fake it!    3. Talk to someone in your support network    4. Open up your depression survival kit           RED    ZONE Medical Alert - Get Help    What it looks like:     Depression is seriously interfering with your life.     You may experience these or other symptoms: You can t get out of bed most days, can t work or engage in other necessary activities, you have trouble taking care of basic hygiene, or basic responsibilities, thoughts of suicide or death that will not go away, self-injurious behavior.     What you need to do:  1. Call your care team and request  a same-day appointment. If they are not available (weekends or after hours) call your local crisis line, emergency room or 911.            Depression Self Care Plan / Survival Kit    Self-Care for Depression  Here s the deal. Your body and mind are really not as separate as most people think.  What you do and think affects how you feel and how you feel influences what you do and think. This means if you do things that people who feel good do, it will help you feel better.  Sometimes this is all it takes.  There is also a place for medication and therapy depending on how severe your depression is, so be sure to consult with your medical provider and/ or Behavioral Health Consultant if your symptoms are worsening or not improving.     In order to better manage my stress, I will:    Exercise  Get some form of exercise, every day. This will help reduce pain and release endorphins, the  feel good  chemicals in your brain. This is almost as good as taking antidepressants!  This is not the same as joining a gym and then never going! (they count on that by the way ) It can be as simple as just going for a walk or doing some gardening, anything that will get you moving.      Hygiene   Maintain good hygiene (Get out of bed in the morning, Make your bed, Brush your teeth, Take a shower, and Get dressed like you were going to work, even if you are unemployed).  If your clothes don't fit try to get ones that do.    Diet  I will strive to eat foods that are good for me, drink plenty of water, and avoid excessive sugar, caffeine, alcohol, and other mood-altering substances.  Some foods that are helpful in depression are: complex carbohydrates, B vitamins, flaxseed, fish or fish oil, fresh fruits and vegetables.    Psychotherapy  I agree to participate in Individual Therapy (if recommended).    Medication  If prescribed medications, I agree to take them.  Missing doses can result in serious side effects.  I understand that drinking  alcohol, or other illicit drug use, may cause potential side effects.  I will not stop my medication abruptly without first discussing it with my provider.    Staying Connected With Others  I will stay in touch with my friends, family members, and my primary care provider/team.    Use your imagination  Be creative.  We all have a creative side; it doesn t matter if it s oil painting, sand castles, or mud pies! This will also kick up the endorphins.    Witness Beauty  (AKA stop and smell the roses) Take a look outside, even in mid-winter. Notice colors, textures. Watch the squirrels and birds.     Service to others  Be of service to others.  There is always someone else in need.  By helping others we can  get out of ourselves  and remember the really important things.  This also provides opportunities for practicing all the other parts of the program.    Humor  Laugh and be silly!  Adjust your TV habits for less news and crime-drama and more comedy.    Control your stress  Try breathing deep, massage therapy, biofeedback, and meditation. Find time to relax each day.     My support system    Clinic Contact:  Phone number:    Contact 1:  Phone number:    Contact 2:  Phone number:    Sabian[employer]/:  Phone number:    Therapist:  Phone number:    Local crisis center:    Phone number:    Other community support:  Phone number:

## 2018-11-01 NOTE — NURSING NOTE
"Chief Complaint   Patient presents with     Depression       Initial /72  Pulse 81  Temp 97.7  F (36.5  C)  Ht 5' 5\" (1.651 m)  Wt 207 lb (93.9 kg)  SpO2 98%  BMI 34.45 kg/m2 Estimated body mass index is 34.45 kg/(m^2) as calculated from the following:    Height as of this encounter: 5' 5\" (1.651 m).    Weight as of this encounter: 207 lb (93.9 kg).  Medication Reconciliation: complete    Gilbert Titus LPN  "

## 2018-11-02 ASSESSMENT — ANXIETY QUESTIONNAIRES: GAD7 TOTAL SCORE: 17

## 2018-11-26 NOTE — PROGRESS NOTES
SUBJECTIVE:   Jennifer Tatum is a 28 year old female who presents to clinic today for the following health issues:      Depression and Anxiety Follow-Up    Status since last visit: No change    Other associated symptoms:None    Complicating factors:     Significant life event: No     Current substance abuse: None    PHQ 12/1/2017 11/1/2018 11/29/2018   PHQ-9 Total Score 21 19 16   Q9: Suicide Ideation Not at all Not at all Not at all     VINI-7 SCORE 12/1/2017 11/1/2018 11/29/2018   Total Score 21 17 17     Pt feels better - less on edge but still on edge  Just came back from [place] - that was fun    No side effects from meds    PHQ-9  English  PHQ-9   Any Language  VINI-7  Suicide Assessment Five-step Evaluation and Treatment (SAFE-T)    Amount of exercise or physical activity: None    Problems taking medications regularly: No    Medication side effects: none    Diet: regular (no restrictions)      Medication Followup of Adderall    Taking Medication as prescribed: yes    Side Effects:  None    Medication Helping Symptoms:  yes           Problem list and histories reviewed & adjusted, as indicated.  Additional history: as documented    Labs reviewed in EPIC    Reviewed and updated as needed this visit by clinical staff       Reviewed and updated as needed this visit by Provider         ROS:  CONSTITUTIONAL: NEGATIVE for fever, chills, change in weight  ROS otherwise negative    OBJECTIVE:                                                    /82  Pulse 90  Temp 97.6  F (36.4  C) (Tympanic)  Wt 209 lb (94.8 kg)  SpO2 98%  BMI 34.78 kg/m2  Body mass index is 34.78 kg/(m^2).   GENERAL: healthy, alert, well nourished, well hydrated, no distress  PSYCH: Alert and oriented times 3; speech- coherent , normal rate and volume; able to articulate logical thoughts, able to abstract reason, no tangential thoughts, no hallucinations or delusions, affect- normal         ASSESSMENT/PLAN:                                                       (F90.0) Attention deficit hyperactivity disorder (ADHD), predominantly inattentive type  Comment: doing better.  RF done.   Plan: amphetamine-dextroamphetamine (ADDERALL XR) 20         MG 24 hr capsule, amphetamine-dextroamphetamine        (ADDERALL XR) 20 MG 24 hr capsule,         amphetamine-dextroamphetamine (ADDERALL XR) 20         MG 24 hr capsule        F/u in 3 months     (F33.0) Major depressive disorder, recurrent episode, mild (H)  Comment: some better. Give more time. Looking into counseling - PROMISES she is going to make appt.   Plan: sertraline (ZOLOFT) 100 MG tablet        Continue current medications and behavioral changes.   F/u in 3 months. Sooner if needed.             Cornell Herron MD  [facility]

## 2018-11-29 ENCOUNTER — OFFICE VISIT (OUTPATIENT)
Dept: FAMILY MEDICINE | Facility: OTHER | Age: 28
End: 2018-11-29
Attending: FAMILY MEDICINE
Payer: COMMERCIAL

## 2018-11-29 VITALS
OXYGEN SATURATION: 98 % | HEART RATE: 90 BPM | BODY MASS INDEX: 34.78 KG/M2 | WEIGHT: 209 LBS | TEMPERATURE: 97.6 F | SYSTOLIC BLOOD PRESSURE: 114 MMHG | DIASTOLIC BLOOD PRESSURE: 82 MMHG

## 2018-11-29 DIAGNOSIS — F90.0 ATTENTION DEFICIT HYPERACTIVITY DISORDER (ADHD), PREDOMINANTLY INATTENTIVE TYPE: ICD-10-CM

## 2018-11-29 DIAGNOSIS — F33.0 MAJOR DEPRESSIVE DISORDER, RECURRENT EPISODE, MILD (H): ICD-10-CM

## 2018-11-29 PROCEDURE — 99213 OFFICE O/P EST LOW 20 MIN: CPT | Performed by: FAMILY MEDICINE

## 2018-11-29 PROCEDURE — G0463 HOSPITAL OUTPT CLINIC VISIT: HCPCS

## 2018-11-29 RX ORDER — SERTRALINE HYDROCHLORIDE 100 MG/1
100 TABLET, FILM COATED ORAL DAILY
Qty: 90 TABLET | Refills: 1 | Status: SHIPPED | OUTPATIENT
Start: 2018-11-29 | End: 2020-04-15

## 2018-11-29 RX ORDER — DEXTROAMPHETAMINE SACCHARATE, AMPHETAMINE ASPARTATE MONOHYDRATE, DEXTROAMPHETAMINE SULFATE AND AMPHETAMINE SULFATE 5; 5; 5; 5 MG/1; MG/1; MG/1; MG/1
20 CAPSULE, EXTENDED RELEASE ORAL DAILY
Qty: 30 CAPSULE | Refills: 0 | Status: SHIPPED | OUTPATIENT
Start: 2019-01-30 | End: 2019-02-20

## 2018-11-29 RX ORDER — DEXTROAMPHETAMINE SACCHARATE, AMPHETAMINE ASPARTATE MONOHYDRATE, DEXTROAMPHETAMINE SULFATE AND AMPHETAMINE SULFATE 5; 5; 5; 5 MG/1; MG/1; MG/1; MG/1
20 CAPSULE, EXTENDED RELEASE ORAL DAILY
Qty: 30 CAPSULE | Refills: 0 | Status: SHIPPED | OUTPATIENT
Start: 2018-12-01 | End: 2019-02-20

## 2018-11-29 RX ORDER — DEXTROAMPHETAMINE SACCHARATE, AMPHETAMINE ASPARTATE MONOHYDRATE, DEXTROAMPHETAMINE SULFATE AND AMPHETAMINE SULFATE 5; 5; 5; 5 MG/1; MG/1; MG/1; MG/1
20 CAPSULE, EXTENDED RELEASE ORAL DAILY
Qty: 30 CAPSULE | Refills: 0 | Status: SHIPPED | OUTPATIENT
Start: 2018-12-31 | End: 2019-02-20

## 2018-11-29 ASSESSMENT — ANXIETY QUESTIONNAIRES
2. NOT BEING ABLE TO STOP OR CONTROL WORRYING: NEARLY EVERY DAY
6. BECOMING EASILY ANNOYED OR IRRITABLE: NEARLY EVERY DAY
3. WORRYING TOO MUCH ABOUT DIFFERENT THINGS: NEARLY EVERY DAY
1. FEELING NERVOUS, ANXIOUS, OR ON EDGE: MORE THAN HALF THE DAYS
4. TROUBLE RELAXING: NEARLY EVERY DAY
5. BEING SO RESTLESS THAT IT IS HARD TO SIT STILL: NOT AT ALL
IF YOU CHECKED OFF ANY PROBLEMS ON THIS QUESTIONNAIRE, HOW DIFFICULT HAVE THESE PROBLEMS MADE IT FOR YOU TO DO YOUR WORK, TAKE CARE OF THINGS AT HOME, OR GET ALONG WITH OTHER PEOPLE: VERY DIFFICULT
7. FEELING AFRAID AS IF SOMETHING AWFUL MIGHT HAPPEN: NEARLY EVERY DAY
GAD7 TOTAL SCORE: 17

## 2018-11-29 ASSESSMENT — PAIN SCALES - GENERAL: PAINLEVEL: NO PAIN (0)

## 2018-11-29 ASSESSMENT — PATIENT HEALTH QUESTIONNAIRE - PHQ9: SUM OF ALL RESPONSES TO PHQ QUESTIONS 1-9: 16

## 2018-11-29 NOTE — NURSING NOTE
"Chief Complaint   Patient presents with     Depression     A.D.H.D       Initial /82  Pulse 90  Temp 97.6  F (36.4  C) (Tympanic)  Wt 209 lb (94.8 kg)  SpO2 98%  BMI 34.78 kg/m2 Estimated body mass index is 34.78 kg/(m^2) as calculated from the following:    Height as of 11/1/18: 5' 5\" (1.651 m).    Weight as of this encounter: 209 lb (94.8 kg).  Medication Reconciliation: complete    Cata Moses MA    "

## 2018-11-29 NOTE — MR AVS SNAPSHOT
After Visit Summary   11/29/2018    Jennifer Tatum    MRN: [id number]           Patient Information     Date Of Birth          1990        Visit Information        Provider Department      11/29/2018 8:45 AM Cornell Herron MD [facility]        Today's Diagnoses     Attention deficit hyperactivity disorder (ADHD), predominantly inattentive type        Major depressive disorder, recurrent episode, mild (H)           Follow-ups after your visit        Who to contact     If you have questions or need follow up information about today's clinic visit or your schedule please contact [facility] directly at [phone].  Normal or non-critical lab and imaging results will be communicated to you by MyChart, letter or phone within 4 business days after the clinic has received the results. If you do not hear from us within 7 days, please contact the clinic through MyChart or phone. If you have a critical or abnormal lab result, we will notify you by phone as soon as possible.  Submit refill requests through [employer] or call your pharmacy and they will forward the refill request to us. Please allow 3 business days for your refill to be completed.          Additional Information About Your Visit        Care EveryWhere ID     This is your Care EveryWhere ID. This could be used by other organizations to access your [place] medical records  [id number]        Your Vitals Were     Pulse Temperature Pulse Oximetry BMI (Body Mass Index)          90 97.6  F (36.4  C) (Tympanic) 98% 34.78 kg/m2         Blood Pressure from Last 3 Encounters:   11/29/18 114/82   11/01/18 128/72   08/09/18 150/97    Weight from Last 3 Encounters:   11/29/18 209 lb (94.8 kg)   11/01/18 207 lb (93.9 kg)   12/01/17 195 lb 3.2 oz (88.5 kg)              Today, you had the following     No orders found for display         Today's Medication Changes          These changes are accurate as of  11/29/18  9:02 AM.  If you have any questions, ask your nurse or doctor.               These medicines have changed or have updated prescriptions.        Dose/Directions    * amphetamine-dextroamphetamine 20 MG 24 hr capsule   Commonly known as:  ADDERALL XR   This may have changed:  These instructions start on 12/1/2018. If you are unsure what to do until then, ask your doctor or other care provider.   Used for:  Attention deficit hyperactivity disorder (ADHD), predominantly inattentive type   Changed by:  Cornell Herron MD        Dose:  20 mg   Start taking on:  12/1/2018   Take 1 capsule (20 mg) by mouth daily   Quantity:  30 capsule   Refills:  0       * amphetamine-dextroamphetamine 20 MG 24 hr capsule   Commonly known as:  ADDERALL XR   This may have changed:  You were already taking a medication with the same name, and this prescription was added. Make sure you understand how and when to take each.   Used for:  Attention deficit hyperactivity disorder (ADHD), predominantly inattentive type   Changed by:  Cornell Herron MD        Dose:  20 mg   Start taking on:  12/31/2018   Take 1 capsule (20 mg) by mouth daily   Quantity:  30 capsule   Refills:  0       * amphetamine-dextroamphetamine 20 MG 24 hr capsule   Commonly known as:  ADDERALL XR   This may have changed:  You were already taking a medication with the same name, and this prescription was added. Make sure you understand how and when to take each.   Used for:  Attention deficit hyperactivity disorder (ADHD), predominantly inattentive type   Changed by:  Cornell Herron MD        Dose:  20 mg   Start taking on:  1/30/2019   Take 1 capsule (20 mg) by mouth daily   Quantity:  30 capsule   Refills:  0       * Notice:  This list has 3 medication(s) that are the same as other medications prescribed for you. Read the directions carefully, and ask your doctor or other care provider to review them with you.         Where to get your medicines      These  medications were sent to [facility] [address] [facility], [place] - [address] [address]  [address], [facility] [address]     Phone:  [phone]     sertraline 100 MG tablet         Some of these will need a paper prescription and others can be bought over the counter.  Ask your nurse if you have questions.     Bring a paper prescription for each of these medications     amphetamine-dextroamphetamine 20 MG 24 hr capsule    amphetamine-dextroamphetamine 20 MG 24 hr capsule    amphetamine-dextroamphetamine 20 MG 24 hr capsule                Primary Care Provider Office Phone # Fax #    Cornell Herron [phone] [address] [facility] [address]        Equal Access to Services     [facility]: Hadii aad jaki hadasho Soomaali, waaxda luqadaha, qaybta kaalmada adeegyada, wade biswas . So [facility] [phone].    ATENCIÓN: Si habla español, tiene a chen disposición servicios gratuitos de asistencia lingüística. [facility] [phone].    We comply with applicable federal civil rights laws and [place] laws. We do not discriminate on the basis of race, color, national origin, age, disability, sex, sexual orientation, or gender identity.            Thank you!     Thank you for choosing [facility]  for your care. Our goal is always to provide you with excellent care. Hearing back from our patients is one way we can continue to improve our services. Please take a few minutes to complete the written survey that you may receive in the mail after your visit with us. Thank you!             Your Updated Medication List - Protect others around you: Learn how to safely use, store and throw away your medicines at www.disposemymeds.org.          This list is accurate as of 11/29/18  9:02 AM.  Always use your most recent med list.                   Brand Name Dispense Instructions for use Diagnosis    * amphetamine-dextroamphetamine 20 MG 24 hr capsule   Start taking on:   12/1/2018    ADDERALL XR    30 capsule    Take 1 capsule (20 mg) by mouth daily    Attention deficit hyperactivity disorder (ADHD), predominantly inattentive type       * amphetamine-dextroamphetamine 20 MG 24 hr capsule   Start taking on:  12/31/2018    ADDERALL XR    30 capsule    Take 1 capsule (20 mg) by mouth daily    Attention deficit hyperactivity disorder (ADHD), predominantly inattentive type       * amphetamine-dextroamphetamine 20 MG 24 hr capsule   Start taking on:  1/30/2019    ADDERALL XR    30 capsule    Take 1 capsule (20 mg) by mouth daily    Attention deficit hyperactivity disorder (ADHD), predominantly inattentive type       sertraline 100 MG tablet    ZOLOFT    90 tablet    Take 1 tablet (100 mg) by mouth daily    Major depressive disorder, recurrent episode, mild (H)       * Notice:  This list has 3 medication(s) that are the same as other medications prescribed for you. Read the directions carefully, and ask your doctor or other care provider to review them with you.

## 2018-11-30 ASSESSMENT — ANXIETY QUESTIONNAIRES: GAD7 TOTAL SCORE: 17

## 2019-02-20 NOTE — PROGRESS NOTES
SUBJECTIVE:   Jennifer Tatum is a 28 year old female who presents to clinic today for the following health issues:      Depression and Anxiety Follow-Up    Status since last visit: depression better but anxiety worse    Other associated symptoms:None    Complicating factors:     Significant life event: No     Current substance abuse: None    Very up and down moods    Anxiety    Not sleep well    Lots of racing thoughts of things to do / things to worry about.     Never been on mood stabilizer.        PHQ 11/29/2018 11/29/2018 2/26/2019   PHQ-9 Total Score 16 16 17   Q9: Suicide Ideation Not at all Not at all Not at all   F/U: Thoughts of suicide or self-harm - - No   F/U: Safety concerns - - No     VINI-7 SCORE 11/1/2018 11/29/2018 2/26/2019   Total Score 17 17 20       PHQ-9  English  PHQ-9   Any Language  VINI-7  Suicide Assessment Five-step Evaluation and Treatment (SAFE-T)    Amount of exercise or physical activity: None    Problems taking medications regularly: No    Medication side effects: none    Diet: regular (no restrictions)        Medication Followup of adderall    Taking Medication as prescribed: yes    Side Effects:  Restless arms    Medication Helping Symptoms:  Yes    No concerns except harder to fall asleep ?? meds vs mood issue. Racing thoughts and worries etc         Musculoskeletal problem/pain      Duration: few months    Description  Location: bilateral forearm pain left greater than right     Intensity:  moderate    Accompanying signs and symptoms: radiation of pain to hands    History  Previous similar problem: no   Previous evaluation:  none    Precipitating or alleviating factors:  Trauma or overuse: no   Aggravating factors include: arms wake her up at night    Therapies tried and outcome: heat, ice, acetaminophen and deep heating rub      Problem list and histories reviewed & adjusted, as indicated.  Additional history: as documented    Labs reviewed in EPIC    Reviewed and updated as  "needed this visit by clinical staff       Reviewed and updated as needed this visit by Provider         ROS:  CONSTITUTIONAL: NEGATIVE for fever, chills, change in weight  RESP: NEGATIVE for significant cough or SOB  CV: NEGATIVE for chest pain, palpitations or peripheral edema    OBJECTIVE:                                                    /78   Pulse 83   Temp 97.6  F (36.4  C)   Ht 1.651 m (5' 5\")   Wt 91.6 kg (202 lb)   SpO2 97%   BMI 33.61 kg/m    Body mass index is 33.61 kg/m .   GENERAL: healthy, alert, well nourished, well hydrated, no distress  NECK: no tenderness, no adenopathy, no asymmetry, no masses, no stiffness; thyroid- normal to palpation  MS: extremities- no gross deformities noted, no edema  PSYCH: Alert and oriented times 3; speech- coherent , normal rate and volume; able to articulate logical thoughts, able to abstract reason, no tangential thoughts, no hallucinations or delusions, affect- normal         ASSESSMENT/PLAN:                                                    (F41.1) VINI (generalized anxiety disorder)  (primary encounter diagnosis)  Comment: discussed.  Will add Lamictal to see if help with mood swings and VINI sx   Plan: lamoTRIgine (LAMICTAL) 25 MG tablet            (F90.0) Attention deficit hyperactivity disorder (ADHD), predominantly inattentive type  Comment: stable overall. May need to try plain version if sleep not better.   Plan: amphetamine-dextroamphetamine (ADDERALL XR) 20         MG 24 hr capsule, amphetamine-dextroamphetamine        (ADDERALL XR) 20 MG 24 hr capsule,         amphetamine-dextroamphetamine (ADDERALL XR) 20         MG 24 hr capsule        F/u in 3 months    (F33.0) Major depressive disorder, recurrent episode, mild (H)  Comment: still high scores - add mood stabilizer.   Plan: lamoTRIgine (LAMICTAL) 25 MG tablet        F/u in 4-5 weeks     (M79.601,  M79.602) Pain in both upper extremities  Comment: overuse syndrome.  Has 6 month old and lifting " a lot   Plan: Discussed.  Alleve  2 tabs BID for 2-3 weeks. Watch lifting. Discussed in length conservative measures of OTC medications for pain, Ice/Heat, elevation and the concept of R.I.C.E.. Continue behavioral changes and proper body mechanics to allow for healing. Follow up as directed.               Cornell Herron MD  [facility]

## 2019-02-26 ENCOUNTER — OFFICE VISIT (OUTPATIENT)
Dept: FAMILY MEDICINE | Facility: OTHER | Age: 29
End: 2019-02-26
Attending: FAMILY MEDICINE
Payer: COMMERCIAL

## 2019-02-26 VITALS
HEART RATE: 83 BPM | TEMPERATURE: 97.6 F | SYSTOLIC BLOOD PRESSURE: 130 MMHG | BODY MASS INDEX: 33.66 KG/M2 | DIASTOLIC BLOOD PRESSURE: 78 MMHG | WEIGHT: 202 LBS | HEIGHT: 65 IN | OXYGEN SATURATION: 97 %

## 2019-02-26 DIAGNOSIS — M79.601 PAIN IN BOTH UPPER EXTREMITIES: ICD-10-CM

## 2019-02-26 DIAGNOSIS — F41.1 GAD (GENERALIZED ANXIETY DISORDER): Primary | ICD-10-CM

## 2019-02-26 DIAGNOSIS — F33.0 MAJOR DEPRESSIVE DISORDER, RECURRENT EPISODE, MILD (H): ICD-10-CM

## 2019-02-26 DIAGNOSIS — F90.0 ATTENTION DEFICIT HYPERACTIVITY DISORDER (ADHD), PREDOMINANTLY INATTENTIVE TYPE: ICD-10-CM

## 2019-02-26 DIAGNOSIS — M79.602 PAIN IN BOTH UPPER EXTREMITIES: ICD-10-CM

## 2019-02-26 PROCEDURE — G0463 HOSPITAL OUTPT CLINIC VISIT: HCPCS

## 2019-02-26 PROCEDURE — 99214 OFFICE O/P EST MOD 30 MIN: CPT | Performed by: FAMILY MEDICINE

## 2019-02-26 RX ORDER — LAMOTRIGINE 25 MG/1
25 TABLET ORAL DAILY
Qty: 60 TABLET | Refills: 3 | Status: SHIPPED | OUTPATIENT
Start: 2019-02-26 | End: 2019-08-21

## 2019-02-26 RX ORDER — DEXTROAMPHETAMINE SACCHARATE, AMPHETAMINE ASPARTATE MONOHYDRATE, DEXTROAMPHETAMINE SULFATE AND AMPHETAMINE SULFATE 5; 5; 5; 5 MG/1; MG/1; MG/1; MG/1
20 CAPSULE, EXTENDED RELEASE ORAL DAILY
Qty: 30 CAPSULE | Refills: 0 | Status: SHIPPED | OUTPATIENT
Start: 2019-03-01 | End: 2019-08-13

## 2019-02-26 RX ORDER — DEXTROAMPHETAMINE SACCHARATE, AMPHETAMINE ASPARTATE MONOHYDRATE, DEXTROAMPHETAMINE SULFATE AND AMPHETAMINE SULFATE 5; 5; 5; 5 MG/1; MG/1; MG/1; MG/1
20 CAPSULE, EXTENDED RELEASE ORAL DAILY
Qty: 30 CAPSULE | Refills: 0 | Status: SHIPPED | OUTPATIENT
Start: 2019-03-31 | End: 2019-08-13

## 2019-02-26 RX ORDER — DEXTROAMPHETAMINE SACCHARATE, AMPHETAMINE ASPARTATE MONOHYDRATE, DEXTROAMPHETAMINE SULFATE AND AMPHETAMINE SULFATE 5; 5; 5; 5 MG/1; MG/1; MG/1; MG/1
20 CAPSULE, EXTENDED RELEASE ORAL DAILY
Qty: 30 CAPSULE | Refills: 0 | Status: SHIPPED | OUTPATIENT
Start: 2019-04-30 | End: 2019-08-13

## 2019-02-26 ASSESSMENT — ANXIETY QUESTIONNAIRES
3. WORRYING TOO MUCH ABOUT DIFFERENT THINGS: NEARLY EVERY DAY
4. TROUBLE RELAXING: NEARLY EVERY DAY
5. BEING SO RESTLESS THAT IT IS HARD TO SIT STILL: MORE THAN HALF THE DAYS
6. BECOMING EASILY ANNOYED OR IRRITABLE: NEARLY EVERY DAY
IF YOU CHECKED OFF ANY PROBLEMS ON THIS QUESTIONNAIRE, HOW DIFFICULT HAVE THESE PROBLEMS MADE IT FOR YOU TO DO YOUR WORK, TAKE CARE OF THINGS AT HOME, OR GET ALONG WITH OTHER PEOPLE: EXTREMELY DIFFICULT
2. NOT BEING ABLE TO STOP OR CONTROL WORRYING: NEARLY EVERY DAY
1. FEELING NERVOUS, ANXIOUS, OR ON EDGE: NEARLY EVERY DAY
GAD7 TOTAL SCORE: 20
7. FEELING AFRAID AS IF SOMETHING AWFUL MIGHT HAPPEN: NEARLY EVERY DAY

## 2019-02-26 ASSESSMENT — PAIN SCALES - GENERAL: PAINLEVEL: SEVERE PAIN (6)

## 2019-02-26 ASSESSMENT — MIFFLIN-ST. JEOR: SCORE: 1652.15

## 2019-02-26 ASSESSMENT — PATIENT HEALTH QUESTIONNAIRE - PHQ9: SUM OF ALL RESPONSES TO PHQ QUESTIONS 1-9: 17

## 2019-02-26 NOTE — NURSING NOTE
"Chief Complaint   Patient presents with     Depression     A.D.H.D       Initial /78   Pulse 83   Temp 97.6  F (36.4  C)   Ht 1.651 m (5' 5\")   Wt 91.6 kg (202 lb)   SpO2 97%   BMI 33.61 kg/m   Estimated body mass index is 33.61 kg/m  as calculated from the following:    Height as of this encounter: 1.651 m (5' 5\").    Weight as of this encounter: 91.6 kg (202 lb).  Medication Reconciliation: complete    Gilbert Titus LPN  "

## 2019-02-27 ASSESSMENT — ANXIETY QUESTIONNAIRES: GAD7 TOTAL SCORE: 20

## 2019-03-13 ENCOUNTER — OFFICE VISIT (OUTPATIENT)
Dept: FAMILY MEDICINE | Facility: OTHER | Age: 29
End: 2019-03-13
Attending: FAMILY MEDICINE
Payer: COMMERCIAL

## 2019-03-13 VITALS
DIASTOLIC BLOOD PRESSURE: 68 MMHG | TEMPERATURE: 98.1 F | WEIGHT: 203 LBS | SYSTOLIC BLOOD PRESSURE: 124 MMHG | OXYGEN SATURATION: 98 % | HEIGHT: 65 IN | HEART RATE: 96 BPM | BODY MASS INDEX: 33.82 KG/M2

## 2019-03-13 DIAGNOSIS — G56.03 BILATERAL CARPAL TUNNEL SYNDROME: ICD-10-CM

## 2019-03-13 DIAGNOSIS — R20.0 NUMBNESS AND TINGLING OF BOTH FEET: ICD-10-CM

## 2019-03-13 DIAGNOSIS — R20.2 NUMBNESS AND TINGLING OF BOTH FEET: ICD-10-CM

## 2019-03-13 DIAGNOSIS — M72.2 PLANTAR FASCIITIS: Primary | ICD-10-CM

## 2019-03-13 LAB
ALBUMIN SERPL-MCNC: 3.9 G/DL (ref 3.4–5)
ALP SERPL-CCNC: 92 U/L (ref 40–150)
ALT SERPL W P-5'-P-CCNC: 29 U/L (ref 0–50)
ANION GAP SERPL CALCULATED.3IONS-SCNC: 5 MMOL/L (ref 3–14)
AST SERPL W P-5'-P-CCNC: 16 U/L (ref 0–45)
BASOPHILS # BLD AUTO: 0 10E9/L (ref 0–0.2)
BASOPHILS NFR BLD AUTO: 0.6 %
BILIRUB SERPL-MCNC: 0.2 MG/DL (ref 0.2–1.3)
BUN SERPL-MCNC: 16 MG/DL (ref 7–30)
CALCIUM SERPL-MCNC: 9 MG/DL (ref 8.5–10.1)
CHLORIDE SERPL-SCNC: 108 MMOL/L (ref 94–109)
CO2 SERPL-SCNC: 25 MMOL/L (ref 20–32)
CREAT SERPL-MCNC: 0.69 MG/DL (ref 0.52–1.04)
DIFFERENTIAL METHOD BLD: ABNORMAL
EOSINOPHIL # BLD AUTO: 0.2 10E9/L (ref 0–0.7)
EOSINOPHIL NFR BLD AUTO: 2.7 %
ERYTHROCYTE [DISTWIDTH] IN BLOOD BY AUTOMATED COUNT: 14.7 % (ref 10–15)
GFR SERPL CREATININE-BSD FRML MDRD: >90 ML/MIN/{1.73_M2}
GLUCOSE SERPL-MCNC: 96 MG/DL (ref 70–99)
HCT VFR BLD AUTO: 41.4 % (ref 35–47)
HGB BLD-MCNC: 13.5 G/DL (ref 11.7–15.7)
IMM GRANULOCYTES # BLD: 0 10E9/L (ref 0–0.4)
IMM GRANULOCYTES NFR BLD: 0.1 %
LYMPHOCYTES # BLD AUTO: 2.5 10E9/L (ref 0.8–5.3)
LYMPHOCYTES NFR BLD AUTO: 37.2 %
MCH RBC QN AUTO: 25.8 PG (ref 26.5–33)
MCHC RBC AUTO-ENTMCNC: 32.6 G/DL (ref 31.5–36.5)
MCV RBC AUTO: 79 FL (ref 78–100)
MONOCYTES # BLD AUTO: 0.6 10E9/L (ref 0–1.3)
MONOCYTES NFR BLD AUTO: 9.4 %
NEUTROPHILS # BLD AUTO: 3.3 10E9/L (ref 1.6–8.3)
NEUTROPHILS NFR BLD AUTO: 50 %
NRBC # BLD AUTO: 0 10*3/UL
NRBC BLD AUTO-RTO: 0 /100
PLATELET # BLD AUTO: 241 10E9/L (ref 150–450)
POTASSIUM SERPL-SCNC: 4 MMOL/L (ref 3.4–5.3)
PROT SERPL-MCNC: 7.7 G/DL (ref 6.8–8.8)
RBC # BLD AUTO: 5.23 10E12/L (ref 3.8–5.2)
SODIUM SERPL-SCNC: 138 MMOL/L (ref 133–144)
TSH SERPL DL<=0.005 MIU/L-ACNC: 1.5 MU/L (ref 0.4–4)
WBC # BLD AUTO: 6.7 10E9/L (ref 4–11)

## 2019-03-13 PROCEDURE — 99214 OFFICE O/P EST MOD 30 MIN: CPT | Performed by: FAMILY MEDICINE

## 2019-03-13 PROCEDURE — 85025 COMPLETE CBC W/AUTO DIFF WBC: CPT | Mod: ZL | Performed by: FAMILY MEDICINE

## 2019-03-13 PROCEDURE — 36415 COLL VENOUS BLD VENIPUNCTURE: CPT | Mod: ZL | Performed by: FAMILY MEDICINE

## 2019-03-13 PROCEDURE — 84443 ASSAY THYROID STIM HORMONE: CPT | Mod: ZL | Performed by: FAMILY MEDICINE

## 2019-03-13 PROCEDURE — 80053 COMPREHEN METABOLIC PANEL: CPT | Mod: ZL | Performed by: FAMILY MEDICINE

## 2019-03-13 PROCEDURE — G0463 HOSPITAL OUTPT CLINIC VISIT: HCPCS

## 2019-03-13 ASSESSMENT — MIFFLIN-ST. JEOR: SCORE: 1656.68

## 2019-03-13 ASSESSMENT — PAIN SCALES - GENERAL: PAINLEVEL: NO PAIN (0)

## 2019-03-13 NOTE — PROGRESS NOTES
SUBJECTIVE:   Jennifer Tatum is a 28 year old female who presents to clinic today for the following health issues:      Musculoskeletal problem/pain      Duration: 7 months for feet     Description  Location: bilateral feet started in left foot and now effects, bilateral hand ache    Intensity:  moderate    Accompanying signs and symptoms: numbness and tingling    History  Previous similar problem: no   Previous evaluation:  none    Precipitating or alleviating factors:  Trauma or overuse: no   Aggravating factors include: overuse    Therapies tried and outcome: acetaminophen and Ibuprofen      Musculoskeletal problem/pain      Duration: 3 months    Description  Location: left arm    Intensity:  moderate    Accompanying signs and symptoms: tingling    History  Previous similar problem: no   Previous evaluation:  none    Precipitating or alleviating factors:  Trauma or overuse: no   Aggravating factors include: comes when sleeping and pain wakes her up    Therapies tried and outcome: nothing      Patient reports having multiple areas of pain and numness/tingling that started roughly 7 months ago when her son was born. Pt is right handed. Initially noted left arm pain. Discussed with PCP. Felt to be related to carrying baby on that side. She has tried to use her right arm more, but pain persists. It is described as an ache in the mid to upper arm. Worst at night when laying on it. Area is sore throughout the day as well.     Over the past several months now also noting bilateral hand numbness, tingling, pain. Worse with use during the day. Folding laundry and doing dishes causes aching in the hands. They feel tight and weak with gripping as well.     Since the baby was born also having numbness over the dorsum of her feet and toes . Started on the left, not also on the right. Occurs only when she is sitting. Not present at night or when ambulating. Does also note, however, bilateral heel pain when ambulating. This  "is severe right away in the AM. Improved after that, but the heels ache all day.       Problem list and histories reviewed & adjusted, as indicated.  Additional history: as documented    Labs reviewed in EPIC    Reviewed and updated as needed this visit by clinical staff  Tobacco  Allergies  Meds  Problems  Med Hx  Surg Hx  Fam Hx  Soc Hx        Reviewed and updated as needed this visit by Provider  Tobacco  Allergies  Meds  Problems  Med Hx  Surg Hx  Fam Hx         ROS:  Constitutional, HEENT, cardiovascular, pulmonary, gi and gu systems are negative, except as otherwise noted.    OBJECTIVE:     /68   Pulse 96   Temp 98.1  F (36.7  C)   Ht 1.651 m (5' 5\")   Wt 92.1 kg (203 lb)   SpO2 98%   BMI 33.78 kg/m    Body mass index is 33.78 kg/m .  GENERAL: healthy, alert and no distress  NECK: no adenopathy, no asymmetry, masses, or scars and thyroid normal to palpation  RESP: lungs clear to auscultation - no rales, rhonchi or wheezes  MS:    - Pt has normal UE AROM. Normal UE strength at shoulder, elbow, wrist and . She has ttp over mid lower arm and mid upper arm diffusely. She has negative cubital tunnel testing. Positive tinel and phalen bilaterally.   - Normal LE strength, normal sensation to light touch over the feet. NOrmal ROM at the knee and ankle. She has ttp over heels bilaterally.     Diagnostic Test Results:  No results found for this or any previous visit (from the past 24 hour(s)).    ASSESSMENT/PLAN:     Plantar fasciitis  Bilateral, discussed nsaid, stretching.     Bilateral carpal tunnel syndrome  Discussed splinting at night, splints given. NSAID. Follow up 2 months. Consider EMG if not improving.     Numbness and tingling of both feet  In area of superficial peroneal nerve possibly. No obvious area of compression, also is bilateral which is strange. Requesting podiatry referral for further evaluation.   - PODIATRY/FOOT & ANKLE SURGERY REFERRAL  - CBC with platelets and " differential  - Comprehensive metabolic panel (BMP + Alb, Alk Phos, ALT, AST, Total. Bili, TP)  - TSH with free T4 reflex    Arcelia Munoz MD  [facility]

## 2019-03-13 NOTE — LETTER
My Depression Action Plan  Name: Jennifer Tatum   Date of Birth 1990  Date: 3/13/2019    My doctor: Cornell Herron   My clinic: [facility] [place]  [address] [place] [address]  [phone]          GREEN    ZONE   Good Control    What it looks like:     Things are going generally well. You have normal up s and down s. You may even feel depressed from time to time, but bad moods usually last less than a day.   What you need to do:  1. Continue to care for yourself (see self care plan)  2. Check your depression survival kit and update it as needed  3. Follow your physician s recommendations including any medication.  4. Do not stop taking medication unless you consult with your physician first.           YELLOW         ZONE Getting Worse    What it looks like:     Depression is starting to interfere with your life.     It may be hard to get out of bed; you may be starting to isolate yourself from others.    Symptoms of depression are starting to last most all day and this has happened for several days.     You may have suicidal thoughts but they are not constant.   What you need to do:     1. Call your care team, your response to treatment will improve if you keep your care team informed of your progress. Yellow periods are signs an adjustment may need to be made.     2. Continue your self-care, even if you have to fake it!    3. Talk to someone in your support network    4. Open up your depression survival kit           RED    ZONE Medical Alert - Get Help    What it looks like:     Depression is seriously interfering with your life.     You may experience these or other symptoms: You can t get out of bed most days, can t work or engage in other necessary activities, you have trouble taking care of basic hygiene, or basic responsibilities, thoughts of suicide or death that will not go away, self-injurious behavior.     What you need to do:  1. Call your care team and request  a same-day appointment. If they are not available (weekends or after hours) call your local crisis line, emergency room or 911.            Depression Self Care Plan / Survival Kit    Self-Care for Depression  Here s the deal. Your body and mind are really not as separate as most people think.  What you do and think affects how you feel and how you feel influences what you do and think. This means if you do things that people who feel good do, it will help you feel better.  Sometimes this is all it takes.  There is also a place for medication and therapy depending on how severe your depression is, so be sure to consult with your medical provider and/ or Behavioral Health Consultant if your symptoms are worsening or not improving.     In order to better manage my stress, I will:    Exercise  Get some form of exercise, every day. This will help reduce pain and release endorphins, the  feel good  chemicals in your brain. This is almost as good as taking antidepressants!  This is not the same as joining a gym and then never going! (they count on that by the way ) It can be as simple as just going for a walk or doing some gardening, anything that will get you moving.      Hygiene   Maintain good hygiene (Get out of bed in the morning, Make your bed, Brush your teeth, Take a shower, and Get dressed like you were going to work, even if you are unemployed).  If your clothes don't fit try to get ones that do.    Diet  I will strive to eat foods that are good for me, drink plenty of water, and avoid excessive sugar, caffeine, alcohol, and other mood-altering substances.  Some foods that are helpful in depression are: complex carbohydrates, B vitamins, flaxseed, fish or fish oil, fresh fruits and vegetables.    Psychotherapy  I agree to participate in Individual Therapy (if recommended).    Medication  If prescribed medications, I agree to take them.  Missing doses can result in serious side effects.  I understand that drinking  alcohol, or other illicit drug use, may cause potential side effects.  I will not stop my medication abruptly without first discussing it with my provider.    Staying Connected With Others  I will stay in touch with my friends, family members, and my primary care provider/team.    Use your imagination  Be creative.  We all have a creative side; it doesn t matter if it s oil painting, sand castles, or mud pies! This will also kick up the endorphins.    Witness Beauty  (AKA stop and smell the roses) Take a look outside, even in mid-winter. Notice colors, textures. Watch the squirrels and birds.     Service to others  Be of service to others.  There is always someone else in need.  By helping others we can  get out of ourselves  and remember the really important things.  This also provides opportunities for practicing all the other parts of the program.    Humor  Laugh and be silly!  Adjust your TV habits for less news and crime-drama and more comedy.    Control your stress  Try breathing deep, massage therapy, biofeedback, and meditation. Find time to relax each day.     My support system    Clinic Contact:  Phone number:    Contact 1:  Phone number:    Contact 2:  Phone number:    Mandaeism/:  Phone number:    Therapist:  Phone number:    Local crisis center:    Phone number:    Other community support:  Phone number:

## 2019-03-13 NOTE — NURSING NOTE
"Chief Complaint   Patient presents with     Pain       Initial /68   Pulse 96   Temp 98.1  F (36.7  C)   Ht 1.651 m (5' 5\")   Wt 92.1 kg (203 lb)   SpO2 98%   BMI 33.78 kg/m   Estimated body mass index is 33.78 kg/m  as calculated from the following:    Height as of this encounter: 1.651 m (5' 5\").    Weight as of this encounter: 92.1 kg (203 lb).  Medication Reconciliation: complete    Gilbert Titus LPN  "

## 2019-03-25 ENCOUNTER — OFFICE VISIT (OUTPATIENT)
Dept: PODIATRY | Facility: OTHER | Age: 29
End: 2019-03-25
Attending: FAMILY MEDICINE
Payer: COMMERCIAL

## 2019-03-25 VITALS
TEMPERATURE: 98.1 F | HEART RATE: 101 BPM | DIASTOLIC BLOOD PRESSURE: 80 MMHG | BODY MASS INDEX: 33.82 KG/M2 | SYSTOLIC BLOOD PRESSURE: 129 MMHG | HEIGHT: 65 IN | WEIGHT: 203 LBS | OXYGEN SATURATION: 98 % | RESPIRATION RATE: 18 BRPM

## 2019-03-25 DIAGNOSIS — M79.671 RIGHT FOOT PAIN: ICD-10-CM

## 2019-03-25 DIAGNOSIS — M79.672 LEFT FOOT PAIN: ICD-10-CM

## 2019-03-25 DIAGNOSIS — M62.462 GASTROCNEMIUS EQUINUS OF LEFT LOWER EXTREMITY: ICD-10-CM

## 2019-03-25 DIAGNOSIS — M62.461 GASTROCNEMIUS EQUINUS OF RIGHT LOWER EXTREMITY: ICD-10-CM

## 2019-03-25 DIAGNOSIS — M72.2 PLANTAR FASCIAL FIBROMATOSIS: Primary | ICD-10-CM

## 2019-03-25 DIAGNOSIS — Z72.0 TOBACCO ABUSE: ICD-10-CM

## 2019-03-25 DIAGNOSIS — Z71.6 TOBACCO ABUSE COUNSELING: ICD-10-CM

## 2019-03-25 PROCEDURE — 20600 DRAIN/INJ JOINT/BURSA W/O US: CPT | Mod: 50 | Performed by: PODIATRIST

## 2019-03-25 PROCEDURE — 20600 DRAIN/INJ JOINT/BURSA W/O US: CPT | Mod: 50

## 2019-03-25 PROCEDURE — 99203 OFFICE O/P NEW LOW 30 MIN: CPT | Mod: 25 | Performed by: PODIATRIST

## 2019-03-25 PROCEDURE — G0463 HOSPITAL OUTPT CLINIC VISIT: HCPCS | Mod: 25

## 2019-03-25 RX ORDER — DEXAMETHASONE SODIUM PHOSPHATE 4 MG/ML
4 INJECTION, SOLUTION INTRA-ARTICULAR; INTRALESIONAL; INTRAMUSCULAR; INTRAVENOUS; SOFT TISSUE ONCE
Qty: 120 ML | Refills: 0 | OUTPATIENT
Start: 2019-03-25 | End: 2019-05-20

## 2019-03-25 RX ORDER — METHYLPREDNISOLONE ACETATE 40 MG/ML
40 INJECTION, SUSPENSION INTRA-ARTICULAR; INTRALESIONAL; INTRAMUSCULAR; SOFT TISSUE ONCE
Qty: 1 ML | Refills: 0 | OUTPATIENT
Start: 2019-03-25 | End: 2019-05-20

## 2019-03-25 ASSESSMENT — MIFFLIN-ST. JEOR: SCORE: 1656.68

## 2019-03-25 ASSESSMENT — PAIN SCALES - GENERAL: PAINLEVEL: SEVERE PAIN (6)

## 2019-03-25 NOTE — PROGRESS NOTES
"Chief complaint: Patient presents with:  Consult: Tingling and numbness in feet bilaterally      History of Present Illness: This 28 year old female is seen at the request of Dr. Munoz for evaluation and suggestions of management of bilateral foot pain (L>R). Pain has been present for the past three months. No history of trauma. Pain is present on the plantar heels bilaterally. She has burning, tingling and numbness in the toes that has been present in the LEFT foro for the past 7 months (this happened after she gave birth to her daughter) and now both feet go numb (the RIGHT foot started around ). The heel pain is the worst with first step pain, but the overall foot pain and tingling goes on throughout the whole day. She has not tried any treatment modalities and she presents with a lightweight, Nike tennis shoe. She says she smokes 1 ppd and she has smoked for a long time. No further pedal complaints today.     /80 (BP Location: Left arm, Patient Position: Sitting, Cuff Size: Adult Regular)   Pulse 101   Temp 98.1  F (36.7  C) (Tympanic)   Resp 18   Ht 1.651 m (5' 5\")   Wt 92.1 kg (203 lb)   SpO2 98%   BMI 33.78 kg/m      Patient Active Problem List   Diagnosis     Anxiety     Smoker     Attention deficit hyperactivity disorder (ADHD), predominantly inattentive type     PTSD (post-traumatic stress disorder)     Hydronephrosis     ACP (advance care planning)     Acute right-sided low back pain with right-sided sciatica     Spontaneous miscarriage     Major depressive disorder, recurrent episode, mild (H)     Complicated grieving     Relationship problem between partners       Past Surgical History:   Procedure Laterality Date     CERCLAGE CERVICAL N/A 11/10/2015    Procedure: CERCLAGE CERVICAL;  Surgeon: Tucker Barragan MD;  Location: [facility] L[facility]      SECTION  2012    Pregnancy full-term; \"Oleksandr\"       SECTION N/A 2016    Procedure:  SECTION;  Surgeon: " Nisha Mcleod MD;  Location: [facility] OR      NEPHROSTOMY PERCUTUTANEOUS      left     impacted wisdom teeth removal         Current Outpatient Medications   Medication     [START ON 4/30/2019] amphetamine-dextroamphetamine (ADDERALL XR) 20 MG 24 hr capsule     [START ON 3/31/2019] amphetamine-dextroamphetamine (ADDERALL XR) 20 MG 24 hr capsule     amphetamine-dextroamphetamine (ADDERALL XR) 20 MG 24 hr capsule     lamoTRIgine (LAMICTAL) 25 MG tablet     sertraline (ZOLOFT) 100 MG tablet     No current facility-administered medications for this visit.           Allergies   Allergen Reactions     Wellbutrin [Bupropion] Other (See Comments)     Mood changes       Family History   Problem Relation Age of Onset     Alcohol/Drug Father         alcoholism     Psychotic Disorder Father         Bipolar     Cerebrovascular Disease Other         Cerebrovascular accident     C.A.D. Other      Hypertension Other      Diabetes Other        Social History     Socioeconomic History     Marital status: Single     Spouse name: None     Number of children: None     Years of education: None     Highest education level: None   Occupational History     None   Social Needs     Financial resource strain: None     Food insecurity:     Worry: None     Inability: None     Transportation needs:     Medical: None     Non-medical: None   Tobacco Use     Smoking status: Current Every Day Smoker     Packs/day: 0.50     Years: 10.00     Pack years: 5.00     Types: Cigarettes     Smokeless tobacco: Never Used   Substance and Sexual Activity     Alcohol use: No     Alcohol/week: 0.0 oz     Drug use: No     Sexual activity: Yes     Partners: Male     Birth control/protection: None   Lifestyle     Physical activity:     Days per week: None     Minutes per session: None     Stress: None   Relationships     Social connections:     Talks on phone: None     Gets together: None     Attends Latter day service: None     Active member of club or organization:  None     Attends meetings of clubs or organizations: None     Relationship status: None     Intimate partner violence:     Fear of current or ex partner: None     Emotionally abused: None     Physically abused: None     Forced sexual activity: None   Other Topics Concern      Service Not Asked     Blood Transfusions Yes     Caffeine Concern Not Asked     Occupational Exposure Not Asked     Hobby Hazards Not Asked     Sleep Concern Not Asked     Stress Concern Not Asked     Weight Concern Not Asked     Special Diet Not Asked     Back Care Not Asked     Exercise Not Asked     Bike Helmet Not Asked     Seat Belt Not Asked     Self-Exams Not Asked     Parent/sibling w/ CABG, MI or angioplasty before 65F 55M? No   Social History Narrative     None       ROS: 10 point ROS neg other than the symptoms noted above in the HPI.  EXAM  Constitutional: healthy, alert and no distress    Psychiatric: mentation appears normal and affect normal/bright    VASCULAR:  -Dorsalis pedis pulse +2/4 b/l  -Posterior tibial pulse +1/4 b/l  -Capillary refill time < 3 seconds to b/l hallux  -Mild 1+ pitting edema to bilateral ankles  NEURO:  -Light touch sensation intact to b/l plantar forefoot  DERM:  -Skin temperature, texture and turgor WNL b/l  -Toenails normotrophic x 10  MSK:  -Pain on palpation to bilateral calcaneus, medial tubercle  -Muscle strength of ankles +5/5 for dorsiflexion, plantarflexion, ABDUction and ADDuction b/l  -Ankle joint passive ROM within normal limits except for dorsiflexion:    Dorsiflexion, RIGHT Straight knee 0 degrees    Dorsiflexion, LEFT Straight knee 0 degrees  -RCSP neutral bilaterally   ---Mild abductory twist bilaterally   -DORSIFLEXION contracture to MTPJ 2-5 b/l  ============================================================    ASSESSMENT:  (M72.2) Plantar fascial fibromatosis  (primary encounter diagnosis)    (M79.672) Left foot pain    (M79.671) Right foot pain    (M21.6X2) Gastrocnemius equinus  of left lower extremity    (M21.6X1) Gastrocnemius equinus of right lower extremity    (Z71.6) Tobacco abuse counseling    (Z72.0) Tobacco abuse      PLAN:  -Patient evaluated and examined. Treatment options discussed with no educational barriers noted.  -Discussed tingling in toes from DORSIFLEXION of MTPJs with the intermetatarsal nerves being compressed. This may be helped with a metatarsal pad. The heel pain is within the plantar fascia insertion. Will address this with multiple treatment approaches today.  -Orthotic referral for CMO with a deep heel cup, plantar fascia groove and a metatarsal pad or a solid OTC insert for additional foot support. Patient educated on slowly transitioning into the inserts. The patient may call the orthotist to make adjustments if the inserts are not comfortable after consistently wearing them for 4-6 weeks.   -Compression socks: Advised to wear compression socks all day (especially when working) to decrease LOWER EXTREMITY swelling in both feet and legs. Apply the socks first thing in the morning before getting out of bed and remove them at night when the feet are elevated in bed.  -Stability Shoe Gear: This involves wearing a solid tennis shoe that bends at the toe, but has a solid midfoot shank and heel contour. Her current tennis shoes are lightweight shoes and are not providing her with any foot support.   -Stretching: Stretch the calf muscles to increase flexibility of the calf muscles. If possible, aim to stretch the calf muscles for a combined total of one hour per day.  -Icing: Ice the bottom of the foot minimally once a day for ten minutes per foot with a frozen water bottle. Ice after any extended amount of time on your feet.  -Consider supportive sandals (example: Oofos, Maxatawny, Vionics) for around the house  -Bilateral heel Injection: Obtained written and verbal consent from patient for a steroid injection into the medal heel of the bilateral foot. Reviewed risks and  benefits of the injection. Informed patient that the injection may decrease pain long-term, short-term, or not at all.She may also experience increased pain within the next 24-48 hours. This is normal. If she is able to take a Tylenol or IBUprofen, this may decrease the pain of the steroid taking effect. Patient in agreement with an injection today in an effort to slow or reverse the chronic inflammatory process and pain in the foot. Applied an alcohol swab to the injection site on both heels. Injected a total of 3mL mL of 1:1:1 of 4mg Dexamethasone, Depo-Medrol 40mg and 2% Lidocaine plain into each heel. Covered both heels with a bandaid. Patient tolerated the injection well.  -Tobacco cessation discussed with patient including benefits of quitting and risks of not quitting. She has declined the Quit Plan and she is not interested in quitting today.  -Patient in agreement with the above treatment plan and all of patient's questions were answered.      RTC 10 weeks to evaluate heel pain  Will consider PT and/or Night splint if pain is minimally improved by next f/u appointment      Courtney Quintero DPM

## 2019-03-25 NOTE — PROGRESS NOTES
The following medication was given:     MEDICATION: Depo Medrol 40mg  ROUTE: IM  SITE: bilateral heels  DOSE: 2 ml  LOT #: [id number]  :  EVY  EXPIRATION DATE:  8/19,02/20  NDC#: 0054-0454-71       The following medication was given:     MEDICATION: Dexamethasone  ROUTE: IM  SITE: bilateral heels  DOSE: 8 mg  LOT #: [id number]  :  [employer]  EXPIRATION DATE:  04/20  NDC#: 56313-156-91

## 2019-03-25 NOTE — NURSING NOTE
"Chief Complaint   Patient presents with     Consult     Tingling and numbness in feet bilaterally       Initial /80 (BP Location: Left arm, Patient Position: Sitting, Cuff Size: Adult Regular)   Pulse 101   Temp 98.1  F (36.7  C) (Tympanic)   Resp 18   Ht 1.651 m (5' 5\")   Wt 92.1 kg (203 lb)   SpO2 98%   BMI 33.78 kg/m   Estimated body mass index is 33.78 kg/m  as calculated from the following:    Height as of this encounter: 1.651 m (5' 5\").    Weight as of this encounter: 92.1 kg (203 lb).  Medication Reconciliation: complete    NILSA CARMONA LPN  "

## 2019-03-25 NOTE — PATIENT INSTRUCTIONS
-Compression socks: Advised to wear compression socks all day (especially when working) to decrease LOWER EXTREMITY swelling in both feet and legs. Apply the socks first thing in the morning before getting out of bed and remove them at night when the feet are elevated in bed.  -Stability Shoe Gear: This involves wearing a solid tennis shoe that bends at the toe, but has a solid midfoot shank and heel contour.   -Stretching: Stretch the calf muscles to increase flexibility of the calf muscles. If possible, aim to stretch the calf muscles for a combined total of one hour per day.  -Icing: Ice the bottom of the foot minimally once a day for ten minutes per foot with a frozen water bottle. Ice after any extended amount of time on your feet.  -Consider supportive sandals (example: Oofos, Vionics or [place] sandals) for around the house    -Custom inserts will be ordered today -- you will be called to make an appointment          HOW TO QUIT SMOKING  Smoking is one of the hardest habits to break. About half of all those who have ever smoked have been able to quit, and most of those (about 70%) who still smoke want to quit. Here are some of the best ways to stop smoking.     KEEP TRYING:  It takes most smokers about 8 tries before they are finally able to fully quit. So, the more often you try and fail, the better your chance of quitting the next time! So, don't give up!    GO COLD TURKEY:  Most ex-smokers quit cold turkey. Trying to cut back gradually doesn't seem to work as well, perhaps because it continues the smoking habit. Also, it is possible to fool yourself by inhaling more while smoking fewer cigarettes. This results in the same amount of nicotine in your body!    GET SUPPORT:  Support programs can make an important difference, especially for the heavy smoker. These groups offer lectures, methods to change your behavior and peer support. Call the free national Quitline for more information. 800-QUIT-NOW  (554.728.3311). Low-cost or free programs are offered by many hospitals, local chapters of the American Lung Association (635-089-6382) and the American Cancer Society (891-861-7518). Support at home is important too. Non-smokers can help by offering praise and encouragement. If the smoker fails to quit, encourage them to try again!    OVER-THE-COUNTER MEDICINES:  For those who can't quit on their own, Nicotine Replacement Therapy (NRT) may make quitting much easier. Certain aids such as the nicotine patch, gum and lozenge are available without a prescription. However, it is best to use these under the guidance of your doctor. The skin patch provides a steady supply of nicotine to the body. Nicotine gum and lozenge gives temporary bursts of low levels of nicotine. Both methods take the edge off the craving for cigarettes. WARNING: If you feel symptoms of nicotine overdose, such as nausea, vomiting, dizziness, weakness, or fast heartbeat, stop using these and see your doctor.    PRESCRIPTION MEDICINES:  After evaluating your smoking patterns and prior attempts at quitting, your doctor may offer a prescription medicine such as bupropion (Zyban, Wellbutrin), varenicline (Chantix, Champix), a niocotine inhaler or nasal spray. Each has its unique advantage and side effects which your doctor can review with you.    HEALTH BENEFITS OF QUITTING:  The benefits of quitting start right away and keep improving the longer you go without smokin minutes: blood pressure and pulse return to normal  8 hours: oxygen levels return to normal  2 days: ability to smell and taste begins to improve as damaged nerves start to regrow  2-3 weeks: circulation and lung function improves  1-9 months: decreased cough, congestion and shortness of breath; less tired  1 year: risk of heart attack decreases by half  5 years: risk of lung cancer decreases by half; risk of stroke becomes the same as a non-smoker  For information about how to quit  smoking, visit the following links:  National Cancer [place] ,   Clearing the Air, Quit Smoking Today   - an online booklet. http://www.smokefree.gov/pubs/clearing_the_air.pdf  Smokefree.gov http://smokefree.gov/  QuitNet http://www.quitnet.com/    3435-6188 Franki Choi, [address] [place] [address], [place] [address]. All rights reserved. This information is not intended as a substitute for professional medical care. Always follow your healthcare professional's instructions.    The Benefits of Living Smoke Free  What do you want to gain from quitting? Check off some reasons to quit.  Health Benefits  ___ Reduce my risk of lung cancer, heart disease, chronic lung disease  ___ Have fewer wrinkles and softer skin  ___ Improve my sense of taste and smell  ___ For pregnant women--reduce the risk of having a miscarriage, stillbirth, premature birth, or low-birth-weight baby  Personal Benefits  ___ Feel more in control of my life  ___ Have better-smelling hair, breath, clothes, home, and car  ___ Save time by not having to take smoke breaks, buy cigarettes, or hunt for a light  ___ Have whiter teeth  Family Benefits  ___ Reduce my children s respiratory tract infections  ___ Set a good example for my children  ___ Reduce my family s cancer risk  Financial Benefits  ___ Save hundreds of dollars each year that would be spent on cigarettes  ___ Save money on medical bills  ___ Save on life, health, and car insurance premiums    Those Dollars Add Up!  Cigarettes are expensive, and getting more expensive all the time. Do you realize how much money you are spending on cigarettes per year? What is the average amount you spend on a pack of cigarettes? What is the average number of packs that you smoke per day? Using your answers to these questions, fill in this formula to help you find out:  ($ _____ per pack) ×  ( _____ number of packs per day) × (365 days) =  $ _____ yearly cost of smoking  Besides tobacco, there are other  costs, including extra cleaning bills and replacement costs for clothing and furniture; medical expenses for smoking-related illnesses; and higher health, life, and car insurance premiums.    Cigars and Pipes Count Too!  Cigars and pipes are also dangerous. So are smokeless (chewing) tobacco and snuff. All of these products contain nicotine, a highly addictive substance that has harmful effects on your body. Quitting smoking means giving up all tobacco products.      8104-6002 [facility], [address] [place] [address], [place] [address]. All rights reserved. This information is not intended as a substitute for professional medical care. Always follow your healthcare professional's instructions.

## 2019-03-26 ENCOUNTER — TELEPHONE (OUTPATIENT)
Dept: FAMILY MEDICINE | Facility: OTHER | Age: 29
End: 2019-03-26

## 2019-03-26 NOTE — TELEPHONE ENCOUNTER
Pt called and could not make f/u appt due to shots in feet. Lamictal is working well. She is to continue on it.  F/u in 3 months.

## 2019-05-13 NOTE — PROGRESS NOTES
"  SUBJECTIVE:   Jennifer Tatum is a 28 year old female who presents to clinic today for the following   health issues:        Medication Followup of Adderall    Taking Medication as prescribed: yes    Side Effects:  None    Medication Helping Symptoms:  NO-feels may need to be bumped up in dose, not lasting as long as it first did    Seems like in last 1-2 months - running short for when needs it.     Take adderal - first thing in am- around 7 am    Feels it lasts to 12noon at most.     Works from [employer] - which is mainly what she wants the meds to help with .                Additional history: as documented    Reviewed  and updated as needed this visit by clinical staff         Reviewed and updated as needed this visit by Provider             ROS:  CONSTITUTIONAL: NEGATIVE for fever, chills, change in weight  CV: NEGATIVE for chest pain, palpitations or peripheral edema    OBJECTIVE:                                                    /74   Pulse 99   Temp 98.2  F (36.8  C)   Ht 1.651 m (5' 5\")   Wt 92.1 kg (203 lb)   SpO2 98%   BMI 33.78 kg/m    Body mass index is 33.78 kg/m .   GENERAL: healthy, alert, well nourished, well hydrated, no distress  PSYCH: Alert and oriented times 3; speech- coherent , normal rate and volume; able to articulate logical thoughts, able to abstract reason, no tangential thoughts, no hallucinations or delusions, affect- normal         ASSESSMENT/PLAN:                                                    (F90.0) Attention deficit hyperactivity disorder (ADHD), predominantly inattentive type  Comment: after some discussion - will go to next higher dose this next 3 months to see if helps   Plan: amphetamine-dextroamphetamine (ADDERALL XR) 25         MG 24 hr capsule, amphetamine-dextroamphetamine        (ADDERALL XR) 25 MG 24 hr capsule,         amphetamine-dextroamphetamine (ADDERALL XR) 25         MG 24 hr capsule              Cornell Herron MD  [facility] - " [place]

## 2019-05-20 ENCOUNTER — OFFICE VISIT (OUTPATIENT)
Dept: FAMILY MEDICINE | Facility: OTHER | Age: 29
End: 2019-05-20
Attending: FAMILY MEDICINE
Payer: COMMERCIAL

## 2019-05-20 VITALS
SYSTOLIC BLOOD PRESSURE: 120 MMHG | OXYGEN SATURATION: 98 % | TEMPERATURE: 98.2 F | BODY MASS INDEX: 33.82 KG/M2 | HEART RATE: 99 BPM | WEIGHT: 203 LBS | HEIGHT: 65 IN | DIASTOLIC BLOOD PRESSURE: 74 MMHG

## 2019-05-20 DIAGNOSIS — F90.0 ATTENTION DEFICIT HYPERACTIVITY DISORDER (ADHD), PREDOMINANTLY INATTENTIVE TYPE: ICD-10-CM

## 2019-05-20 PROCEDURE — 99213 OFFICE O/P EST LOW 20 MIN: CPT | Performed by: FAMILY MEDICINE

## 2019-05-20 PROCEDURE — G0463 HOSPITAL OUTPT CLINIC VISIT: HCPCS

## 2019-05-20 RX ORDER — DEXTROAMPHETAMINE SACCHARATE, AMPHETAMINE ASPARTATE MONOHYDRATE, DEXTROAMPHETAMINE SULFATE AND AMPHETAMINE SULFATE 6.25; 6.25; 6.25; 6.25 MG/1; MG/1; MG/1; MG/1
25 CAPSULE, EXTENDED RELEASE ORAL DAILY
Qty: 30 CAPSULE | Refills: 0 | Status: SHIPPED | OUTPATIENT
Start: 2019-07-21 | End: 2019-08-21

## 2019-05-20 RX ORDER — DEXTROAMPHETAMINE SACCHARATE, AMPHETAMINE ASPARTATE MONOHYDRATE, DEXTROAMPHETAMINE SULFATE AND AMPHETAMINE SULFATE 5; 5; 5; 5 MG/1; MG/1; MG/1; MG/1
20 CAPSULE, EXTENDED RELEASE ORAL DAILY
Qty: 30 CAPSULE | Refills: 0 | Status: CANCELLED | OUTPATIENT
Start: 2019-05-29

## 2019-05-20 RX ORDER — DEXTROAMPHETAMINE SACCHARATE, AMPHETAMINE ASPARTATE MONOHYDRATE, DEXTROAMPHETAMINE SULFATE AND AMPHETAMINE SULFATE 5; 5; 5; 5 MG/1; MG/1; MG/1; MG/1
20 CAPSULE, EXTENDED RELEASE ORAL DAILY
Qty: 30 CAPSULE | Refills: 0 | Status: CANCELLED | OUTPATIENT
Start: 2019-07-28

## 2019-05-20 RX ORDER — DEXTROAMPHETAMINE SACCHARATE, AMPHETAMINE ASPARTATE MONOHYDRATE, DEXTROAMPHETAMINE SULFATE AND AMPHETAMINE SULFATE 5; 5; 5; 5 MG/1; MG/1; MG/1; MG/1
20 CAPSULE, EXTENDED RELEASE ORAL DAILY
Qty: 30 CAPSULE | Refills: 0 | Status: CANCELLED | OUTPATIENT
Start: 2019-06-28

## 2019-05-20 RX ORDER — DEXTROAMPHETAMINE SACCHARATE, AMPHETAMINE ASPARTATE MONOHYDRATE, DEXTROAMPHETAMINE SULFATE AND AMPHETAMINE SULFATE 6.25; 6.25; 6.25; 6.25 MG/1; MG/1; MG/1; MG/1
25 CAPSULE, EXTENDED RELEASE ORAL DAILY
Qty: 30 CAPSULE | Refills: 0 | Status: SHIPPED | OUTPATIENT
Start: 2019-06-20 | End: 2019-08-21

## 2019-05-20 RX ORDER — DEXTROAMPHETAMINE SACCHARATE, AMPHETAMINE ASPARTATE MONOHYDRATE, DEXTROAMPHETAMINE SULFATE AND AMPHETAMINE SULFATE 6.25; 6.25; 6.25; 6.25 MG/1; MG/1; MG/1; MG/1
25 CAPSULE, EXTENDED RELEASE ORAL DAILY
Qty: 30 CAPSULE | Refills: 0 | Status: SHIPPED | OUTPATIENT
Start: 2019-05-20 | End: 2019-08-21

## 2019-05-20 ASSESSMENT — ANXIETY QUESTIONNAIRES
7. FEELING AFRAID AS IF SOMETHING AWFUL MIGHT HAPPEN: SEVERAL DAYS
2. NOT BEING ABLE TO STOP OR CONTROL WORRYING: NEARLY EVERY DAY
4. TROUBLE RELAXING: NEARLY EVERY DAY
3. WORRYING TOO MUCH ABOUT DIFFERENT THINGS: NEARLY EVERY DAY
GAD7 TOTAL SCORE: 19
6. BECOMING EASILY ANNOYED OR IRRITABLE: NEARLY EVERY DAY
IF YOU CHECKED OFF ANY PROBLEMS ON THIS QUESTIONNAIRE, HOW DIFFICULT HAVE THESE PROBLEMS MADE IT FOR YOU TO DO YOUR WORK, TAKE CARE OF THINGS AT HOME, OR GET ALONG WITH OTHER PEOPLE: EXTREMELY DIFFICULT
1. FEELING NERVOUS, ANXIOUS, OR ON EDGE: NEARLY EVERY DAY
5. BEING SO RESTLESS THAT IT IS HARD TO SIT STILL: NEARLY EVERY DAY

## 2019-05-20 ASSESSMENT — MIFFLIN-ST. JEOR: SCORE: 1656.68

## 2019-05-20 ASSESSMENT — PAIN SCALES - GENERAL: PAINLEVEL: NO PAIN (0)

## 2019-05-20 ASSESSMENT — PATIENT HEALTH QUESTIONNAIRE - PHQ9: SUM OF ALL RESPONSES TO PHQ QUESTIONS 1-9: 14

## 2019-05-20 NOTE — NURSING NOTE
"Chief Complaint   Patient presents with     A.D.H.D       Initial /74   Pulse 99   Temp 98.2  F (36.8  C)   Ht 1.651 m (5' 5\")   Wt 92.1 kg (203 lb)   SpO2 98%   BMI 33.78 kg/m   Estimated body mass index is 33.78 kg/m  as calculated from the following:    Height as of this encounter: 1.651 m (5' 5\").    Weight as of this encounter: 92.1 kg (203 lb).  Medication Reconciliation: complete    Gilbert Titus LPN  "

## 2019-05-21 ASSESSMENT — ANXIETY QUESTIONNAIRES: GAD7 TOTAL SCORE: 19

## 2019-06-11 ENCOUNTER — OFFICE VISIT (OUTPATIENT)
Dept: PODIATRY | Facility: OTHER | Age: 29
End: 2019-06-11
Attending: PODIATRIST
Payer: COMMERCIAL

## 2019-06-11 VITALS
TEMPERATURE: 98.1 F | SYSTOLIC BLOOD PRESSURE: 139 MMHG | HEART RATE: 98 BPM | HEIGHT: 65 IN | DIASTOLIC BLOOD PRESSURE: 82 MMHG | WEIGHT: 203 LBS | BODY MASS INDEX: 33.82 KG/M2

## 2019-06-11 DIAGNOSIS — M79.672 LEFT FOOT PAIN: ICD-10-CM

## 2019-06-11 DIAGNOSIS — M62.461 GASTROCNEMIUS EQUINUS OF RIGHT LOWER EXTREMITY: ICD-10-CM

## 2019-06-11 DIAGNOSIS — Z71.6 TOBACCO ABUSE COUNSELING: ICD-10-CM

## 2019-06-11 DIAGNOSIS — M62.462 GASTROCNEMIUS EQUINUS OF LEFT LOWER EXTREMITY: ICD-10-CM

## 2019-06-11 DIAGNOSIS — Z72.0 TOBACCO ABUSE: ICD-10-CM

## 2019-06-11 DIAGNOSIS — M72.2 PLANTAR FASCIAL FIBROMATOSIS: Primary | ICD-10-CM

## 2019-06-11 DIAGNOSIS — M79.671 RIGHT FOOT PAIN: ICD-10-CM

## 2019-06-11 PROCEDURE — 99213 OFFICE O/P EST LOW 20 MIN: CPT | Mod: 25 | Performed by: PODIATRIST

## 2019-06-11 PROCEDURE — G0463 HOSPITAL OUTPT CLINIC VISIT: HCPCS

## 2019-06-11 PROCEDURE — G0463 HOSPITAL OUTPT CLINIC VISIT: HCPCS | Mod: 25

## 2019-06-11 PROCEDURE — 20605 DRAIN/INJ JOINT/BURSA W/O US: CPT | Performed by: PODIATRIST

## 2019-06-11 RX ORDER — KETOROLAC TROMETHAMINE 30 MG/ML
30 INJECTION, SOLUTION INTRAMUSCULAR; INTRAVENOUS ONCE
Qty: 1 ML | Refills: 0 | OUTPATIENT
Start: 2019-06-11 | End: 2019-08-21

## 2019-06-11 ASSESSMENT — MIFFLIN-ST. JEOR: SCORE: 1656.68

## 2019-06-11 ASSESSMENT — PAIN SCALES - GENERAL: PAINLEVEL: SEVERE PAIN (6)

## 2019-06-11 NOTE — PATIENT INSTRUCTIONS

## 2019-06-11 NOTE — NURSING NOTE
"Chief Complaint   Patient presents with     Consult     Numbness and tingling of both feet; Referred by Dr. Munoz       Initial /82 (Cuff Size: Adult Large)   Pulse 98   Temp 98.1  F (36.7  C) (Tympanic)   Ht 1.651 m (5' 5\")   Wt 92.1 kg (203 lb)   BMI 33.78 kg/m   Estimated body mass index is 33.78 kg/m  as calculated from the following:    Height as of this encounter: 1.651 m (5' 5\").    Weight as of this encounter: 92.1 kg (203 lb).  Medication Reconciliation: complete    Catherine Rader LPN    "

## 2019-06-11 NOTE — PROGRESS NOTES
"Chief complaint: Patient presents with:  Consult: Numbness and tingling of both feet; Referred by Dr. Munoz      History of Present Illness: This 28 year old female is seen for follow-up management of bilateral foot pain (L>R). She has been through physical therapy and she saw the orthotist, Verónica Fitzgerald, two weeks ago. They cause her feet to go more numb and she can only wear them for a half hour. She does also have heel pain in the LEFT foot. Her biggest complaint in the LEFT heel today. She has worn compression socks at work and she has tried the compression sleeve, but this has not helped at all. She is also icing, rolling her foot on a ball and stretching.     Her RIGHT one improved after the previous injections. She never had pain relief in the LEFT heel after the injections. Her LEFT heel pain is still the worst when she is walking as well as when she is standing. Her pain is still the worst with first step pain. Her pain is worse than when it first started. No further pedal complaints today.     She is still smoking and she does not want to quit or start the Quit Plan today.     History of heel pain:  Pain has been present for the past since around January, 2019. No history of trauma. Pain is present on the plantar heels bilaterally. She has burning, tingling and numbness in the toes that has been present in the LEFT since around late summer, 2019 (this happened after she gave birth to her daughter) and now both feet go numb (the RIGHT foot started around December, 2018). The heel pain is the worst with first step pain, but the overall foot pain and tingling goes on throughout the whole day. She has not tried any treatment modalities and she presents with a lightweight, Nike tennis shoe. She says she smoked 1 ppd and she has smoked for a long time. No further pedal complaints today.     /82 (Cuff Size: Adult Large)   Pulse 98   Temp 98.1  F (36.7  C) (Tympanic)   Ht 1.651 m (5' 5\")   Wt 92.1 kg " "(203 lb)   BMI 33.78 kg/m      Patient Active Problem List   Diagnosis     Anxiety     Smoker     Attention deficit hyperactivity disorder (ADHD), predominantly inattentive type     PTSD (post-traumatic stress disorder)     Hydronephrosis     ACP (advance care planning)     Acute right-sided low back pain with right-sided sciatica     Spontaneous miscarriage     Major depressive disorder, recurrent episode, mild (H)     Complicated grieving     Relationship problem between partners       Past Surgical History:   Procedure Laterality Date     CERCLAGE CERVICAL N/A 11/10/2015    Procedure: CERCLAGE CERVICAL;  Surgeon: Tucker Barragan MD;  Location: [facility] L[facility]D      SECTION  2012    Pregnancy full-term; \"Oleksandr\"       SECTION N/A 2016    Procedure:  SECTION;  Surgeon: Nisha Mcleod MD;  Location: [facility] OR     HC NEPHROSTOMY PERCUTUTANEOUS      left     impacted wisdom teeth removal         Current Outpatient Medications   Medication     amphetamine-dextroamphetamine (ADDERALL XR) 20 MG 24 hr capsule     amphetamine-dextroamphetamine (ADDERALL XR) 20 MG 24 hr capsule     amphetamine-dextroamphetamine (ADDERALL XR) 20 MG 24 hr capsule     amphetamine-dextroamphetamine (ADDERALL XR) 25 MG 24 hr capsule     [START ON 2019] amphetamine-dextroamphetamine (ADDERALL XR) 25 MG 24 hr capsule     [START ON 2019] amphetamine-dextroamphetamine (ADDERALL XR) 25 MG 24 hr capsule     lamoTRIgine (LAMICTAL) 25 MG tablet     sertraline (ZOLOFT) 100 MG tablet     No current facility-administered medications for this visit.           Allergies   Allergen Reactions     Wellbutrin [Bupropion] Other (See Comments)     Mood changes       Family History   Problem Relation Age of Onset     Alcohol/Drug Father         alcoholism     Psychotic Disorder Father         Bipolar     Cerebrovascular Disease Other         Cerebrovascular accident     C.A.D. Other      Hypertension Other      Diabetes Other  "       Social History     Socioeconomic History     Marital status: Single     Spouse name: None     Number of children: None     Years of education: None     Highest education level: None   Occupational History     None   Social Needs     Financial resource strain: None     Food insecurity:     Worry: None     Inability: None     Transportation needs:     Medical: None     Non-medical: None   Tobacco Use     Smoking status: Current Every Day Smoker     Packs/day: 0.50     Years: 10.00     Pack years: 5.00     Types: Cigarettes     Smokeless tobacco: Never Used   Substance and Sexual Activity     Alcohol use: No     Alcohol/week: 0.0 oz     Drug use: No     Sexual activity: Yes     Partners: Male     Birth control/protection: None   Lifestyle     Physical activity:     Days per week: None     Minutes per session: None     Stress: None   Relationships     Social connections:     Talks on phone: None     Gets together: None     Attends Nondenominational service: None     Active member of club or organization: None     Attends meetings of clubs or organizations: None     Relationship status: None     Intimate partner violence:     Fear of current or ex partner: None     Emotionally abused: None     Physically abused: None     Forced sexual activity: None   Other Topics Concern      Service Not Asked     Blood Transfusions Yes     Caffeine Concern Not Asked     Occupational Exposure Not Asked     Hobby Hazards Not Asked     Sleep Concern Not Asked     Stress Concern Not Asked     Weight Concern Not Asked     Special Diet Not Asked     Back Care Not Asked     Exercise Not Asked     Bike Helmet Not Asked     Seat Belt Not Asked     Self-Exams Not Asked     Parent/sibling w/ CABG, MI or angioplasty before 65F 55M? No   Social History Narrative     None       ROS: 10 point ROS neg other than the symptoms noted above in the HPI.  EXAM  Constitutional: healthy, alert and no distress    Psychiatric: mentation appears normal and  affect normal/bright    VASCULAR:  -Dorsalis pedis pulse +2/4 b/l  -Posterior tibial pulse +1/4 b/l  -Capillary refill time < 3 seconds to b/l hallux  -Mild 1+ pitting edema to bilateral ankles  NEURO:  -Light touch sensation intact to b/l plantar forefoot  DERM:  -Skin temperature, texture and turgor WNL b/l  -Toenails normotrophic x 10  MSK:  -Pain on palpation to bilateral calcaneus, medial tubercle, LEFT heel  -Muscle strength of ankles +5/5 for dorsiflexion, plantarflexion, ABDUction and ADDuction b/l  -Ankle joint passive ROM within normal limits except for dorsiflexion:    Dorsiflexion, RIGHT Straight knee 0 degrees    Dorsiflexion, LEFT Straight knee 0 degrees  -RCSP neutral bilaterally   ---Mild abductory twist bilaterally   -DORSIFLEXION contracture to MTPJ 2-5 b/l  ============================================================    ASSESSMENT:  (M72.2) Plantar fascial fibromatosis  (primary encounter diagnosis)    (M79.672) Left foot pain    (M79.671) Right foot pain    (M21.6X2) Gastrocnemius equinus of left lower extremity    (M21.6X1) Gastrocnemius equinus of right lower extremity    (Z71.6) Tobacco abuse counseling    (Z72.0) Tobacco abuse      PLAN:  -Patient evaluated and examined. Treatment options discussed with no educational barriers noted.  -Physical therapy for LEFT heel  --PT to work on an aggressive stretching regimen to stretch the bilateral posterior leg muscles, deep tissue manipulation (similar to Graston) of the LEFT heel and bilateral calf muscles, iontophoresis to the LEFT heel, and strengthening of the intrinsic muscles of the foot bilaterally. Thank you.    -Patient has CMOs: Call orthotist to brooks an appointment to make adjustments to the CMOs  ---Sent message to the orthotist, Verónica Fitzgerald   -Continue Compression socks as tolerated: Advised to wear compression socks all day (especially when working) to decrease LOWER EXTREMITY swelling in both feet and legs. Apply the socks first  thing in the morning before getting out of bed and remove them at night when the feet are elevated in bed.  -Continue Stability Shoe Gear: This involves wearing a solid tennis shoe that bends at the toe, but has a solid midfoot shank and heel contour. Her current tennis shoes are lightweight shoes and are not providing her with any foot support.   -Continue Stretching: Stretch the calf muscles to increase flexibility of the calf muscles. If possible, aim to stretch the calf muscles for a combined total of one hour per day.  -Continue Icing: Ice the bottom of the foot minimally once a day for ten minutes per foot with a frozen water bottle. Ice after any extended amount of time on your feet.  -Continue supportive sandals (example: [employer], [place], [employer]) for around the house  -LEFT heel anti-inflammatory Injection: Obtained written and verbal consent from patient for an anti-inflammatory injection into the medial heel of the LEFT foot. Reviewed risks and benefits of the injection. Informed patient that the injection may decrease pain long-term, short-term, or not at all. Patient in agreement with an injection today in an effort to slow or reverse the chronic inflammatory process and pain in the foot. Applied an alcohol swab to the injection site on both heels. Injected a total of 3mL mL with 1mL of Ketorolac and 2mL of 2% Lidocaine plain. Covered both heels with a bandaid. Patient tolerated the injection well.  -Tobacco cessation discussed with patient including benefits of quitting and risks of not quitting. She has declined the Quit Plan and she is not interested in quitting today.  -Patient in agreement with the above treatment plan and all of patient's questions were answered.      RTC 10 weeks to evaluate heel pain  Will consider Night splint if pain is minimally improved by next f/u appointment      Courtney Quintero DPM

## 2019-07-01 ENCOUNTER — HOSPITAL ENCOUNTER (OUTPATIENT)
Dept: PHYSICAL THERAPY | Facility: HOSPITAL | Age: 29
Setting detail: THERAPIES SERIES
End: 2019-07-01
Attending: PODIATRIST
Payer: COMMERCIAL

## 2019-07-01 DIAGNOSIS — M79.672 LEFT FOOT PAIN: ICD-10-CM

## 2019-07-01 DIAGNOSIS — M72.2 PLANTAR FASCIAL FIBROMATOSIS: ICD-10-CM

## 2019-07-01 DIAGNOSIS — M62.462 GASTROCNEMIUS EQUINUS OF LEFT LOWER EXTREMITY: ICD-10-CM

## 2019-07-01 DIAGNOSIS — M62.461 GASTROCNEMIUS EQUINUS OF RIGHT LOWER EXTREMITY: ICD-10-CM

## 2019-07-01 PROCEDURE — 97140 MANUAL THERAPY 1/> REGIONS: CPT | Mod: GP | Performed by: PHYSICAL THERAPIST

## 2019-07-01 PROCEDURE — 97110 THERAPEUTIC EXERCISES: CPT | Mod: GP | Performed by: PHYSICAL THERAPIST

## 2019-07-01 PROCEDURE — 97161 PT EVAL LOW COMPLEX 20 MIN: CPT | Mod: GP | Performed by: PHYSICAL THERAPIST

## 2019-07-01 NOTE — PROGRESS NOTES
07/01/19 0827   General Information   Type of Visit Initial OP Ortho PT Evaluation   Start of Care Date 07/01/19   Referring Physician Courtney Quintero DPM   Patient/Family Goals Statement decrease heel pain   Orders Evaluate and Treat   Orders Comment Patient complains of LEFT heel pain along the plantar fascia insertion on the medial plantar heel as well as the mid, plantar heel. There is also a bilateral gastroc equinus.   Please work on an aggressive stretching regimen to stretch the bilateral posterior leg muscles, deep tissue manipulation (similar to Graston) of the LEFT heel and bilateral calf muscles, iontophoresis to the LEFT heel, and strengthening of the intrinsic muscles of the foot bilaterally.   Date of Order 06/11/19   Certification Required? No   Medical Diagnosis plantar fascial fibromatosis, left foot pain, gastrocnemius equinus of L and R LE   Surgical/Medical history reviewed Yes   Body Part(s)   Body Part(s) Ankle/Foot   Presentation and Etiology   Pertinent history of current problem (include personal factors and/or comorbidities that impact the POC) Pt states she had her daughter 11 months ago.  Pt states at her 6 week follow up after delivery appointment she told her MD that her left foot was numb and states it remained numb until she had injections to bilateral feet in March which improved her right heel but the numbness remained in her left foot.  Pt states she had a second injection in beginning of June and states heel is still painful and has ongoing numbness.  Pt has been seeing her chiropracter regularly and was told that she has a heel spur via x-ray.  Pt has trialed icing with temporary relief, has worked on some calf stretching, and has trialed some taping.  Pt states when she does taping she is able to get through her entire shift.  Pt reports sharp pain with first steps in the morning.  Pt states she did get custom orthotics but states she can't wear the inserts in her shoes and  states her feet go numb within 20-30 minutes in bilateral feet.  Pain does not wake patient up at night.  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  Pt does states she will get tingling in her toes if she sits after being on her feet all day.  Pt states she has trialed some ibuprofen but did not notice any change   Impairments A. Pain;B. Decreased WB tolerance;D. Decreased ROM;E. Decreased flexibility;G. Impaired balance;H. Impaired gait;K. Numbness   Functional Limitations perform activities of daily living;perform required work activities;perform desired leisure / sports activities   Symptom Location left heel   How/Where did it occur From insidious onset   Onset date of current episode/exacerbation 06/11/19  (date of MD order)   Chronicity New   Pain rating (0-10 point scale) Best (/10);Worst (/10)   Best (/10) 3   Worst (/10) 8   Pain quality A. Sharp;C. Aching;E. Shooting;F. Stabbing   Frequency of pain/symptoms A. Constant   Pain/symptoms are: Other   Pain symptoms comment Bad in morning, improves somewhat during the day and increases after being on them all day   Pain/symptoms exacerbated by B. Walking;E. Rest;J. ADL;K. Home tasks;L. Work tasks   Pain/symptoms eased by A. Sitting;H. Cold;K. Other   Pain eased by comment taping   Progression of symptoms since onset: Worsened   Prior Level of Function   Prior Level of Function-Mobility independent   Prior Level of Function-ADLs independent   Current Level of Function   Patient role/employment history A. Employed   Employment Comments works casual as a CNA   [facility]   Home/community accessibility stairs within home   Fall Risk Screen   Fall screen completed by PT   Have you fallen 2 or more times in the past year? No   Have you fallen and had an injury in the past year? No   Is patient a fall risk? No   Ankle/Foot Objective Findings   Side (if bilateral, select both right and  left) Left   Observation no acute distress   Posture Normal   Gait/Locomotion mild antalgic gait noted with WB on L LE   Balance/ Proprioception (Single Leg Stance) equal bilaterally   Foot Position In Standing mild pes planus noted L>R   Ankle/Foot ROM Comment Limited great to extension bilaterally   Palpation tenderness to plantar fascia insertion on left heel, crepitus present in plantar fascia L>R   Left DF (Knee Ext) AROM full and equal to right   Left PF AROM full and equal to right   Left Great Toe Flexion AROM WNL   Left DF/Inversion Strength 5/5   Left DF/Eversion Strength 5/5   Left PF/Inversion Strength 5/5   Left PF/Eversion Strength 5/5   Left PF Strength 5/5   Left Gastroc (in WB) Flexibility no significant tightness present   Planned Therapy Interventions   Planned Therapy Interventions balance training;joint mobilization;manual therapy;neuromuscular re-education;ROM;strengthening;stretching   Planned Modality Interventions   Planned Modality Interventions Iontophoresis   Planned Modality Interventions Comments with dexamethasone   Clinical Impression   Criteria for Skilled Therapeutic Interventions Met yes, treatment indicated   PT Diagnosis left heel pain limiting tolerance for work and household activities   Influenced by the following impairments pain, numbness, decreased flexibility, impaired foot mechanics   Functional limitations due to impairments decreased tolerance for standing, decreased tolerance for walking, first step pain in mornings   Clinical Presentation Stable/Uncomplicated   Clinical Presentation Rationale clinical judgement   Clinical Decision Making (Complexity) Low complexity   Therapy Frequency 2 times/Week   Predicted Duration of Therapy Intervention (days/wks) 8 weeks   Risk & Benefits of therapy have been explained Yes   Patient, Family & other staff in agreement with plan of care Yes   Clinical Impression Comments Pt presents with left heel pain and numbness ongoing since  being pregnant and delivering her child 11 months ago.  Pt has first step pain in the mornings with tenderness to palpation of medial heel.  Pt may have a back contribution as well due to the complaints of numbness but demonstrates equal leg length on assessmen today.  Pt would benefit from skilled PT to address inflammtion and work on progressive loading of tissues.   Education Assessment   Preferred Learning Style Listening;Reading   Barriers to Learning No barriers   ORTHO GOALS   PT Ortho Eval Goals 1;2;3   Ortho Goal 1   Goal Identifier STG 1   Goal Description Pt to be independent and compliant with HEP.   Target Date 07/15/19   Ortho Goal 2   Goal Identifier LTG 1   Goal Description Pt to report at least 75% improvement in overall symptoms to tolerate full work day with minimal pain in left heel.   Target Date 08/26/19   Ortho Goal 3   Goal Identifier LTG 2   Goal Description Pt to report no pain when getting OOB in morning.s   Target Date 08/26/19   Total Evaluation Time   PT Dodie, Low Complexity Minutes (33882) 22

## 2019-07-03 ENCOUNTER — HOSPITAL ENCOUNTER (OUTPATIENT)
Dept: PHYSICAL THERAPY | Facility: HOSPITAL | Age: 29
Setting detail: THERAPIES SERIES
End: 2019-07-03
Attending: PODIATRIST
Payer: COMMERCIAL

## 2019-07-03 PROCEDURE — 97140 MANUAL THERAPY 1/> REGIONS: CPT | Mod: GP | Performed by: PHYSICAL THERAPIST

## 2019-07-03 PROCEDURE — 97110 THERAPEUTIC EXERCISES: CPT | Mod: GP | Performed by: PHYSICAL THERAPIST

## 2019-07-08 ENCOUNTER — HOSPITAL ENCOUNTER (OUTPATIENT)
Dept: PHYSICAL THERAPY | Facility: HOSPITAL | Age: 29
Setting detail: THERAPIES SERIES
End: 2019-07-08
Attending: PODIATRIST
Payer: COMMERCIAL

## 2019-07-08 PROCEDURE — 97110 THERAPEUTIC EXERCISES: CPT | Mod: GP

## 2019-07-08 PROCEDURE — 97140 MANUAL THERAPY 1/> REGIONS: CPT | Mod: GP

## 2019-07-31 ENCOUNTER — HOSPITAL ENCOUNTER (OUTPATIENT)
Dept: PHYSICAL THERAPY | Facility: HOSPITAL | Age: 29
Setting detail: THERAPIES SERIES
End: 2019-07-31
Attending: PODIATRIST
Payer: COMMERCIAL

## 2019-07-31 PROCEDURE — 97033 APP MDLTY 1+IONTPHRSIS EA 15: CPT | Mod: GP | Performed by: PHYSICAL THERAPIST

## 2019-07-31 PROCEDURE — 97140 MANUAL THERAPY 1/> REGIONS: CPT | Mod: GP | Performed by: PHYSICAL THERAPIST

## 2019-08-01 ENCOUNTER — HOSPITAL ENCOUNTER (EMERGENCY)
Facility: HOSPITAL | Age: 29
Discharge: HOME | End: 2019-08-01
Attending: PHYSICIAN ASSISTANT | Admitting: PHYSICIAN ASSISTANT
Payer: COMMERCIAL

## 2019-08-01 VITALS
DIASTOLIC BLOOD PRESSURE: 81 MMHG | TEMPERATURE: 97.9 F | OXYGEN SATURATION: 99 % | RESPIRATION RATE: 16 BRPM | SYSTOLIC BLOOD PRESSURE: 122 MMHG

## 2019-08-01 DIAGNOSIS — S39.012A STRAIN OF LUMBAR REGION, INITIAL ENCOUNTER: ICD-10-CM

## 2019-08-01 PROCEDURE — 25000125 ZZHC RX 250: Performed by: PHYSICIAN ASSISTANT

## 2019-08-01 PROCEDURE — G0463 HOSPITAL OUTPT CLINIC VISIT: HCPCS

## 2019-08-01 PROCEDURE — 99213 OFFICE O/P EST LOW 20 MIN: CPT | Performed by: PHYSICIAN ASSISTANT

## 2019-08-01 RX ORDER — CYCLOBENZAPRINE HCL 10 MG
10 TABLET ORAL 3 TIMES DAILY PRN
Qty: 20 TABLET | Refills: 0 | Status: SHIPPED | OUTPATIENT
Start: 2019-08-01 | End: 2019-08-06

## 2019-08-01 RX ORDER — HYDROCODONE BITARTRATE AND ACETAMINOPHEN 10; 325 MG/1; MG/1
1 TABLET ORAL ONCE
Status: COMPLETED | OUTPATIENT
Start: 2019-08-01 | End: 2019-08-01

## 2019-08-01 RX ADMIN — HYDROCODONE BITARTRATE AND ACETAMINOPHEN 1 TABLET: 10; 325 TABLET ORAL at 13:20

## 2019-08-01 ASSESSMENT — ENCOUNTER SYMPTOMS
SHORTNESS OF BREATH: 0
NUMBNESS: 0
CHILLS: 0
BACK PAIN: 1
WEAKNESS: 0
FEVER: 0
ABDOMINAL PAIN: 0
NECK PAIN: 0

## 2019-08-01 NOTE — ED TRIAGE NOTES
"Patient presents to the emergency room with complaints of low back pain.  States she injured her back aka \"threw it out\" 3 days ago at work.  Since then has been using ibuprofen 800 mg tid and saw a chiropractor with no relief.    "

## 2019-08-01 NOTE — ED AVS SNAPSHOT
[facility]  [address]  [place] [address]  Phone:  [phone]                                    Jennifer Tatum   MRN: [id number]    Department:  [facility]   Date of Visit:  8/1/2019           After Visit Summary Signature Page    I have received my discharge instructions, and my questions have been answered. I have discussed any challenges I see with this plan with the nurse or doctor.    ..........................................................................................................................................  Patient/Patient Representative Signature      ..........................................................................................................................................  Patient Representative Print Name and Relationship to Patient    ..................................................               ................................................  Date                                   Time    ..........................................................................................................................................  Reviewed by Signature/Title    ...................................................              ..............................................  Date                                               Time          22EPIC Rev 08/18

## 2019-08-01 NOTE — DISCHARGE INSTRUCTIONS
Ibuprofen 800  mg three times daily along with Tylenol 650-1000 mg 4 times daily.  Limit ibuprofen to one week at a time.    Flexeril 10 mg three times daily as needed.  This causes drowsiness so no driving or drinking alcohol with this medication.  Ok to cut in half too.  Referral to PT.  Follow up with primary care if symptoms persist or worsen.

## 2019-08-01 NOTE — ED TRIAGE NOTES
Pt presents today with c/o lower back pain. States she was at work, but doesn't want to claim work comp. Shooting sharp pain up her back. 3 days ago.

## 2019-08-01 NOTE — ED PROVIDER NOTES
History     Chief Complaint   Patient presents with     Back Pain       Jennifer Tatum is a 28 year old female who presents for evaluation of right low back pain and stiffness. 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  She did not notice any pain at the time of the incident, but symptoms started later that evening.  She describes the pain as a tight, sharp, stabbing pain.  Pain is worse with any type of movement, better with laying flat.  She has been trying ibuprofen 800 mg, heat, ice, icy hot, asper cream, tiger balm with little relief.  She has also been to the chiropractor.  She has had back problems in the past, but usually her symptoms resolve in 2-3 days with the above mentioned treatments.  Denies numbness, tingling, weakness.     Allergies:  Allergies   Allergen Reactions     Wellbutrin [Bupropion] Other (See Comments)     Mood changes       Problem List:    Patient Active Problem List    Diagnosis Date Noted     Relationship problem between partners 12/01/2017     Priority: Medium     Complicated grieving 07/26/2017     Priority: Medium     Major depressive disorder, recurrent episode, mild (H) 05/02/2017     Priority: Medium     Spontaneous miscarriage 04/06/2017     Priority: Medium     Acute right-sided low back pain with right-sided sciatica 10/18/2016     Priority: Medium     ACP (advance care planning) 06/17/2016     Priority: Medium     Advance Care Planning 6/17/2016: ACP Review of Chart / Resources Provided:  Reviewed chart for advance care plan.  Jennifer Tatum has no plan or code status on file. Discussed available resources and provided with information.   Added by Gilbert Titus             Attention deficit hyperactivity disorder (ADHD), predominantly inattentive type 04/01/2016     Priority: Medium     PTSD (post-traumatic stress disorder) 04/01/2016     Priority: Medium     Hydronephrosis 11/18/2015     Priority: Medium     Anxiety 07/31/2013     Priority:  "Medium     Feels it is going away       Smoker 2013     Priority: Medium     Patient is weaning KWIT on 8/2/15          Past Medical History:    Past Medical History:   Diagnosis Date     H/O nephrolithotomy with removal of calculi      History of  delivery 2015     Incompetent cervix 11/10/2015     IUFD (intrauterine fetal death) 2016     MDD (major depressive disorder)      Mild major depression (H) 2013     Nephrolithiasis      Smoker 2013       Past Surgical History:    Past Surgical History:   Procedure Laterality Date     CERCLAGE CERVICAL N/A 11/10/2015    Procedure: CERCLAGE CERVICAL;  Surgeon: Tucker Barragan MD;  Location: [facility]      SECTION  2012    Pregnancy full-term; \"Oleksandr\"       SECTION N/A 2016    Procedure:  SECTION;  Surgeon: Nisha Mcleod MD;  Location: [facility]     HC NEPHROSTOMY PERCUTUTANEOUS      left     impacted wisdom teeth removal         Family History:    Family History   Problem Relation Age of Onset     Alcohol/Drug Father         alcoholism     Psychotic Disorder Father         Bipolar     Cerebrovascular Disease Other         Cerebrovascular accident     C.A.D. Other      Hypertension Other      Diabetes Other        Social History:  Marital Status:  Single [1]  Social History     Tobacco Use     Smoking status: Current Every Day Smoker     Packs/day: 0.50     Years: 10.00     Pack years: 5.00     Types: Cigarettes     Smokeless tobacco: Never Used   Substance Use Topics     Alcohol use: No     Alcohol/week: 0.0 oz     Drug use: No        Medications:      cyclobenzaprine (FLEXERIL) 10 MG tablet   amphetamine-dextroamphetamine (ADDERALL XR) 20 MG 24 hr capsule   amphetamine-dextroamphetamine (ADDERALL XR) 20 MG 24 hr capsule   amphetamine-dextroamphetamine (ADDERALL XR) 20 MG 24 hr capsule   amphetamine-dextroamphetamine (ADDERALL XR) 25 MG 24 hr capsule   lamoTRIgine (LAMICTAL) 25 MG tablet   sertraline " (ZOLOFT) 100 MG tablet         Review of Systems   Constitutional: Negative for chills and fever.   Respiratory: Negative for shortness of breath.    Cardiovascular: Negative for chest pain.   Gastrointestinal: Negative for abdominal pain.   Musculoskeletal: Positive for back pain. Negative for neck pain.   Neurological: Negative for weakness and numbness.        No bowel or bladder dysfunction.  No radicular pain.    All other systems reviewed and are negative.      Physical Exam   BP: 122/81  Heart Rate: 87  Temp: 97.9  F (36.6  C)  Resp: 16  SpO2: 99 %      General: Patient appears to be in mild to moderate pain, antalgic gait noted.   Eyes: EOMI, no conjunctivitis  Lungs: CTAB, non labored  CV: RRR  MS/Neuro: Lumbosacral spine area reveals no tenderness over spine, but tender over right lumbar paraspinal muscles with spasm. Reduced lumbarsacral ROM secondary to pain. Straight leg raise is negative. DTR's, motor strength and sensation normal, including heel and toe gait.  Peripheral pulses are palpable.   Psych: normal affect        ED Course       Medications   HYDROcodone-acetaminophen (NORCO)  MG per tablet 1 tablet (1 tablet Oral Given 8/1/19 1320)       Assessments & Plan (with Medical Decision Making)     Right low back strain and muscle spasm - Discussed symptomatic cares.  Flexeril as needed.  Referral to PT.         Medication List      Started    cyclobenzaprine 10 MG tablet  Commonly known as:  FLEXERIL  10 mg, Oral, 3 TIMES DAILY PRN            Patient instructions:  Ibuprofen 800  mg three times daily along with Tylenol 650-1000 mg 4 times daily.  Limit ibuprofen to one week at a time.    Flexeril 10 mg three times daily as needed.  This causes drowsiness so no driving or drinking alcohol with this medication.  Ok to cut in half too.  Referral to PT.  Follow up with primary care if symptoms persist or worsen.        Final diagnoses:   Strain of lumbar region, initial encounter     Rosalina TIRADO  Alli GARVEY  8/1/2019   [facility]     Rosalina Carson PA-C  08/01/19 183

## 2019-08-02 ENCOUNTER — HOSPITAL ENCOUNTER (OUTPATIENT)
Dept: PHYSICAL THERAPY | Facility: HOSPITAL | Age: 29
Setting detail: THERAPIES SERIES
End: 2019-08-02
Attending: PODIATRIST
Payer: COMMERCIAL

## 2019-08-02 PROCEDURE — 97033 APP MDLTY 1+IONTPHRSIS EA 15: CPT | Mod: GP

## 2019-08-02 PROCEDURE — 97140 MANUAL THERAPY 1/> REGIONS: CPT | Mod: GP

## 2019-08-05 ENCOUNTER — HOSPITAL ENCOUNTER (OUTPATIENT)
Dept: PHYSICAL THERAPY | Facility: HOSPITAL | Age: 29
Setting detail: THERAPIES SERIES
End: 2019-08-05
Attending: PODIATRIST
Payer: COMMERCIAL

## 2019-08-05 PROCEDURE — 97033 APP MDLTY 1+IONTPHRSIS EA 15: CPT | Mod: GP

## 2019-08-05 PROCEDURE — 97140 MANUAL THERAPY 1/> REGIONS: CPT | Mod: GP

## 2019-08-06 ENCOUNTER — HOSPITAL ENCOUNTER (EMERGENCY)
Facility: HOSPITAL | Age: 29
Discharge: HOME | End: 2019-08-06
Attending: NURSE PRACTITIONER | Admitting: NURSE PRACTITIONER
Payer: COMMERCIAL

## 2019-08-06 VITALS
RESPIRATION RATE: 14 BRPM | OXYGEN SATURATION: 98 % | SYSTOLIC BLOOD PRESSURE: 144 MMHG | TEMPERATURE: 98.5 F | DIASTOLIC BLOOD PRESSURE: 93 MMHG

## 2019-08-06 DIAGNOSIS — R21 RASH AND NONSPECIFIC SKIN ERUPTION: ICD-10-CM

## 2019-08-06 PROCEDURE — G0463 HOSPITAL OUTPT CLINIC VISIT: HCPCS

## 2019-08-06 PROCEDURE — 99213 OFFICE O/P EST LOW 20 MIN: CPT | Performed by: NURSE PRACTITIONER

## 2019-08-06 PROCEDURE — 87168 MACROSCOPIC EXAM ARTHROPOD: CPT | Performed by: NURSE PRACTITIONER

## 2019-08-06 RX ORDER — PERMETHRIN 50 MG/G
CREAM TOPICAL ONCE
Qty: 60 G | Refills: 3 | Status: SHIPPED | OUTPATIENT
Start: 2019-08-06 | End: 2019-08-21

## 2019-08-06 ASSESSMENT — ENCOUNTER SYMPTOMS
ROS SKIN COMMENTS: ITCHY
CHILLS: 0
FEVER: 0
GASTROINTESTINAL NEGATIVE: 1
ACTIVITY CHANGE: 1

## 2019-08-06 NOTE — DISCHARGE INSTRUCTIONS
Examination for mites -- The definitive method to confirm diagnosis of scabies is a scabies preparation. Scabies preparations are used to detect mites, mite eggs, or mite fecal pellets. Dermoscopic examination can identify sites of scabies mites or burrows and can facilitate placement of the scraping. Because of the low mite burden in classic scabies, negative results do not exclude the diagnosis [11,32,33]. (See 'Scabies preparation' below and 'Dermoscopy' below.)

## 2019-08-06 NOTE — ED NOTES
Pt presents with a rash to her buttocks. States she believes it is scabies. Daughter was in last night and diagnosed with scabies. States she needs a skin scrape.

## 2019-08-06 NOTE — ED AVS SNAPSHOT
[facility]  [address]  [place] [address]  Phone:  [phone]                                    Jennifer Tatum   MRN: [id number]    Department:  [facility]   Date of Visit:  8/6/2019           After Visit Summary Signature Page    I have received my discharge instructions, and my questions have been answered. I have discussed any challenges I see with this plan with the nurse or doctor.    ..........................................................................................................................................  Patient/Patient Representative Signature      ..........................................................................................................................................  Patient Representative Print Name and Relationship to Patient    ..................................................               ................................................  Date                                   Time    ..........................................................................................................................................  Reviewed by Signature/Title    ...................................................              ..............................................  Date                                               Time          22EPIC Rev 08/18

## 2019-08-06 NOTE — ED PROVIDER NOTES
History     Chief Complaint   Patient presents with     Rash     notes being treated for scabies but hasn't started the med yet     HPI  Jennifer Tatum is a 28 year old female who presents with a scattered, itchy, rash over her buttocks, arms, and behind her knees for one month now.. Her daughter is being treated for scabies and her place of employment has had a positive skin scrapping indicating scabies. There are several other workers and residents who have the same rash. Denies fevers, chills, nausea, vomiting, diarrhea, and shortness of breath.    Allergies:  Allergies   Allergen Reactions     Wellbutrin [Bupropion] Other (See Comments)     Mood changes       Problem List:    Patient Active Problem List    Diagnosis Date Noted     Relationship problem between partners 12/01/2017     Priority: Medium     Complicated grieving 07/26/2017     Priority: Medium     Major depressive disorder, recurrent episode, mild (H) 05/02/2017     Priority: Medium     Spontaneous miscarriage 04/06/2017     Priority: Medium     Acute right-sided low back pain with right-sided sciatica 10/18/2016     Priority: Medium     ACP (advance care planning) 06/17/2016     Priority: Medium     Advance Care Planning 6/17/2016: ACP Review of Chart / Resources Provided:  Reviewed chart for advance care plan.  Jennifer Tatum has no plan or code status on file. Discussed available resources and provided with information.   Added by Gilbert Titus             Attention deficit hyperactivity disorder (ADHD), predominantly inattentive type 04/01/2016     Priority: Medium     PTSD (post-traumatic stress disorder) 04/01/2016     Priority: Medium     Hydronephrosis 11/18/2015     Priority: Medium     Anxiety 07/31/2013     Priority: Medium     Feels it is going away       Smoker 07/31/2013     Priority: Medium     Patient is weaning KWIT on 8/2/15          Past Medical History:    Past Medical History:   Diagnosis Date     H/O nephrolithotomy with  "removal of calculi      History of  delivery 2015     Incompetent cervix 11/10/2015     IUFD (intrauterine fetal death) 2016     MDD (major depressive disorder)      Mild major depression (H) 2013     Nephrolithiasis      Smoker 2013       Past Surgical History:    Past Surgical History:   Procedure Laterality Date     CERCLAGE CERVICAL N/A 11/10/2015    Procedure: CERCLAGE CERVICAL;  Surgeon: Tucker Barragan MD;  [facility]: [facility]      SECTION  2012    Pregnancy full-term; \"Oleksandr\"       SECTION N/A 2016    Procedure:  SECTION;  Surgeon: Nisha Mcleod MD;  Location: [facility]     HC NEPHROSTOMY PERCUTUTANEOUS      left     impacted wisdom teeth removal         Family History:    Family History   Problem Relation Age of Onset     Alcohol/Drug Father         alcoholism     Psychotic Disorder Father         Bipolar     Cerebrovascular Disease Other         Cerebrovascular accident     C.A.D. Other      Hypertension Other      Diabetes Other        Social History:  Marital Status:  Single [1]  Social History     Tobacco Use     Smoking status: Current Every Day Smoker     Packs/day: 0.50     Years: 10.00     Pack years: 5.00     Types: Cigarettes     Smokeless tobacco: Never Used   Substance Use Topics     Alcohol use: No     Alcohol/week: 0.0 oz     Drug use: No        Medications:      amphetamine-dextroamphetamine (ADDERALL XR) 20 MG 24 hr capsule   amphetamine-dextroamphetamine (ADDERALL XR) 20 MG 24 hr capsule   amphetamine-dextroamphetamine (ADDERALL XR) 20 MG 24 hr capsule   amphetamine-dextroamphetamine (ADDERALL XR) 25 MG 24 hr capsule   lamoTRIgine (LAMICTAL) 25 MG tablet   sertraline (ZOLOFT) 100 MG tablet         Review of Systems   Constitutional: Positive for activity change. Negative for chills and fever.   Respiratory: Negative.    Gastrointestinal: Negative.    Skin: Positive for rash.        itchy       Physical Exam   BP: 144/93  Heart " Rate: 100  Temp: 98.5  F (36.9  C)  Resp: 14  SpO2: 98 %      Physical Exam   Constitutional: She is oriented to person, place, and time. She appears well-developed and well-nourished. She appears distressed.   Pulmonary/Chest: Effort normal.   Neurological: She is oriented to person, place, and time.   Skin: Skin is warm and dry. Capillary refill takes less than 2 seconds. Rash noted.        Psychiatric: She has a normal mood and affect.   Nursing note and vitals reviewed.      ED Course        Procedures            No results found for this or any previous visit (from the past 24 hour(s)).    Medications - No data to display    Assessments & Plan (with Medical Decision Making)     I have reviewed the nursing notes.    I have reviewed the findings, diagnosis, plan and need for follow up with the patient.  Rash located in diffuse areas, conglamurated mostly on her buttocks. Probably scabies. Her place of employment has had a positive skin scrapping on one of the residents. Her daughter is being treated for scabies. Skin scraping obtained; it is a send out. Will notify her when results are obtained. Permethrin prescribed and instructions given verbally regarding the process to eliminate scabies from her home environment. Discharge instructions and medications reviewed with her and understanding verbalized.         Medication List      ASK your doctor about these medications    permethrin 5 % external cream  Commonly known as:  ELIMITE  Topical, ONCE, Massage into skin from head to foot.  Leave on for 8-14hrs and then wash off.  May repeat in 2 wks if needed.  Ask about: Should I take this medication?            Final diagnoses:   Rash and nonspecific skin eruption       8/6/2019   [facility]     Luba Dumas, CNP  08/07/19 [id number]

## 2019-08-07 LAB
INSECT SPEC: NORMAL
SPECIMEN SOURCE: NORMAL

## 2019-08-07 ASSESSMENT — ENCOUNTER SYMPTOMS: RESPIRATORY NEGATIVE: 1

## 2019-08-13 NOTE — PROGRESS NOTES
"Subjective     Jennifer Tatum is a 28 year old female who presents to clinic today for the following health issues:    HPI   Medication Followup of adderall    Taking Medication as prescribed: yes    Side Effects:  None    Medication Helping Symptoms:  Yes    Doing well     Mood good mostly     Still irritable - happens easily     Yelling more at kids etc    Frustrated easily     Lots on her plate.     Taking lamictal 1 at bedtime- not tolerate bid due to sedation          Current Outpatient Medications   Medication Sig Dispense Refill     [START ON 10/21/2019] amphetamine-dextroamphetamine (ADDERALL XR) 25 MG 24 hr capsule Take 1 capsule (25 mg) by mouth daily 30 capsule 0     [START ON 9/20/2019] amphetamine-dextroamphetamine (ADDERALL XR) 25 MG 24 hr capsule Take 1 capsule (25 mg) by mouth daily 30 capsule 0     amphetamine-dextroamphetamine (ADDERALL XR) 25 MG 24 hr capsule Take 1 capsule (25 mg) by mouth daily 30 capsule 0     lamoTRIgine (LAMICTAL) 25 MG tablet 2 tabs orally at night 60 tablet 3     sertraline (ZOLOFT) 100 MG tablet Take 1 tablet (100 mg) by mouth daily 90 tablet 1         Reviewed and updated as needed this visit by Provider         Review of Systems   ROS COMP: CONSTITUTIONAL: NEGATIVE for fever, chills, change in weight  RESP: NEGATIVE for significant cough or SOB  CV: NEGATIVE for chest pain, palpitations or peripheral edema      Objective    /74 (BP Location: Left arm, Patient Position: Sitting, Cuff Size: Adult Regular)   Pulse 104   Temp 98.4  F (36.9  C) (Tympanic)   Ht 1.651 m (5' 5\")   Wt 90.3 kg (199 lb)   SpO2 97%   BMI 33.12 kg/m    Body mass index is 33.12 kg/m .  Physical Exam   GENERAL: healthy, alert and no distress  PSYCH: mentation appears normal, affect normal/bright, little stresed with work.kids and family             Assessment & Plan     1. Attention deficit hyperactivity disorder (ADHD), predominantly inattentive type  Rf done and see back in  3 months   - " "amphetamine-dextroamphetamine (ADDERALL XR) 25 MG 24 hr capsule; Take 1 capsule (25 mg) by mouth daily  Dispense: 30 capsule; Refill: 0  - amphetamine-dextroamphetamine (ADDERALL XR) 25 MG 24 hr capsule; Take 1 capsule (25 mg) by mouth daily  Dispense: 30 capsule; Refill: 0  - amphetamine-dextroamphetamine (ADDERALL XR) 25 MG 24 hr capsule; Take 1 capsule (25 mg) by mouth daily  Dispense: 30 capsule; Refill: 0    2. VINI (generalized anxiety disorder)  Discussed. Will try increase lamictal from 1 to 2 tabs at bedtime to help this and increase aggression. If doing well - f/u in 3 months.   - lamoTRIgine (LAMICTAL) 25 MG tablet; 2 tabs orally at night  Dispense: 60 tablet; Refill: 3    3. Major depressive disorder, recurrent episode, mild (H)  As above.   - lamoTRIgine (LAMICTAL) 25 MG tablet; 2 tabs orally at night  Dispense: 60 tablet; Refill: 3     Tobacco Cessation:   reports that she has been smoking cigarettes.  She has a 5.00 pack-year smoking history. She has never used smokeless tobacco.  Tobacco Cessation Action Plan: Information offered: Patient not interested at this time      BMI:   Estimated body mass index is 33.12 kg/m  as calculated from the following:    Height as of this encounter: 1.651 m (5' 5\").    Weight as of this encounter: 90.3 kg (199 lb).               No follow-ups on file.    Cornell Herron MD  [facility] [place]      "

## 2019-08-20 ENCOUNTER — HOSPITAL ENCOUNTER (OUTPATIENT)
Dept: PHYSICAL THERAPY | Facility: HOSPITAL | Age: 29
Setting detail: THERAPIES SERIES
End: 2019-08-20
Attending: PHYSICIAN ASSISTANT
Payer: COMMERCIAL

## 2019-08-20 PROCEDURE — 97530 THERAPEUTIC ACTIVITIES: CPT | Mod: GP

## 2019-08-20 PROCEDURE — 97162 PT EVAL MOD COMPLEX 30 MIN: CPT | Mod: GP

## 2019-08-20 PROCEDURE — 97140 MANUAL THERAPY 1/> REGIONS: CPT | Mod: GP

## 2019-08-20 NOTE — PROGRESS NOTES
08/20/19 0900   General Information   Type of Visit Initial OP Ortho PT Evaluation   Start of Care Date 08/20/19   Referring Physician Rosalina Carson, [facility]   Orders Evaluate and Treat   Date of Order 08/01/19   Certification Required? No   Medical Diagnosis Lumbar strain   Surgical/Medical history reviewed Yes   Precautions/Limitations no known precautions/limitations   Body Part(s)   Body Part(s) Lumbar Spine/SI   Presentation and Etiology   Pertinent history of current problem (include personal factors and/or comorbidities that impact the POC) C/O LBP, with onset while moving a resident in July. She's tried some meds and patches but nothing really has gotten rid of the pain. She also has migraine headache today, since yesterday.    Impairments A. Pain;E. Decreased flexibility   Functional Limitations perform activities of daily living;perform required work activities   How/Where did it occur While lifting;During contact with another person   Onset date of current episode/exacerbation 07/27/19   Chronicity Recurrent   Pain rating (0-10 point scale) Best (/10);Worst (/10);Other   Best (/10) 3   Worst (/10) 8   Pain rating comment 4/10 right now.   Pain quality C. Aching;G. Cramping   Frequency of pain/symptoms B. Intermittent   Pain/symptoms exacerbated by A. Sitting;B. Walking;C. Lifting;D. Carrying;G. Certain positions;I. Bending;K. Home tasks;L. Work tasks   Pain/symptoms eased by D. Nothing   Current / Previous Interventions   Diagnostic Tests: X-ray   X-ray Results unremarkable   Current Level of Function   Patient role/employment history A. Employed   Employment Comments works casual as a CNA   Fall Risk Screen   Fall screen completed by PT   Have you fallen 2 or more times in the past year? No   Have you fallen and had an injury in the past year? No   Is patient a fall risk? No   System Outcome Measures   Outcome Measures Low Back Pain (see Oswestry and Willem)   Lumbar Spine/SI Objective Findings  "  Palpation Left SI jt locked and left LE 3/8 inch longer than right. Left ASIS, IC, PSIS, and IT all inferior relative to right. Right sacral sulcus deep and right SPENCER prominent. T1-L5=NSLRR. Occiput flexed/sheared anteriorly on C1, SLRR. Left rearfoot and forefoot varus.    Planned Therapy Interventions   Planned Therapy Interventions manual therapy;neuromuscular re-education;strengthening;stretching   Clinical Impression   Criteria for Skilled Therapeutic Interventions Met yes, treatment indicated   PT Diagnosis LBP mediated by somatic dysfunction at pelvic, sacral, lumbar, thoracic, occipital, and LE, with functional leg length difference.    Influenced by the following impairments rear- and forefoot varus, left   Clinical Presentation Evolving/Changing   Clinical Presentation Rationale Oswestry Disability Index   Clinical Decision Making (Complexity) Moderate complexity   Therapy Frequency 2 times/Week   Predicted Duration of Therapy Intervention (days/wks) 6 weeks   Risk & Benefits of therapy have been explained Yes   Patient, Family & other staff in agreement with plan of care Yes   Clinical Impression Comments Findings consistent with left downslipped innominate with functionally long left LE, SR sacrum, flexed SLRR occiput, SLRR curve in T-L spine that was compensating for the leg length difference, with left rear- and fore-foot varus. The functionally long LE was externally rotating to \"decrease\" the leg length difference, which exaggerated with left foot varus during stance and gait.   Education Assessment   Barriers to Learning No barriers   ORTHO GOALS   PT Ortho Eval Goals 1;2   Ortho Goal 1   Goal Identifier 1 Functional   Goal Description Improved Oswestry Disability Index score to 20% or better   Target Date 10/01/19   Ortho Goal 2   Goal Identifier 2 Outcome   Goal Description Resolution of somatic dysfunction   Target Date 10/01/19   Total Evaluation Time   PT Eval, Moderate Complexity Minutes " (49817) 15     Giselle Munoz, DPT    I certify the need for these services furnished under this plan of treatment and while under my care. (Physician co-signature of this document indicates review and certification of the therapy plan).

## 2019-08-21 ENCOUNTER — OFFICE VISIT (OUTPATIENT)
Dept: FAMILY MEDICINE | Facility: OTHER | Age: 29
End: 2019-08-21
Attending: FAMILY MEDICINE
Payer: COMMERCIAL

## 2019-08-21 VITALS
HEIGHT: 65 IN | OXYGEN SATURATION: 97 % | WEIGHT: 199 LBS | DIASTOLIC BLOOD PRESSURE: 74 MMHG | TEMPERATURE: 98.4 F | HEART RATE: 104 BPM | SYSTOLIC BLOOD PRESSURE: 126 MMHG | BODY MASS INDEX: 33.15 KG/M2

## 2019-08-21 DIAGNOSIS — F33.0 MAJOR DEPRESSIVE DISORDER, RECURRENT EPISODE, MILD (H): ICD-10-CM

## 2019-08-21 DIAGNOSIS — F41.1 GAD (GENERALIZED ANXIETY DISORDER): ICD-10-CM

## 2019-08-21 DIAGNOSIS — F90.0 ATTENTION DEFICIT HYPERACTIVITY DISORDER (ADHD), PREDOMINANTLY INATTENTIVE TYPE: ICD-10-CM

## 2019-08-21 PROCEDURE — G0463 HOSPITAL OUTPT CLINIC VISIT: HCPCS

## 2019-08-21 PROCEDURE — 99213 OFFICE O/P EST LOW 20 MIN: CPT | Performed by: FAMILY MEDICINE

## 2019-08-21 PROCEDURE — G0463 HOSPITAL OUTPT CLINIC VISIT: HCPCS | Mod: 25

## 2019-08-21 RX ORDER — LAMOTRIGINE 25 MG/1
TABLET ORAL
Qty: 60 TABLET | Refills: 3 | Status: SHIPPED | OUTPATIENT
Start: 2019-08-21 | End: 2019-11-25

## 2019-08-21 RX ORDER — DEXTROAMPHETAMINE SACCHARATE, AMPHETAMINE ASPARTATE MONOHYDRATE, DEXTROAMPHETAMINE SULFATE AND AMPHETAMINE SULFATE 6.25; 6.25; 6.25; 6.25 MG/1; MG/1; MG/1; MG/1
25 CAPSULE, EXTENDED RELEASE ORAL DAILY
Qty: 30 CAPSULE | Refills: 0 | Status: SHIPPED | OUTPATIENT
Start: 2019-10-21 | End: 2019-11-19

## 2019-08-21 RX ORDER — DEXTROAMPHETAMINE SACCHARATE, AMPHETAMINE ASPARTATE MONOHYDRATE, DEXTROAMPHETAMINE SULFATE AND AMPHETAMINE SULFATE 6.25; 6.25; 6.25; 6.25 MG/1; MG/1; MG/1; MG/1
25 CAPSULE, EXTENDED RELEASE ORAL DAILY
Qty: 30 CAPSULE | Refills: 0 | Status: SHIPPED | OUTPATIENT
Start: 2019-08-21 | End: 2019-11-25

## 2019-08-21 RX ORDER — DEXTROAMPHETAMINE SACCHARATE, AMPHETAMINE ASPARTATE MONOHYDRATE, DEXTROAMPHETAMINE SULFATE AND AMPHETAMINE SULFATE 6.25; 6.25; 6.25; 6.25 MG/1; MG/1; MG/1; MG/1
25 CAPSULE, EXTENDED RELEASE ORAL DAILY
Qty: 30 CAPSULE | Refills: 0 | Status: SHIPPED | OUTPATIENT
Start: 2019-09-20 | End: 2019-11-25

## 2019-08-21 ASSESSMENT — ANXIETY QUESTIONNAIRES
1. FEELING NERVOUS, ANXIOUS, OR ON EDGE: MORE THAN HALF THE DAYS
2. NOT BEING ABLE TO STOP OR CONTROL WORRYING: MORE THAN HALF THE DAYS
5. BEING SO RESTLESS THAT IT IS HARD TO SIT STILL: MORE THAN HALF THE DAYS
6. BECOMING EASILY ANNOYED OR IRRITABLE: NEARLY EVERY DAY
GAD7 TOTAL SCORE: 15
7. FEELING AFRAID AS IF SOMETHING AWFUL MIGHT HAPPEN: MORE THAN HALF THE DAYS
3. WORRYING TOO MUCH ABOUT DIFFERENT THINGS: MORE THAN HALF THE DAYS
IF YOU CHECKED OFF ANY PROBLEMS ON THIS QUESTIONNAIRE, HOW DIFFICULT HAVE THESE PROBLEMS MADE IT FOR YOU TO DO YOUR WORK, TAKE CARE OF THINGS AT HOME, OR GET ALONG WITH OTHER PEOPLE: VERY DIFFICULT

## 2019-08-21 ASSESSMENT — PATIENT HEALTH QUESTIONNAIRE - PHQ9
SUM OF ALL RESPONSES TO PHQ QUESTIONS 1-9: 13
5. POOR APPETITE OR OVEREATING: MORE THAN HALF THE DAYS

## 2019-08-21 ASSESSMENT — MIFFLIN-ST. JEOR: SCORE: 1638.54

## 2019-08-21 ASSESSMENT — PAIN SCALES - GENERAL: PAINLEVEL: MODERATE PAIN (4)

## 2019-08-21 NOTE — NURSING NOTE
"Chief Complaint   Patient presents with     A.D.H.D       Initial /74 (BP Location: Left arm, Patient Position: Sitting, Cuff Size: Adult Regular)   Pulse 104   Temp 98.4  F (36.9  C) (Tympanic)   Ht 1.651 m (5' 5\")   Wt 90.3 kg (199 lb)   SpO2 97%   BMI 33.12 kg/m   Estimated body mass index is 33.12 kg/m  as calculated from the following:    Height as of this encounter: 1.651 m (5' 5\").    Weight as of this encounter: 90.3 kg (199 lb).  Medication Reconciliation: complete  "

## 2019-08-22 ENCOUNTER — HOSPITAL ENCOUNTER (OUTPATIENT)
Dept: PHYSICAL THERAPY | Facility: HOSPITAL | Age: 29
Setting detail: THERAPIES SERIES
End: 2019-08-22
Attending: PHYSICIAN ASSISTANT
Payer: COMMERCIAL

## 2019-08-22 PROCEDURE — 97140 MANUAL THERAPY 1/> REGIONS: CPT | Mod: GP

## 2019-08-22 ASSESSMENT — ANXIETY QUESTIONNAIRES: GAD7 TOTAL SCORE: 15

## 2019-08-26 ENCOUNTER — OFFICE VISIT (OUTPATIENT)
Dept: PODIATRY | Facility: OTHER | Age: 29
End: 2019-08-26
Attending: PODIATRIST
Payer: COMMERCIAL

## 2019-08-26 VITALS
HEART RATE: 79 BPM | SYSTOLIC BLOOD PRESSURE: 100 MMHG | TEMPERATURE: 96.5 F | WEIGHT: 199 LBS | OXYGEN SATURATION: 97 % | BODY MASS INDEX: 33.15 KG/M2 | DIASTOLIC BLOOD PRESSURE: 70 MMHG | HEIGHT: 65 IN

## 2019-08-26 DIAGNOSIS — Z72.0 TOBACCO ABUSE: ICD-10-CM

## 2019-08-26 DIAGNOSIS — M62.461 GASTROCNEMIUS EQUINUS OF RIGHT LOWER EXTREMITY: ICD-10-CM

## 2019-08-26 DIAGNOSIS — M72.2 PLANTAR FASCIAL FIBROMATOSIS: Primary | ICD-10-CM

## 2019-08-26 DIAGNOSIS — M79.672 LEFT FOOT PAIN: ICD-10-CM

## 2019-08-26 DIAGNOSIS — M79.671 RIGHT FOOT PAIN: ICD-10-CM

## 2019-08-26 DIAGNOSIS — Z71.6 TOBACCO ABUSE COUNSELING: ICD-10-CM

## 2019-08-26 DIAGNOSIS — M62.462 GASTROCNEMIUS EQUINUS OF LEFT LOWER EXTREMITY: ICD-10-CM

## 2019-08-26 PROCEDURE — 20605 DRAIN/INJ JOINT/BURSA W/O US: CPT | Performed by: PODIATRIST

## 2019-08-26 PROCEDURE — G0463 HOSPITAL OUTPT CLINIC VISIT: HCPCS | Mod: 25

## 2019-08-26 PROCEDURE — 99213 OFFICE O/P EST LOW 20 MIN: CPT | Mod: 25 | Performed by: PODIATRIST

## 2019-08-26 PROCEDURE — G0463 HOSPITAL OUTPT CLINIC VISIT: HCPCS

## 2019-08-26 RX ORDER — KETOROLAC TROMETHAMINE 30 MG/ML
45 INJECTION, SOLUTION INTRAMUSCULAR; INTRAVENOUS ONCE
Status: COMPLETED | OUTPATIENT
Start: 2019-08-26 | End: 2019-08-26

## 2019-08-26 RX ADMIN — KETOROLAC TROMETHAMINE 45 MG: 30 INJECTION, SOLUTION INTRAMUSCULAR at 09:30

## 2019-08-26 ASSESSMENT — MIFFLIN-ST. JEOR: SCORE: 1633.54

## 2019-08-26 ASSESSMENT — PAIN SCALES - GENERAL: PAINLEVEL: MODERATE PAIN (5)

## 2019-08-26 NOTE — NURSING NOTE
"Chief Complaint   Patient presents with     RECHECK     Left heal       Initial /70 (BP Location: Right arm, Patient Position: Chair, Cuff Size: Adult Regular)   Pulse 79   Temp 96.5  F (35.8  C) (Tympanic)   Ht 1.651 m (5' 5\")   Wt 90.3 kg (199 lb)   SpO2 97%   BMI 33.12 kg/m   Estimated body mass index is 33.12 kg/m  as calculated from the following:    Height as of this encounter: 1.651 m (5' 5\").    Weight as of this encounter: 90.3 kg (199 lb).  Medication Reconciliation: complete   SHERICE SHARMA LPN      "

## 2019-08-26 NOTE — PROGRESS NOTES
Clinic Administered Medication Documentation      Injectable Medication Documentation    Patient was given Ketorolac Tromethamine (Toradol). Prior to medication administration, verified patients identity using patient s name and date of birth. Please see MAR and medication order for additional information. Patient instructed to report any adverse reaction to staff immediately .      Was entire vial of medication used? No, The remainder 0.5/ML45MG of  60{MG/MLwas discarded as unavoidable waste.  Vial/Syringe: Single dose vial  Expiration Date:  06/2020  Was this medication supplied by the patient? No

## 2019-08-26 NOTE — PATIENT INSTRUCTIONS

## 2019-08-26 NOTE — PROGRESS NOTES
"Chief complaint: Patient presents with:  RECHECK: Left heal      History of Present Illness: This 29 year old female is seen for follow-up management of bilateral foot pain (L>R). She has been through physical therapy and she saw the orthotist, Verónica Fitzgerald for CMO modifications. She needs them modified but she is not sure if she has an appointment set up for this. She is no longer going through physical therapy, but she still does the stretches every morning.  She had an injection of the LEFT heel on 06/11/2019 and it made her foot fairly numb with minimal pain up until mid August, 2019 when the pain returned to baseline.    She went to her chiropractor and he found that she has a heel spur. She doesn't notice a difference with the compression socks. The pain is still the worst with first step pain. No further pedal complaints today.       Her RIGHT one improved after the previous injections.    She is still smoking and she does not want to quit or start the Quit Plan today.     History of heel pain:  Pain has been present for the past since around January, 2019. No history of trauma. Pain is present on the plantar heels bilaterally. She has burning, tingling and numbness in the toes that has been present in the LEFT since around late summer, 2019 (this happened after she gave birth to her daughter) and now both feet go numb (the RIGHT foot started around December, 2018). The heel pain is the worst with first step pain, but the overall foot pain and tingling goes on throughout the whole day. She has not tried any treatment modalities and she presents with a lightweight, Nike tennis shoe. She says she smoked 1 ppd and she has smoked for a long time. No further pedal complaints today.     /70 (BP Location: Right arm, Patient Position: Chair, Cuff Size: Adult Regular)   Pulse 79   Temp 96.5  F (35.8  C) (Tympanic)   Ht 1.651 m (5' 5\")   Wt 90.3 kg (199 lb)   SpO2 97%   BMI 33.12 kg/m      Patient Active " "Problem List   Diagnosis     Anxiety     Smoker     Attention deficit hyperactivity disorder (ADHD), predominantly inattentive type     PTSD (post-traumatic stress disorder)     Hydronephrosis     ACP (advance care planning)     Acute right-sided low back pain with right-sided sciatica     Spontaneous miscarriage     Major depressive disorder, recurrent episode, mild (H)     Complicated grieving     Relationship problem between partners       Past Surgical History:   Procedure Laterality Date     CERCLAGE CERVICAL N/A 11/10/2015    Procedure: CERCLAGE CERVICAL;  Surgeon: Tucker Barragan MD;  [facility]: [facility]      SECTION  2012    Pregnancy full-term; \"Oleksandr\"       SECTION N/A 2016    Procedure:  SECTION;  Surgeon: Nisha Mcleod MD;  Location: [facility] OR     HC NEPHROSTOMY PERCUTUTANEOUS      left     impacted wisdom teeth removal         Current Outpatient Medications   Medication     [START ON 10/21/2019] amphetamine-dextroamphetamine (ADDERALL XR) 25 MG 24 hr capsule     [START ON 2019] amphetamine-dextroamphetamine (ADDERALL XR) 25 MG 24 hr capsule     amphetamine-dextroamphetamine (ADDERALL XR) 25 MG 24 hr capsule     lamoTRIgine (LAMICTAL) 25 MG tablet     sertraline (ZOLOFT) 100 MG tablet     No current facility-administered medications for this visit.           Allergies   Allergen Reactions     Wellbutrin [Bupropion] Other (See Comments)     Mood changes       Family History   Problem Relation Age of Onset     Alcohol/Drug Father         alcoholism     Psychotic Disorder Father         Bipolar     Cerebrovascular Disease Other         Cerebrovascular accident     C.A.D. Other      Hypertension Other      Diabetes Other        Social History     Socioeconomic History     Marital status: Single     Spouse name: None     Number of children: None     Years of education: None     Highest education level: None   Occupational History     None   Social Needs     Financial " resource strain: None     Food insecurity:     Worry: None     Inability: None     Transportation needs:     Medical: None     Non-medical: None   Tobacco Use     Smoking status: Current Every Day Smoker     Packs/day: 0.50     Years: 10.00     Pack years: 5.00     Types: Cigarettes     Smokeless tobacco: Never Used   Substance and Sexual Activity     Alcohol use: No     Alcohol/week: 0.0 oz     Drug use: No     Sexual activity: Yes     Partners: Male     Birth control/protection: None   Lifestyle     Physical activity:     Days per week: None     Minutes per session: None     Stress: None   Relationships     Social connections:     Talks on phone: None     Gets together: None     Attends Confucianism service: None     Active member of club or organization: None     Attends meetings of clubs or organizations: None     Relationship status: None     Intimate partner violence:     Fear of current or ex partner: None     Emotionally abused: None     Physically abused: None     Forced sexual activity: None   Other Topics Concern      Service Not Asked     Blood Transfusions Yes     Caffeine Concern Not Asked     Occupational Exposure Not Asked     Hobby Hazards Not Asked     Sleep Concern Not Asked     Stress Concern Not Asked     Weight Concern Not Asked     Special Diet Not Asked     Back Care Not Asked     Exercise Not Asked     Bike Helmet Not Asked     Seat Belt Not Asked     Self-Exams Not Asked     Parent/sibling w/ CABG, MI or angioplasty before 65F 55M? No   Social History Narrative     None       ROS: 10 point ROS neg other than the symptoms noted above in the HPI.  EXAM  Constitutional: healthy, alert and no distress    Psychiatric: mentation appears normal and affect normal/bright    VASCULAR:  -Dorsalis pedis pulse +2/4 b/l  -Posterior tibial pulse +1/4 b/l  -Capillary refill time < 3 seconds to b/l hallux  -Mild 1+ pitting edema to bilateral ankles  NEURO:  -Light touch sensation intact to b/l plantar  forefoot  DERM:  -Skin temperature, texture and turgor WNL b/l  -Toenails normotrophic x 10  MSK:  -Pain on palpation to bilateral calcaneus, medial tubercle, LEFT heel  -Muscle strength of ankles +5/5 for dorsiflexion, plantarflexion, ABDUction and ADDuction b/l  -Ankle joint passive ROM within normal limits except for dorsiflexion:    Dorsiflexion, RIGHT Straight knee 0 degrees    Dorsiflexion, LEFT Straight knee 0 degrees  -RCSP neutral bilaterally   ---Mild abductory twist bilaterally   -DORSIFLEXION contracture to MTPJ 2-5 b/l  ============================================================    ASSESSMENT:  (M72.2) Plantar fascial fibromatosis  (primary encounter diagnosis)    (M79.672) Left foot pain    (M79.671) Right foot pain    (M21.6X2) Gastrocnemius equinus of left lower extremity    (M21.6X1) Gastrocnemius equinus of right lower extremity    (Z71.6) Tobacco abuse counseling    (Z72.0) Tobacco abuse      PLAN:  -Patient evaluated and examined. Treatment options discussed with no educational barriers noted.    -Patient has CMOs:   ---Appointment scheduled for adjustments to CMOs on 10/22/2019  -Patient attempted Compression socks and she did not notice a difference    -Continue Stability Shoe Gear: This involves wearing a solid tennis shoe that bends at the toe, but has a solid midfoot shank and heel contour. Her current tennis shoes are lightweight shoes and are not providing her with any foot support.   -Continue Stretching and physical therapy exercises at home  -Continue Icing: Ice the bottom of the foot minimally once a day for ten minutes per foot with a frozen water bottle. Ice after any extended amount of time on your feet.  -Continue supportive sandals (example: Oofos, Rosanky, Vionics) for around the house    -LEFT heel anti-inflammatory Injection: Obtained written and verbal consent from patient for an anti-inflammatory injection into the medial heel of the LEFT foot. Reviewed risks and benefits of  the injection. Informed patient that the injection may decrease pain long-term, short-term, or not at all. Patient in agreement with an injection today in an effort to slow or reverse the chronic inflammatory process and pain in the foot. Applied an alcohol swab to the injection site on both heels. Injected a total of 3mL mL with 1.5 mL of Ketorolac and 1.5mL of 2% Lidocaine plain. Covered both heels with a bandaid. Patient tolerated the injection well.    -DME for night splint for LEFT foot  -Tobacco cessation discussed with patient including benefits of quitting and risks of not quitting. She has declined the Quit Plan and she is not interested in quitting today.  -Patient in agreement with the above treatment plan and all of patient's questions were answered.      RTC 3 months to evaluate heel pain  Will consider an MRI with the discussion of surgery at next follow-up appointment if patient has had no pain relief      Courtney Quintero DPM

## 2019-08-27 ENCOUNTER — HOSPITAL ENCOUNTER (OUTPATIENT)
Dept: PHYSICAL THERAPY | Facility: HOSPITAL | Age: 29
Setting detail: THERAPIES SERIES
End: 2019-08-27
Attending: PHYSICIAN ASSISTANT
Payer: COMMERCIAL

## 2019-08-27 PROCEDURE — 97140 MANUAL THERAPY 1/> REGIONS: CPT | Mod: GP

## 2019-10-01 NOTE — PROGRESS NOTES
Outpatient Physical Therapy Discharge Note     Patient: Jennifer Tatum  : 1990    Beginning/End Dates of Reporting Period:  2019 to 10/1/2019    Referring Provider: NIKOLAI Bolaños    Therapy Diagnosis: Lumbar strain mediated by somatic dysfunction at pelvic, sacral, lumbar, thoracic, and cranial segments, with functional leg length difference.     Client Self Report: Patient seen [id number] for C/O recurrent LBP. She is sore. Pain rated 5/10 right now. She has been shifting her weight to the right side because the left heel hurts. She had an injection of Torodol in the foot yesterday.     Objective Measurements:  Objective Measure: Somatic dysfunction  Details: Dural sheath adhered in lumbar and thoracic regions in caudal and cephalad glides. Left sacral sulcus deep and left SPENCER prominent. Leg lengths equal, pelvis level, and SI jts equally mobile. Lumbar segments alignment WFL.      Goals:  Goal Identifier 1 Functional   Goal Description Improved Oswestry Disability Index score to 20% or better   Target Date 10/01/19   Date Met      Progress:     Goal Identifier 2 Outcome   Goal Description Resolution of somatic dysfunction   Target Date 10/01/19   Date Met      Progress:       Progress Toward Goals:   Progress limited due to patient either failing to arrive or canceling 14 appointments in a row.     Plan:  Discharge from therapy.    Discharge:    Reason for Discharge: Patient has not made expected progress due to interrupted treatment attendance.    Equipment Issued: n/a    Discharge Plan: current status of patient is unknown.    Giselle Munoz DPT

## 2019-10-10 ENCOUNTER — HOSPITAL ENCOUNTER (EMERGENCY)
Facility: HOSPITAL | Age: 29
Discharge: HOME | End: 2019-10-10
Admitting: NURSE PRACTITIONER
Payer: COMMERCIAL

## 2019-10-10 VITALS
TEMPERATURE: 97.7 F | RESPIRATION RATE: 14 BRPM | DIASTOLIC BLOOD PRESSURE: 82 MMHG | SYSTOLIC BLOOD PRESSURE: 128 MMHG | OXYGEN SATURATION: 99 %

## 2019-10-10 PROCEDURE — 40000268 ZZH STATISTIC NO CHARGES

## 2019-11-19 NOTE — PROGRESS NOTES
"Subjective     Jennifer Tatum is a 29 year old female who presents to clinic today for the following health issues:    HPI   Medication Followup of Adderall    Taking Medication as prescribed: yes    Side Effects:  None    Medication Helping Symptoms:  not applicable  Doing well with regards to Adderall  In between jobs but helps there and taking care of 2 children           Dealing with msk issues    Depression stable for her  Anxiety worse- mostly when has appt or needs to be there on time   Seeing Magalie Meyer NP at [facility]  Pt looking at Medical THC for her anxiety   Going to see [facility]  Uses some THC weekly not daily at night and helps.          PHQ-9 SCORE 5/20/2019 8/21/2019 11/25/2019   PHQ-9 Total Score - - -   PHQ-9 Total Score 14 13 16     VINI-7 SCORE 5/20/2019 8/21/2019 11/25/2019   Total Score 19 15 20           Current Outpatient Medications   Medication Sig Dispense Refill     amphetamine-dextroamphetamine (ADDERALL XR) 25 MG 24 hr capsule Take 1 capsule (25 mg) by mouth daily 30 capsule 0     ARIPiprazole (ABILIFY) 5 MG tablet Take 5 mg by mouth 2 times daily       sertraline (ZOLOFT) 100 MG tablet Take 1 tablet (100 mg) by mouth daily 90 tablet 1     Allergies   Allergen Reactions     Wellbutrin [Bupropion] Other (See Comments)     Mood changes         Reviewed and updated as needed this visit by Provider         Review of Systems   ROS COMP: CONSTITUTIONAL: NEGATIVE for fever, chills, change in weight  RESP: NEGATIVE for significant cough or SOB  CV: NEGATIVE for chest pain, palpitations or peripheral edema      Objective    /72 (BP Location: Left arm, Patient Position: Sitting, Cuff Size: Adult Regular)   Pulse 103   Temp 98.9  F (37.2  C) (Tympanic)   Ht 1.651 m (5' 5\")   Wt 88.5 kg (195 lb)   SpO2 98%   BMI 32.45 kg/m    There is no height or weight on file to calculate BMI.  Physical Exam   GENERAL: healthy, alert and no distress  PSYCH: mentation appears " "normal, affect anxious/ over analyzes/ lots of worries. Good attentive mom            Assessment & Plan     1. Attention deficit hyperactivity disorder (ADHD), predominantly inattentive type  Stable - no labs needed today. RF done. Will f/u in 6 months - call for another 3 month RF discussed.   - amphetamine-dextroamphetamine (ADDERALL XR) 25 MG 24 hr capsule; Take 1 capsule (25 mg) by mouth daily  Dispense: 30 capsule; Refill: 0  - amphetamine-dextroamphetamine (ADDERALL XR) 25 MG 24 hr capsule; Take 1 capsule (25 mg) by mouth daily  Dispense: 30 capsule; Refill: 0  - amphetamine-dextroamphetamine (ADDERALL XR) 25 MG 24 hr capsule; Take 1 capsule (25 mg) by mouth daily  Dispense: 30 capsule; Refill: 0    2. VINI (generalized anxiety disorder)  Still big issue. Medical THC may help- on moderate amt meds.  Seeing couselor and NP.  Will be making appt with [facility].  Will add a prn Vistaril and pt aware of side effect of sedation   - ARIPiprazole (ABILIFY) 5 MG tablet; Take 1 tablet (5 mg) by mouth 2 times daily  Dispense: 60 tablet; Refill: 5  - hydrOXYzine (ATARAX) 25 MG tablet; 1-2 tabs every 6 hrs as needed anxiety  Dispense: 30 tablet; Refill: 1    3. PTSD (post-traumatic stress disorder)  As above- would qualify for medical THC-- pt aware of avg price tag  - ARIPiprazole (ABILIFY) 5 MG tablet; Take 1 tablet (5 mg) by mouth 2 times daily  Dispense: 60 tablet; Refill: 5    4. Major depressive disorder, recurrent episode, mild (H)  Same RF on Abilify needed   - ARIPiprazole (ABILIFY) 5 MG tablet; Take 1 tablet (5 mg) by mouth 2 times daily  Dispense: 60 tablet; Refill: 5     Tobacco Cessation:   reports that she has been smoking cigarettes. She has a 5.00 pack-year smoking history. She has never used smokeless tobacco.        BMI:   Estimated body mass index is 32.45 kg/m  as calculated from the following:    Height as of this encounter: 1.651 m (5' 5\").    Weight as of this encounter: 88.5 kg (195 lb). "               No follow-ups on file.    Cornell Herron MD  [facility]

## 2019-11-25 ENCOUNTER — TELEPHONE (OUTPATIENT)
Dept: FAMILY MEDICINE | Facility: OTHER | Age: 29
End: 2019-11-25

## 2019-11-25 ENCOUNTER — OFFICE VISIT (OUTPATIENT)
Dept: FAMILY MEDICINE | Facility: OTHER | Age: 29
End: 2019-11-25
Attending: FAMILY MEDICINE
Payer: COMMERCIAL

## 2019-11-25 VITALS
TEMPERATURE: 98.9 F | SYSTOLIC BLOOD PRESSURE: 118 MMHG | BODY MASS INDEX: 32.49 KG/M2 | DIASTOLIC BLOOD PRESSURE: 72 MMHG | WEIGHT: 195 LBS | OXYGEN SATURATION: 98 % | HEART RATE: 103 BPM | HEIGHT: 65 IN

## 2019-11-25 DIAGNOSIS — F33.0 MAJOR DEPRESSIVE DISORDER, RECURRENT EPISODE, MILD (H): ICD-10-CM

## 2019-11-25 DIAGNOSIS — F43.10 PTSD (POST-TRAUMATIC STRESS DISORDER): ICD-10-CM

## 2019-11-25 DIAGNOSIS — F41.1 GAD (GENERALIZED ANXIETY DISORDER): Primary | ICD-10-CM

## 2019-11-25 DIAGNOSIS — F90.0 ATTENTION DEFICIT HYPERACTIVITY DISORDER (ADHD), PREDOMINANTLY INATTENTIVE TYPE: ICD-10-CM

## 2019-11-25 DIAGNOSIS — F41.1 GAD (GENERALIZED ANXIETY DISORDER): ICD-10-CM

## 2019-11-25 PROCEDURE — 99214 OFFICE O/P EST MOD 30 MIN: CPT | Performed by: FAMILY MEDICINE

## 2019-11-25 PROCEDURE — G0463 HOSPITAL OUTPT CLINIC VISIT: HCPCS

## 2019-11-25 RX ORDER — DEXTROAMPHETAMINE SACCHARATE, AMPHETAMINE ASPARTATE MONOHYDRATE, DEXTROAMPHETAMINE SULFATE AND AMPHETAMINE SULFATE 6.25; 6.25; 6.25; 6.25 MG/1; MG/1; MG/1; MG/1
25 CAPSULE, EXTENDED RELEASE ORAL DAILY
Qty: 30 CAPSULE | Refills: 0 | Status: SHIPPED | OUTPATIENT
Start: 2019-12-23 | End: 2020-01-31

## 2019-11-25 RX ORDER — DEXTROAMPHETAMINE SACCHARATE, AMPHETAMINE ASPARTATE MONOHYDRATE, DEXTROAMPHETAMINE SULFATE AND AMPHETAMINE SULFATE 6.25; 6.25; 6.25; 6.25 MG/1; MG/1; MG/1; MG/1
25 CAPSULE, EXTENDED RELEASE ORAL DAILY
Qty: 30 CAPSULE | Refills: 0 | Status: SHIPPED | OUTPATIENT
Start: 2020-01-23 | End: 2020-06-15

## 2019-11-25 RX ORDER — ARIPIPRAZOLE 5 MG/1
5 TABLET ORAL 2 TIMES DAILY
Qty: 60 TABLET | Refills: 5 | Status: SHIPPED | OUTPATIENT
Start: 2019-11-25 | End: 2019-11-27

## 2019-11-25 RX ORDER — HYDROXYZINE HYDROCHLORIDE 25 MG/1
TABLET, FILM COATED ORAL
Qty: 30 TABLET | Refills: 1 | Status: SHIPPED | OUTPATIENT
Start: 2019-11-25 | End: 2022-10-27

## 2019-11-25 RX ORDER — ARIPIPRAZOLE 5 MG/1
5 TABLET ORAL 2 TIMES DAILY
COMMUNITY
End: 2019-11-25

## 2019-11-25 RX ORDER — DEXTROAMPHETAMINE SACCHARATE, AMPHETAMINE ASPARTATE MONOHYDRATE, DEXTROAMPHETAMINE SULFATE AND AMPHETAMINE SULFATE 6.25; 6.25; 6.25; 6.25 MG/1; MG/1; MG/1; MG/1
25 CAPSULE, EXTENDED RELEASE ORAL DAILY
Qty: 30 CAPSULE | Refills: 0 | Status: SHIPPED | OUTPATIENT
Start: 2019-11-25 | End: 2020-01-31

## 2019-11-25 ASSESSMENT — ANXIETY QUESTIONNAIRES
2. NOT BEING ABLE TO STOP OR CONTROL WORRYING: NEARLY EVERY DAY
4. TROUBLE RELAXING: NEARLY EVERY DAY
6. BECOMING EASILY ANNOYED OR IRRITABLE: NEARLY EVERY DAY
1. FEELING NERVOUS, ANXIOUS, OR ON EDGE: NEARLY EVERY DAY
IF YOU CHECKED OFF ANY PROBLEMS ON THIS QUESTIONNAIRE, HOW DIFFICULT HAVE THESE PROBLEMS MADE IT FOR YOU TO DO YOUR WORK, TAKE CARE OF THINGS AT HOME, OR GET ALONG WITH OTHER PEOPLE: VERY DIFFICULT
GAD7 TOTAL SCORE: 20
5. BEING SO RESTLESS THAT IT IS HARD TO SIT STILL: NEARLY EVERY DAY
3. WORRYING TOO MUCH ABOUT DIFFERENT THINGS: NEARLY EVERY DAY
7. FEELING AFRAID AS IF SOMETHING AWFUL MIGHT HAPPEN: MORE THAN HALF THE DAYS

## 2019-11-25 ASSESSMENT — PATIENT HEALTH QUESTIONNAIRE - PHQ9: SUM OF ALL RESPONSES TO PHQ QUESTIONS 1-9: 16

## 2019-11-25 ASSESSMENT — MIFFLIN-ST. JEOR: SCORE: 1615.39

## 2019-11-25 ASSESSMENT — PAIN SCALES - GENERAL: PAINLEVEL: NO PAIN (0)

## 2019-11-25 NOTE — NURSING NOTE
"Chief Complaint   Patient presents with     A.D.H.D       Initial /72 (BP Location: Left arm, Patient Position: Sitting, Cuff Size: Adult Regular)   Pulse 103   Temp 98.9  F (37.2  C) (Tympanic)   Ht 1.651 m (5' 5\")   Wt 88.5 kg (195 lb)   SpO2 98%   BMI 32.45 kg/m   Estimated body mass index is 32.45 kg/m  as calculated from the following:    Height as of this encounter: 1.651 m (5' 5\").    Weight as of this encounter: 88.5 kg (195 lb).  Medication Reconciliation: complete  Diane Espino LPN  "

## 2019-11-26 ASSESSMENT — ANXIETY QUESTIONNAIRES: GAD7 TOTAL SCORE: 20

## 2019-11-27 ENCOUNTER — TELEPHONE (OUTPATIENT)
Dept: FAMILY MEDICINE | Facility: OTHER | Age: 29
End: 2019-11-27

## 2019-11-27 DIAGNOSIS — F43.10 PTSD (POST-TRAUMATIC STRESS DISORDER): Primary | ICD-10-CM

## 2019-11-27 DIAGNOSIS — F41.1 GAD (GENERALIZED ANXIETY DISORDER): ICD-10-CM

## 2019-11-27 RX ORDER — ARIPIPRAZOLE 5 MG/1
TABLET ORAL
Qty: 30 TABLET | Refills: 5 | Status: SHIPPED | OUTPATIENT
Start: 2019-11-27 | End: 2019-12-26

## 2019-11-27 NOTE — TELEPHONE ENCOUNTER
Pt needs a referral and last dict to be faxed to  [facility]. DX: PTSD  Please place referral

## 2019-12-26 ENCOUNTER — TELEPHONE (OUTPATIENT)
Dept: FAMILY MEDICINE | Facility: OTHER | Age: 29
End: 2019-12-26

## 2019-12-26 DIAGNOSIS — F43.10 PTSD (POST-TRAUMATIC STRESS DISORDER): Primary | ICD-10-CM

## 2019-12-26 DIAGNOSIS — F41.1 GAD (GENERALIZED ANXIETY DISORDER): ICD-10-CM

## 2019-12-26 RX ORDER — ARIPIPRAZOLE 10 MG/1
10 TABLET ORAL DAILY
Qty: 30 TABLET | Refills: 3 | Status: SHIPPED | OUTPATIENT
Start: 2019-12-26 | End: 2020-05-08

## 2019-12-26 NOTE — TELEPHONE ENCOUNTER
Left message to return call to clinic.     Patient is requesting Abilify 10 mg tab so she can take once daily.  Patient uses [facility] in [place].    Patient is also requesting a referral stating that she was diagnosed with PTSD.  She would like to  referral on 1-7-19 for an appt at [facility].  # to reach patient is [phone].  Thank you

## 2019-12-26 NOTE — TELEPHONE ENCOUNTER
Please clarify the dose she is on at this time. Looks like 5 mg tabs ?? 1 daily or is she on 1/2 tab (2.5mg) BID ??

## 2019-12-26 NOTE — TELEPHONE ENCOUNTER
Pt would like an increase on her Abilify to 10 mg. Please call pt when it is called into her pharmacy

## 2020-01-14 ENCOUNTER — OFFICE VISIT (OUTPATIENT)
Dept: FAMILY MEDICINE | Facility: OTHER | Age: 30
End: 2020-01-14
Attending: FAMILY MEDICINE
Payer: COMMERCIAL

## 2020-01-14 ENCOUNTER — ANCILLARY PROCEDURE (OUTPATIENT)
Dept: GENERAL RADIOLOGY | Facility: OTHER | Age: 30
End: 2020-01-14
Attending: FAMILY MEDICINE
Payer: COMMERCIAL

## 2020-01-14 VITALS
SYSTOLIC BLOOD PRESSURE: 126 MMHG | WEIGHT: 202 LBS | DIASTOLIC BLOOD PRESSURE: 68 MMHG | HEIGHT: 65 IN | TEMPERATURE: 98.7 F | OXYGEN SATURATION: 98 % | HEART RATE: 113 BPM | BODY MASS INDEX: 33.66 KG/M2

## 2020-01-14 DIAGNOSIS — M54.50 CHRONIC BILATERAL LOW BACK PAIN WITHOUT SCIATICA: ICD-10-CM

## 2020-01-14 DIAGNOSIS — G89.29 CHRONIC BILATERAL LOW BACK PAIN WITHOUT SCIATICA: ICD-10-CM

## 2020-01-14 DIAGNOSIS — Z23 NEED FOR PROPHYLACTIC VACCINATION AND INOCULATION AGAINST INFLUENZA: Primary | ICD-10-CM

## 2020-01-14 PROCEDURE — G0463 HOSPITAL OUTPT CLINIC VISIT: HCPCS

## 2020-01-14 PROCEDURE — G0463 HOSPITAL OUTPT CLINIC VISIT: HCPCS | Mod: 25

## 2020-01-14 PROCEDURE — 99213 OFFICE O/P EST LOW 20 MIN: CPT | Performed by: FAMILY MEDICINE

## 2020-01-14 PROCEDURE — 72100 X-RAY EXAM L-S SPINE 2/3 VWS: CPT | Mod: TC

## 2020-01-14 PROCEDURE — 90471 IMMUNIZATION ADMIN: CPT | Performed by: FAMILY MEDICINE

## 2020-01-14 PROCEDURE — 90686 IIV4 VACC NO PRSV 0.5 ML IM: CPT

## 2020-01-14 ASSESSMENT — PATIENT HEALTH QUESTIONNAIRE - PHQ9: SUM OF ALL RESPONSES TO PHQ QUESTIONS 1-9: 9

## 2020-01-14 ASSESSMENT — ANXIETY QUESTIONNAIRES
1. FEELING NERVOUS, ANXIOUS, OR ON EDGE: MORE THAN HALF THE DAYS
3. WORRYING TOO MUCH ABOUT DIFFERENT THINGS: NEARLY EVERY DAY
2. NOT BEING ABLE TO STOP OR CONTROL WORRYING: NEARLY EVERY DAY
6. BECOMING EASILY ANNOYED OR IRRITABLE: MORE THAN HALF THE DAYS
5. BEING SO RESTLESS THAT IT IS HARD TO SIT STILL: NEARLY EVERY DAY
IF YOU CHECKED OFF ANY PROBLEMS ON THIS QUESTIONNAIRE, HOW DIFFICULT HAVE THESE PROBLEMS MADE IT FOR YOU TO DO YOUR WORK, TAKE CARE OF THINGS AT HOME, OR GET ALONG WITH OTHER PEOPLE: SOMEWHAT DIFFICULT
7. FEELING AFRAID AS IF SOMETHING AWFUL MIGHT HAPPEN: NEARLY EVERY DAY
4. TROUBLE RELAXING: NEARLY EVERY DAY
GAD7 TOTAL SCORE: 19

## 2020-01-14 ASSESSMENT — PAIN SCALES - GENERAL: PAINLEVEL: MODERATE PAIN (5)

## 2020-01-14 ASSESSMENT — MIFFLIN-ST. JEOR: SCORE: 1647.15

## 2020-01-14 NOTE — NURSING NOTE
"Chief Complaint   Patient presents with     Back Pain       Initial /68   Pulse 113   Temp 98.7  F (37.1  C)   Ht 1.651 m (5' 5\")   Wt 91.6 kg (202 lb)   SpO2 98%   BMI 33.61 kg/m   Estimated body mass index is 33.61 kg/m  as calculated from the following:    Height as of this encounter: 1.651 m (5' 5\").    Weight as of this encounter: 91.6 kg (202 lb).  Medication Reconciliation: complete  Gilbert Titus LPN  "

## 2020-01-14 NOTE — PROGRESS NOTES
"Subjective     Jennifer Tatum is a 29 year old female who presents to clinic today for the following health issues:    HPI   Musculoskeletal problem/pain      Duration: 2 months    Description  Location: whole back    Intensity:  moderate    Accompanying signs and symptoms: none    History  Previous similar problem: no   Previous evaluation:  none    Precipitating or alleviating factors:  Trauma or overuse: no   Aggravating factors include: laying down    Therapies tried and outcome: nothing    Laying on abdomen - gets increase pain and spasm and almost feels like paralyzed due to pain.       Minimal pain with other positions    No leg pains    Left foot numbness -- working with Dr Quintero to help with this             Current Outpatient Medications   Medication Sig Dispense Refill     [START ON 1/23/2020] amphetamine-dextroamphetamine (ADDERALL XR) 25 MG 24 hr capsule Take 1 capsule (25 mg) by mouth daily 30 capsule 0     amphetamine-dextroamphetamine (ADDERALL XR) 25 MG 24 hr capsule Take 1 capsule (25 mg) by mouth daily 30 capsule 0     amphetamine-dextroamphetamine (ADDERALL XR) 25 MG 24 hr capsule Take 1 capsule (25 mg) by mouth daily 30 capsule 0     ARIPiprazole (ABILIFY) 10 MG tablet Take 1 tablet (10 mg) by mouth daily 30 tablet 3     hydrOXYzine (ATARAX) 25 MG tablet 1-2 tabs every 6 hrs as needed anxiety 30 tablet 1     sertraline (ZOLOFT) 100 MG tablet Take 1 tablet (100 mg) by mouth daily 90 tablet 1     Allergies   Allergen Reactions     Wellbutrin [Bupropion] Other (See Comments)     Mood changes         Reviewed and updated as needed this visit by Provider         Review of Systems   ROS COMP: Constitutional, HEENT, cardiovascular, pulmonary, gi and gu systems are negative, except as otherwise noted.      Objective    /68   Pulse 113   Temp 98.7  F (37.1  C)   Ht 1.651 m (5' 5\")   Wt 91.6 kg (202 lb)   SpO2 98%   BMI 33.61 kg/m    Body mass index is 33.61 kg/m .  Physical Exam   GENERAL: " "healthy, alert and no distress  MS: no gross musculoskeletal defects noted, no edema- DTR/SLR/Strength equal.  Mild positive HUE   BACK: no CVA tenderness,  paralumbar tenderness  Mild SI tenderness        XR unremarkable     Assessment & Plan     1. Need for prophylactic vaccination and inoculation against influenza  Shot given   - INFLUENZA VACCINE IM > 6 MONTHS VALENT IIV4 [44669]  - Vaccine Administration, Initial [16857]    2. Chronic bilateral low back pain without sciatica  Functional/mechanical. Getting spasms with laying on belly. Needs to go back to PT and get on good HEP .  Wt loss will help and good body mechanics.  Discussed behavioral changes and proper body mechanics needed to help control patient's back pain.   Discussed in length conservative measures of OTC medications for pain, Ice/Heat, elevation and the concept of R.I.C.E.. Continue behavioral changes and proper body mechanics to allow for healing. Follow up as directed.   - XR Lumbar Spine 2/3 Views; Future  - PHYSICAL THERAPY REFERRAL; Future     BMI:   Estimated body mass index is 33.61 kg/m  as calculated from the following:    Height as of this encounter: 1.651 m (5' 5\").    Weight as of this encounter: 91.6 kg (202 lb).               No follow-ups on file.    Cornell Herron MD  [facility] [place]      "

## 2020-01-15 ASSESSMENT — ANXIETY QUESTIONNAIRES: GAD7 TOTAL SCORE: 19

## 2020-01-31 ENCOUNTER — OFFICE VISIT (OUTPATIENT)
Dept: OBGYN | Facility: OTHER | Age: 30
End: 2020-01-31
Attending: NURSE PRACTITIONER
Payer: COMMERCIAL

## 2020-01-31 VITALS
DIASTOLIC BLOOD PRESSURE: 64 MMHG | WEIGHT: 202 LBS | BODY MASS INDEX: 33.66 KG/M2 | HEART RATE: 102 BPM | SYSTOLIC BLOOD PRESSURE: 108 MMHG | HEIGHT: 65 IN | OXYGEN SATURATION: 98 %

## 2020-01-31 DIAGNOSIS — N90.89 LABIAL LESION: Primary | ICD-10-CM

## 2020-01-31 PROCEDURE — 88305 TISSUE EXAM BY PATHOLOGIST: CPT | Mod: TC | Performed by: NURSE PRACTITIONER

## 2020-01-31 PROCEDURE — 11104 PUNCH BX SKIN SINGLE LESION: CPT

## 2020-01-31 PROCEDURE — 56605 BIOPSY OF VULVA/PERINEUM: CPT

## 2020-01-31 PROCEDURE — 56605 BIOPSY OF VULVA/PERINEUM: CPT | Performed by: NURSE PRACTITIONER

## 2020-01-31 PROCEDURE — 36415 COLL VENOUS BLD VENIPUNCTURE: CPT | Mod: ZL | Performed by: NURSE PRACTITIONER

## 2020-01-31 PROCEDURE — 87491 CHLMYD TRACH DNA AMP PROBE: CPT | Mod: ZL | Performed by: NURSE PRACTITIONER

## 2020-01-31 PROCEDURE — 86780 TREPONEMA PALLIDUM: CPT | Mod: ZL | Performed by: NURSE PRACTITIONER

## 2020-01-31 PROCEDURE — 87529 HSV DNA AMP PROBE: CPT | Mod: ZL,59 | Performed by: NURSE PRACTITIONER

## 2020-01-31 PROCEDURE — 87591 N.GONORRHOEAE DNA AMP PROB: CPT | Mod: ZL | Performed by: NURSE PRACTITIONER

## 2020-01-31 PROCEDURE — G0463 HOSPITAL OUTPT CLINIC VISIT: HCPCS | Mod: 25 | Performed by: COUNSELOR

## 2020-01-31 PROCEDURE — 99213 OFFICE O/P EST LOW 20 MIN: CPT | Mod: 25 | Performed by: NURSE PRACTITIONER

## 2020-01-31 PROCEDURE — 87070 CULTURE OTHR SPECIMN AEROBIC: CPT | Mod: ZL | Performed by: NURSE PRACTITIONER

## 2020-01-31 ASSESSMENT — MIFFLIN-ST. JEOR: SCORE: 1647.15

## 2020-01-31 ASSESSMENT — PAIN SCALES - GENERAL: PAINLEVEL: MODERATE PAIN (5)

## 2020-01-31 NOTE — NURSING NOTE
"Chief Complaint   Patient presents with     Vaginal Problem     Ulcer       Initial /64 (BP Location: Right arm, Patient Position: Sitting, Cuff Size: Adult Regular)   Pulse 102   Ht 1.651 m (5' 5\")   Wt 91.6 kg (202 lb)   SpO2 98%   BMI 33.61 kg/m   Estimated body mass index is 33.61 kg/m  as calculated from the following:    Height as of this encounter: 1.651 m (5' 5\").    Weight as of this encounter: 91.6 kg (202 lb).  Medication Reconciliation: complete     Shiloh King LPN    "

## 2020-02-01 LAB
C TRACH DNA SPEC QL PROBE+SIG AMP: NOT DETECTED
N GONORRHOEA DNA SPEC QL PROBE+SIG AMP: NOT DETECTED
SPECIMEN SOURCE: NORMAL
T PALLIDUM AB SER QL: NONREACTIVE

## 2020-02-02 LAB
HSV1 DNA SPEC QL NAA+PROBE: NEGATIVE
HSV2 DNA SPEC QL NAA+PROBE: NEGATIVE
SPECIMEN SOURCE: NORMAL

## 2020-02-03 LAB
BACTERIA SPEC CULT: NORMAL
BACTERIA SPEC CULT: NORMAL
SPECIMEN SOURCE: NORMAL

## 2020-02-04 ENCOUNTER — OFFICE VISIT (OUTPATIENT)
Dept: OBGYN | Facility: OTHER | Age: 30
End: 2020-02-04
Attending: OBSTETRICS & GYNECOLOGY
Payer: COMMERCIAL

## 2020-02-04 VITALS
DIASTOLIC BLOOD PRESSURE: 72 MMHG | HEART RATE: 88 BPM | WEIGHT: 208 LBS | BODY MASS INDEX: 34.61 KG/M2 | SYSTOLIC BLOOD PRESSURE: 122 MMHG

## 2020-02-04 DIAGNOSIS — A57 CHANCROID: Primary | ICD-10-CM

## 2020-02-04 LAB — COPATH REPORT: NORMAL

## 2020-02-04 PROCEDURE — G0463 HOSPITAL OUTPT CLINIC VISIT: HCPCS

## 2020-02-04 PROCEDURE — 99214 OFFICE O/P EST MOD 30 MIN: CPT | Performed by: OBSTETRICS & GYNECOLOGY

## 2020-02-04 RX ORDER — AZITHROMYCIN 500 MG/1
1000 TABLET, FILM COATED ORAL DAILY
Qty: 2 TABLET | Refills: 0 | Status: SHIPPED | OUTPATIENT
Start: 2020-02-04 | End: 2020-02-07

## 2020-02-04 NOTE — NURSING NOTE
"Chief Complaint   Patient presents with     Consult     Consult from A Lynette for tubal       Initial /72 (BP Location: Left arm, Patient Position: Sitting, Cuff Size: Adult Large)   Pulse 88   Wt 94.3 kg (208 lb)   BMI 34.61 kg/m   Estimated body mass index is 34.61 kg/m  as calculated from the following:    Height as of 1/31/20: 1.651 m (5' 5\").    Weight as of this encounter: 94.3 kg (208 lb).  Medication Reconciliation: complete  NILSA CARMONA LPN  "

## 2020-02-04 NOTE — PROGRESS NOTES
"Jennifer Tatum is a 29 year old  who desires a tubal ligation. She states that she is certain that she has completed desired childbearing. She was also seen recently for a labial ulcer. Cultures for bacteria and HSV were negative a biopsy was non-contributory. Pt states that the ulcer seems worse. It is tender.    ROS:  Constitutional, neuro, endocrine, gastrointestinal, genitourinary and psychiatric systems are otherwise negative.    Past Medical History:   Diagnosis Date     H/O nephrolithotomy with removal of calculi      History of  delivery 2015    sched for 3/21/15      Incompetent cervix 11/10/2015    To [place] per Dr. Evan RICKS      IUFD (intrauterine fetal death) 2016    Heart rate dropped during BPP and patient was taken for code pink c/s where baby was noted to be dead at birth      MDD (major depressive disorder)      Mild major depression (H) 2013     Nephrolithiasis     bilateral /complicating pregnancy- Seen Dr Bennie Fritzentia      Smoker 2013    Patient is weaning      Past Surgical History:   Procedure Laterality Date     CERCLAGE CERVICAL N/A 11/10/2015    Procedure: CERCLAGE CERVICAL;  Surgeon: Tucker Barragan MD;  [facility]: [facility] L[facility]      SECTION  2012    Pregnancy full-term; \"Oleksandr\"       SECTION N/A 2016    Procedure:  SECTION;  Surgeon: Nisha Mcleod MD;  Location: [facility]      NEPHROSTOMY PERCUTUTANEOUS      left     impacted wisdom teeth removal          Allergies   Allergen Reactions     Wellbutrin [Bupropion] Other (See Comments)     Mood changes     EXAM  /72 (BP Location: Left arm, Patient Position: Sitting, Cuff Size: Adult Large)   Pulse 88   Wt 94.3 kg (208 lb)   BMI 34.61 kg/m    /72 (BP Location: Left arm, Patient Position: Sitting, Cuff Size: Adult Large)   Pulse 88   Wt 94.3 kg (208 lb)   BMI 34.61 kg/m      General- NAD  Neck- Supple, Normal thyroid  Lungs- CTA  CV- RRR  Abdomen- Soft, " non-tender  External genitalia- No lesions, normal anatomy  Vagina- left labial ulceration with granulation tissue 1.5 x 1.5 cm. No surrounding erythema or induration. Lesion is acutely tender.    A/P Tubal ligation, labial lesion   1. Sterilization - pt confirms she desires permanent and irreversible sterilization. We discussed options, risks and benefits. She states that she desires a bilateral salpingectomy. She understands need for additional port site and increased operative time. Pt will sign federal papers today.   2. Ulceration - unclear etiology, aside from the fact it is an ulcer it does not appear herpetic and HSV testing was negative. Plan to treat for chancroid - azithromycin. Pt will f/u in 5 days to confirm improvement.    RUDDY JOHNSTON MD

## 2020-02-05 NOTE — PROGRESS NOTES
"[facility]                HPI   Jennifer presents with 2 day history of lesion on the inner aspect of her left labia.  She states she had intercourse the day before she found the lesion but that she did not have pain or bleeding at any time during intercourse.  She has had the same partner for many years and she states that they are monogamous.  She denies trauma to the area.  Denies using any new products or lubricants. No history of STI.  She found the lesion when she discovered tenderness when wiping.  Denies pain with movement or urination.  No vaginal discharge or odor.     Jennifer is also very interested in sterilization.  She is sure that she  Has completed childbearing and would like a referral for surgical discussion.              Medications:     Current Outpatient Medications Ordered in Epic   Medication     amphetamine-dextroamphetamine (ADDERALL XR) 25 MG 24 hr capsule     ARIPiprazole (ABILIFY) 10 MG tablet     hydrOXYzine (ATARAX) 25 MG tablet     sertraline (ZOLOFT) 100 MG tablet     azithromycin (ZITHROMAX) 500 MG tablet     No current Epic-ordered facility-administered medications on file.                 Allergies:   Wellbutrin [bupropion]         Review of Systems:   The 5 point Review of Systems is negative other than noted in the HPI                     Physical Exam:   Blood pressure 108/64, pulse 102, height 1.651 m (5' 5\"), weight 91.6 kg (202 lb), SpO2 98 %, not currently breastfeeding.  Constitutional:   awake, alert, cooperative, no apparent distress, and appears stated age     Genitounirinary:   External Genitalia:  Hair distribution; normal,   Abnormal findings: 0.75 cm ulceration noted on inner aspect of left labia minora. Firm outer edge with center concaved, grey and pink granulation tissue present.  Tender to touch. No edema in surrounding tissues and no discharge.      Vagina:  General appearance normal, Discharge absent, Lesions absent  Cervix:  Lesions absent, " Discharge absent  Anus/Perineum:  Lesions absent     Procedure:  After informed consent was obtained from the patient the area was then swabbed with betadine prep and infiltrated with 1% lidocaine. A 3 mm punch biopsy was used to excise a small portion of  the lesion. Pressure with sterile gauze and pressure was used to obtain hemostasis. Specimen was submitted to pathology. Patient tolerated the procedure well. EBL minimal.           Data:     Results for orders placed or performed in visit on 01/31/20   Treponema Abs w Reflex to RPR and Titer     Status: None   Result Value Ref Range    Treponema Antibodies Nonreactive NR^Nonreactive   Surgical pathology exam     Status: None   Result Value Ref Range    Copath Report       Patient Name: TARIQ LOPEZ  MR#: [id number]  Specimen #:   Collected: 1/31/2020  Received: 2/3/2020  Reported: 2/4/2020 10:34  Ordering Phy(s): YOHANNES BAEZA  Additional Phy(s): DAVIDSON BENAVIDEZ    For improved result formatting, select 'View Enhanced Report Format' under   Linked Documents section.    SPECIMEN(S):  Labia biopsy, left    FINAL DIAGNOSIS:  Skin, left labia, biopsy  - Ulcer and inflamed squamous epithelium (see comment)    COMMENT:  The etiology of the ulcer is not apparent from this biopsy.  A larger   biopsy may be helpful.    I have personally reviewed all specimens and/or slides, including the   listed special stains, and used them  with my medical judgement to determine or confirm the final diagnosis.    Electronically signed out by:    Simon Vera M.D.    CLINICAL HISTORY:  Left labial ulcer, lesion.    GROSS:  There are pieces of gray soft tissue which are 5 x 5 x 2 mm in aggregate.   (TE in 1 block). (Dictated by:  Simon Vera MD 2/3/2020 03:42 PM)     MICROSCOPIC:  There is an ulcer with a fibrinopurulent exudate.  There is a small piece   of inflamed squamous epithelium.  The etiology of the ulcer is not apparent from this biopsy.    CPT Codes  A:  67343-ZT8    COLLECTION SITE:  Client: [facility]  Location: [facility] ([facility])    The technical component of this testing was completed at the [facility], with the  professional component performed at the [facility], [address] [place] [id number]  ([phone])       Genital Culture Aerobic Bacterial     Status: None   Result Value Ref Range    Specimen Description Labia     Culture Micro Normal urogenital lubna     Culture Micro No Pathogens Isolated    GC/Chlamydia by PCR - [facility]     Status: None   Result Value Ref Range    Specimen Source Urine     Neisseria gonorrhoreae PCR Not Detected NDET^Not Detected    Chlamydia Trachomatis PCR Not Detected NDET^Not Detected   HSV 1 and 2 DNA by PCR     Status: None   Result Value Ref Range    HSV Specimen Type Genital     HSV Type 1 PCR Negative NEG^Negative    HSV Type 2 PCR Negative NEG^Negative               Assessment and Plan:   Labial lesion resembling chancroid - no sexual contact until healed.  Will await labs and treat according to findings.  Will have follow up in 3 days with Dr. Vasquez.      Female sterilization - See Dr. Vasquez on Monday as scheduled.      Kathy Ojeda NP, ZORA  2/5/2020  9:51 AM

## 2020-02-07 ENCOUNTER — OFFICE VISIT (OUTPATIENT)
Dept: OBGYN | Facility: OTHER | Age: 30
End: 2020-02-07
Attending: OBSTETRICS & GYNECOLOGY
Payer: COMMERCIAL

## 2020-02-07 VITALS
SYSTOLIC BLOOD PRESSURE: 122 MMHG | WEIGHT: 208 LBS | BODY MASS INDEX: 34.66 KG/M2 | HEIGHT: 65 IN | HEART RATE: 60 BPM | DIASTOLIC BLOOD PRESSURE: 70 MMHG

## 2020-02-07 DIAGNOSIS — A57 CHANCROID: Primary | ICD-10-CM

## 2020-02-07 PROCEDURE — G0463 HOSPITAL OUTPT CLINIC VISIT: HCPCS

## 2020-02-07 PROCEDURE — G0463 HOSPITAL OUTPT CLINIC VISIT: HCPCS | Mod: 25

## 2020-02-07 PROCEDURE — 99213 OFFICE O/P EST LOW 20 MIN: CPT | Performed by: OBSTETRICS & GYNECOLOGY

## 2020-02-07 ASSESSMENT — PAIN SCALES - GENERAL: PAINLEVEL: NO PAIN (0)

## 2020-02-07 ASSESSMENT — MIFFLIN-ST. JEOR: SCORE: 1674.36

## 2020-02-07 NOTE — PROGRESS NOTES
"Jennifer Tatum is a 29 year old Who was seen for a labial ulcer 3 days ago and treated for chancroid. She is here today for f/u since diagnosis was less than certain. She states that lesion is still present and that she has not touched the area since last seen.    ROS:  Constitutional, neuro, endocrine, gastrointestinal, genitourinary and psychiatric systems are otherwise negative.    EXAM  /70   Pulse 60   Ht 1.651 m (5' 5\")   Wt 94.3 kg (208 lb)   BMI 34.61 kg/m    Gen - NAD  VE - lesion appears smaller with granulation at edges. Still no sign of surrounding or expanding infection  Currently lesion in 1 x 1 cm    A/P Possible chancroid   S/p treatment with azithromycin.   Lesion appears to be improving though it has only been three days. It is certainly no worse.   Continue to monitor for improvement and f/u next week    RUDDY JOHNSTON MD    "

## 2020-02-11 PROBLEM — N76.6 ULCER OF GENITAL LABIA: Status: ACTIVE | Noted: 2020-02-11

## 2020-02-12 PROBLEM — A57: Status: ACTIVE | Noted: 2020-02-12

## 2020-02-17 NOTE — ED NOTES
"In ED for \" slipping on the ice last Sunday and falling on the ice hitting my head, an hour later hit my right arm and right knee on the garage floor from falling.  Ever since falling am feeling tired and nauseous, vomiting this morning.     "  DTaP/Tdap/Td vaccine (1 - Tdap) 02/07/1987    HIV screen  02/07/1991    Hepatitis B vaccine (1 of 3 - Risk 3-dose series) 02/07/1995    Diabetic retinal exam  01/27/2017    Flu vaccine (1) 09/01/2019    Lipid screen  02/21/2020    Diabetic foot exam  08/26/2020    A1C test (Diabetic or Prediabetic)  08/26/2020    Diabetic microalbuminuria test  08/26/2020    Potassium monitoring  08/26/2020    Creatinine monitoring  08/26/2020    Shingles Vaccine (1 of 2) 02/07/2026    Hepatitis A vaccine  Aged Out    Hib vaccine  Aged Out    Meningococcal (ACWY) vaccine  Aged Out       Subjective:      Review of Systems   Constitutional: Negative for chills, fatigue and fever. HENT: Negative for congestion, rhinorrhea, sore throat and trouble swallowing. Eyes: Negative for photophobia and visual disturbance. Respiratory: Negative for cough, choking, shortness of breath and wheezing. Cardiovascular: Negative for chest pain, palpitations and leg swelling. Gastrointestinal: Positive for blood in stool (related to straining and hemorrhoids). Negative for abdominal pain, constipation, diarrhea, nausea and vomiting. Endocrine: Negative for cold intolerance, heat intolerance, polydipsia, polyphagia and polyuria. Genitourinary: Negative for difficulty urinating, dysuria and hematuria. Musculoskeletal: Negative for arthralgias and myalgias. Skin: Negative for rash and wound. Neurological: Positive for light-headedness (1 episode). Negative for dizziness, tremors, weakness, numbness and headaches. Psychiatric/Behavioral: Negative for agitation, sleep disturbance and suicidal ideas. The patient is not nervous/anxious. Objective:     /87 (Site: Right Upper Arm, Position: Sitting, Cuff Size: Large Adult)   Pulse 87   Ht 5' 10\" (1.778 m)   Wt 277 lb (125.6 kg)   BMI 39.75 kg/m²     Physical Exam  Vitals signs reviewed. Constitutional:       General: He is not in acute distress. 08/26/2019    AST 18 08/26/2019     08/26/2019    K 4.1 08/26/2019     08/26/2019    CREATININE 0.79 08/26/2019    BUN 11 08/26/2019    CO2 22 08/26/2019    TSH 1.570 07/28/2017    LABA1C 6.8 (H) 02/17/2020       Assessment/Plan      1. Type 2 diabetes mellitus without complication, with long-term current use of insulin (HCC)  A1C 6.8  Have dilated eye examination with ophthalmologist.  Continue daily foot checks. Orders placed for fasting labs, please get when able. Fast for 12 hours prior. Discussed trying to eat dinner earlier to help with lower early morning blood sugars.    - POCT glycosylated hemoglobin (Hb A1C)  - 21271 - Collection Capillary Blood Specimen  - Lipid Panel; Future  - CBC; Future  - Comprehensive Metabolic Panel, Fasting; Future    2. Essential hypertension  BP Today 134/87  Check labs  Continue amlodipine and losartan-hydrochlorothiazide    - Lipid Panel; Future  - CBC; Future  - Comprehensive Metabolic Panel, Fasting; Future    3. Mixed hyperlipidemia  Check labs  Continue lipitor 20 mg     - Lipid Panel; Future  - Comprehensive Metabolic Panel, Fasting; Future    4. Obstructive sleep apnea on CPAP  Continue with Lashonda Walls CNP  Continue CPAP    5. Obesity (BMI 30-39. 9)  Weight loss discussion this visit    6. Episode of dizziness  Notify Dr. Chadwick Wheeler if you have any more episodes of dizziness. If you have dizziness again with any associated chest pain, jaw, shoulder, or arm pain, palpitations, passing out, bleeding call 911 and go to the emergency department. - CBC; Future    7. Blood in stool  May discuss with Dr. Chadwick Wheeler the blood in your stool to see if you can be re-referred to GI at this point.    - CBC; Future      Return in about 6 months (around 8/17/2020) for diabetes. Patient given educational materials - see patient instructions. Discussed use, benefit, and side effects of prescribed medications. All patient questions answered.   Pt voiced understanding. Reviewed health maintenance.        Electronically signed NEGAR White CNP on 2/17/20 at 10:10 AM

## 2020-02-18 PROBLEM — Z30.2 ENCOUNTER FOR STERILIZATION: Status: ACTIVE | Noted: 2020-02-18

## 2020-02-20 ENCOUNTER — OFFICE VISIT (OUTPATIENT)
Dept: OBGYN | Facility: OTHER | Age: 30
End: 2020-02-20
Attending: OBSTETRICS & GYNECOLOGY
Payer: COMMERCIAL

## 2020-02-20 VITALS
HEIGHT: 65 IN | WEIGHT: 204 LBS | DIASTOLIC BLOOD PRESSURE: 82 MMHG | HEART RATE: 84 BPM | BODY MASS INDEX: 33.99 KG/M2 | SYSTOLIC BLOOD PRESSURE: 122 MMHG

## 2020-02-20 DIAGNOSIS — Z30.2 ENCOUNTER FOR STERILIZATION: Primary | ICD-10-CM

## 2020-02-20 DIAGNOSIS — A57: ICD-10-CM

## 2020-02-20 PROCEDURE — 99212 OFFICE O/P EST SF 10 MIN: CPT | Performed by: OBSTETRICS & GYNECOLOGY

## 2020-02-20 PROCEDURE — G0463 HOSPITAL OUTPT CLINIC VISIT: HCPCS

## 2020-02-20 ASSESSMENT — MIFFLIN-ST. JEOR: SCORE: 1656.22

## 2020-02-20 ASSESSMENT — PAIN SCALES - GENERAL: PAINLEVEL: NO PAIN (0)

## 2020-02-20 NOTE — PROGRESS NOTES
S:   Follow-up of Chancroid ulcer LEFT Labia.  She has no complaints and the lesion has totally resolved.  Denies any sequelae.    Also, desirous of permanent irreversible sterilization.  .  Has completed childbearing.  Desirous of bilateral salpingectomy.    30 day Federal consent signed .      O:  NI      A:   Chancroid LEFT Labia resolved        Desirous permanent irreversible sterilization.      P:   Laparoscopic bilateral salpingectomy       sterilization.         Surgery 3/19/2020         preop     3/18/2020

## 2020-02-20 NOTE — NURSING NOTE
"Chief Complaint   Patient presents with     Pre-Op Exam       Initial /82   Pulse 84   Ht 1.651 m (5' 5\")   Wt 92.5 kg (204 lb)   BMI 33.95 kg/m   Estimated body mass index is 33.95 kg/m  as calculated from the following:    Height as of this encounter: 1.651 m (5' 5\").    Weight as of this encounter: 92.5 kg (204 lb).  Medication Reconciliation: complete  Madie Garcia LPN    "

## 2020-02-21 ENCOUNTER — PREP FOR PROCEDURE (OUTPATIENT)
Dept: OBGYN | Facility: OTHER | Age: 30
End: 2020-02-21

## 2020-02-21 ENCOUNTER — HOSPITAL ENCOUNTER (OUTPATIENT)
Facility: HOSPITAL | Age: 30
End: 2020-02-21
Attending: OBSTETRICS & GYNECOLOGY | Admitting: OBSTETRICS & GYNECOLOGY
Payer: COMMERCIAL

## 2020-02-21 DIAGNOSIS — Z30.2 STERILIZATION: ICD-10-CM

## 2020-02-21 DIAGNOSIS — Z30.2 STERILIZATION: Primary | ICD-10-CM

## 2020-02-25 DIAGNOSIS — F90.0 ATTENTION DEFICIT HYPERACTIVITY DISORDER (ADHD), PREDOMINANTLY INATTENTIVE TYPE: Primary | ICD-10-CM

## 2020-02-25 RX ORDER — DEXTROAMPHETAMINE SACCHARATE, AMPHETAMINE ASPARTATE MONOHYDRATE, DEXTROAMPHETAMINE SULFATE AND AMPHETAMINE SULFATE 6.25; 6.25; 6.25; 6.25 MG/1; MG/1; MG/1; MG/1
25 CAPSULE, EXTENDED RELEASE ORAL DAILY
Qty: 30 CAPSULE | Refills: 0 | Status: SHIPPED | OUTPATIENT
Start: 2020-02-25 | End: 2020-05-21

## 2020-02-25 RX ORDER — DEXTROAMPHETAMINE SACCHARATE, AMPHETAMINE ASPARTATE MONOHYDRATE, DEXTROAMPHETAMINE SULFATE AND AMPHETAMINE SULFATE 6.25; 6.25; 6.25; 6.25 MG/1; MG/1; MG/1; MG/1
25 CAPSULE, EXTENDED RELEASE ORAL DAILY
Qty: 30 CAPSULE | Refills: 0 | Status: SHIPPED | OUTPATIENT
Start: 2020-03-27 | End: 2020-05-21

## 2020-02-25 RX ORDER — DEXTROAMPHETAMINE SACCHARATE, AMPHETAMINE ASPARTATE MONOHYDRATE, DEXTROAMPHETAMINE SULFATE AND AMPHETAMINE SULFATE 6.25; 6.25; 6.25; 6.25 MG/1; MG/1; MG/1; MG/1
25 CAPSULE, EXTENDED RELEASE ORAL DAILY
Qty: 30 CAPSULE | Refills: 0 | Status: SHIPPED | OUTPATIENT
Start: 2020-04-27 | End: 2020-05-21

## 2020-02-27 ENCOUNTER — HOSPITAL ENCOUNTER (OUTPATIENT)
Dept: PHYSICAL THERAPY | Facility: HOSPITAL | Age: 30
Setting detail: THERAPIES SERIES
End: 2020-02-27
Attending: FAMILY MEDICINE
Payer: COMMERCIAL

## 2020-02-27 DIAGNOSIS — M54.50 CHRONIC BILATERAL LOW BACK PAIN WITHOUT SCIATICA: ICD-10-CM

## 2020-02-27 DIAGNOSIS — G89.29 CHRONIC BILATERAL LOW BACK PAIN WITHOUT SCIATICA: ICD-10-CM

## 2020-02-27 PROCEDURE — 97162 PT EVAL MOD COMPLEX 30 MIN: CPT | Mod: GP

## 2020-02-27 PROCEDURE — 97140 MANUAL THERAPY 1/> REGIONS: CPT | Mod: GP

## 2020-02-27 PROCEDURE — 97530 THERAPEUTIC ACTIVITIES: CPT | Mod: GP

## 2020-02-27 NOTE — PROGRESS NOTES
02/27/20 1000   General Information   Type of Visit Initial OP Ortho PT Evaluation   Start of Care Date 02/27/20   Referring Physician Cornell Herron MD   Orders Evaluate and Treat   Orders Comment good HEP   Date of Order 01/14/20   Certification Required? No   Surgical/Medical history reviewed Yes   Precautions/Limitations no known precautions/limitations   Body Part(s)   Body Part(s) Lumbar Spine/SI   Presentation and Etiology   Pertinent history of current problem (include personal factors and/or comorbidities that impact the POC) C/O back pain, onset about 2 months ago. When she sleeps on her stomach she feels like her back is paralyzed. She has to slide over the edge of the bed to get up, and sometimes she has to get help from her fiance to get up. She is OK otherwise. She is unable to get on the [place] or the snowmobile. She can't move after that.    Impairments A. Pain;D. Decreased ROM;E. Decreased flexibility;H. Impaired gait   Functional Limitations perform activities of daily living   How/Where did it occur From insidious onset   Onset date of current episode/exacerbation 11/15/19   Chronicity New   Pain rating (0-10 point scale) Best (/10);Worst (/10);Other   Best (/10) 4   Worst (/10) 10   Pain rating comment 4/10 right now   Pain quality A. Sharp;B. Dull;C. Aching;D. Burning;E. Shooting;F. Stabbing;G. Cramping;H. Other   Pain quality comment numbness   Frequency of pain/symptoms D. Other   Pain frequency comment while sleeping   Pain/symptoms exacerbated by E. Rest;G. Certain positions;K. Home tasks   Pain/symptoms eased by D. Nothing   Current / Previous Interventions   Diagnostic Tests: X-ray   X-ray Results unremarkable   Current Level of Function   Patient role/employment history C. Homemaker   Fall Risk Screen   Fall screen completed by PT   Have you fallen 2 or more times in the past year? No   Have you fallen and had an injury in the past year? No   Is patient a fall risk? No   System  Outcome Measures   Outcome Measures Low Back Pain (see Oswestry and Willem)   Lumbar Spine/SI Objective Findings   Palpation Left SI jt locked and left LE 1/2 inch longer than right. Left ASIS, IC, PSIS, and IT all inferior relative to right. Right sacral sulcus deep and right SPENCER prominent. T3-L5=NSRRL.   Planned Therapy Interventions   Planned Therapy Interventions manual therapy;neuromuscular re-education   Planned Therapy Interventions Comment Back in Balance program when mechanical dysfunction resolves   Clinical Impression   Criteria for Skilled Therapeutic Interventions Met yes, treatment indicated   PT Diagnosis Back pain mediated by somatic dysfunction at pelvic, sacral, lumbar, and thoracic segments, with functional leg length difference.   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   Education Assessment   Barriers to Learning No barriers   ORTHO GOALS   PT Ortho Eval Goals 1;2   Ortho Goal 1   Goal Identifier 1 Functional   Goal Description Ability to sleep prone without waking to pain.   Target Date 03/26/20   Ortho Goal 2   Goal Identifier 2 Outcome   Goal Description Resolution of somatic dysfunction   Target Date 03/26/20   Total Evaluation Time   PT Eval, Moderate Complexity Minutes (04320) 15     Giselle Munoz DPT

## 2020-03-02 ENCOUNTER — HEALTH MAINTENANCE LETTER (OUTPATIENT)
Age: 30
End: 2020-03-02

## 2020-03-04 ENCOUNTER — HOSPITAL ENCOUNTER (OUTPATIENT)
Dept: PHYSICAL THERAPY | Facility: HOSPITAL | Age: 30
Setting detail: THERAPIES SERIES
End: 2020-03-04
Attending: FAMILY MEDICINE
Payer: COMMERCIAL

## 2020-03-04 PROCEDURE — 97140 MANUAL THERAPY 1/> REGIONS: CPT | Mod: GP

## 2020-03-11 ENCOUNTER — ANESTHESIA EVENT (OUTPATIENT)
Dept: SURGERY | Facility: HOSPITAL | Age: 30
End: 2020-03-11

## 2020-03-11 RX ORDER — NALOXONE HYDROCHLORIDE 0.4 MG/ML
.1-.4 INJECTION, SOLUTION INTRAMUSCULAR; INTRAVENOUS; SUBCUTANEOUS
Status: CANCELLED | OUTPATIENT
Start: 2020-03-11 | End: 2020-03-12

## 2020-03-11 RX ORDER — FENTANYL CITRATE 50 UG/ML
25-50 INJECTION, SOLUTION INTRAMUSCULAR; INTRAVENOUS
Status: CANCELLED | OUTPATIENT
Start: 2020-03-11

## 2020-03-11 RX ORDER — ONDANSETRON 4 MG/1
4 TABLET, ORALLY DISINTEGRATING ORAL EVERY 30 MIN PRN
Status: CANCELLED | OUTPATIENT
Start: 2020-03-11

## 2020-03-11 RX ORDER — SODIUM CHLORIDE, SODIUM LACTATE, POTASSIUM CHLORIDE, CALCIUM CHLORIDE 600; 310; 30; 20 MG/100ML; MG/100ML; MG/100ML; MG/100ML
INJECTION, SOLUTION INTRAVENOUS CONTINUOUS
Status: CANCELLED | OUTPATIENT
Start: 2020-03-11

## 2020-03-11 RX ORDER — ONDANSETRON 2 MG/ML
4 INJECTION INTRAMUSCULAR; INTRAVENOUS EVERY 30 MIN PRN
Status: CANCELLED | OUTPATIENT
Start: 2020-03-11

## 2020-03-11 RX ORDER — LIDOCAINE 40 MG/G
CREAM TOPICAL
Status: CANCELLED | OUTPATIENT
Start: 2020-03-11

## 2020-03-11 RX ORDER — MEPERIDINE HYDROCHLORIDE 25 MG/ML
12.5 INJECTION INTRAMUSCULAR; INTRAVENOUS; SUBCUTANEOUS
Status: CANCELLED | OUTPATIENT
Start: 2020-03-11

## 2020-03-11 ASSESSMENT — LIFESTYLE VARIABLES: TOBACCO_USE: 1

## 2020-03-11 NOTE — ANESTHESIA PREPROCEDURE EVALUATION
"Anesthesia Pre-Procedure Evaluation    Patient: Jennifer Tatum   MRN: [id number] : 1990          Preoperative Diagnosis: Sterilization [Z30.2]    Procedure(s):  laparoscopy bilateral salpingectomy    Past Medical History:   Diagnosis Date     H/O nephrolithotomy with removal of calculi      History of  delivery 2015    sched for 3/21/15      Incompetent cervix 11/10/2015    To [facility] per Dr. Evan RICKS      IUFD (intrauterine fetal death) 2016    Heart rate dropped during BPP and patient was taken for code pink c/s where baby was noted to be dead at birth      MDD (major depressive disorder)      Mild major depression (H) 2013     Nephrolithiasis     bilateral /complicating pregnancy- Seen Dr Bennie Lucero      Smoker 2013    Patient is weaning      Past Surgical History:   Procedure Laterality Date     CERCLAGE CERVICAL N/A 11/10/2015    Procedure: CERCLAGE CERVICAL;  Surgeon: Tucker Barragan MD;  Location: [facility] L[facility]      SECTION  2012    Pregnancy full-term; \"Oleksandr\"       SECTION N/A 2016    Procedure:  SECTION;  Surgeon: Nisha Mcleod MD;  Location: [facility] OR      NEPHROSTOMY PERCUTUTANEOUS      left     impacted wisdom teeth removal         Anesthesia Evaluation     .             ROS/MED HX    ENT/Pulmonary:     (+)tobacco use, Current use , . .    Neurologic:       Cardiovascular:         METS/Exercise Tolerance:     Hematologic:         Musculoskeletal:         GI/Hepatic:         Renal/Genitourinary:     (+) Nephrolithiasis ,       Endo:         Psychiatric:     (+) psychiatric history depression      Infectious Disease:         Malignancy:         Other:                                 Lab Results   Component Value Date    WBC 6.7 2019    HGB 13.5 2019    HCT 41.4 2019     2019    CRP <2.9 2016    SED 8 2015     2019    POTASSIUM 4.0 2019    CHLORIDE 108 2019    CO2 " "25 03/13/2019    BUN 16 03/13/2019    CR 0.69 03/13/2019    GLC 96 03/13/2019    SOPHIE 9.0 03/13/2019    ALBUMIN 3.9 03/13/2019    PROTTOTAL 7.7 03/13/2019    ALT 29 03/13/2019    AST 16 03/13/2019    ALKPHOS 92 03/13/2019    BILITOTAL 0.2 03/13/2019    LIPASE 127 09/10/2015    PTT 23 (L) 01/20/2016    INR 0.95 01/20/2016    TSH 1.50 03/13/2019    HCG Negative 10/05/2017    HCGS Positive (A) 12/01/2017       Preop Vitals  BP Readings from Last 3 Encounters:   02/20/20 122/82   02/07/20 122/70   02/04/20 122/72    Pulse Readings from Last 3 Encounters:   02/20/20 84   02/07/20 60   02/04/20 88      Resp Readings from Last 3 Encounters:   08/06/19 14   08/01/19 16   03/25/19 18    SpO2 Readings from Last 3 Encounters:   01/31/20 98%   01/14/20 98%   11/25/19 98%      Temp Readings from Last 1 Encounters:   01/14/20 98.7  F (37.1  C)    Ht Readings from Last 1 Encounters:   02/20/20 1.651 m (5' 5\")      Wt Readings from Last 1 Encounters:   02/20/20 92.5 kg (204 lb)    Estimated body mass index is 33.95 kg/m  as calculated from the following:    Height as of 2/20/20: 1.651 m (5' 5\").    Weight as of 2/20/20: 92.5 kg (204 lb).       Anesthesia Plan          Plan for General with Intravenous and Propofol induction. Maintenance will be Balanced.      Needs H and P        Postoperative Care      Consents                 JOHNSON Field CRNA  "

## 2020-03-19 ENCOUNTER — ANESTHESIA (OUTPATIENT)
Dept: SURGERY | Facility: HOSPITAL | Age: 30
End: 2020-03-19

## 2020-03-19 NOTE — PROGRESS NOTES
Outpatient Physical Therapy Discharge Note     Patient: Jennifer Tatum  : 1990    Beginning/End Dates of Reporting Period:  2019 to 3/19/2020    Referring Provider: Courtney Quintero DPM    Therapy Diagnosis: left heel pain limiting tolerance for work and household activities     Client Self Report:  Pt failed to attend additional PT session    Objective Measurements:      Outcome Measures (most recent score):      Goals:  Goal Identifier STG 1   Goal Description Pt to be independent and compliant with HEP.   Target Date 07/15/19   Date Met      Progress:     Goal Identifier LTG 1   Goal Description Pt to report at least 75% improvement in overall symptoms to tolerate full work day with minimal pain in left heel.   Target Date 19   Date Met      Progress:     Goal Identifier LTG 2   Goal Description Pt to report no pain when getting OOB in morning.s   Target Date 19   Date Met      Progress:       Progress Toward Goals:   Not assessed this period.  Pt failed to attend additional PT sessions          Plan:  Discharge from therapy.    Discharge:    Reason for Discharge: Patient has failed to schedule/attend further appointments.    Equipment Issued:     Discharge Plan: no plan established

## 2020-04-01 DIAGNOSIS — F33.0 MAJOR DEPRESSIVE DISORDER, RECURRENT EPISODE, MILD (H): ICD-10-CM

## 2020-04-01 RX ORDER — SERTRALINE HYDROCHLORIDE 100 MG/1
100 TABLET, FILM COATED ORAL DAILY
Qty: 90 TABLET | Refills: 1 | OUTPATIENT
Start: 2020-04-01

## 2020-04-01 NOTE — TELEPHONE ENCOUNTER
sertraline   Last Written Prescription Date:  11/29/18  Last Fill Quantity: 90,   # refills: 1  Last Office Visit: 1/14/20  Future Office visit:       Routing refill request to provider for review/approval because:  Medication is reported/historical

## 2020-04-13 DIAGNOSIS — F33.0 MAJOR DEPRESSIVE DISORDER, RECURRENT EPISODE, MILD (H): ICD-10-CM

## 2020-04-13 NOTE — TELEPHONE ENCOUNTER
sertraline (ZOLOFT) 100 MG tablet      Last Written Prescription Date:  11/29/18  Last Fill Quantity: 90,   # refills: 1  Last Office Visit: 1/14/20  Future Office visit:       Routing refill request to provider for review/approval because:

## 2020-04-15 RX ORDER — SERTRALINE HYDROCHLORIDE 100 MG/1
100 TABLET, FILM COATED ORAL DAILY
Qty: 90 TABLET | Refills: 1 | Status: SHIPPED | OUTPATIENT
Start: 2020-04-15 | End: 2020-05-21

## 2020-04-15 NOTE — TELEPHONE ENCOUNTER
Failed protocol due to PHQ9,  PHQ-9 score:    PHQ 1/14/2020   PHQ-9 Total Score 9   Q9: Thoughts of better off dead/self-harm past 2 weeks Not at all   F/U: Thoughts of suicide or self-harm -   F/U: Safety concerns -

## 2020-05-07 DIAGNOSIS — F41.1 GAD (GENERALIZED ANXIETY DISORDER): ICD-10-CM

## 2020-05-07 DIAGNOSIS — F43.10 PTSD (POST-TRAUMATIC STRESS DISORDER): ICD-10-CM

## 2020-05-08 RX ORDER — ARIPIPRAZOLE 10 MG/1
TABLET ORAL
Qty: 90 TABLET | Refills: 0 | Status: SHIPPED | OUTPATIENT
Start: 2020-05-08 | End: 2020-07-27

## 2020-05-08 NOTE — TELEPHONE ENCOUNTER
Abilify     Last Written Prescription Date:  12.26.19  Last Fill Quantity: 30,   # refills: 3  Last Office Visit: 1.14.2020  Future Office visit:       Routing refill request to provider for review/approval because:  No results found for: LDL  Lab Results   Component Value Date    WBC 6.7 03/13/2019     Lab Results   Component Value Date    RBC 5.23 03/13/2019     Lab Results   Component Value Date    HGB 13.5 03/13/2019     Lab Results   Component Value Date    HCT 41.4 03/13/2019     No components found for: MCT  Lab Results   Component Value Date    MCV 79 03/13/2019     Lab Results   Component Value Date    MCH 25.8 03/13/2019     Lab Results   Component Value Date    MCHC 32.6 03/13/2019     Lab Results   Component Value Date    RDW 14.7 03/13/2019     Lab Results   Component Value Date     03/13/2019     No results found for: A1C  Pended.  Tiffany Anderson RN

## 2020-05-21 ENCOUNTER — HOSPITAL ENCOUNTER (EMERGENCY)
Facility: HOSPITAL | Age: 30
Discharge: HOME | End: 2020-05-21
Attending: PHYSICIAN ASSISTANT | Admitting: PHYSICIAN ASSISTANT
Payer: COMMERCIAL

## 2020-05-21 ENCOUNTER — TELEPHONE (OUTPATIENT)
Dept: FAMILY MEDICINE | Facility: OTHER | Age: 30
End: 2020-05-21

## 2020-05-21 VITALS
SYSTOLIC BLOOD PRESSURE: 129 MMHG | RESPIRATION RATE: 16 BRPM | TEMPERATURE: 97.7 F | DIASTOLIC BLOOD PRESSURE: 76 MMHG | OXYGEN SATURATION: 97 %

## 2020-05-21 DIAGNOSIS — M54.50 BILATERAL LOW BACK PAIN WITHOUT SCIATICA: ICD-10-CM

## 2020-05-21 PROCEDURE — 99213 OFFICE O/P EST LOW 20 MIN: CPT | Mod: Z6 | Performed by: PHYSICIAN ASSISTANT

## 2020-05-21 PROCEDURE — G0463 HOSPITAL OUTPT CLINIC VISIT: HCPCS

## 2020-05-21 RX ORDER — CYCLOBENZAPRINE HCL 10 MG
10 TABLET ORAL 3 TIMES DAILY PRN
Qty: 20 TABLET | Refills: 0 | Status: SHIPPED | OUTPATIENT
Start: 2020-05-21 | End: 2020-07-27

## 2020-05-21 RX ORDER — KETOROLAC TROMETHAMINE 10 MG/1
10 TABLET, FILM COATED ORAL EVERY 6 HOURS PRN
Qty: 15 TABLET | Refills: 0 | Status: SHIPPED | OUTPATIENT
Start: 2020-05-21 | End: 2020-08-19

## 2020-05-21 ASSESSMENT — ENCOUNTER SYMPTOMS
FACIAL ASYMMETRY: 0
NUMBNESS: 0
NECK STIFFNESS: 0
SHORTNESS OF BREATH: 0
ABDOMINAL PAIN: 0
FEVER: 0
APPETITE CHANGE: 0
LIGHT-HEADEDNESS: 0
NECK PAIN: 0
CHILLS: 0
FATIGUE: 0
BACK PAIN: 1
ACTIVITY CHANGE: 0
VOMITING: 0
NAUSEA: 0
BRUISES/BLEEDS EASILY: 0

## 2020-05-21 NOTE — LETTER
Jennifer Tatum was seen and treated in our emergency department on 5/21/2020.  Please excuse her from work on 5/21 and 5/22.     If you have any questions or concerns, please don't hesitate to call.              Jacquie Rust PA-C

## 2020-05-21 NOTE — TELEPHONE ENCOUNTER
Pt called, reports back pain that started two weeks ago. This is a chronic issue for pt but she notes that pain has gotten worse. No issues walking. Pt reports that she has difficulty sleeping due to the pain. No recent injury or exercise that involved this part of the body. No appts available with walk in clinic today. Pt advised to be seen in [facility].

## 2020-05-21 NOTE — ED TRIAGE NOTES
Presents for back pain for about 10 days.  States she woke up and her back was out, has a history of this happening.  Was doing PT but that stopped due to COVID.  Pain is 7/10.   States nothing has relieved.  Tylenol and Ibuprfen at  0830 with no relief.

## 2020-05-21 NOTE — ED AVS SNAPSHOT
[facility]  [address]  [place] [address]  Phone:  [phone]                                    Jennifer Tatum   MRN: [id number]    Department:  [facility]   Date of Visit:  5/21/2020           After Visit Summary Signature Page    I have received my discharge instructions, and my questions have been answered. I have discussed any challenges I see with this plan with the nurse or doctor.    ..........................................................................................................................................  Patient/Patient Representative Signature      ..........................................................................................................................................  Patient Representative Print Name and Relationship to Patient    ..................................................               ................................................  Date                                   Time    ..........................................................................................................................................  Reviewed by Signature/Title    ...................................................              ..............................................  Date                                               Time          22EPIC Rev 08/18

## 2020-05-21 NOTE — ED PROVIDER NOTES
History     Chief Complaint   Patient presents with     Back Pain     The history is provided by the patient.     Jennifer Tatum is a 29 year old female who presented to the urgent care ambulatory for evaluation of low back pain.  Described as bilateral however left greater than right.  Denies any profound or significant radiculopathy or radiation of the pain.  Denies any abdominal pain.  Pain is worse with position and sitting.  Worse with flexing at the waist.  Denies any fevers, chills, trauma, history of IV drug abuse, immunosuppression, or bowel or bladder dysfunction.  Pain is described as an 8 on the 10 scale.    Allergies:  Allergies   Allergen Reactions     Wellbutrin [Bupropion] Other (See Comments)     Mood changes       Problem List:    Patient Active Problem List    Diagnosis Date Noted     Sterilization 02/21/2020     Priority: Medium     Added automatically from request for surgery [id number]       Encounter for sterilization--2/2020 02/18/2020     Priority: Medium     Chancroid in female-LEFT Labia,presumptive RX with Zithromax--2/2020 02/12/2020     Priority: Medium     Ulcer of genital labia-LEFT, biopsy not diagnostic, lab calderón neg, RX'd for Chancroid with Zithromax---2/2020 02/11/2020     Priority: Medium     Relationship problem between partners 12/01/2017     Priority: Medium     Complicated grieving 07/26/2017     Priority: Medium     Major depressive disorder, recurrent episode, mild (H) 05/02/2017     Priority: Medium     Spontaneous miscarriage 04/06/2017     Priority: Medium     Acute right-sided low back pain with right-sided sciatica 10/18/2016     Priority: Medium     ACP (advance care planning) 06/17/2016     Priority: Medium     Advance Care Planning 6/17/2016: ACP Review of Chart / Resources Provided:  Reviewed chart for advance care plan.  Jennifer Tatum has no plan or code status on file. Discussed available resources and provided with information.   Added by Gilbert Titus              "Attention deficit hyperactivity disorder (ADHD), predominantly inattentive type 2016     Priority: Medium     PTSD (post-traumatic stress disorder) 2016     Priority: Medium     Hydronephrosis 2015     Priority: Medium     Anxiety 2013     Priority: Medium     Feels it is going away       Smoker 2013     Priority: Medium     Patient is weaning KWIT on 8/2/15          Past Medical History:    Past Medical History:   Diagnosis Date     H/O nephrolithotomy with removal of calculi      History of  delivery 2015     Incompetent cervix 11/10/2015     IUFD (intrauterine fetal death) 2016     MDD (major depressive disorder)      Mild major depression (H) 2013     Nephrolithiasis      Smoker 2013       Past Surgical History:    Past Surgical History:   Procedure Laterality Date     CERCLAGE CERVICAL N/A 11/10/2015    Procedure: CERCLAGE CERVICAL;  Surgeon: Tucker Barragan MD;  [facility]      SECTION  2012    Pregnancy full-term; \"Oleksandr\"       SECTION N/A 2016    Procedure:  SECTION;  Surgeon: Nisha Mcleod MD;  Location: [facility]     HC NEPHROSTOMY PERCUTUTANEOUS      left     impacted wisdom teeth removal         Family History:    Family History   Problem Relation Age of Onset     Alcohol/Drug Father         alcoholism     Psychotic Disorder Father         Bipolar     Cerebrovascular Disease Other         Cerebrovascular accident     C.A.D. Other      Hypertension Other      Diabetes Other        Social History:  Marital Status:  Single [1]  Social History     Tobacco Use     Smoking status: Current Every Day Smoker     Packs/day: 0.50     Years: 10.00     Pack years: 5.00     Types: Cigarettes     Smokeless tobacco: Never Used   Substance Use Topics     Alcohol use: No     Alcohol/week: 0.0 standard drinks     Drug use: No        Medications:    amphetamine-dextroamphetamine (ADDERALL XR) 25 MG 24 hr " capsule  ARIPiprazole (ABILIFY) 10 MG tablet  cyclobenzaprine (FLEXERIL) 10 MG tablet  ketorolac (TORADOL) 10 MG tablet  hydrOXYzine (ATARAX) 25 MG tablet          Review of Systems   Constitutional: Negative for activity change, appetite change, chills, fatigue and fever.   Respiratory: Negative for shortness of breath.    Cardiovascular: Negative for chest pain.   Gastrointestinal: Negative for abdominal pain, nausea and vomiting.   Genitourinary: Negative.    Musculoskeletal: Positive for back pain. Negative for neck pain and neck stiffness.   Skin: Negative.    Neurological: Negative for facial asymmetry, light-headedness and numbness.   Hematological: Does not bruise/bleed easily.       Physical Exam   BP: 129/76  Heart Rate: 86  Temp: 97.7  F (36.5  C)  Resp: 16  SpO2: 97 %      Physical Exam  Vitals signs and nursing note reviewed.   Constitutional:       Appearance: Normal appearance. She is normal weight.   Cardiovascular:      Rate and Rhythm: Normal rate and regular rhythm.      Pulses: Normal pulses.   Pulmonary:      Effort: Pulmonary effort is normal.   Musculoskeletal:        Back:    Skin:     General: Skin is warm and dry.      Capillary Refill: Capillary refill takes less than 2 seconds.   Neurological:      General: No focal deficit present.      Mental Status: She is alert and oriented to person, place, and time.   Psychiatric:         Mood and Affect: Mood normal.         ED Course        Procedures               Critical Care time:  none               No results found for this or any previous visit (from the past 24 hour(s)).    Medications - No data to display    Assessments & Plan (with Medical Decision Making)   HPI, exam, and symptoms are most consistent with acute low back mechanical strain.  There is no red flag signs or symptoms.  Gait is steady.  Able to flex at the waist.  No weakness or loss of sensation.  No reasonable indication for imaging at this time.  Follow-up in the clinic to  discuss further imaging and options.  Flexeril and Toradol for home.  Work excuse.  Return here for any worsening symptoms or new concerns whatsoever.  The patient voiced complete understanding and was happy and agreeable.      This document was prepared using a combination of typing and voice generated software.  While every attempt was made for accuracy, spelling and grammatical errors may exist.    I have reviewed the nursing notes.    I have reviewed the findings, diagnosis, plan and need for follow up with the patient.       New Prescriptions    CYCLOBENZAPRINE (FLEXERIL) 10 MG TABLET    Take 1 tablet (10 mg) by mouth 3 times daily as needed for muscle spasms    KETOROLAC (TORADOL) 10 MG TABLET    Take 1 tablet (10 mg) by mouth every 6 hours as needed for moderate pain       Final diagnoses:   Bilateral low back pain without sciatica       5/21/2020   [facility]     Jacquie Rust PA-C  05/21/20 1039

## 2020-05-21 NOTE — DISCHARGE INSTRUCTIONS
Start Flexeril and Toradol for your pain.    Please return here for any worsening symptoms, new symptoms, fevers, difficulty with bowel or bladder function, weakness, or other concerns.    Please follow-up with Dr. Herron for further work-up.

## 2020-06-15 DIAGNOSIS — F90.0 ATTENTION DEFICIT HYPERACTIVITY DISORDER (ADHD), PREDOMINANTLY INATTENTIVE TYPE: ICD-10-CM

## 2020-06-15 RX ORDER — DEXTROAMPHETAMINE SACCHARATE, AMPHETAMINE ASPARTATE MONOHYDRATE, DEXTROAMPHETAMINE SULFATE AND AMPHETAMINE SULFATE 6.25; 6.25; 6.25; 6.25 MG/1; MG/1; MG/1; MG/1
25 CAPSULE, EXTENDED RELEASE ORAL DAILY
Qty: 30 CAPSULE | Refills: 0 | Status: SHIPPED | OUTPATIENT
Start: 2020-06-15 | End: 2020-12-14

## 2020-06-15 NOTE — TELEPHONE ENCOUNTER
adderall XR      Last Written Prescription Date:  3-  Last Fill Quantity: 30,   # refills: 0  Last Office Visit: 1-  Future Office visit:    Next 5 appointments (look out 90 days)    Jul 27, 2020  1:45 PM CDT  (Arrive by 1:30 PM)  SHORT with Cornell Herron MD  [facility] [place] ([facility] [place] ) [address]  [place] [address]  [phone]           Routing refill request to provider for review/approval because:

## 2020-07-24 NOTE — PROGRESS NOTES
Outpatient Physical Therapy Discharge Note     Patient: Jennifer Tatum  : 1990    Beginning/End Dates of Reporting Period:  2020 to 2020    Referring Provider: Cornell Herron MD    Therapy Diagnosis: Chronic back pain mediated by somatic dysfunction at pelvic, sacral, lumbar, and thoracic segments, with functional leg length difference.     Client Self Report: Patient seen [phone] for C/O back pain especially if she sleeps on her stomach. She is more sore in the upper back now but the LB is better.     Objective Measurements:  Objective Measure: Somatic dysfunction  Details: Left SI jt locked and left LE 3/8 inch longer than right. Left ASIS inferior and left PSIS superior relative to right. Right sacral sulcus deep and right SPENCER prominent. T3-L5=NSRRL.     Goals:  Goal Identifier 1 Functional   Goal Description Ability to sleep prone without waking to pain.   Target Date 20   Date Met      Progress:     Goal Identifier 2 Outcome   Goal Description Resolution of somatic dysfunction   Target Date 20   Date Met      Progress:       Progress Toward Goals:   Not assessed this period. Tx interrupted by Covid-19. Patient has failed to schedule more appointments. Goals not met.    Plan:  Discharge from therapy.    Discharge:    Reason for Discharge: Patient has failed to schedule further appointments.    Equipment Issued: n/a    Discharge Plan: current status of patient is unknown.     Giselle Munoz DPT

## 2020-07-27 ENCOUNTER — VIRTUAL VISIT (OUTPATIENT)
Dept: FAMILY MEDICINE | Facility: OTHER | Age: 30
End: 2020-07-27
Attending: FAMILY MEDICINE
Payer: COMMERCIAL

## 2020-07-27 DIAGNOSIS — F41.1 GAD (GENERALIZED ANXIETY DISORDER): ICD-10-CM

## 2020-07-27 DIAGNOSIS — F90.0 ATTENTION DEFICIT HYPERACTIVITY DISORDER (ADHD), PREDOMINANTLY INATTENTIVE TYPE: Primary | ICD-10-CM

## 2020-07-27 DIAGNOSIS — F43.10 PTSD (POST-TRAUMATIC STRESS DISORDER): ICD-10-CM

## 2020-07-27 PROCEDURE — 99213 OFFICE O/P EST LOW 20 MIN: CPT | Mod: 95 | Performed by: FAMILY MEDICINE

## 2020-07-27 RX ORDER — DEXTROAMPHETAMINE SACCHARATE, AMPHETAMINE ASPARTATE MONOHYDRATE, DEXTROAMPHETAMINE SULFATE AND AMPHETAMINE SULFATE 6.25; 6.25; 6.25; 6.25 MG/1; MG/1; MG/1; MG/1
25 CAPSULE, EXTENDED RELEASE ORAL DAILY
Qty: 30 CAPSULE | Refills: 0 | Status: SHIPPED | OUTPATIENT
Start: 2020-07-27 | End: 2020-08-26

## 2020-07-27 RX ORDER — SERTRALINE HYDROCHLORIDE 100 MG/1
TABLET, FILM COATED ORAL
Qty: 90 TABLET | Refills: 3 | Status: SHIPPED | OUTPATIENT
Start: 2020-07-27 | End: 2021-03-12

## 2020-07-27 RX ORDER — DEXTROAMPHETAMINE SACCHARATE, AMPHETAMINE ASPARTATE MONOHYDRATE, DEXTROAMPHETAMINE SULFATE AND AMPHETAMINE SULFATE 6.25; 6.25; 6.25; 6.25 MG/1; MG/1; MG/1; MG/1
25 CAPSULE, EXTENDED RELEASE ORAL DAILY
Qty: 30 CAPSULE | Refills: 0 | Status: SHIPPED | OUTPATIENT
Start: 2020-09-23 | End: 2020-10-23

## 2020-07-27 RX ORDER — DEXTROAMPHETAMINE SACCHARATE, AMPHETAMINE ASPARTATE MONOHYDRATE, DEXTROAMPHETAMINE SULFATE AND AMPHETAMINE SULFATE 6.25; 6.25; 6.25; 6.25 MG/1; MG/1; MG/1; MG/1
25 CAPSULE, EXTENDED RELEASE ORAL DAILY
Qty: 30 CAPSULE | Refills: 0 | Status: SHIPPED | OUTPATIENT
Start: 2020-08-25 | End: 2020-09-24

## 2020-07-27 RX ORDER — ARIPIPRAZOLE 10 MG/1
10 TABLET ORAL DAILY
Qty: 90 TABLET | Refills: 3 | Status: SHIPPED | OUTPATIENT
Start: 2020-07-27 | End: 2022-10-27

## 2020-07-27 ASSESSMENT — ANXIETY QUESTIONNAIRES
6. BECOMING EASILY ANNOYED OR IRRITABLE: SEVERAL DAYS
3. WORRYING TOO MUCH ABOUT DIFFERENT THINGS: SEVERAL DAYS
GAD7 TOTAL SCORE: 15
1. FEELING NERVOUS, ANXIOUS, OR ON EDGE: NEARLY EVERY DAY
7. FEELING AFRAID AS IF SOMETHING AWFUL MIGHT HAPPEN: NEARLY EVERY DAY
IF YOU CHECKED OFF ANY PROBLEMS ON THIS QUESTIONNAIRE, HOW DIFFICULT HAVE THESE PROBLEMS MADE IT FOR YOU TO DO YOUR WORK, TAKE CARE OF THINGS AT HOME, OR GET ALONG WITH OTHER PEOPLE: EXTREMELY DIFFICULT
5. BEING SO RESTLESS THAT IT IS HARD TO SIT STILL: SEVERAL DAYS
2. NOT BEING ABLE TO STOP OR CONTROL WORRYING: NEARLY EVERY DAY

## 2020-07-27 ASSESSMENT — PATIENT HEALTH QUESTIONNAIRE - PHQ9
SUM OF ALL RESPONSES TO PHQ QUESTIONS 1-9: 6
5. POOR APPETITE OR OVEREATING: NEARLY EVERY DAY

## 2020-07-27 NOTE — NURSING NOTE
"Chief Complaint   Patient presents with     A.D.H.D       Initial There were no vitals taken for this visit. Estimated body mass index is 33.95 kg/m  as calculated from the following:    Height as of 2/20/20: 1.651 m (5' 5\").    Weight as of 2/20/20: 92.5 kg (204 lb).  Medication Reconciliation: complete  Lesley Sullivan  "

## 2020-07-28 ASSESSMENT — ANXIETY QUESTIONNAIRES: GAD7 TOTAL SCORE: 15

## 2020-07-30 ENCOUNTER — OFFICE VISIT (OUTPATIENT)
Dept: FAMILY MEDICINE | Facility: OTHER | Age: 30
End: 2020-07-30
Attending: FAMILY MEDICINE
Payer: COMMERCIAL

## 2020-07-30 ENCOUNTER — HOSPITAL ENCOUNTER (EMERGENCY)
Facility: HOSPITAL | Age: 30
End: 2020-07-30
Payer: COMMERCIAL

## 2020-07-30 ENCOUNTER — NURSE TRIAGE (OUTPATIENT)
Dept: FAMILY MEDICINE | Facility: OTHER | Age: 30
End: 2020-07-30

## 2020-07-30 DIAGNOSIS — R09.89 RUNNY NOSE: Primary | ICD-10-CM

## 2020-07-30 DIAGNOSIS — R07.0 THROAT PAIN: ICD-10-CM

## 2020-07-30 DIAGNOSIS — R11.0 NAUSEA: ICD-10-CM

## 2020-07-30 PROCEDURE — U0003 INFECTIOUS AGENT DETECTION BY NUCLEIC ACID (DNA OR RNA); SEVERE ACUTE RESPIRATORY SYNDROME CORONAVIRUS 2 (SARS-COV-2) (CORONAVIRUS DISEASE [COVID-19]), AMPLIFIED PROBE TECHNIQUE, MAKING USE OF HIGH THROUGHPUT TECHNOLOGIES AS DESCRIBED BY CMS-2020-01-R: HCPCS | Mod: ZL | Performed by: FAMILY MEDICINE

## 2020-07-30 NOTE — TELEPHONE ENCOUNTER
Pt called, requesting covid testing. Reports productive cough, sore throat, fatigue that started saturday. Denies SOB, fever. Scheduled for curbside testing. Advised 14 day quarantine, symptomatic treatment, call back with change or worsening in symptoms. Pt verbalizes understanding.       Reason for Disposition    [1] COVID-19 infection suspected by caller or triager AND [2] mild symptoms (cough, fever, or others) AND [3] no complications or SOB    Additional Information    Negative: SEVERE difficulty breathing (e.g., struggling for each breath, speaks in single words)    Negative: Difficult to awaken or acting confused (e.g., disoriented, slurred speech)    Negative: Bluish (or gray) lips or face now    Negative: Shock suspected (e.g., cold/pale/clammy skin, too weak to stand, low BP, rapid pulse)    Negative: Sounds like a life-threatening emergency to the triager    Negative: [1] COVID-19 exposure AND [2] no symptoms    Negative: COVID-19 and Breastfeeding, questions about    Negative: [1] Adult with possible COVID-19 symptoms AND [2] triager concerned about severity of symptoms or other causes    Negative: SEVERE or constant chest pain or pressure (Exception: mild central chest pain, present only when coughing)    Negative: MODERATE difficulty breathing (e.g., speaks in phrases, SOB even at rest, pulse 100-120)    Negative: Patient sounds very sick or weak to the triager    Negative: MILD difficulty breathing (e.g., minimal/no SOB at rest, SOB with walking, pulse <100)    Negative: Chest pain or pressure    Negative: Fever > 103 F (39.4 C)    Negative: [1] Fever > 101 F (38.3 C) AND [2] age > 60    Negative: [1] Fever > 100.0 F (37.8 C) AND [2] bedridden (e.g., nursing home patient, CVA, chronic illness, recovering from surgery)    Negative: HIGH RISK patient (e.g., age > 64 years, diabetes, heart or lung disease, weak immune system)    Negative: Fever present > 3 days (72 hours)    Negative: [1] Fever returns  "after gone for over 24 hours AND [2] symptoms worse or not improved    Negative: [1] Continuous (nonstop) coughing interferes with work or school AND [2] no improvement using cough treatment per protocol    Answer Assessment - Initial Assessment Questions  1. COVID-19 DIAGNOSIS: \"Who made your Coronavirus (COVID-19) diagnosis?\" \"Was it confirmed by a positive lab test?\" If not diagnosed by a HCP, ask \"Are there lots of cases (community spread) where you live?\" (See public health department website, if unsure)      Not confirmed  2. ONSET: \"When did the COVID-19 symptoms start?\"       saturday  3. WORST SYMPTOM: \"What is your worst symptom?\" (e.g., cough, fever, shortness of breath, muscle aches)      nausea  4. COUGH: \"Do you have a cough?\" If so, ask: \"How bad is the cough?\"        Yes, productive  5. FEVER: \"Do you have a fever?\" If so, ask: \"What is your temperature, how was it measured, and when did it start?\"      no  6. RESPIRATORY STATUS: \"Describe your breathing?\" (e.g., shortness of breath, wheezing, unable to speak)       wheezing  7. BETTER-SAME-WORSE: \"Are you getting better, staying the same or getting worse compared to yesterday?\"  If getting worse, ask, \"In what way?\"      worse  8. HIGH RISK DISEASE: \"Do you have any chronic medical problems?\" (e.g., asthma, heart or lung disease, weak immune system, etc.)      denies  9. PREGNANCY: \"Is there any chance you are pregnant?\" \"When was your last menstrual period?\"      no  10. OTHER SYMPTOMS: \"Do you have any other symptoms?\"  (e.g., chills, fatigue, headache, loss of smell or taste, muscle pain, sore throat)        Fatigue, sore throat    Protocols used: CORONAVIRUS (COVID-19) DIAGNOSED OR [id number] 5.16.20      "

## 2020-07-31 LAB
SARS-COV-2 RNA SPEC QL NAA+PROBE: NOT DETECTED
SPECIMEN SOURCE: NORMAL

## 2020-08-03 ENCOUNTER — PREP FOR PROCEDURE (OUTPATIENT)
Dept: OBGYN | Facility: OTHER | Age: 30
End: 2020-08-03

## 2020-08-03 DIAGNOSIS — Z30.2 ENCOUNTER FOR STERILIZATION: Primary | ICD-10-CM

## 2020-08-03 DIAGNOSIS — Z01.818 PRE-OP EXAM: ICD-10-CM

## 2020-08-14 ENCOUNTER — PREP FOR PROCEDURE (OUTPATIENT)
Dept: OBGYN | Facility: OTHER | Age: 30
End: 2020-08-14

## 2020-08-14 DIAGNOSIS — Z30.2 ENCOUNTER FOR STERILIZATION: Primary | ICD-10-CM

## 2020-08-17 ENCOUNTER — IMMUNIZATION (OUTPATIENT)
Dept: FAMILY MEDICINE | Facility: OTHER | Age: 30
End: 2020-08-17
Attending: OBSTETRICS & GYNECOLOGY
Payer: COMMERCIAL

## 2020-08-17 ENCOUNTER — ANESTHESIA EVENT (OUTPATIENT)
Dept: SURGERY | Facility: HOSPITAL | Age: 30
End: 2020-08-17
Payer: COMMERCIAL

## 2020-08-17 DIAGNOSIS — Z01.818 PRE-OP TESTING: Primary | ICD-10-CM

## 2020-08-17 PROCEDURE — U0003 INFECTIOUS AGENT DETECTION BY NUCLEIC ACID (DNA OR RNA); SEVERE ACUTE RESPIRATORY SYNDROME CORONAVIRUS 2 (SARS-COV-2) (CORONAVIRUS DISEASE [COVID-19]), AMPLIFIED PROBE TECHNIQUE, MAKING USE OF HIGH THROUGHPUT TECHNOLOGIES AS DESCRIBED BY CMS-2020-01-R: HCPCS | Mod: ZL | Performed by: OBSTETRICS & GYNECOLOGY

## 2020-08-17 ASSESSMENT — LIFESTYLE VARIABLES: TOBACCO_USE: 1

## 2020-08-17 NOTE — ANESTHESIA PREPROCEDURE EVALUATION
"Anesthesia Pre-Procedure Evaluation    Patient: Jennifer Tatum   MRN: [id number] : 1990          Preoperative Diagnosis: Encounter for sterilization [Z30.2]    Procedure(s):  LAPAROSCOPIC TUBAL LIGATION    Past Medical History:   Diagnosis Date     H/O nephrolithotomy with removal of calculi      History of  delivery 2015    sched for 3/21/15      Incompetent cervix 11/10/2015    To [place] per Dr. Evan RICKS      IUFD (intrauterine fetal death) 2016    Heart rate dropped during BPP and patient was taken for code pink c/s where baby was noted to be dead at birth      MDD (major depressive disorder)      Mild major depression (H) 2013     Nephrolithiasis     bilateral /complicating pregnancy- Seen Dr Bennie Lucero      Smoker 2013    Patient is weaning      Past Surgical History:   Procedure Laterality Date     CERCLAGE CERVICAL N/A 11/10/2015    Procedure: CERCLAGE CERVICAL;  Surgeon: Tucker Barragan MD;  [facility]: [facility]      SECTION  2012    Pregnancy full-term; \"Oleksandr\"       SECTION N/A 2016    Procedure:  SECTION;  Surgeon: Nisha Mcleod MD;  Location: [facility]      NEPHROSTOMY PERCUTUTANEOUS      left     impacted wisdom teeth removal         Anesthesia Evaluation     . Pt has had prior anesthetic.     No history of anesthetic complications          ROS/MED HX    ENT/Pulmonary:     (+)tobacco use, Current use 1 pack/day for 10 years packs/day  , . .    Neurologic:  - neg neurologic ROS     Cardiovascular:  - neg cardiovascular ROS       METS/Exercise Tolerance:  >4 METS   Hematologic:  - neg hematologic  ROS       Musculoskeletal:   (+)  other musculoskeletal- right-side LBP with sciatica      GI/Hepatic:  - neg GI/hepatic ROS       Renal/Genitourinary:     (+) Nephrolithiasis ,       Endo:     (+) Obesity, .      Psychiatric:     (+) psychiatric history depression, anxiety and other (comment) (ADHD, PTSD)      Infectious Disease:   " "(+) Other Infectious Disease genital chancroid      Malignancy:      - no malignancy   Other:    (+) No chance of pregnancy C-spine cleared: N/A, no H/O Chronic Pain,no other significant disability                         Physical Exam  Normal systems: cardiovascular, pulmonary and dental    Airway   Mallampati: II  TM distance: >3 FB  Neck ROM: full    Dental     Cardiovascular   Rhythm and rate: regular and normal      Pulmonary    breath sounds clear to auscultation            Lab Results   Component Value Date    WBC 6.7 03/13/2019    HGB 13.5 03/13/2019    HCT 41.4 03/13/2019     03/13/2019    CRP <2.9 07/21/2016    SED 8 09/28/2015     03/13/2019    POTASSIUM 4.0 03/13/2019    CHLORIDE 108 03/13/2019    CO2 25 03/13/2019    BUN 16 03/13/2019    CR 0.69 03/13/2019    GLC 96 03/13/2019    SOPHIE 9.0 03/13/2019    ALBUMIN 3.9 03/13/2019    PROTTOTAL 7.7 03/13/2019    ALT 29 03/13/2019    AST 16 03/13/2019    ALKPHOS 92 03/13/2019    BILITOTAL 0.2 03/13/2019    LIPASE 127 09/10/2015    PTT 23 (L) 01/20/2016    INR 0.95 01/20/2016    TSH 1.50 03/13/2019    HCG Negative 10/05/2017    HCGS Positive (A) 12/01/2017       Preop Vitals  BP Readings from Last 3 Encounters:   05/21/20 129/76   02/20/20 122/82   02/07/20 122/70    Pulse Readings from Last 3 Encounters:   02/20/20 84   02/07/20 60   02/04/20 88      Resp Readings from Last 3 Encounters:   05/21/20 16   08/06/19 14   08/01/19 16    SpO2 Readings from Last 3 Encounters:   05/21/20 97%   01/31/20 98%   01/14/20 98%      Temp Readings from Last 1 Encounters:   05/21/20 97.7  F (36.5  C) (Tympanic)    Ht Readings from Last 1 Encounters:   02/20/20 1.651 m (5' 5\")      Wt Readings from Last 1 Encounters:   02/20/20 92.5 kg (204 lb)    Estimated body mass index is 33.95 kg/m  as calculated from the following:    Height as of 2/20/20: 1.651 m (5' 5\").    Weight as of 2/20/20: 92.5 kg (204 lb).       Anesthesia Plan      History & Physical Review  History " and physical reviewed and following examination; no interval change.    ASA Status:  3 .    NPO Status:  > 8 hours    Plan for General with Intravenous and Propofol induction. Maintenance will be Inhalation.    PONV prophylaxis:  Dexamethasone or Solumedrol, Scopolamine patch and Ondansetron (or other 5HT-3)  H and P date 8/19/20        Postoperative Care  Postoperative pain management:  IV analgesics.      Consents  Anesthetic plan, risks, benefits and alternatives discussed with:  Patient..                 JOHNSON Alexis CRNA

## 2020-08-18 LAB
SARS-COV-2 RNA SPEC QL NAA+PROBE: NOT DETECTED
SPECIMEN SOURCE: NORMAL

## 2020-08-19 ENCOUNTER — OFFICE VISIT (OUTPATIENT)
Dept: OBGYN | Facility: OTHER | Age: 30
End: 2020-08-19
Attending: OBSTETRICS & GYNECOLOGY
Payer: COMMERCIAL

## 2020-08-19 VITALS
BODY MASS INDEX: 31.32 KG/M2 | SYSTOLIC BLOOD PRESSURE: 118 MMHG | DIASTOLIC BLOOD PRESSURE: 78 MMHG | HEIGHT: 65 IN | WEIGHT: 188 LBS | HEART RATE: 98 BPM

## 2020-08-19 DIAGNOSIS — Z01.818 PREOP GENERAL PHYSICAL EXAM: Primary | ICD-10-CM

## 2020-08-19 DIAGNOSIS — Z30.2 STERILIZATION: ICD-10-CM

## 2020-08-19 LAB
B-HCG SERPL-ACNC: <1 IU/L (ref 0–5)
ERYTHROCYTE [DISTWIDTH] IN BLOOD BY AUTOMATED COUNT: 14.1 % (ref 10–15)
HCT VFR BLD AUTO: 45.9 % (ref 35–47)
HGB BLD-MCNC: 15.3 G/DL (ref 11.7–15.7)
MCH RBC QN AUTO: 28.3 PG (ref 26.5–33)
MCHC RBC AUTO-ENTMCNC: 33.3 G/DL (ref 31.5–36.5)
MCV RBC AUTO: 85 FL (ref 78–100)
PLATELET # BLD AUTO: 230 10E9/L (ref 150–450)
RBC # BLD AUTO: 5.41 10E12/L (ref 3.8–5.2)
WBC # BLD AUTO: 7 10E9/L (ref 4–11)

## 2020-08-19 PROCEDURE — G0463 HOSPITAL OUTPT CLINIC VISIT: HCPCS

## 2020-08-19 PROCEDURE — 36415 COLL VENOUS BLD VENIPUNCTURE: CPT | Mod: ZL | Performed by: OBSTETRICS & GYNECOLOGY

## 2020-08-19 PROCEDURE — 85027 COMPLETE CBC AUTOMATED: CPT | Mod: ZL | Performed by: OBSTETRICS & GYNECOLOGY

## 2020-08-19 PROCEDURE — 99213 OFFICE O/P EST LOW 20 MIN: CPT | Performed by: OBSTETRICS & GYNECOLOGY

## 2020-08-19 PROCEDURE — 84702 CHORIONIC GONADOTROPIN TEST: CPT | Mod: ZL | Performed by: OBSTETRICS & GYNECOLOGY

## 2020-08-19 ASSESSMENT — MIFFLIN-ST. JEOR: SCORE: 1583.64

## 2020-08-19 ASSESSMENT — PAIN SCALES - GENERAL: PAINLEVEL: NO PAIN (0)

## 2020-08-19 NOTE — PROGRESS NOTES
"    History and Physical  Obstetrics and Gynecology     Date of Admission:  2020    Assessment & Plan   Jennifer Tatum is a 29 year old female who presents with desirous permanent irreversible sterilization by Laparoscopic   Bilateral Salpingectomy    Active Problems:    * No active hospital problems. *      Christiano Grimm    Code Status   Full Code    Primary Care Physician   Cornell Herron    Chief Complaint   sterilization    History is obtained from the patient    History of Present Illness   Jennifer Tatum is a 29 year old female who presents for sterilization.    Past Medical History    I have reviewed this patient's medical history and updated it with pertinent information if needed.   Past Medical History:   Diagnosis Date     H/O nephrolithotomy with removal of calculi      History of  delivery 2015    sched for 3/21/15      Incompetent cervix 11/10/2015    To [place] per Dr. Evan RICKS      IUFD (intrauterine fetal death) 2016    Heart rate dropped during BPP and patient was taken for code pink c/s where baby was noted to be dead at birth      MDD (major depressive disorder)      Mild major depression (H) 2013     Nephrolithiasis     bilateral /complicating pregnancy- Seen Dr Avery [facility]      Smoker 2013    Patient is weaning        Past Surgical History   I have reviewed this patient's surgical history and updated it with pertinent information if needed.  Past Surgical History:   Procedure Laterality Date     CERCLAGE CERVICAL N/A 11/10/2015    Procedure: CERCLAGE CERVICAL;  Surgeon: Tucker Barragan MD;  [facility]      SECTION  2012    Pregnancy full-term; \"Oleksandr\"       SECTION N/A 2016    Procedure:  SECTION;  Surgeon: Nisha Mcleod MD;  Location: [facility]      NEPHROSTOMY PERCUTUTANEOUS      left     impacted wisdom teeth removal         Prior to Admission Medications   Cannot display prior to admission medications " because the patient has not been admitted in this contact.     Allergies   Allergies   Allergen Reactions     Wellbutrin [Bupropion] Other (See Comments)     Mood changes       Social History   I have reviewed this patient's social history and updated it with pertinent information if needed. Jennifer Tatum  reports that she has been smoking cigarettes. She has a 5.00 pack-year smoking history. She has never used smokeless tobacco. She reports that she does not drink alcohol or use drugs.    Family History   I have reviewed this patient's family history and updated it with pertinent information if needed.   Family History   Problem Relation Age of Onset     Alcohol/Drug Father         alcoholism     Psychotic Disorder Father         Bipolar     Cerebrovascular Disease Other         Cerebrovascular accident     C.A.D. Other      Hypertension Other      Diabetes Other        Review of Systems   CONSTITUTIONAL:  negative  EYES:  negative  HEENT:  negative  RESPIRATORY:  negative  CARDIOVASCULAR:  negative  GASTROINTESTINAL:  negative  GENITOURINARY:  negative  HEMATOLOGIC/LYMPHATIC:  negative  NEUROLOGICAL:  negative    Physical Exam       BP: 118/78 Pulse: 98            Vital Signs with Ranges  Pulse:  [98] 98  BP: (118)/(78) 118/78  188 lbs 0 oz    Constitutional: awake, alert, cooperative, no apparent distress, and appears stated age  Respiratory: No increased work of breathing, good air exchange, clear to auscultation bilaterally, no crackles or wheezing  Cardiovascular: Normal apical impulse, regular rate and rhythm, normal S1 and S2, no S3 or S4, and no murmur noted  GI: No scars, normal bowel sounds, soft, non-distended, non-tender, no masses palpated, no hepatosplenomegally  Genitounirinary: External Genitalia:  General appearance; normal, Hair distribution; normal  Urethral Meatus:  Size normal  Bladder:  Fullness absent, Masses absent  Vagina:  General appearance normal, Estrogen effect normal  Cervix:   General appearance normal  Uterus:  Size normal, Contour normal  Adenexa:  Masses absent, Tenderness absent  Skin/Extremities: no bruising or bleeding, normal skin color, texture, turgor, no redness, warmth, or swelling and no rashes  Musculoskeletal: There is no redness, warmth, or swelling of the joints.  Full range of motion noted.  Motor strength is 5 out of 5 all extremities bilaterally.  Tone is normal.  Neurologic: Awake, alert, oriented to name, place and time.  Cranial nerves II-XII are grossly intact.  Motor is 5 out of 5 bilaterally.  Cerebellar finger to nose, heel to shin intact.  Sensory is intact.  Babinski down going, Romberg negative, and gait is normal.  Neuropsychiatric: General: normal, calm and normal eye contact  Level of consciousness: alert / normal  Affect: normal, pleasant and full  Orientation: oriented to self, place, time and situation    Data LAB: pending  No results found for this or any previous visit (from the past 24 hour(s)).

## 2020-08-19 NOTE — NURSING NOTE
"Chief Complaint   Patient presents with     Pre-Op Exam       Initial /78   Pulse 98   Ht 1.651 m (5' 5\")   Wt 85.3 kg (188 lb)   BMI 31.28 kg/m   Estimated body mass index is 31.28 kg/m  as calculated from the following:    Height as of this encounter: 1.651 m (5' 5\").    Weight as of this encounter: 85.3 kg (188 lb).  Medication Reconciliation: complete  Madie Garcia LPN    "

## 2020-08-20 ENCOUNTER — HOSPITAL ENCOUNTER (OUTPATIENT)
Facility: HOSPITAL | Age: 30
Discharge: HOME | End: 2020-08-20
Attending: OBSTETRICS & GYNECOLOGY | Admitting: OBSTETRICS & GYNECOLOGY
Payer: COMMERCIAL

## 2020-08-20 ENCOUNTER — ANESTHESIA (OUTPATIENT)
Dept: SURGERY | Facility: HOSPITAL | Age: 30
End: 2020-08-20
Payer: COMMERCIAL

## 2020-08-20 ENCOUNTER — APPOINTMENT (OUTPATIENT)
Dept: LAB | Facility: HOSPITAL | Age: 30
End: 2020-08-20
Attending: OBSTETRICS & GYNECOLOGY
Payer: COMMERCIAL

## 2020-08-20 VITALS
HEIGHT: 65 IN | TEMPERATURE: 98 F | RESPIRATION RATE: 18 BRPM | BODY MASS INDEX: 30.99 KG/M2 | OXYGEN SATURATION: 95 % | DIASTOLIC BLOOD PRESSURE: 63 MMHG | HEART RATE: 67 BPM | SYSTOLIC BLOOD PRESSURE: 111 MMHG | WEIGHT: 186 LBS

## 2020-08-20 DIAGNOSIS — Z30.2 STERILIZATION: ICD-10-CM

## 2020-08-20 DIAGNOSIS — Z30.2 ENCOUNTER FOR STERILIZATION: ICD-10-CM

## 2020-08-20 DIAGNOSIS — Z30.2 ENCOUNTER FOR STERILIZATION: Primary | ICD-10-CM

## 2020-08-20 DIAGNOSIS — N73.6 FEMALE PELVIC PERITONEAL ADHESIONS: ICD-10-CM

## 2020-08-20 LAB — HCG UR QL: NEGATIVE

## 2020-08-20 PROCEDURE — 36000058 ZZH SURGERY LEVEL 3 EA 15 ADDTL MIN: Performed by: OBSTETRICS & GYNECOLOGY

## 2020-08-20 PROCEDURE — 58661 LAPAROSCOPY REMOVE ADNEXA: CPT | Performed by: OBSTETRICS & GYNECOLOGY

## 2020-08-20 PROCEDURE — 25000125 ZZHC RX 250: Performed by: NURSE ANESTHETIST, CERTIFIED REGISTERED

## 2020-08-20 PROCEDURE — 25000128 H RX IP 250 OP 636: Performed by: NURSE ANESTHETIST, CERTIFIED REGISTERED

## 2020-08-20 PROCEDURE — 25000132 ZZH RX MED GY IP 250 OP 250 PS 637: Performed by: OBSTETRICS & GYNECOLOGY

## 2020-08-20 PROCEDURE — 27110028 ZZH OR GENERAL SUPPLY NON-STERILE: Performed by: OBSTETRICS & GYNECOLOGY

## 2020-08-20 PROCEDURE — 81025 URINE PREGNANCY TEST: CPT | Performed by: NURSE ANESTHETIST, CERTIFIED REGISTERED

## 2020-08-20 PROCEDURE — 71000027 ZZH RECOVERY PHASE 2 EACH 15 MINS: Performed by: OBSTETRICS & GYNECOLOGY

## 2020-08-20 PROCEDURE — 25000128 H RX IP 250 OP 636: Performed by: OBSTETRICS & GYNECOLOGY

## 2020-08-20 PROCEDURE — 27210794 ZZH OR GENERAL SUPPLY STERILE: Performed by: OBSTETRICS & GYNECOLOGY

## 2020-08-20 PROCEDURE — 36000056 ZZH SURGERY LEVEL 3 1ST 30 MIN: Performed by: OBSTETRICS & GYNECOLOGY

## 2020-08-20 PROCEDURE — 40000305 ZZH STATISTIC PRE PROC ASSESS I: Performed by: OBSTETRICS & GYNECOLOGY

## 2020-08-20 PROCEDURE — 25000566 ZZH SEVOFLURANE, EA 15 MIN: Performed by: NURSE ANESTHETIST, CERTIFIED REGISTERED

## 2020-08-20 PROCEDURE — 25000132 ZZH RX MED GY IP 250 OP 250 PS 637: Performed by: NURSE ANESTHETIST, CERTIFIED REGISTERED

## 2020-08-20 PROCEDURE — 37000009 ZZH ANESTHESIA TECHNICAL FEE, EACH ADDTL 15 MIN: Performed by: OBSTETRICS & GYNECOLOGY

## 2020-08-20 PROCEDURE — 58661 LAPAROSCOPY REMOVE ADNEXA: CPT | Performed by: NURSE ANESTHETIST, CERTIFIED REGISTERED

## 2020-08-20 PROCEDURE — 71000014 ZZH RECOVERY PHASE 1 LEVEL 2 FIRST HR: Performed by: OBSTETRICS & GYNECOLOGY

## 2020-08-20 PROCEDURE — 25800030 ZZH RX IP 258 OP 636: Performed by: NURSE ANESTHETIST, CERTIFIED REGISTERED

## 2020-08-20 PROCEDURE — 37000008 ZZH ANESTHESIA TECHNICAL FEE, 1ST 30 MIN: Performed by: OBSTETRICS & GYNECOLOGY

## 2020-08-20 RX ORDER — FENTANYL CITRATE 50 UG/ML
25-50 INJECTION, SOLUTION INTRAMUSCULAR; INTRAVENOUS EVERY 5 MIN PRN
Status: DISCONTINUED | OUTPATIENT
Start: 2020-08-20 | End: 2020-08-20 | Stop reason: HOSPADM

## 2020-08-20 RX ORDER — GLYCOPYRROLATE 0.2 MG/ML
INJECTION, SOLUTION INTRAMUSCULAR; INTRAVENOUS PRN
Status: DISCONTINUED | OUTPATIENT
Start: 2020-08-20 | End: 2020-08-20

## 2020-08-20 RX ORDER — SCOLOPAMINE TRANSDERMAL SYSTEM 1 MG/1
1 PATCH, EXTENDED RELEASE TRANSDERMAL ONCE
Status: DISCONTINUED | OUTPATIENT
Start: 2020-08-20 | End: 2020-08-20 | Stop reason: HOSPADM

## 2020-08-20 RX ORDER — SODIUM CHLORIDE, SODIUM LACTATE, POTASSIUM CHLORIDE, CALCIUM CHLORIDE 600; 310; 30; 20 MG/100ML; MG/100ML; MG/100ML; MG/100ML
INJECTION, SOLUTION INTRAVENOUS CONTINUOUS
Status: DISCONTINUED | OUTPATIENT
Start: 2020-08-20 | End: 2020-08-20 | Stop reason: HOSPADM

## 2020-08-20 RX ORDER — KETOROLAC TROMETHAMINE 30 MG/ML
30 INJECTION, SOLUTION INTRAMUSCULAR; INTRAVENOUS ONCE
Status: COMPLETED | OUTPATIENT
Start: 2020-08-20 | End: 2020-08-20

## 2020-08-20 RX ORDER — LIDOCAINE 40 MG/G
CREAM TOPICAL
Status: DISCONTINUED | OUTPATIENT
Start: 2020-08-20 | End: 2020-08-20 | Stop reason: HOSPADM

## 2020-08-20 RX ORDER — ONDANSETRON 4 MG/1
4 TABLET, ORALLY DISINTEGRATING ORAL EVERY 30 MIN PRN
Status: DISCONTINUED | OUTPATIENT
Start: 2020-08-20 | End: 2020-08-20 | Stop reason: HOSPADM

## 2020-08-20 RX ORDER — LABETALOL 20 MG/4 ML (5 MG/ML) INTRAVENOUS SYRINGE
10
Status: DISCONTINUED | OUTPATIENT
Start: 2020-08-20 | End: 2020-08-20 | Stop reason: HOSPADM

## 2020-08-20 RX ORDER — BUPIVACAINE HYDROCHLORIDE 2.5 MG/ML
INJECTION, SOLUTION INFILTRATION; PERINEURAL PRN
Status: DISCONTINUED | OUTPATIENT
Start: 2020-08-20 | End: 2020-08-20 | Stop reason: HOSPADM

## 2020-08-20 RX ORDER — NEOSTIGMINE METHYLSULFATE 1 MG/ML
VIAL (ML) INJECTION PRN
Status: DISCONTINUED | OUTPATIENT
Start: 2020-08-20 | End: 2020-08-20

## 2020-08-20 RX ORDER — IBUPROFEN 200 MG
600 TABLET ORAL
Status: DISCONTINUED | OUTPATIENT
Start: 2020-08-20 | End: 2020-08-20 | Stop reason: HOSPADM

## 2020-08-20 RX ORDER — CEFAZOLIN SODIUM 2 G/100ML
2 INJECTION, SOLUTION INTRAVENOUS
Status: COMPLETED | OUTPATIENT
Start: 2020-08-20 | End: 2020-08-20

## 2020-08-20 RX ORDER — ACETAMINOPHEN 325 MG/1
975 TABLET ORAL ONCE
Status: COMPLETED | OUTPATIENT
Start: 2020-08-20 | End: 2020-08-20

## 2020-08-20 RX ORDER — IBUPROFEN 800 MG/1
800 TABLET, FILM COATED ORAL EVERY 8 HOURS PRN
Qty: 40 TABLET | Refills: 1 | Status: SHIPPED | OUTPATIENT
Start: 2020-08-20 | End: 2021-03-19 | Stop reason: ALTCHOICE

## 2020-08-20 RX ORDER — NALOXONE HYDROCHLORIDE 0.4 MG/ML
.1-.4 INJECTION, SOLUTION INTRAMUSCULAR; INTRAVENOUS; SUBCUTANEOUS
Status: DISCONTINUED | OUTPATIENT
Start: 2020-08-20 | End: 2020-08-20 | Stop reason: HOSPADM

## 2020-08-20 RX ORDER — LIDOCAINE HYDROCHLORIDE 20 MG/ML
INJECTION, SOLUTION INFILTRATION; PERINEURAL PRN
Status: DISCONTINUED | OUTPATIENT
Start: 2020-08-20 | End: 2020-08-20

## 2020-08-20 RX ORDER — HYDROCODONE BITARTRATE AND ACETAMINOPHEN 5; 325 MG/1; MG/1
1 TABLET ORAL
Status: COMPLETED | OUTPATIENT
Start: 2020-08-20 | End: 2020-08-20

## 2020-08-20 RX ORDER — ONDANSETRON 2 MG/ML
INJECTION INTRAMUSCULAR; INTRAVENOUS PRN
Status: DISCONTINUED | OUTPATIENT
Start: 2020-08-20 | End: 2020-08-20

## 2020-08-20 RX ORDER — ACETAMINOPHEN 325 MG/1
975 TABLET ORAL ONCE
Status: DISCONTINUED | OUTPATIENT
Start: 2020-08-20 | End: 2020-08-20

## 2020-08-20 RX ORDER — HYDROMORPHONE HYDROCHLORIDE 1 MG/ML
.3-.5 INJECTION, SOLUTION INTRAMUSCULAR; INTRAVENOUS; SUBCUTANEOUS EVERY 10 MIN PRN
Status: DISCONTINUED | OUTPATIENT
Start: 2020-08-20 | End: 2020-08-20 | Stop reason: HOSPADM

## 2020-08-20 RX ORDER — ONDANSETRON 4 MG/1
4 TABLET, ORALLY DISINTEGRATING ORAL
Status: DISCONTINUED | OUTPATIENT
Start: 2020-08-20 | End: 2020-08-20 | Stop reason: HOSPADM

## 2020-08-20 RX ORDER — ALBUTEROL SULFATE 90 UG/1
AEROSOL, METERED RESPIRATORY (INHALATION) PRN
Status: DISCONTINUED | OUTPATIENT
Start: 2020-08-20 | End: 2020-08-20

## 2020-08-20 RX ORDER — FENTANYL CITRATE 50 UG/ML
25-50 INJECTION, SOLUTION INTRAMUSCULAR; INTRAVENOUS
Status: DISCONTINUED | OUTPATIENT
Start: 2020-08-20 | End: 2020-08-20 | Stop reason: HOSPADM

## 2020-08-20 RX ORDER — ONDANSETRON 2 MG/ML
4 INJECTION INTRAMUSCULAR; INTRAVENOUS EVERY 30 MIN PRN
Status: DISCONTINUED | OUTPATIENT
Start: 2020-08-20 | End: 2020-08-20 | Stop reason: HOSPADM

## 2020-08-20 RX ORDER — MEPERIDINE HYDROCHLORIDE 25 MG/ML
12.5 INJECTION INTRAMUSCULAR; INTRAVENOUS; SUBCUTANEOUS
Status: DISCONTINUED | OUTPATIENT
Start: 2020-08-20 | End: 2020-08-20 | Stop reason: HOSPADM

## 2020-08-20 RX ORDER — PROPOFOL 10 MG/ML
INJECTION, EMULSION INTRAVENOUS PRN
Status: DISCONTINUED | OUTPATIENT
Start: 2020-08-20 | End: 2020-08-20

## 2020-08-20 RX ORDER — CEFAZOLIN SODIUM 2 G/100ML
2 INJECTION, SOLUTION INTRAVENOUS
Status: DISCONTINUED | OUTPATIENT
Start: 2020-08-20 | End: 2020-08-20

## 2020-08-20 RX ORDER — DEXAMETHASONE SODIUM PHOSPHATE 10 MG/ML
INJECTION, SOLUTION INTRAMUSCULAR; INTRAVENOUS PRN
Status: DISCONTINUED | OUTPATIENT
Start: 2020-08-20 | End: 2020-08-20

## 2020-08-20 RX ORDER — OXYCODONE AND ACETAMINOPHEN 5; 325 MG/1; MG/1
1 TABLET ORAL EVERY 6 HOURS PRN
Qty: 12 TABLET | Refills: 0 | Status: SHIPPED | OUTPATIENT
Start: 2020-08-20 | End: 2020-08-23

## 2020-08-20 RX ORDER — FENTANYL CITRATE 50 UG/ML
INJECTION, SOLUTION INTRAMUSCULAR; INTRAVENOUS PRN
Status: DISCONTINUED | OUTPATIENT
Start: 2020-08-20 | End: 2020-08-20

## 2020-08-20 RX ADMIN — SODIUM CHLORIDE, POTASSIUM CHLORIDE, SODIUM LACTATE AND CALCIUM CHLORIDE: 600; 310; 30; 20 INJECTION, SOLUTION INTRAVENOUS at 11:32

## 2020-08-20 RX ADMIN — CEFAZOLIN SODIUM 2 G: 2 INJECTION, SOLUTION INTRAVENOUS at 11:56

## 2020-08-20 RX ADMIN — PROPOFOL 200 MG: 10 INJECTION, EMULSION INTRAVENOUS at 11:57

## 2020-08-20 RX ADMIN — SCOPALAMINE 1 PATCH: 1 PATCH, EXTENDED RELEASE TRANSDERMAL at 11:31

## 2020-08-20 RX ADMIN — ALBUTEROL SULFATE 8 PUFF: 90 AEROSOL, METERED RESPIRATORY (INHALATION) at 12:47

## 2020-08-20 RX ADMIN — Medication 4 MG: at 12:39

## 2020-08-20 RX ADMIN — FENTANYL CITRATE 50 MCG: 50 INJECTION, SOLUTION INTRAMUSCULAR; INTRAVENOUS at 12:31

## 2020-08-20 RX ADMIN — LIDOCAINE HYDROCHLORIDE 40 MG: 20 INJECTION, SOLUTION INFILTRATION; PERINEURAL at 11:57

## 2020-08-20 RX ADMIN — GLYCOPYRROLATE 0.6 MG: 0.2 INJECTION, SOLUTION INTRAMUSCULAR; INTRAVENOUS at 12:39

## 2020-08-20 RX ADMIN — DEXAMETHASONE SODIUM PHOSPHATE 10 MG: 10 INJECTION, SOLUTION INTRAMUSCULAR; INTRAVENOUS at 12:03

## 2020-08-20 RX ADMIN — FENTANYL CITRATE 50 MCG: 50 INJECTION INTRAMUSCULAR; INTRAVENOUS at 14:01

## 2020-08-20 RX ADMIN — PROPOFOL 20 MG: 10 INJECTION, EMULSION INTRAVENOUS at 12:52

## 2020-08-20 RX ADMIN — PROPOFOL 30 MG: 10 INJECTION, EMULSION INTRAVENOUS at 12:33

## 2020-08-20 RX ADMIN — ROCURONIUM BROMIDE 30 MG: 10 INJECTION INTRAVENOUS at 11:57

## 2020-08-20 RX ADMIN — GLYCOPYRROLATE 0.2 MG: 0.2 INJECTION, SOLUTION INTRAMUSCULAR; INTRAVENOUS at 12:15

## 2020-08-20 RX ADMIN — HYDROCODONE BITARTRATE AND ACETAMINOPHEN 1 TABLET: 5; 325 TABLET ORAL at 14:06

## 2020-08-20 RX ADMIN — ONDANSETRON 4 MG: 2 INJECTION INTRAMUSCULAR; INTRAVENOUS at 12:43

## 2020-08-20 RX ADMIN — FENTANYL CITRATE 100 MCG: 50 INJECTION, SOLUTION INTRAMUSCULAR; INTRAVENOUS at 12:13

## 2020-08-20 RX ADMIN — FENTANYL CITRATE 50 MCG: 50 INJECTION, SOLUTION INTRAMUSCULAR; INTRAVENOUS at 12:33

## 2020-08-20 RX ADMIN — FENTANYL CITRATE 25 MCG: 50 INJECTION, SOLUTION INTRAMUSCULAR; INTRAVENOUS at 12:42

## 2020-08-20 RX ADMIN — FENTANYL CITRATE 100 MCG: 50 INJECTION, SOLUTION INTRAMUSCULAR; INTRAVENOUS at 11:57

## 2020-08-20 RX ADMIN — HYDROMORPHONE HYDROCHLORIDE 0.6 MG: 1 INJECTION, SOLUTION INTRAMUSCULAR; INTRAVENOUS; SUBCUTANEOUS at 12:03

## 2020-08-20 RX ADMIN — KETOROLAC TROMETHAMINE 30 MG: 30 INJECTION, SOLUTION INTRAMUSCULAR at 13:39

## 2020-08-20 RX ADMIN — PROPOFOL 10 MG: 10 INJECTION, EMULSION INTRAVENOUS at 12:34

## 2020-08-20 RX ADMIN — ACETAMINOPHEN 975 MG: 325 TABLET, FILM COATED ORAL at 11:31

## 2020-08-20 ASSESSMENT — MIFFLIN-ST. JEOR: SCORE: 1574.57

## 2020-08-20 NOTE — OP NOTE
Procedure Date: 2020      PREOPERATIVE DIAGNOSIS:  Desires permanent irreversible sterilization.      POSTOPERATIVE DIAGNOSES:   1.  Desires permanent irreversible sterilization.   2.  Severe whole pelvis peritoneal adhesions.      PROCEDURES:   1.  Video laparoscopy.   2.  Extensive lysis of pelvic adhesions to access the fallopian tubes bilaterally.   3.  Bilateral fallopian tube fulguration.      SURGEON:  Christiano Grimm MD      ASSISTANT:  peggy Tadeo.      ANESTHESIA:  General endotracheal.      ESTIMATED BLOOD LOSS:  Less than 5 mL      COMPLICATIONS:  None.      CONDITION:  Stable to recovery room.      DRAINS:  None.      PACKS:  None.      COUNTS:  Sponge and needle count reported to be correct.  Instrument count reported to be correct.      SPECIMENS:  None.      OPERATIVE FINDINGS:  There were extensive pelvic adhesions in the right lower quadrant, central quadrant, left lower quadrant, left middle quadrant, right middle quadrant, central middle quadrant that had both fallopian tubes where they could not be visualized.      SURGICAL INDICATIONS FOR PROCEDURE:    This is a 29-year-old multigravida who has had 3 prior  sections who was desirous of permanent irreversible sterilization.  She declines all other methods of contraception and after reviewing the surgical options of sterilization, she opted for laparoscopic bilateral salpingectomy to reduce ovarian cancer risk as well as provide the permanent sterilization.  Therefore, the aforementioned procedure would be indicated.      INFORMED CONSENT PROCESS:    The patient presented to the office on 2020, for a preoperative physical exam and medical informed consent conference.  I gave the patient informed consent regarding the risks, complications, and alternatives to the proposed procedure.  The patient read detailed informed consent form, understood and signed it, wished to proceed with surgery as proposed.  She seemed to have  reasonable expectation regarding outcome of her surgery, which was a bilateral permanent irreversible sterilization as well as risk reduction with bilateral salpingectomy.  Answered all of her questions to her satisfaction and she is ready to proceed.      PREOPERATIVE PROCESS:    The patient presented to preop holding and I discussed the procedure again with the patient and verified and verified her allergies and of course patient identification.  All questions were answered and she was ready to proceed.      OPERATIVE TECHNIQUE:    The patient was taken back to the operating room and placed in dorsal supine position.  She underwent general endotracheal anesthesia, and abdomen was prepped with ChloraPrep and allowed to dry for 3 full minutes and then the patient was draped.      FORMAL SURGICAL TIME OUT PROCESS:  Formal surgical timeout process occurred with proper patient identification, preop diagnosis, postop diagnosis, procedure to be performed, any significant allergies or risk factors for surgery.  This was agreed upon by all operating room personnel.      Subumbilical area was injected with 0.25% Marcaine.  A stab incision was made.  Veress needle was placed with creation of the pneumoperitoneum using continuous flow carbon dioxide insufflator at first to 25 mmHg pressure low-flow insufflation at first and then high-flow insufflation.  Then, a 5 mm retractable blade trocar was placed without any difficulty.  Laparoscope was placed and a laparoscopic sleeve under direct laparoscopic visualization and the pressure was taken down to 18 mmHg for the remainder of the procedure.  There were severe adhesions in all quadrants and there was only one possibility to put a secondary trocar in, therefore there was transillumination injection with 0.25% Marcaine and a stab incision.  A 5 mm retractable blade trocar was placed under direct laparoscopic visualization.  Then there was careful dissection with the LigaSure and  a blunt-tipped probe to remove adhesions and expose the right fallopian tube.  There was nowhere to place a secondary trocar and no way to complete a salpingectomy safely with only 1 Instrument therefore, the LigaSure was used for fulguration of a 3 cm contiguous portion of the fallopian tube with evidence of complete fulguration with total blanching of the tube.  Photo documentation was carried out.        Then, attention was turned towards the left adnexa where there was extensive lysis of adhesions to have any exposure of the fallopian tube and it was carefully tracked down where the fimbria could be seen and again there was contraindication to attempted removal of the tube with the severity of the adhesions and vital structures close by, but fulguration could be carried out.  A 3 cm continuous fulguration was accomplished with evidence of complete fulguration as there was blanching of the tube.  Photo documentation was carried out.  There was no bleeding.  All the instruments were removed.  Carbon dioxide was evacuated.  Skin incisions were closed with 4-0 subcuticular Vicryl suture and surgical glue and dressings. The patient tolerated the procedure well.      POSTPROCEDURE ANALYSIS PROCESS:    I led the surgical team on a post-procedure analysis process where the preop diagnosis, postop diagnosis, estimated blood loss, specimens, complications, were discussed and any special risk factors for postop anesthesia.  There was also a discussion whether anything could have been done differently to improve the case and there were no feelings towards that, and everybody agreed that everything went as good as possible.      The patient was taken to the recovery room in satisfactory condition.         RUDDY NAIK MD             D: 2020   T: 2020   MT: GEMA      Name:     TARIQ LOPEZ   MRN:      [id number]        Account:        [id number]   :      1990           Procedure Date: 2020       Document: [id number]

## 2020-08-20 NOTE — ANESTHESIA CARE TRANSFER NOTE
Patient: Jennifer Tatum    Procedure(s):  LAPAROSCOPIC BILATERAL FALLOPIAN TUBE FULGRATION    Diagnosis: Encounter for sterilization [Z30.2]  Diagnosis Additional Information: No value filed.    Anesthesia Type:   General     Note:  Airway :Face Mask  Patient transferred to:PACU  Handoff Report: Identifed the Patient, Identified the Reponsible Provider, Reviewed the pertinent medical history, Discussed the surgical course, Reviewed Intra-OP anesthesia mangement and issues during anesthesia, Set expectations for post-procedure period and Allowed opportunity for questions and acknowledgement of understanding      Vitals: (Last set prior to Anesthesia Care Transfer)    CRNA VITALS  8/20/2020 1223 - 8/20/2020 1302      8/20/2020             Resp Rate (set):  8                Electronically Signed By: JOHNSON Euceda CRNA  August 20, 2020  1:02 PM

## 2020-08-20 NOTE — OR NURSING
Up w/o vertigo.Patient and responsible adult given discharge instructions with no questions regarding instructions. Sarika score 19. Pain level 4/10.  Discharged from unit via w/c**. Patient discharged to home

## 2020-08-20 NOTE — OP NOTE
[facility]    Gynecology Brief Operative Note    8/20/2020    Pre-operative diagnosis: Encounter for sterilization [Z30.2]     Post-operative diagnosis SAME  Severe whole pelvis peritoneal adhesions     Procedure: Procedure(s):  LAPAROSCOPIC BILATERAL FALLOPIAN TUBE FULGRATION   Extensive Lysis of pelvic adhesions to access Fallopian Tubes Bilaterally     Surgeon: Christiano Grimm   Assistants(s): Carlyle branch   Anesthesia: General    Estimated blood loss: Minimal    Total IV fluids: (See anesthesia record)   Blood transfusion: No transfusion was given during surgery   Total urine output: (See anesthesia record)   Drains: None   Specimens: None     Findings: Severe whole pelvis adhesions with NO ACCESS to Fallopian Tubes either side  Ovaries are normal     Complications: None   Condition: Stable   Comments: See dictated operative report for full details

## 2020-08-20 NOTE — ANESTHESIA POSTPROCEDURE EVALUATION
Patient: Jennifer Tatum    Procedure(s):  LAPAROSCOPIC BILATERAL FALLOPIAN TUBE FULGRATION    Diagnosis:Encounter for sterilization [Z30.2]  Diagnosis Additional Information: No value filed.    Anesthesia Type:  General    Note:  Anesthesia Post Evaluation    Patient participation: Able to fully participate in evaluation  Level of consciousness: awake  Pain management: adequate  Airway patency: patent  Cardiovascular status: acceptable  Respiratory status: acceptable  Hydration status: acceptable  PONV: none     Anesthetic complications: None          Last vitals:  Vitals:    08/20/20 1335 08/20/20 1340 08/20/20 1345   BP: 103/56 (!) 93/45 105/58   Pulse: 64 65 63   Resp: 18 16 18   Temp:      SpO2: 94% 94% 94%         Electronically Signed By: JOHNSON Ingram CRNA  August 20, 2020  1:57 PM

## 2020-09-08 ENCOUNTER — OFFICE VISIT (OUTPATIENT)
Dept: OBGYN | Facility: OTHER | Age: 30
End: 2020-09-08
Attending: OBSTETRICS & GYNECOLOGY
Payer: COMMERCIAL

## 2020-09-08 VITALS
HEART RATE: 88 BPM | TEMPERATURE: 98.4 F | WEIGHT: 192.3 LBS | SYSTOLIC BLOOD PRESSURE: 100 MMHG | HEIGHT: 65 IN | BODY MASS INDEX: 32.04 KG/M2 | DIASTOLIC BLOOD PRESSURE: 66 MMHG

## 2020-09-08 DIAGNOSIS — Z30.431 ENCOUNTER FOR MANAGEMENT OF INTRAUTERINE CONTRACEPTIVE DEVICE (IUD), UNSPECIFIED IUD MANAGEMENT TYPE: Primary | ICD-10-CM

## 2020-09-08 PROCEDURE — 58301 REMOVE INTRAUTERINE DEVICE: CPT | Performed by: OBSTETRICS & GYNECOLOGY

## 2020-09-08 PROCEDURE — 99024 POSTOP FOLLOW-UP VISIT: CPT | Performed by: OBSTETRICS & GYNECOLOGY

## 2020-09-08 PROCEDURE — G0463 HOSPITAL OUTPT CLINIC VISIT: HCPCS | Mod: 25

## 2020-09-08 ASSESSMENT — PAIN SCALES - GENERAL: PAINLEVEL: NO PAIN (0)

## 2020-09-08 ASSESSMENT — MIFFLIN-ST. JEOR: SCORE: 1598.15

## 2020-09-08 NOTE — PATIENT INSTRUCTIONS
Thank you for allowing Dr. Mclean and our OB/GYN team to participate in your care. Please call our office at [phone] with scheduling questions or with any other questions or concerns.

## 2020-09-08 NOTE — PROGRESS NOTES
"Jennifer Tatum is a 30 year old who is s/p a tubal ligation and is here for an IUD removal. She is doing well and has no complaints    EXAM  /66   Pulse 88   Temp 98.4  F (36.9  C)   Ht 1.651 m (5' 5\")   Wt 87.2 kg (192 lb 4.8 oz)   LMP 08/16/2020 (Exact Date)   BMI 32.00 kg/m    Gen - NAD  Abdomen - soft, non-tender  Incisions - well healed  VE - normal external genitalia, Normal vaginal vault and cervix, IUD strings seen      A/P IUD removal, post-op s/p tubal   1. Tubal well healed, doing well   2. IUD removed      Procedure: Speculum placed in vagina, IUD strings seen, grasped and IUD removed. No bleeding. Pt tolerated well      RUDDY JOHNSTON MD     "

## 2020-09-08 NOTE — NURSING NOTE
"Chief Complaint   Patient presents with     Surgical Followup     Laparoscopic bilateral fulguration and extensive lysis of adhesions on 8/20/2020 with Dr. Grimm.     IUD     Paraguard IUD removal       Initial /66   Pulse 88   Temp 98.4  F (36.9  C)   Ht 1.651 m (5' 5\")   Wt 87.2 kg (192 lb 4.8 oz)   LMP 08/16/2020 (Exact Date)   BMI 32.00 kg/m   Estimated body mass index is 32 kg/m  as calculated from the following:    Height as of this encounter: 1.651 m (5' 5\").    Weight as of this encounter: 87.2 kg (192 lb 4.8 oz).  Medication Reconciliation: complete  LESLIE BRADLEY LPN    "

## 2020-10-20 ENCOUNTER — HOSPITAL ENCOUNTER (EMERGENCY)
Facility: HOSPITAL | Age: 30
Discharge: HOME | End: 2020-10-20
Attending: PHYSICIAN ASSISTANT | Admitting: PHYSICIAN ASSISTANT
Payer: COMMERCIAL

## 2020-10-20 VITALS
TEMPERATURE: 99.1 F | OXYGEN SATURATION: 97 % | HEART RATE: 81 BPM | RESPIRATION RATE: 14 BRPM | SYSTOLIC BLOOD PRESSURE: 134 MMHG | DIASTOLIC BLOOD PRESSURE: 80 MMHG

## 2020-10-20 DIAGNOSIS — R51.9 HEADACHE: ICD-10-CM

## 2020-10-20 PROCEDURE — 250N000012 HC RX MED GY IP 250 OP 636 PS 637: Performed by: PHYSICIAN ASSISTANT

## 2020-10-20 PROCEDURE — 250N000011 HC RX IP 250 OP 636: Performed by: PHYSICIAN ASSISTANT

## 2020-10-20 PROCEDURE — 96372 THER/PROPH/DIAG INJ SC/IM: CPT | Performed by: PHYSICIAN ASSISTANT

## 2020-10-20 PROCEDURE — 99284 EMERGENCY DEPT VISIT MOD MDM: CPT | Mod: 25

## 2020-10-20 PROCEDURE — 99284 EMERGENCY DEPT VISIT MOD MDM: CPT | Performed by: PHYSICIAN ASSISTANT

## 2020-10-20 RX ORDER — HYDROCODONE BITARTRATE AND ACETAMINOPHEN 5; 325 MG/1; MG/1
1 TABLET ORAL EVERY 6 HOURS PRN
Qty: 6 TABLET | Refills: 0 | Status: SHIPPED | OUTPATIENT
Start: 2020-10-20 | End: 2020-11-18

## 2020-10-20 RX ORDER — KETOROLAC TROMETHAMINE 30 MG/ML
60 INJECTION, SOLUTION INTRAMUSCULAR; INTRAVENOUS ONCE
Status: COMPLETED | OUTPATIENT
Start: 2020-10-20 | End: 2020-10-20

## 2020-10-20 RX ORDER — SUMATRIPTAN 6 MG/.5ML
6 INJECTION, SOLUTION SUBCUTANEOUS ONCE
Status: COMPLETED | OUTPATIENT
Start: 2020-10-20 | End: 2020-10-20

## 2020-10-20 RX ADMIN — KETOROLAC TROMETHAMINE 60 MG: 30 INJECTION, SOLUTION INTRAMUSCULAR at 14:19

## 2020-10-20 RX ADMIN — SUMATRIPTAN SUCCINATE 6 MG: 6 INJECTION SUBCUTANEOUS at 14:19

## 2020-10-20 ASSESSMENT — ENCOUNTER SYMPTOMS
COUGH: 0
DIZZINESS: 0
HEADACHES: 1
FATIGUE: 0
FEVER: 0
WEAKNESS: 0
NECK STIFFNESS: 0
NECK PAIN: 0
NUMBNESS: 0
SHORTNESS OF BREATH: 0
PHOTOPHOBIA: 1
CHEST TIGHTNESS: 0
ABDOMINAL PAIN: 0
SINUS PRESSURE: 0
NAUSEA: 0
APPETITE CHANGE: 0
SORE THROAT: 0
LIGHT-HEADEDNESS: 0
VOMITING: 0
ACTIVITY CHANGE: 0
BRUISES/BLEEDS EASILY: 0
BACK PAIN: 0
CHILLS: 0

## 2020-10-20 NOTE — ED PROVIDER NOTES
History     Chief Complaint   Patient presents with     Headache     c/o h/a since bumping her head at work on sunday. notes blurred vision initially. denies nausea, vomiting. notes fatigue. pt notes works at the [employer].      The history is provided by the patient.     Jennifer Tatum is a 30 year old female who presented to the emergency department ambulatory for evaluation of a headache.  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  Reports has been having an anterior headache since that time.  Describes some mild photophobia and nausea without vomiting.  Denies any unilateral weakness.  Denies any difficulty speaking or walking.  Denies any visual field defects.  Denies any fevers or chills.  Denies any loss of taste or smell.  She reports she gets similar headaches approximately twice a month.    Allergies:  Allergies   Allergen Reactions     Wellbutrin [Bupropion] Other (See Comments)     Mood changes       Problem List:    Patient Active Problem List    Diagnosis Date Noted     Female pelvic peritoneal adhesions--Severe whole pelvis adhesions with LYSIS for access for TUBAL FULGURATION---8/20/2020 08/20/2020     Priority: Medium     Preop general physical exam--9/2020 08/17/2020     Priority: Medium     Sterilization 02/21/2020     Priority: Medium     Added automatically from request for surgery [id number]       Encounter for sterilization--LAPAROSCOPIC BILATERAL TUBAL FULGURATION--8/2020 02/18/2020     Priority: Medium     Chancroid in female-LEFT Labia,presumptive RX with Zithromax--2/2020 02/12/2020     Priority: Medium     Ulcer of genital labia-LEFT, biopsy not diagnostic, lab calderón neg, RX'd for Chancroid with Zithromax---2/2020 02/11/2020     Priority: Medium     Relationship problem between partners 12/01/2017     Priority: Medium     Complicated grieving 07/26/2017     Priority: Medium     Major depressive disorder, recurrent  "episode, mild (H) 2017     Priority: Medium     Spontaneous miscarriage 2017     Priority: Medium     Acute right-sided low back pain with right-sided sciatica 10/18/2016     Priority: Medium     ACP (advance care planning) 2016     Priority: Medium     Advance Care Planning 2016: ACP Review of Chart / Resources Provided:  Reviewed chart for advance care plan.  Jennifer Tatum has no plan or code status on file. Discussed available resources and provided with information.   Added by Gilbert Titus             Attention deficit hyperactivity disorder (ADHD), predominantly inattentive type 2016     Priority: Medium     PTSD (post-traumatic stress disorder) 2016     Priority: Medium     Hydronephrosis 2015     Priority: Medium     Anxiety 2013     Priority: Medium     Feels it is going away       Smoker 2013     Priority: Medium     Patient is weaning KWIT on 8/2/15          Past Medical History:    Past Medical History:   Diagnosis Date     H/O nephrolithotomy with removal of calculi      History of  delivery 2015     Incompetent cervix 11/10/2015     IUFD (intrauterine fetal death) 2016     MDD (major depressive disorder)      Mild major depression (H) 2013     Nephrolithiasis      Smoker 2013       Past Surgical History:    Past Surgical History:   Procedure Laterality Date     CERCLAGE CERVICAL N/A 11/10/2015    Procedure: CERCLAGE CERVICAL;  Surgeon: Tucker Barragan MD;  Location: [facility] L[facility]D      SECTION  2012    Pregnancy full-term; \"Oleksandr\"       SECTION N/A 2016    Procedure:  SECTION;  Surgeon: Nisha Mcleod MD;  Location: [facility] OR      NEPHROSTOMY PERCUTUTANEOUS      left     impacted wisdom teeth removal       LAPAROSCOPIC TUBAL LIGATION Bilateral 2020    Procedure: LAPAROSCOPIC BILATERAL FALLOPIAN TUBE FULGRATION;  Surgeon: Frow, David Franke, MD;  Location: [facility] OR       Family History:  "   Family History   Problem Relation Age of Onset     Alcohol/Drug Father         alcoholism     Psychotic Disorder Father         Bipolar     Cerebrovascular Disease Other         Cerebrovascular accident     C.A.D. Other      Hypertension Other      Diabetes Other        Social History:  Marital Status:  Single [1]  Social History     Tobacco Use     Smoking status: Current Every Day Smoker     Packs/day: 10.00     Years: 10.00     Pack years: 100.00     Types: Cigarettes     Smokeless tobacco: Never Used     Tobacco comment: declines quitplan referral 9/8/2020   Substance Use Topics     Alcohol use: No     Alcohol/week: 0.0 standard drinks     Drug use: No        Medications:         amphetamine-dextroamphetamine (ADDERALL XR) 25 MG 24 hr capsule       ARIPiprazole (ABILIFY) 10 MG tablet       HYDROcodone-acetaminophen (NORCO) 5-325 MG tablet       ibuprofen (ADVIL/MOTRIN) 800 MG tablet       sertraline (ZOLOFT) 100 MG tablet       amphetamine-dextroamphetamine (ADDERALL XR) 25 MG 24 hr capsule       hydrOXYzine (ATARAX) 25 MG tablet          Review of Systems   Constitutional: Negative for activity change, appetite change, chills, fatigue and fever.   HENT: Negative for ear discharge, ear pain, postnasal drip, sinus pressure and sore throat.    Eyes: Positive for photophobia. Negative for visual disturbance.   Respiratory: Negative for cough, chest tightness and shortness of breath.    Cardiovascular: Negative for chest pain.   Gastrointestinal: Negative for abdominal pain, nausea and vomiting.   Genitourinary: Negative.    Musculoskeletal: Negative for back pain, neck pain and neck stiffness.   Skin: Negative.    Neurological: Positive for headaches. Negative for dizziness, weakness, light-headedness and numbness.   Hematological: Does not bruise/bleed easily.       Physical Exam   BP: 134/80  Pulse: 81  Temp: 99.1  F (37.3  C)  Resp: 14  SpO2: 97 %      Physical Exam  Vitals signs and nursing note reviewed.    Constitutional:       General: She is not in acute distress.     Appearance: Normal appearance. She is normal weight. She is not ill-appearing, toxic-appearing or diaphoretic.      Comments: Smiling and talkative 30-year-old female found semireclined in a dark room in no distress and playing on cell phone.   HENT:      Head: Normocephalic and atraumatic.      Comments: No evidence of head injury     Right Ear: Tympanic membrane, ear canal and external ear normal.      Left Ear: Tympanic membrane, ear canal and external ear normal.      Ears:      Comments: Mastoids are negative     Nose: Nose normal.      Mouth/Throat:      Mouth: Mucous membranes are moist.      Pharynx: Oropharynx is clear. No oropharyngeal exudate or posterior oropharyngeal erythema.   Eyes:      Conjunctiva/sclera: Conjunctivae normal.      Pupils: Pupils are equal, round, and reactive to light.   Neck:      Musculoskeletal: Normal range of motion and neck supple. No neck rigidity or muscular tenderness.   Cardiovascular:      Rate and Rhythm: Normal rate and regular rhythm.      Pulses: Normal pulses.   Pulmonary:      Effort: Pulmonary effort is normal.   Lymphadenopathy:      Cervical: No cervical adenopathy.   Skin:     General: Skin is warm and dry.      Capillary Refill: Capillary refill takes less than 2 seconds.   Neurological:      General: No focal deficit present.      Mental Status: She is alert and oriented to person, place, and time.      Comments: Cranial nerve examination: revealed that for cranial nerve   II: the pupils were reactive and the visual field were full  III, IV, and VI, the extraocular movements were full.    V: facial sensation intact bilateral   VII: facial movements are symmetric  VIII: hearing intact to voice  IX & X: the soft palate rises symmetrically   XI: shoulder movements are symmetric  XII: tongue is midline    Neurological examination:  That the patient was awake and alert, the attention, orientation,  concentration, language, memory and fund of knowledge were all normal.  The patient had no neglect or apraxia.    Normal finger to nose.   Normal gait   Normal speech    Psychiatric:         Mood and Affect: Mood normal.         ED Course        Procedures                [place] Head CT Rule  (calculator)  Background  Assesses need for head imaging in acute trauma  Only validated in adults with GCS 13-15 with witnessed LOC, amnesia to head injury or confusion  Data  30 year old  High Risk Criteria (major criteria)   Of 5 possible items  NEGATIVE  Hyacinth Coma Scale <15 at 2 hours after injury  Open or depressed skull Fracture  Vomiting (Two or more episodes)  Age 65 years or over (other studies suggest age 60)  Basal skull Fracture signs (Hemotympanum, Periorbital Bruising -Raccoon's Eyes, Mastoid process Ecchymosis -Pretty's Sign, Cerebrospinal fluid leakage from ear or nose)    Moderate Risk Criteria (minor criteria)   Of 3 possible items  NEGATIVE  Pre-trauma amnesia lasting longer than 30 minutes  High risk mechanism of injury (Pedestrian in motor vehicle accident, Passenger ejected from vehicle, Fall from height over 3 feet or 5 stairs)    Interpretation  No indications for head imaging           Critical Care time:  none               No results found for this or any previous visit (from the past 24 hour(s)).    Medications   ketorolac (TORADOL) injection 60 mg (60 mg Intramuscular Given 10/20/20 1419)   SUMAtriptan (IMITREX) injection 6 mg (6 mg Subcutaneous Given 10/20/20 1419)       Assessments & Plan (with Medical Decision Making)   Findings as above.  This is a 30-year-old female who presented to the emergency department for 48-hour history of frontal headache after bumping her head on a glove box at work..  Some mild photophobia without visual changes.  No lateralizing symptoms or other concerns.  Normal gait and normal neurological examination.  No reasonable indication for imaging.  She was treated  with Toradol and Imitrex at her request.  Headache improved.  Long detailed discussion.  She declined imaging of the brain as well.  She would like to go home and this is certainly the most reasonable disposition.  At this point history of present illness, exam, symptoms are not consistent with subarachnoid hemorrhage, subdural hematoma, epidural hematoma, cerebral venous thrombosis, meningitis, or other intracranial catastrophe.  She is pleasant and talkative.  She will return here for any other questions or concerns.    This document was prepared using a combination of typing and voice generated software.  While every attempt was made for accuracy, spelling and grammatical errors may exist.    I have reviewed the nursing notes.    I have reviewed the findings, diagnosis, plan and need for follow up with the patient.       New Prescriptions    HYDROCODONE-ACETAMINOPHEN (NORCO) 5-325 MG TABLET    Take 1 tablet by mouth every 6 hours as needed for pain       Final diagnoses:   Headache       10/20/2020   [facility]     Jacquie Rust PA-C  10/20/20 1500

## 2020-10-20 NOTE — ED NOTES
Discharge instructions reviewed with patient.  Pt refused discharged vitals.  Pt stated her headache is pretty much the same as when she came in. Rx has been e-scribed to pharmacy of choice.  Encouraged to return with new or worsening symptoms.  Copy of AVS in hand on discharge.

## 2020-10-20 NOTE — ED AVS SNAPSHOT
[facility]  [address]  [place] [address]  Phone: [phone]                                    Jennifer Tatum   MRN: [id number]    Department: [facility]   Date of Visit: 10/20/2020           After Visit Summary Signature Page    I have received my discharge instructions, and my questions have been answered. I have discussed any challenges I see with this plan with the nurse or doctor.    ..........................................................................................................................................  Patient/Patient Representative Signature      ..........................................................................................................................................  Patient Representative Print Name and Relationship to Patient    ..................................................               ................................................  Date                                   Time    ..........................................................................................................................................  Reviewed by Signature/Title    ...................................................              ..............................................  Date                                               Time          22EPIC Rev 08/18

## 2020-11-18 ENCOUNTER — VIRTUAL VISIT (OUTPATIENT)
Dept: FAMILY MEDICINE | Facility: OTHER | Age: 30
End: 2020-11-18
Attending: FAMILY MEDICINE
Payer: COMMERCIAL

## 2020-11-18 DIAGNOSIS — R53.83 FATIGUE, UNSPECIFIED TYPE: ICD-10-CM

## 2020-11-18 DIAGNOSIS — L65.9 HAIR LOSS: Primary | ICD-10-CM

## 2020-11-18 PROBLEM — O09.293: Status: ACTIVE | Noted: 2017-03-08

## 2020-11-18 PROBLEM — O09.93 HIGH-RISK PREGNANCY, THIRD TRIMESTER: Status: ACTIVE | Noted: 2017-03-08

## 2020-11-18 PROCEDURE — 99213 OFFICE O/P EST LOW 20 MIN: CPT | Mod: 95 | Performed by: FAMILY MEDICINE

## 2020-11-18 ASSESSMENT — ANXIETY QUESTIONNAIRES
6. BECOMING EASILY ANNOYED OR IRRITABLE: NOT AT ALL
4. TROUBLE RELAXING: NOT AT ALL
5. BEING SO RESTLESS THAT IT IS HARD TO SIT STILL: NOT AT ALL
GAD7 TOTAL SCORE: 0
1. FEELING NERVOUS, ANXIOUS, OR ON EDGE: NOT AT ALL
3. WORRYING TOO MUCH ABOUT DIFFERENT THINGS: NOT AT ALL
7. FEELING AFRAID AS IF SOMETHING AWFUL MIGHT HAPPEN: NOT AT ALL
2. NOT BEING ABLE TO STOP OR CONTROL WORRYING: NOT AT ALL

## 2020-11-18 ASSESSMENT — PATIENT HEALTH QUESTIONNAIRE - PHQ9: SUM OF ALL RESPONSES TO PHQ QUESTIONS 1-9: 0

## 2020-11-18 ASSESSMENT — PAIN SCALES - GENERAL: PAINLEVEL: NO PAIN (0)

## 2020-11-18 NOTE — PROGRESS NOTES
"Jennifer Tatum is a 30 year old female who is being evaluated via a billable telephone visit.      The patient has been notified of following:     \"This telephone visit will be conducted via a call between you and your physician/provider. We have found that certain health care needs can be provided without the need for a physical exam.  This service lets us provide the care you need with a short phone conversation.  If a prescription is necessary we can send it directly to your pharmacy.  If lab work is needed we can place an order for that and you can then stop by our lab to have the test done at a later time.    Telephone visits are billed at different rates depending on your insurance coverage. During this emergency period, for some insurers they may be billed the same as an in-person visit.  Please reach out to your insurance provider with any questions.    If during the course of the call the physician/provider feels a telephone visit is not appropriate, you will not be charged for this service.\"    Patient has given verbal consent for Telephone visit?  Yes    What phone number would you like to be contacted at? [phone]    How would you like to obtain your AVS? MyChart    Subjective     Jennifer Tatum is a 30 year old female who presents via phone visit today for the following health issues:    HPI     Hypothyroidism Follow-up      Since last visit, patient describes the following symptoms: weight gain, fatigue and hair loss- patient states was on keto diet and wanted to loose 10 pounds but was only able to loose 5 pounds- is not on keto currently   Got hair done and noted increase hair loss  Loosing lot of hair in shower  Has FH thyroid issues  Feeling well other than decrease injury   No trauma to hair                Review of Systems   Constitutional, HEENT, cardiovascular, pulmonary, gi and gu systems are negative, except as otherwise noted.       Objective          Vitals:  No vitals were obtained " "today due to virtual visit.    healthy, alert and no distress  PSYCH: Alert and oriented times 3; coherent speech, normal   rate and volume, able to articulate logical thoughts, able   to abstract reason, no tangential thoughts, no hallucinations   or delusions  Her affect is normal  RESP: No cough, no audible wheezing, able to talk in full sentences  Remainder of exam unable to be completed due to telephone visits            Assessment/Plan:    Assessment & Plan       ICD-10-CM    1. Hair loss  L65.9 TSH     CBC with platelets differential     Comprehensive metabolic panel   2. Fatigue, unspecified type  R53.83 TSH     CBC with platelets differential     Comprehensive metabolic panel     Will check above labs for this reason and also coming in for Px in December.  Will examine her hair in Dec. Discussed other trauma or physiological ways for to have hair loss.  Will call with labs.      Tobacco Cessation:   reports that she has been smoking cigarettes. She has a 100.00 pack-year smoking history. She has never used smokeless tobacco.        BMI:   Estimated body mass index is 32 kg/m  as calculated from the following:    Height as of 9/8/20: 1.651 m (5' 5\").    Weight as of 9/8/20: 87.2 kg (192 lb 4.8 oz).                No follow-ups on file.    Cornell Herron MD  [facility]    Phone call duration:  9 minutes                "

## 2020-11-18 NOTE — NURSING NOTE
"Chief Complaint   Patient presents with     Thyroid Problem       Initial There were no vitals taken for this visit. Estimated body mass index is 32 kg/m  as calculated from the following:    Height as of 9/8/20: 1.651 m (5' 5\").    Weight as of 9/8/20: 87.2 kg (192 lb 4.8 oz).  Medication Reconciliation: complete     Shanice Zendejas LPN    "

## 2020-11-19 DIAGNOSIS — L65.9 HAIR LOSS: ICD-10-CM

## 2020-11-19 DIAGNOSIS — R53.83 FATIGUE, UNSPECIFIED TYPE: ICD-10-CM

## 2020-11-19 LAB
ALBUMIN SERPL-MCNC: 3.6 G/DL (ref 3.4–5)
ALP SERPL-CCNC: 76 U/L (ref 40–150)
ALT SERPL W P-5'-P-CCNC: 23 U/L (ref 0–50)
ANION GAP SERPL CALCULATED.3IONS-SCNC: 4 MMOL/L (ref 3–14)
AST SERPL W P-5'-P-CCNC: 25 U/L (ref 0–45)
BASOPHILS # BLD AUTO: 0.1 10E9/L (ref 0–0.2)
BASOPHILS NFR BLD AUTO: 0.7 %
BILIRUB SERPL-MCNC: 0.3 MG/DL (ref 0.2–1.3)
BUN SERPL-MCNC: 10 MG/DL (ref 7–30)
CALCIUM SERPL-MCNC: 8.9 MG/DL (ref 8.5–10.1)
CHLORIDE SERPL-SCNC: 107 MMOL/L (ref 94–109)
CO2 SERPL-SCNC: 26 MMOL/L (ref 20–32)
CREAT SERPL-MCNC: 0.75 MG/DL (ref 0.52–1.04)
DIFFERENTIAL METHOD BLD: NORMAL
EOSINOPHIL # BLD AUTO: 0.2 10E9/L (ref 0–0.7)
EOSINOPHIL NFR BLD AUTO: 2 %
ERYTHROCYTE [DISTWIDTH] IN BLOOD BY AUTOMATED COUNT: 13.7 % (ref 10–15)
GFR SERPL CREATININE-BSD FRML MDRD: >90 ML/MIN/{1.73_M2}
GLUCOSE SERPL-MCNC: 83 MG/DL (ref 70–99)
HCT VFR BLD AUTO: 41.8 % (ref 35–47)
HGB BLD-MCNC: 14.2 G/DL (ref 11.7–15.7)
IMM GRANULOCYTES # BLD: 0 10E9/L (ref 0–0.4)
IMM GRANULOCYTES NFR BLD: 0.3 %
LYMPHOCYTES # BLD AUTO: 2.9 10E9/L (ref 0.8–5.3)
LYMPHOCYTES NFR BLD AUTO: 29.7 %
MCH RBC QN AUTO: 29.3 PG (ref 26.5–33)
MCHC RBC AUTO-ENTMCNC: 34 G/DL (ref 31.5–36.5)
MCV RBC AUTO: 86 FL (ref 78–100)
MONOCYTES # BLD AUTO: 0.6 10E9/L (ref 0–1.3)
MONOCYTES NFR BLD AUTO: 6.1 %
NEUTROPHILS # BLD AUTO: 6 10E9/L (ref 1.6–8.3)
NEUTROPHILS NFR BLD AUTO: 61.2 %
NRBC # BLD AUTO: 0 10*3/UL
NRBC BLD AUTO-RTO: 0 /100
PLATELET # BLD AUTO: 286 10E9/L (ref 150–450)
POTASSIUM SERPL-SCNC: 3.9 MMOL/L (ref 3.4–5.3)
PROT SERPL-MCNC: 7.6 G/DL (ref 6.8–8.8)
RBC # BLD AUTO: 4.84 10E12/L (ref 3.8–5.2)
SODIUM SERPL-SCNC: 137 MMOL/L (ref 133–144)
TSH SERPL DL<=0.005 MIU/L-ACNC: 1.13 MU/L (ref 0.4–4)
WBC # BLD AUTO: 9.7 10E9/L (ref 4–11)

## 2020-11-19 PROCEDURE — 36415 COLL VENOUS BLD VENIPUNCTURE: CPT | Mod: ZL | Performed by: FAMILY MEDICINE

## 2020-11-19 PROCEDURE — 85025 COMPLETE CBC W/AUTO DIFF WBC: CPT | Mod: ZL | Performed by: FAMILY MEDICINE

## 2020-11-19 PROCEDURE — 80053 COMPREHEN METABOLIC PANEL: CPT | Mod: ZL | Performed by: FAMILY MEDICINE

## 2020-11-19 PROCEDURE — 84443 ASSAY THYROID STIM HORMONE: CPT | Mod: ZL | Performed by: FAMILY MEDICINE

## 2020-11-19 ASSESSMENT — ANXIETY QUESTIONNAIRES: GAD7 TOTAL SCORE: 0

## 2020-12-14 ENCOUNTER — HEALTH MAINTENANCE LETTER (OUTPATIENT)
Age: 30
End: 2020-12-14

## 2020-12-14 DIAGNOSIS — F90.0 ATTENTION DEFICIT HYPERACTIVITY DISORDER (ADHD), PREDOMINANTLY INATTENTIVE TYPE: ICD-10-CM

## 2020-12-14 RX ORDER — DEXTROAMPHETAMINE SACCHARATE, AMPHETAMINE ASPARTATE MONOHYDRATE, DEXTROAMPHETAMINE SULFATE AND AMPHETAMINE SULFATE 6.25; 6.25; 6.25; 6.25 MG/1; MG/1; MG/1; MG/1
25 CAPSULE, EXTENDED RELEASE ORAL DAILY
Qty: 30 CAPSULE | Refills: 0 | Status: SHIPPED | OUTPATIENT
Start: 2020-12-14 | End: 2021-01-07

## 2020-12-14 NOTE — TELEPHONE ENCOUNTER
Adderall      Last Written Prescription Date:  6/15/20  Last Fill Quantity: 30,   # refills: 0  Last Office Visit: 11/18/20  Future Office visit:    Next 5 appointments (look out 90 days)    Jan 25, 2021  3:15 PM  (Arrive by 3:00 PM)  SHORT with Cornell Herron MD  [facility] ([facility] ) [address]  [place] [address]  [phone]           Routing refill request to provider for review/approval because:

## 2021-01-07 ENCOUNTER — TELEPHONE (OUTPATIENT)
Dept: FAMILY MEDICINE | Facility: OTHER | Age: 31
End: 2021-01-07

## 2021-01-07 DIAGNOSIS — F90.0 ATTENTION DEFICIT HYPERACTIVITY DISORDER (ADHD), PREDOMINANTLY INATTENTIVE TYPE: ICD-10-CM

## 2021-01-07 RX ORDER — DEXTROAMPHETAMINE SACCHARATE, AMPHETAMINE ASPARTATE MONOHYDRATE, DEXTROAMPHETAMINE SULFATE AND AMPHETAMINE SULFATE 6.25; 6.25; 6.25; 6.25 MG/1; MG/1; MG/1; MG/1
25 CAPSULE, EXTENDED RELEASE ORAL DAILY
Qty: 30 CAPSULE | Refills: 0 | Status: SHIPPED | OUTPATIENT
Start: 2021-01-07 | End: 2021-02-12

## 2021-01-07 NOTE — TELEPHONE ENCOUNTER
I sent in Rx. Due for Px and no showed/cancelled  last one. Needs follow up and has 2 coming up-- make one a px and cancel the other .  Then she will be good for 6 months.

## 2021-01-07 NOTE — TELEPHONE ENCOUNTER
Pt is in need of a refill of her Adderall today sent to [facility] [place], she is going out of town tomorrow. Also  Pt wants to know if she can have a 6 month refill or does she need to call every month for a refill?

## 2021-02-11 DIAGNOSIS — F90.0 ATTENTION DEFICIT HYPERACTIVITY DISORDER (ADHD), PREDOMINANTLY INATTENTIVE TYPE: ICD-10-CM

## 2021-02-11 NOTE — TELEPHONE ENCOUNTER
adderall xr 25mg      Last Written Prescription Date:  1/7/21  Last Fill Quantity: 30,   # refills: 0  Last Office Visit: 11/18/20  Future Office visit:

## 2021-02-12 ENCOUNTER — TELEPHONE (OUTPATIENT)
Dept: FAMILY MEDICINE | Facility: OTHER | Age: 31
End: 2021-02-12

## 2021-02-12 RX ORDER — DEXTROAMPHETAMINE SACCHARATE, AMPHETAMINE ASPARTATE MONOHYDRATE, DEXTROAMPHETAMINE SULFATE AND AMPHETAMINE SULFATE 6.25; 6.25; 6.25; 6.25 MG/1; MG/1; MG/1; MG/1
25 CAPSULE, EXTENDED RELEASE ORAL DAILY
Qty: 30 CAPSULE | Refills: 0 | Status: SHIPPED | OUTPATIENT
Start: 2021-02-12 | End: 2021-03-12

## 2021-02-12 NOTE — TELEPHONE ENCOUNTER
----- Message from Cornell Herron MD sent at 2/12/2021  7:29 AM CST -----  Needs follow up on ADHD in clinic ( no showed for px and follow-up in past.)
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
## 2021-03-12 ENCOUNTER — OFFICE VISIT (OUTPATIENT)
Dept: FAMILY MEDICINE | Facility: OTHER | Age: 31
End: 2021-03-12
Attending: FAMILY MEDICINE
Payer: COMMERCIAL

## 2021-03-12 VITALS
DIASTOLIC BLOOD PRESSURE: 74 MMHG | BODY MASS INDEX: 30.99 KG/M2 | OXYGEN SATURATION: 95 % | HEART RATE: 90 BPM | WEIGHT: 186 LBS | SYSTOLIC BLOOD PRESSURE: 132 MMHG | HEIGHT: 65 IN | TEMPERATURE: 98.1 F

## 2021-03-12 DIAGNOSIS — N92.0 MENORRHAGIA WITH REGULAR CYCLE: ICD-10-CM

## 2021-03-12 DIAGNOSIS — Z63.0 RELATIONSHIP PROBLEM BETWEEN PARTNERS: ICD-10-CM

## 2021-03-12 DIAGNOSIS — F90.0 ATTENTION DEFICIT HYPERACTIVITY DISORDER (ADHD), PREDOMINANTLY INATTENTIVE TYPE: ICD-10-CM

## 2021-03-12 DIAGNOSIS — Z00.00 ROUTINE GENERAL MEDICAL EXAMINATION AT A HEALTH CARE FACILITY: Primary | ICD-10-CM

## 2021-03-12 DIAGNOSIS — F41.1 GAD (GENERALIZED ANXIETY DISORDER): ICD-10-CM

## 2021-03-12 DIAGNOSIS — Z72.0 TOBACCO ABUSE: ICD-10-CM

## 2021-03-12 PROBLEM — Z30.2 STERILIZATION: Status: RESOLVED | Noted: 2020-02-21 | Resolved: 2021-03-12

## 2021-03-12 PROBLEM — O03.9 SPONTANEOUS MISCARRIAGE: Status: RESOLVED | Noted: 2017-04-06 | Resolved: 2021-03-12

## 2021-03-12 PROBLEM — Z01.818 PREOP GENERAL PHYSICAL EXAM: Status: RESOLVED | Noted: 2020-08-17 | Resolved: 2021-03-12

## 2021-03-12 PROBLEM — O09.93 HIGH-RISK PREGNANCY, THIRD TRIMESTER: Status: RESOLVED | Noted: 2017-03-08 | Resolved: 2021-03-12

## 2021-03-12 PROCEDURE — G0463 HOSPITAL OUTPT CLINIC VISIT: HCPCS

## 2021-03-12 PROCEDURE — 99212 OFFICE O/P EST SF 10 MIN: CPT | Mod: 25 | Performed by: FAMILY MEDICINE

## 2021-03-12 PROCEDURE — G0123 SCREEN CERV/VAG THIN LAYER: HCPCS | Mod: ZL | Performed by: FAMILY MEDICINE

## 2021-03-12 PROCEDURE — 99395 PREV VISIT EST AGE 18-39: CPT | Performed by: FAMILY MEDICINE

## 2021-03-12 PROCEDURE — 87624 HPV HI-RISK TYP POOLED RSLT: CPT | Mod: ZL | Performed by: FAMILY MEDICINE

## 2021-03-12 RX ORDER — DEXTROAMPHETAMINE SACCHARATE, AMPHETAMINE ASPARTATE, DEXTROAMPHETAMINE SULFATE AND AMPHETAMINE SULFATE 5; 5; 5; 5 MG/1; MG/1; MG/1; MG/1
20 TABLET ORAL DAILY
Qty: 30 TABLET | Refills: 0 | Status: SHIPPED | OUTPATIENT
Start: 2021-03-12 | End: 2021-05-17

## 2021-03-12 RX ORDER — DEXTROAMPHETAMINE SACCHARATE, AMPHETAMINE ASPARTATE MONOHYDRATE, DEXTROAMPHETAMINE SULFATE AND AMPHETAMINE SULFATE 6.25; 6.25; 6.25; 6.25 MG/1; MG/1; MG/1; MG/1
25 CAPSULE, EXTENDED RELEASE ORAL DAILY
Qty: 30 CAPSULE | Refills: 0 | Status: SHIPPED | OUTPATIENT
Start: 2021-03-12 | End: 2021-04-15

## 2021-03-12 RX ORDER — SERTRALINE HYDROCHLORIDE 100 MG/1
100 TABLET, FILM COATED ORAL DAILY
Qty: 90 TABLET | Refills: 0 | COMMUNITY
Start: 2021-03-12 | End: 2022-10-27

## 2021-03-12 SDOH — SOCIAL STABILITY - SOCIAL INSECURITY: PROBLEMS IN RELATIONSHIP WITH SPOUSE OR PARTNER: Z63.0

## 2021-03-12 ASSESSMENT — ANXIETY QUESTIONNAIRES
1. FEELING NERVOUS, ANXIOUS, OR ON EDGE: NEARLY EVERY DAY
7. FEELING AFRAID AS IF SOMETHING AWFUL MIGHT HAPPEN: NEARLY EVERY DAY
4. TROUBLE RELAXING: NEARLY EVERY DAY
2. NOT BEING ABLE TO STOP OR CONTROL WORRYING: NEARLY EVERY DAY
6. BECOMING EASILY ANNOYED OR IRRITABLE: NEARLY EVERY DAY
GAD7 TOTAL SCORE: 21
5. BEING SO RESTLESS THAT IT IS HARD TO SIT STILL: NEARLY EVERY DAY
3. WORRYING TOO MUCH ABOUT DIFFERENT THINGS: NEARLY EVERY DAY
IF YOU CHECKED OFF ANY PROBLEMS ON THIS QUESTIONNAIRE, HOW DIFFICULT HAVE THESE PROBLEMS MADE IT FOR YOU TO DO YOUR WORK, TAKE CARE OF THINGS AT HOME, OR GET ALONG WITH OTHER PEOPLE: EXTREMELY DIFFICULT

## 2021-03-12 ASSESSMENT — PATIENT HEALTH QUESTIONNAIRE - PHQ9: SUM OF ALL RESPONSES TO PHQ QUESTIONS 1-9: 19

## 2021-03-12 ASSESSMENT — MIFFLIN-ST. JEOR: SCORE: 1569.57

## 2021-03-12 NOTE — NURSING NOTE
"Chief Complaint   Patient presents with     Physical       Initial /74   Pulse 90   Temp 98.1  F (36.7  C)   Ht 1.651 m (5' 5\")   Wt 84.4 kg (186 lb)   LMP 02/26/2021   SpO2 95%   BMI 30.95 kg/m   Estimated body mass index is 30.95 kg/m  as calculated from the following:    Height as of this encounter: 1.651 m (5' 5\").    Weight as of this encounter: 84.4 kg (186 lb).  Medication Reconciliation: complete  Gilbert Titus LPN  "

## 2021-03-13 ASSESSMENT — ANXIETY QUESTIONNAIRES: GAD7 TOTAL SCORE: 21

## 2021-03-16 LAB
COPATH REPORT: NORMAL
PAP: NORMAL

## 2021-03-19 ENCOUNTER — OFFICE VISIT (OUTPATIENT)
Dept: OBGYN | Facility: OTHER | Age: 31
End: 2021-03-19
Attending: OBSTETRICS & GYNECOLOGY
Payer: COMMERCIAL

## 2021-03-19 VITALS
BODY MASS INDEX: 30.99 KG/M2 | HEIGHT: 65 IN | WEIGHT: 186 LBS | HEART RATE: 80 BPM | SYSTOLIC BLOOD PRESSURE: 118 MMHG | DIASTOLIC BLOOD PRESSURE: 82 MMHG

## 2021-03-19 DIAGNOSIS — R45.86 MOOD SWINGS: ICD-10-CM

## 2021-03-19 DIAGNOSIS — N92.0 MENORRHAGIA WITH REGULAR CYCLE: ICD-10-CM

## 2021-03-19 DIAGNOSIS — N94.5 SECONDARY DYSMENORRHEA: Primary | ICD-10-CM

## 2021-03-19 LAB
FINAL DIAGNOSIS: NORMAL
HPV HR 12 DNA CVX QL NAA+PROBE: NEGATIVE
HPV16 DNA SPEC QL NAA+PROBE: NEGATIVE
HPV18 DNA SPEC QL NAA+PROBE: NEGATIVE
SPECIMEN DESCRIPTION: NORMAL
SPECIMEN SOURCE CVX/VAG CYTO: NORMAL

## 2021-03-19 PROCEDURE — G0463 HOSPITAL OUTPT CLINIC VISIT: HCPCS

## 2021-03-19 PROCEDURE — 99214 OFFICE O/P EST MOD 30 MIN: CPT | Performed by: OBSTETRICS & GYNECOLOGY

## 2021-03-19 RX ORDER — DEXTROAMPHETAMINE SULFATE, DEXTROAMPHETAMINE SACCHARATE, AMPHETAMINE SULFATE AND AMPHETAMINE ASPARTATE 5; 5; 5; 5 MG/1; MG/1; MG/1; MG/1
CAPSULE, EXTENDED RELEASE ORAL
COMMUNITY
Start: 2021-03-12 | End: 2021-03-22

## 2021-03-19 RX ORDER — IBUPROFEN 800 MG/1
800 TABLET, FILM COATED ORAL EVERY 8 HOURS PRN
Qty: 25 TABLET | Refills: 4 | Status: SHIPPED | OUTPATIENT
Start: 2021-03-19 | End: 2022-10-27

## 2021-03-19 RX ORDER — NORETHINDRONE ACETATE 5 MG
5 TABLET ORAL DAILY
Qty: 30 TABLET | Refills: 4 | Status: SHIPPED | OUTPATIENT
Start: 2021-03-19 | End: 2021-05-11

## 2021-03-19 ASSESSMENT — PAIN SCALES - GENERAL: PAINLEVEL: MODERATE PAIN (5)

## 2021-03-19 ASSESSMENT — MIFFLIN-ST. JEOR: SCORE: 1569.57

## 2021-03-19 NOTE — PROGRESS NOTES
INQUIRY FOR ENDOMETRIAL ABLATION      S:   Patient has very heavy menses.  She has associated severe dysmenorrhea.  She's had previous Laparoscopic tubal fulguration and had severe whole pelvis adhesion which required extensive lysis of adhesions just to do her tubal.  patient also experiences mood swings around menses.    She denies any AUB, IMB, or PCB.    She desires trial of endometrial ablation.  US is scheduled for Monday by primary care Dr. Herron.      O:  Exam is current      A:   Progressive menorrhagia in regular cycles  Progressive dysmenorrhea  Documented severe whole pelvis adhesions  S/P Laparoscopy tubal fulguration  Mood swings premenstrual      P:   Discussed ablation and it's efficacy.  Discussed Progestin therapy for flow, cramps and moods.  Discussed thinning the lining of endometrium for better efficacy of ablation.  Will use Aygestin 5 mg daily  Ibuprofen high dose 800 mg for Dysmenorrhea.  Aygestin therapy aught to help with her premenstrual mood swings.  US this Monday.  Follow-up visit 4 weeks.  Ablation in May.

## 2021-03-19 NOTE — NURSING NOTE
"Chief Complaint   Patient presents with     Consult     -menorrhagia       Initial /82   Pulse 80   Ht 1.651 m (5' 5\")   Wt 84.4 kg (186 lb)   LMP 02/26/2021   BMI 30.95 kg/m   Estimated body mass index is 30.95 kg/m  as calculated from the following:    Height as of this encounter: 1.651 m (5' 5\").    Weight as of this encounter: 84.4 kg (186 lb).  Medication Reconciliation: complete  Madie Garcia LPN    "

## 2021-03-22 ENCOUNTER — HOSPITAL ENCOUNTER (OUTPATIENT)
Dept: ULTRASOUND IMAGING | Facility: HOSPITAL | Age: 31
Discharge: HOME | End: 2021-03-22
Attending: FAMILY MEDICINE | Admitting: FAMILY MEDICINE
Payer: COMMERCIAL

## 2021-03-22 ENCOUNTER — HOSPITAL ENCOUNTER (EMERGENCY)
Facility: HOSPITAL | Age: 31
Discharge: HOME | End: 2021-03-22
Attending: EMERGENCY MEDICINE | Admitting: EMERGENCY MEDICINE
Payer: COMMERCIAL

## 2021-03-22 VITALS
HEART RATE: 68 BPM | OXYGEN SATURATION: 100 % | TEMPERATURE: 98.5 F | RESPIRATION RATE: 16 BRPM | SYSTOLIC BLOOD PRESSURE: 133 MMHG | DIASTOLIC BLOOD PRESSURE: 80 MMHG

## 2021-03-22 DIAGNOSIS — N92.0 MENORRHAGIA WITH REGULAR CYCLE: ICD-10-CM

## 2021-03-22 DIAGNOSIS — G43.809 OTHER MIGRAINE WITHOUT STATUS MIGRAINOSUS, NOT INTRACTABLE: ICD-10-CM

## 2021-03-22 DIAGNOSIS — G44.209 TENSION HEADACHE: ICD-10-CM

## 2021-03-22 LAB
ANION GAP SERPL CALCULATED.3IONS-SCNC: 5 MMOL/L (ref 3–14)
BASOPHILS # BLD AUTO: 0.1 10E9/L (ref 0–0.2)
BASOPHILS NFR BLD AUTO: 0.6 %
BUN SERPL-MCNC: 11 MG/DL (ref 7–30)
CALCIUM SERPL-MCNC: 9.2 MG/DL (ref 8.5–10.1)
CHLORIDE SERPL-SCNC: 110 MMOL/L (ref 94–109)
CO2 SERPL-SCNC: 25 MMOL/L (ref 20–32)
CREAT SERPL-MCNC: 0.72 MG/DL (ref 0.52–1.04)
DIFFERENTIAL METHOD BLD: NORMAL
EOSINOPHIL # BLD AUTO: 0.3 10E9/L (ref 0–0.7)
EOSINOPHIL NFR BLD AUTO: 3 %
ERYTHROCYTE [DISTWIDTH] IN BLOOD BY AUTOMATED COUNT: 13.6 % (ref 10–15)
ETHANOL SERPL-MCNC: <0.01 G/DL
GFR SERPL CREATININE-BSD FRML MDRD: >90 ML/MIN/{1.73_M2}
GLUCOSE SERPL-MCNC: 100 MG/DL (ref 70–99)
HCG SERPL QL: NEGATIVE
HCT VFR BLD AUTO: 42.3 % (ref 35–47)
HGB BLD-MCNC: 14 G/DL (ref 11.7–15.7)
IMM GRANULOCYTES # BLD: 0 10E9/L (ref 0–0.4)
IMM GRANULOCYTES NFR BLD: 0.2 %
LYMPHOCYTES # BLD AUTO: 2.7 10E9/L (ref 0.8–5.3)
LYMPHOCYTES NFR BLD AUTO: 31.1 %
MCH RBC QN AUTO: 28.5 PG (ref 26.5–33)
MCHC RBC AUTO-ENTMCNC: 33.1 G/DL (ref 31.5–36.5)
MCV RBC AUTO: 86 FL (ref 78–100)
MONOCYTES # BLD AUTO: 0.8 10E9/L (ref 0–1.3)
MONOCYTES NFR BLD AUTO: 8.9 %
NEUTROPHILS # BLD AUTO: 4.9 10E9/L (ref 1.6–8.3)
NEUTROPHILS NFR BLD AUTO: 56.2 %
NRBC # BLD AUTO: 0 10*3/UL
NRBC BLD AUTO-RTO: 0 /100
PLATELET # BLD AUTO: 225 10E9/L (ref 150–450)
POTASSIUM SERPL-SCNC: 3.6 MMOL/L (ref 3.4–5.3)
RBC # BLD AUTO: 4.92 10E12/L (ref 3.8–5.2)
SODIUM SERPL-SCNC: 140 MMOL/L (ref 133–144)
WBC # BLD AUTO: 8.7 10E9/L (ref 4–11)

## 2021-03-22 PROCEDURE — 76830 TRANSVAGINAL US NON-OB: CPT

## 2021-03-22 PROCEDURE — 99284 EMERGENCY DEPT VISIT MOD MDM: CPT | Performed by: EMERGENCY MEDICINE

## 2021-03-22 PROCEDURE — 84703 CHORIONIC GONADOTROPIN ASSAY: CPT | Performed by: EMERGENCY MEDICINE

## 2021-03-22 PROCEDURE — 250N000011 HC RX IP 250 OP 636: Performed by: EMERGENCY MEDICINE

## 2021-03-22 PROCEDURE — 82077 ASSAY SPEC XCP UR&BREATH IA: CPT | Performed by: EMERGENCY MEDICINE

## 2021-03-22 PROCEDURE — 36415 COLL VENOUS BLD VENIPUNCTURE: CPT | Performed by: EMERGENCY MEDICINE

## 2021-03-22 PROCEDURE — 99284 EMERGENCY DEPT VISIT MOD MDM: CPT | Mod: 25

## 2021-03-22 PROCEDURE — 96372 THER/PROPH/DIAG INJ SC/IM: CPT | Performed by: EMERGENCY MEDICINE

## 2021-03-22 PROCEDURE — 85025 COMPLETE CBC W/AUTO DIFF WBC: CPT | Performed by: EMERGENCY MEDICINE

## 2021-03-22 PROCEDURE — 80048 BASIC METABOLIC PNL TOTAL CA: CPT | Performed by: EMERGENCY MEDICINE

## 2021-03-22 RX ORDER — DIPHENHYDRAMINE HYDROCHLORIDE 50 MG/ML
50 INJECTION INTRAMUSCULAR; INTRAVENOUS ONCE
Status: COMPLETED | OUTPATIENT
Start: 2021-03-22 | End: 2021-03-22

## 2021-03-22 RX ADMIN — DIPHENHYDRAMINE HYDROCHLORIDE 50 MG: 50 INJECTION, SOLUTION INTRAMUSCULAR; INTRAVENOUS at 00:40

## 2021-03-22 RX ADMIN — PROCHLORPERAZINE EDISYLATE 10 MG: 5 INJECTION INTRAMUSCULAR; INTRAVENOUS at 00:40

## 2021-03-22 ASSESSMENT — ENCOUNTER SYMPTOMS
RESPIRATORY NEGATIVE: 1
EYES NEGATIVE: 1
NECK STIFFNESS: 0
CHILLS: 0
NECK PAIN: 0
ENDOCRINE NEGATIVE: 1
CONSTITUTIONAL NEGATIVE: 1
HEMATOLOGIC/LYMPHATIC NEGATIVE: 1
CARDIOVASCULAR NEGATIVE: 1
FEVER: 0
PSYCHIATRIC NEGATIVE: 1
MUSCULOSKELETAL NEGATIVE: 1
GASTROINTESTINAL NEGATIVE: 1
PHOTOPHOBIA: 0
MYALGIAS: 0
ALLERGIC/IMMUNOLOGIC NEGATIVE: 1
NEUROLOGICAL NEGATIVE: 1

## 2021-03-22 NOTE — ED NOTES
Pt feeling much better. Has a ride coming for her. Provided discharge instructions and pt will call OBGYN clinic today for follow up and any other concerns.

## 2021-03-22 NOTE — ED PROVIDER NOTES
History     Chief Complaint   Patient presents with     Medication Reaction     HPI  Jennifer Tatum is a 30 year old female who presents today with complaints of possible medication reaction.  Patient states that she took for the first time a just ran was given to her by her OB/GYN doctor.  Patient states about 4 hours later she developed a headache, a feeling of imbalance and abdominal pain.  Denies any itching, tongue swelling no shortness of breath.  Headache is not the worst headache of her life.  Patient denies any vertigo symptoms.  No additional complaints    Allergies:  Allergies   Allergen Reactions     Wellbutrin [Bupropion] Other (See Comments)     Mood changes       Problem List:    Patient Active Problem List    Diagnosis Date Noted     Secondary dysmenorrhea--Aygestin-Ibuprofen---3/19/2021 2021     Priority: Medium     Mood swings--Aygestin---3/19/2021 2021     Priority: Medium     Tobacco abuse 2021     Priority: Medium     VINI (generalized anxiety disorder) 2021     Priority: Medium     Menorrhagia with regular cycle--Aygestin---3/19/2021 2021     Priority: Medium     Female pelvic peritoneal adhesions--Severe whole pelvis adhesions with LYSIS for access for TUBAL FULGURATION---2020     Priority: Medium     Encounter for sterilization--LAPAROSCOPIC BILATERAL TUBAL FULGURATION--2020     Priority: Medium     Chancroid in female-LEFT Labia,presumptive RX with Zithromax--2020     Priority: Medium     Ulcer of genital labia-LEFT, biopsy not diagnostic, lab calderón neg, RX'd for Chancroid with Zithromax---2020     Priority: Medium     Relationship problem between partners 2017     Priority: Medium     Complicated grieving 2017     Priority: Medium     Major depressive disorder, recurrent episode, mild (H) 2017     Priority: Medium     Hx of  death in prior pregnancy, currently pregnant, third  "trimester 2017     Priority: Medium     Acute right-sided low back pain with right-sided sciatica 10/18/2016     Priority: Medium     ACP (advance care planning) 2016     Priority: Medium     Advance Care Planning 2016: ACP Review of Chart / Resources Provided:  Reviewed chart for advance care plan.  Jennifer Tatum has no plan or code status on file. Discussed available resources and provided with information.   Added by Gilbert Titus             Attention deficit hyperactivity disorder (ADHD), predominantly inattentive type 2016     Priority: Medium     PTSD (post-traumatic stress disorder) 2016     Priority: Medium     Other hydronephrosis 2015     Priority: Medium     Anxiety 2013     Priority: Medium     Feels it is going away          Past Medical History:    Past Medical History:   Diagnosis Date     H/O nephrolithotomy with removal of calculi      History of  delivery 2015     Incompetent cervix 11/10/2015     IUFD (intrauterine fetal death) 2016     MDD (major depressive disorder)      Mild major depression (H) 2013     Nephrolithiasis      Smoker 2013       Past Surgical History:    Past Surgical History:   Procedure Laterality Date     CERCLAGE CERVICAL N/A 11/10/2015    Procedure: CERCLAGE CERVICAL;  Surgeon: Tucker Barragan MD;  Location: [facility] L[facility]D      SECTION  2012    Pregnancy full-term; \"Oleksandr\"       SECTION N/A 2016    Procedure:  SECTION;  Surgeon: Nisha Mcleod MD;  Location: [facility] OR      NEPHROSTOMY PERCUTUTANEOUS      left     impacted wisdom teeth removal       LAPAROSCOPIC TUBAL LIGATION Bilateral 2020    Procedure: LAPAROSCOPIC BILATERAL FALLOPIAN TUBE FULGRATION;  Surgeon: Frow, David Franke, MD;  Location: [facility] OR       Family History:    Family History   Problem Relation Age of Onset     Alcohol/Drug Father         alcoholism     Psychotic Disorder Father         Bipolar     " Cerebrovascular Disease Other         Cerebrovascular accident     C.A.D. Other      Hypertension Other      Diabetes Other        Social History:  Marital Status:  Single [1]  Social History     Tobacco Use     Smoking status: Current Every Day Smoker     Packs/day: 10.00     Years: 10.00     Pack years: 100.00     Types: Cigarettes     Smokeless tobacco: Never Used     Tobacco comment: declines quitplan referral 9/8/2020   Substance Use Topics     Alcohol use: No     Alcohol/week: 0.0 standard drinks     Drug use: No        Medications:    amphetamine-dextroamphetamine (ADDERALL XR) 25 MG 24 hr capsule  amphetamine-dextroamphetamine (ADDERALL) 20 MG tablet  ARIPiprazole (ABILIFY) 10 MG tablet  norethindrone (AYGESTIN) 5 MG tablet  sertraline (ZOLOFT) 100 MG tablet  hydrOXYzine (ATARAX) 25 MG tablet  ibuprofen (ADVIL/MOTRIN) 800 MG tablet          Review of Systems   Constitutional: Negative.  Negative for chills and fever.   HENT: Negative.    Eyes: Negative.  Negative for photophobia.   Respiratory: Negative.    Cardiovascular: Negative.    Gastrointestinal: Negative.    Endocrine: Negative.    Genitourinary: Negative.    Musculoskeletal: Negative.  Negative for myalgias, neck pain and neck stiffness.   Skin: Negative.    Allergic/Immunologic: Negative.    Neurological: Negative.    Hematological: Negative.    Psychiatric/Behavioral: Negative.        Physical Exam   BP: 133/80  Pulse: 68  Temp: 98.5  F (36.9  C)  Resp: 16  SpO2: 100 %      Physical Exam  Constitutional:       General: She is not in acute distress.     Appearance: Normal appearance. She is normal weight. She is not toxic-appearing.   HENT:      Head: Normocephalic.   Eyes:      Extraocular Movements: Extraocular movements intact.      Conjunctiva/sclera: Conjunctivae normal.      Pupils: Pupils are equal, round, and reactive to light.   Neck:      Musculoskeletal: Normal range of motion.   Cardiovascular:      Rate and Rhythm: Normal rate and  regular rhythm.   Pulmonary:      Effort: Pulmonary effort is normal.   Abdominal:      General: Abdomen is flat.   Musculoskeletal: Normal range of motion.   Skin:     General: Skin is warm.      Capillary Refill: Capillary refill takes less than 2 seconds.   Neurological:      General: No focal deficit present.      Mental Status: She is alert.   Psychiatric:         Mood and Affect: Mood normal.         Behavior: Behavior normal.         Thought Content: Thought content normal.         ED Course     ED Course as of Mar 22 0708   Mon Mar 22, 2021   0140 Patient feeling better.  No complaints at this time.  Patient to be discharged home        Procedures                 Results for orders placed or performed during the hospital encounter of 03/22/21 (from the past 24 hour(s))   Alcohol ethyl   Result Value Ref Range    Ethanol g/dL <0.01 0.01 g/dL   Basic metabolic panel   Result Value Ref Range    Sodium 140 133 - 144 mmol/L    Potassium 3.6 3.4 - 5.3 mmol/L    Chloride 110 (H) 94 - 109 mmol/L    Carbon Dioxide 25 20 - 32 mmol/L    Anion Gap 5 3 - 14 mmol/L    Glucose 100 (H) 70 - 99 mg/dL    Urea Nitrogen 11 7 - 30 mg/dL    Creatinine 0.72 0.52 - 1.04 mg/dL    GFR Estimate >90 >60 mL/min/[1.73_m2]    GFR Estimate If Black >90 >60 mL/min/[1.73_m2]    Calcium 9.2 8.5 - 10.1 mg/dL   CBC with platelets differential   Result Value Ref Range    WBC 8.7 4.0 - 11.0 10e9/L    RBC Count 4.92 3.8 - 5.2 10e12/L    Hemoglobin 14.0 11.7 - 15.7 g/dL    Hematocrit 42.3 35.0 - 47.0 %    MCV 86 78 - 100 fl    MCH 28.5 26.5 - 33.0 pg    MCHC 33.1 31.5 - 36.5 g/dL    RDW 13.6 10.0 - 15.0 %    Platelet Count 225 150 - 450 10e9/L    Diff Method Automated Method     % Neutrophils 56.2 %    % Lymphocytes 31.1 %    % Monocytes 8.9 %    % Eosinophils 3.0 %    % Basophils 0.6 %    % Immature Granulocytes 0.2 %    Nucleated RBCs 0 0 /100    Absolute Neutrophil 4.9 1.6 - 8.3 10e9/L    Absolute Lymphocytes 2.7 0.8 - 5.3 10e9/L    Absolute  Monocytes 0.8 0.0 - 1.3 10e9/L    Absolute Eosinophils 0.3 0.0 - 0.7 10e9/L    Absolute Basophils 0.1 0.0 - 0.2 10e9/L    Abs Immature Granulocytes 0.0 0 - 0.4 10e9/L    Absolute Nucleated RBC 0.0    HCG qualitative Blood   Result Value Ref Range    HCG Qualitative Serum Negative NEG^Negative       Medications   prochlorperazine (COMPAZINE) injection 10 mg (has no administration in time range)   diphenhydrAMINE (BENADRYL) injection 50 mg (has no administration in time range)       Assessments & Plan (with Medical Decision Making)     30-year-old female who presents today with complaints of possible allergic reaction.  Patient states she took a progesterone pill today given to her by her doctor for the first time.  Patient states that few hours afterwards she became  dizzy and developed a migraine  headache also had some abdominal pain.  Patient denied any itching shortness of breath, shortness of breath or wheezing.      On exam patient had anonfocal exam.  Labs as above and patient treated for migraine headache with Compazine and Benadryl.  After an hour patient was feeling much better. Explained to patient that etiology of symptoms not entirely clear but symptoms not quite consistent with an allergic reaction.      Given that patient is feeling better, patient going to be discharged home and instructed to follow-up with her regular doctor in 12 to 24 hours. Patient also told to return if she develops any new or worsening symptoms.    Due to the nature of this electronic medical record, laboratory results, imaging results, diagnosis, other information and medications reported above may not represent information available to me at the the time of my care and disposition. Medications reported above may have not been ordered by me.     Portions of the record may have been created with voice recognition software. Occasional wrong-word or 'sound-a- like' substitution may have occurred due to the inherent limitations of  voice recognition software. Though the chart has been reviewed, there may be inadvertent transcription errors. Read the chart carefully and recognize, using context, where substitutions have occurred.       New Prescriptions    No medications on file       Final diagnoses:   Tension headache   Other migraine without status migrainosus, not intractable       3/22/2021   [facility]     Pao Samayoa MD  03/22/21 0791

## 2021-03-22 NOTE — ED TRIAGE NOTES
Pt started taking aygestin today prior to uterine ablation in May per OBGYN. Pt took around 1900 and has a migraine, nausea and feeling like she is moving when her eyes are closed.

## 2021-03-22 NOTE — DISCHARGE INSTRUCTIONS
1) Follow the aftercare instructions provided  2) You must follow up with your doctor in 12 to 24 hours  3) You have been given a prescription for a medication that can cause an allergic reactions. Return to the ER if you develop any itching, tongue swelling, wheezing or shortness of breath.  4) You have been given a medication or prescription for a medication that can make you drowsy. Do not drink, drive, ride a bicycle or operate any heavy machinery while using this medication.

## 2021-03-26 ENCOUNTER — TELEPHONE (OUTPATIENT)
Dept: FAMILY MEDICINE | Facility: OTHER | Age: 31
End: 2021-03-26

## 2021-03-26 NOTE — TELEPHONE ENCOUNTER
Needs [facility] paper work filled out d/t missing work with stress and anxiety of relationship dysfunction and also needs new Rx for window tint being at 0%. Letter pending

## 2021-03-26 NOTE — LETTER
Jennifer GANN 1990            To Whom it may concern,    Please allow for patient to have window tinting at 35% due to light sensitivity and history of migraines.                 Sincerely,        Cornell TRAN.

## 2021-03-26 NOTE — TELEPHONE ENCOUNTER
Form received [facility] paperwork ,placed in provider's wall bin.   After form is completed patient would like form to be please mail.

## 2021-04-05 ENCOUNTER — TELEPHONE (OUTPATIENT)
Dept: FAMILY MEDICINE | Facility: OTHER | Age: 31
End: 2021-04-05

## 2021-04-05 NOTE — TELEPHONE ENCOUNTER
Please call Mei at the [facility] concerning a fax that was sent on Friday. They are looking for office notes and medications.

## 2021-04-08 ENCOUNTER — TELEPHONE (OUTPATIENT)
Dept: FAMILY MEDICINE | Facility: OTHER | Age: 31
End: 2021-04-08

## 2021-04-08 NOTE — LETTER
April 9, 2021      Jennifer Tatum  [address] [id number]        To Whom It May Concern:    Jennifer Tatum was seen in our clinic. She may return to work without restrictions.      Sincerely,        Cornell Herron MD

## 2021-04-15 DIAGNOSIS — F90.0 ATTENTION DEFICIT HYPERACTIVITY DISORDER (ADHD), PREDOMINANTLY INATTENTIVE TYPE: ICD-10-CM

## 2021-04-15 RX ORDER — DEXTROAMPHETAMINE SACCHARATE, AMPHETAMINE ASPARTATE MONOHYDRATE, DEXTROAMPHETAMINE SULFATE AND AMPHETAMINE SULFATE 6.25; 6.25; 6.25; 6.25 MG/1; MG/1; MG/1; MG/1
25 CAPSULE, EXTENDED RELEASE ORAL DAILY
Qty: 30 CAPSULE | Refills: 0 | Status: SHIPPED | OUTPATIENT
Start: 2021-04-15 | End: 2021-05-17

## 2021-04-15 NOTE — TELEPHONE ENCOUNTER
Adderall XR 25 mg      Last Written Prescription Date:  3-  Last Fill Quantity: 30,   # refills: 0  Last Office Visit: 3-  Future Office visit:    Next 5 appointments (look out 90 days)    Apr 19, 2021  2:30 PM  (Arrive by 2:15 PM)  Office Visit with David Franke Frow, MD  [facility] ([facility] ) [address]  [place] [address]  [phone]           Routing refill request to provider for review/approval because:

## 2021-05-11 ENCOUNTER — HOSPITAL ENCOUNTER (EMERGENCY)
Facility: HOSPITAL | Age: 31
Discharge: HOME | End: 2021-05-11
Attending: NURSE PRACTITIONER | Admitting: NURSE PRACTITIONER
Payer: COMMERCIAL

## 2021-05-11 VITALS
SYSTOLIC BLOOD PRESSURE: 140 MMHG | RESPIRATION RATE: 18 BRPM | OXYGEN SATURATION: 99 % | DIASTOLIC BLOOD PRESSURE: 90 MMHG | TEMPERATURE: 98.9 F | HEART RATE: 88 BPM

## 2021-05-11 DIAGNOSIS — M54.9 BACK PAIN: ICD-10-CM

## 2021-05-11 DIAGNOSIS — M54.50 LOW BACK PAIN, UNSPECIFIED BACK PAIN LATERALITY, UNSPECIFIED CHRONICITY, UNSPECIFIED WHETHER SCIATICA PRESENT: ICD-10-CM

## 2021-05-11 PROCEDURE — G0463 HOSPITAL OUTPT CLINIC VISIT: HCPCS | Mod: 25

## 2021-05-11 PROCEDURE — 96372 THER/PROPH/DIAG INJ SC/IM: CPT | Performed by: NURSE PRACTITIONER

## 2021-05-11 PROCEDURE — 99214 OFFICE O/P EST MOD 30 MIN: CPT | Performed by: NURSE PRACTITIONER

## 2021-05-11 PROCEDURE — 250N000011 HC RX IP 250 OP 636: Performed by: NURSE PRACTITIONER

## 2021-05-11 RX ORDER — KETOROLAC TROMETHAMINE 30 MG/ML
60 INJECTION, SOLUTION INTRAMUSCULAR; INTRAVENOUS ONCE
Status: COMPLETED | OUTPATIENT
Start: 2021-05-11 | End: 2021-05-11

## 2021-05-11 RX ORDER — TIZANIDINE 2 MG/1
2 TABLET ORAL 3 TIMES DAILY PRN
Qty: 15 TABLET | Refills: 0 | Status: SHIPPED | OUTPATIENT
Start: 2021-05-11 | End: 2021-09-01

## 2021-05-11 RX ADMIN — KETOROLAC TROMETHAMINE 60 MG: 30 INJECTION, SOLUTION INTRAMUSCULAR at 09:20

## 2021-05-11 ASSESSMENT — ENCOUNTER SYMPTOMS
BACK PAIN: 1
LIGHT-HEADEDNESS: 0
DIARRHEA: 0
HEADACHES: 0
COUGH: 0
VOMITING: 0
NAUSEA: 0
CHILLS: 0
DIZZINESS: 0
FEVER: 0
ACTIVITY CHANGE: 1
CONSTIPATION: 0
SHORTNESS OF BREATH: 0

## 2021-05-11 NOTE — DISCHARGE INSTRUCTIONS
Keep affected extremity elevated as much as possible for next 24 - 48 hours. Ice to affected area 20 minutes every hour as needed for comfort. After 48 hours you can apply heat. After 5:30 pm may start: Ibuprofen 600 to 800 mg (3 - 4 tabs of over the counter med) every six to eight hours as needed;not to exceed maximum amount of 3200 mg in 24 hours. Take with food. Tylenol 650 to 1000 mg every four to six hours as needed (not to exceed more than 4000 mg in a 24 hour period). May use interchangeably. Suggest medicating around the clock for the next 24-48 hours.  Follow up with primary provider.

## 2021-05-11 NOTE — ED TRIAGE NOTES
Pt presents today with c/ right side lower back for 2 months. Getting worse now and keeping her up at night. Tried tylenol, ibuprofen, icy hot, Biofreeze, and Toradol. No relief. Pt states she has a history of 'a bad back'. Has been to PT and was told it was a sprain.

## 2021-05-11 NOTE — ED PROVIDER NOTES
History     Chief Complaint   Patient presents with     Back Pain     HPI  Jennifer Tatum is a 30 year old female who presents with exacerbation of chronic, right sided, lower back pain that radiates into proximal one third of her right upper thigh both laterally and anteriorly.  Originally her back pain started in high school: Was in sports.  Her current pain has been ongoing for the last 2 months.  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  Denies new numbness or weakness in her legs.  Denies bowel or bladder retention or incontinency.  It does interfere with her employment.  She has tried icy hot, heat, Tylenol, and ibuprofen without relief.  Smoker.  Denies fevers, chills, nausea, vomiting, shortness of breath, dizziness, and lightheadedness.    Back Pain       Duration: two months        Specific cause: lifting a resident at work. [employer]    Description:   Location of pain: low back right  Character of pain: sharp, shooting and constant  Pain radiation:radiates into the right leg  New numbness or weakness in legs, not attributed to pain:  no    Intensity: Currently 7/10    History:   Pain interferes with job: YES left work today because of the pain  History of back problems: recurrent self limited episodes of low back pain in the past  Any previous MRI or X-rays: Yes- at [facility].  Date two years ago  Sees a specialist for back pain:  No  Therapies tried without relief: icy hot, acetaminophen (Tylenol), heat and NSAIDs    Alleviating factors:   Improved by: none      Precipitating factors:  Worsened by: Standing and Sitting    Accompanying Signs & Symptoms:  Risk of Fracture:  None  Risk of Cauda Equina:  None  Risk of Infection:  None  Risk of Cancer:  None  Risk of Ankylosing Spondylitis:  Onset at age <35, male, AND morning back stiffness. no     Allergies:  Allergies   Allergen Reactions     Wellbutrin [Bupropion] Other (See Comments)     Mood changes      Lamictal [Lamotrigine] Rash       Problem List:    Patient Active Problem List    Diagnosis Date Noted     Secondary dysmenorrhea--Aygestin-Ibuprofen---3/19/2021 2021     Priority: Medium     Mood swings--Aygestin---3/19/2021 2021     Priority: Medium     Tobacco abuse 2021     Priority: Medium     VINI (generalized anxiety disorder) 2021     Priority: Medium     Menorrhagia with regular cycle--Aygestin---3/19/2021 2021     Priority: Medium     Female pelvic peritoneal adhesions--Severe whole pelvis adhesions with LYSIS for access for TUBAL FULGURATION---2020     Priority: Medium     Encounter for sterilization--LAPAROSCOPIC BILATERAL TUBAL FULGURATION--2020     Priority: Medium     Chancroid in female-LEFT Labia,presumptive RX with Zithromax--2020     Priority: Medium     Ulcer of genital labia-LEFT, biopsy not diagnostic, lab calderón neg, RX'd for Chancroid with Zithromax---2020     Priority: Medium     Relationship problem between partners 2017     Priority: Medium     Complicated grieving 2017     Priority: Medium     Major depressive disorder, recurrent episode, mild (H) 2017     Priority: Medium     Hx of  death in prior pregnancy, currently pregnant, third trimester 2017     Priority: Medium     Acute right-sided low back pain with right-sided sciatica 10/18/2016     Priority: Medium     ACP (advance care planning) 2016     Priority: Medium     Advance Care Planning 2016: ACP Review of Chart / Resources Provided:  Reviewed chart for advance care plan.  Jennifer Tatum has no plan or code status on file. Discussed available resources and provided with information.   Added by Gilbert Titus             Attention deficit hyperactivity disorder (ADHD), predominantly inattentive type 2016     Priority: Medium     PTSD (post-traumatic stress disorder) 2016     Priority: Medium      "Other hydronephrosis 2015     Priority: Medium     Anxiety 2013     Priority: Medium     Feels it is going away          Past Medical History:    Past Medical History:   Diagnosis Date     H/O nephrolithotomy with removal of calculi      History of  delivery 2015     Incompetent cervix 11/10/2015     IUFD (intrauterine fetal death) 2016     MDD (major depressive disorder)      Mild major depression (H) 2013     Nephrolithiasis      Smoker 2013       Past Surgical History:    Past Surgical History:   Procedure Laterality Date     CERCLAGE CERVICAL N/A 11/10/2015    Procedure: CERCLAGE CERVICAL;  Surgeon: Tucker Barragan MD;  [facility]: [facility]      SECTION  2012    Pregnancy full-term; \"Oleksandr\"       SECTION N/A 2016    Procedure:  SECTION;  Surgeon: Nisha Mcleod MD;  Location: [facility]      NEPHROSTOMY PERCUTUTANEOUS      left     impacted wisdom teeth removal       LAPAROSCOPIC TUBAL LIGATION Bilateral 2020    Procedure: LAPAROSCOPIC BILATERAL FALLOPIAN TUBE FULGRATION;  Surgeon: Frow, David Franke, MD;  Location: [facility]       Family History:    Family History   Problem Relation Age of Onset     Alcohol/Drug Father         alcoholism     Psychotic Disorder Father         Bipolar     Cerebrovascular Disease Other         Cerebrovascular accident     C.A.D. Other      Hypertension Other      Diabetes Other        Social History:  Marital Status:  Single [1]  Social History     Tobacco Use     Smoking status: Current Every Day Smoker     Packs/day: 10.00     Years: 10.00     Pack years: 100.00     Types: Cigarettes     Smokeless tobacco: Never Used     Tobacco comment: declines quitplan referral 2020   Substance Use Topics     Alcohol use: No     Alcohol/week: 0.0 standard drinks     Drug use: No        Medications:    tiZANidine (ZANAFLEX) 2 MG tablet  amphetamine-dextroamphetamine (ADDERALL XR) 25 MG 24 hr " capsule  amphetamine-dextroamphetamine (ADDERALL) 20 MG tablet  ARIPiprazole (ABILIFY) 10 MG tablet  hydrOXYzine (ATARAX) 25 MG tablet  ibuprofen (ADVIL/MOTRIN) 800 MG tablet  sertraline (ZOLOFT) 100 MG tablet          Review of Systems   Constitutional: Positive for activity change. Negative for chills and fever.   Respiratory: Negative for cough and shortness of breath.    Gastrointestinal: Negative for constipation, diarrhea, nausea and vomiting.        Last bm this morning normal   Genitourinary: Negative.    Musculoskeletal: Positive for back pain.   Neurological: Negative for dizziness, light-headedness and headaches.       Physical Exam   BP: 140/90  Pulse: 88  Temp: 98.9  F (37.2  C)  Resp: 18  SpO2: 99 %      Physical Exam  Vitals signs and nursing note reviewed.   Constitutional:       General: She is in acute distress (Mild to moderate).   Cardiovascular:      Rate and Rhythm: Normal rate and regular rhythm.      Heart sounds: Normal heart sounds. No murmur.   Pulmonary:      Effort: Pulmonary effort is normal. No respiratory distress.      Breath sounds: Normal breath sounds. No wheezing.   Musculoskeletal:         General: Tenderness present. No swelling.      Lumbar back: She exhibits decreased range of motion and tenderness.        Back:       Comments: Nature of onset lifting patient at work  Location right-sided low back pain  Character sharp stabbing constant  Radiation right upper thigh laterally and anteriorly    Musculoskeletal: Lumbar and thoracic spine without gross deformity, rash, erythema, or ecchymosis. No trauma noted.        No tenderness, spasms, pain, or step-offs noted with spinal palpation.Paraspinous muscle tenderness over the right gluteal muscles. No numbness or tingling in pelvic or gluteal muscles (spinal anesthesia). Pain radiates into right legs.  Pain is same as chronic pain. Denies incontinency of bowel or bladder.       Inspection:    Is able to get up from chair with  minimal difficulty  Ambulation normal  Posture good  Shoulders even bilaterally    iliac crest even bilaterally       Flexion at waist: 70 degrees. extension 20 degrees  Lateral flexion:   R) 40 degrees.  L) 45 degrees    Stand on tip toes (S1).  Yes  Rock on heels (L4).  Yes   Flex toes up (L5).  Yes     L1, 2, 3:  Bend right knee from standing position to 100 degrees before having pain.  Bend left  knee from standing position to 100 degrees before having pain.    L2, 3: quad muscles extend leg and hold against resistance. R) strong against resistance L) strong against resistance    L4: (anterior tibialis - dorsi flex foot R) yes L) yes     L5: press great toe up  R) yes L) yes    S1: gastrocnemius flex toe down R) yes L) yes    You know you know theSensation bilaterally in feet: L4 - medial malleolus yes         L 5 - dorsal web space 1st and 2nd toe yes         S1 - lateral malleolus yes  Strength strong and equal against resistance  . No neuro deficits, no bowel/bladder retention or incontinence. No spinal anesthesia.      Skin:     General: Skin is warm and dry.      Findings: No bruising or erythema.   Neurological:      Mental Status: She is alert and oriented to person, place, and time.      Deep Tendon Reflexes:      Reflex Scores:       Patellar reflexes are 2+ on the right side and 2+ on the left side.       Achilles reflexes are 1+ on the right side and 1+ on the left side.  Psychiatric:         Behavior: Behavior normal.         ED Course        Procedures           No results found for this or any previous visit (from the past 24 hour(s)).    Medications   ketorolac (TORADOL) injection 60 mg (60 mg Intramuscular Given 5/11/21 0920)       Assessments & Plan (with Medical Decision Making)     I have reviewed the nursing notes.    I have reviewed the findings, diagnosis, plan and need for follow up with the patient.  (M54.9) Back pain  Comment: 30 year old female who presents with exacerbation of chronic,  right sided, lower back pain that radiates into proximal one third of her right upper thigh both laterally and anteriorly.  Originally her back pain started in high school: Was in sports.  Her current pain has been ongoing for the last 2 months.  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  Denies new numbness or weakness in her legs.  Denies bowel or bladder retention or incontinency.  It does interfere with her employment.  She has tried icy hot, heat, Tylenol, and ibuprofen without relief.  Smoker.  Denies fevers, chills, nausea, vomiting, shortness of breath, dizziness, and lightheadedness.    MDM: NHT. Lungs CTA  See back assessment    Ketorolac 60 mg given IM. Has had previously    Plan: Education provided for back care tips. Tizanidine tid prn.   Verbally instructed:  Do not drive motor vehicles or operate heavy equipment while taking muscle relaxors.    Ice to affected area 20 minutes every hour as needed for comfort. After 48 hours you can apply heat. After 5:30 pm may start: Ibuprofen 600 to 800 mg (3 - 4 tabs of over the counter med) every six to eight hours as needed;not to exceed maximum amount of 3200 mg in 24 hours. Take with food. Tylenol 650 to 1000 mg every four to six hours as needed (not to exceed more than 4000 mg in a 24 hour period). May use interchangeably. Suggest medicating around the clock for the next 24-48 hours.  Follow up with primary provider.    These discharge instructions and medications were reviewed with her and understanding verbalized.    This document was prepared using a combination of typing and voice generated software.  While every attempt was made for accuracy, spelling and grammatical errors may exist.    Discharge Medication List as of 5/11/2021  9:19 AM          Final diagnoses:   Back pain       5/11/2021   [facility]       Luba Dumas, CNP  05/11/21 5057

## 2021-05-11 NOTE — ED TRIAGE NOTES
Pt in for evaluation fo right lower back pain. 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 Pain is constant and does not radiate.

## 2021-05-17 DIAGNOSIS — F90.0 ATTENTION DEFICIT HYPERACTIVITY DISORDER (ADHD), PREDOMINANTLY INATTENTIVE TYPE: ICD-10-CM

## 2021-05-17 RX ORDER — DEXTROAMPHETAMINE SACCHARATE, AMPHETAMINE ASPARTATE, DEXTROAMPHETAMINE SULFATE AND AMPHETAMINE SULFATE 5; 5; 5; 5 MG/1; MG/1; MG/1; MG/1
20 TABLET ORAL DAILY
Qty: 30 TABLET | Refills: 0 | Status: SHIPPED | OUTPATIENT
Start: 2021-05-17 | End: 2022-10-27

## 2021-05-17 RX ORDER — DEXTROAMPHETAMINE SACCHARATE, AMPHETAMINE ASPARTATE MONOHYDRATE, DEXTROAMPHETAMINE SULFATE AND AMPHETAMINE SULFATE 6.25; 6.25; 6.25; 6.25 MG/1; MG/1; MG/1; MG/1
25 CAPSULE, EXTENDED RELEASE ORAL DAILY
Qty: 30 CAPSULE | Refills: 0 | Status: SHIPPED | OUTPATIENT
Start: 2021-05-17 | End: 2022-10-27

## 2021-05-17 NOTE — TELEPHONE ENCOUNTER
adderall  xr 25   Last Written Prescription Date:  4/15/21  Last Fill Quantity: 30,   # refills: 0  Last Office Visit: 3/12/21  Future Office visit:       Routing refill request to provider for review/approval because:  Drug not on the [facility], [facility] or [facility] refill protocol or controlled substance  adderall 20      Last Written Prescription Date:  3/12/21  Last Fill Quantity: 30,   # refills: 0

## 2021-05-18 ENCOUNTER — OFFICE VISIT (OUTPATIENT)
Dept: OBGYN | Facility: OTHER | Age: 31
End: 2021-05-18
Attending: OBSTETRICS & GYNECOLOGY
Payer: COMMERCIAL

## 2021-05-18 VITALS
HEART RATE: 60 BPM | BODY MASS INDEX: 30.82 KG/M2 | SYSTOLIC BLOOD PRESSURE: 114 MMHG | HEIGHT: 65 IN | WEIGHT: 185 LBS | DIASTOLIC BLOOD PRESSURE: 76 MMHG

## 2021-05-18 DIAGNOSIS — R45.86 MOOD SWINGS: ICD-10-CM

## 2021-05-18 DIAGNOSIS — N94.5 SECONDARY DYSMENORRHEA: ICD-10-CM

## 2021-05-18 DIAGNOSIS — N92.0 MENORRHAGIA WITH REGULAR CYCLE: Primary | ICD-10-CM

## 2021-05-18 PROBLEM — N76.6 ULCER OF GENITAL LABIA: Status: RESOLVED | Noted: 2020-02-11 | Resolved: 2021-05-18

## 2021-05-18 PROBLEM — O09.293: Status: RESOLVED | Noted: 2017-03-08 | Resolved: 2021-05-18

## 2021-05-18 PROBLEM — F43.21 COMPLICATED GRIEVING: Status: RESOLVED | Noted: 2017-07-26 | Resolved: 2021-05-18

## 2021-05-18 PROBLEM — N73.6 FEMALE PELVIC PERITONEAL ADHESIONS: Status: ACTIVE | Noted: 2020-08-20

## 2021-05-18 PROBLEM — A57: Status: RESOLVED | Noted: 2020-02-12 | Resolved: 2021-05-18

## 2021-05-18 PROBLEM — Z30.2 ENCOUNTER FOR STERILIZATION: Status: RESOLVED | Noted: 2020-02-18 | Resolved: 2021-05-18

## 2021-05-18 PROCEDURE — 99214 OFFICE O/P EST MOD 30 MIN: CPT | Performed by: OBSTETRICS & GYNECOLOGY

## 2021-05-18 PROCEDURE — G0463 HOSPITAL OUTPT CLINIC VISIT: HCPCS

## 2021-05-18 RX ORDER — EPINEPHRINE 0.3 MG/.3ML
INJECTION SUBCUTANEOUS
COMMUNITY
Start: 2021-04-07 | End: 2023-03-14

## 2021-05-18 RX ORDER — FAMOTIDINE 20 MG/1
TABLET, FILM COATED ORAL
COMMUNITY
Start: 2021-04-07 | End: 2021-05-18

## 2021-05-18 RX ORDER — NORETHINDRONE ACETATE 5 MG
TABLET ORAL
COMMUNITY
Start: 2021-04-27 | End: 2021-05-18

## 2021-05-18 RX ORDER — ACETAMINOPHEN AND CODEINE PHOSPHATE 120; 12 MG/5ML; MG/5ML
0.35 SOLUTION ORAL DAILY
Qty: 84 TABLET | Refills: 3 | Status: SHIPPED | OUTPATIENT
Start: 2021-05-18 | End: 2022-10-27

## 2021-05-18 RX ORDER — LAMOTRIGINE 25 MG/1
TABLET, EXTENDED RELEASE ORAL
COMMUNITY
Start: 2021-04-06 | End: 2021-05-18

## 2021-05-18 RX ORDER — CETIRIZINE HYDROCHLORIDE 10 MG/1
TABLET ORAL
COMMUNITY
Start: 2021-04-07 | End: 2021-05-18

## 2021-05-18 ASSESSMENT — MIFFLIN-ST. JEOR: SCORE: 1565.03

## 2021-05-18 ASSESSMENT — PAIN SCALES - GENERAL: PAINLEVEL: NO PAIN (0)

## 2021-05-18 NOTE — PATIENT INSTRUCTIONS
- Stop the Aygestin 5 mg daily prescription at the end of this pack and then change to Micronor prescription.  - Take the Micronor at the same time each day.  - Return to clinic if your symptoms worsen or do not improve.

## 2021-05-18 NOTE — PROGRESS NOTES
CHIEF COMPLAINT / REASON FOR VISIT  Patient presents for Gynecology visit for menorrhagia follow-up.    HISTORY OF PRESENT ILLNESS  Jennifer Tatum is a 30 year old  with Patient's last menstrual period was 2021 (approximate). who presents for menorrhagia follow-up.  The patient had a tubal fulguration on 2020 and shortly thereafter had her IUD removed. About 4-5 months ago she began experiencing heavier menses.  She reports menses once a month that last for 4 to 7 days with heavy menstrual blood flow, menorrhagia, and blood clots for the first 3 days.  No intermenstrual bleeding or postcoital spotting.  She does have worsening dysmenorrhea and mood swings associated with heavier menses.  The patient was given a prescription for Aygestin 5 mg daily in March and her menorrhagia, dysmenorrhea, and mood swings have improved.  The patient presents today for follow-up and requests an endometrial ablation.    She has had 3 prior  sections as well as a tubal fulguration on 2020.  At the time of her laparoscopic tubal fulguration she was found to have extensive pelvic adhesive disease which prevented a bilateral salpingectomy.    MENSTRUAL HISTORY  Menarche was at age 11-12.  Menses: Regular, q 30 days.  Duration of menses: 4-7 days  Heavy menses: For the first 3 days as above.  Now improved on Aygestin.  Clots: For the first 3 days as above.  Now improved on Aygestin.  Intermenstrual bleeding: Denies.  Dysmenorrhea: As above. Now improved on Aygestin..  She is sexually active and denies issues with intercourse.   Dyspareunia: Denies.  Postcoital spotting: Denies.  She is currently using sterilization for contraception.  STD History: Denies.  Pap smear history: No cervical dysplasia.    ALLERGIES     Allergies   Allergen Reactions     Wellbutrin [Bupropion] Other (See Comments)     Mood changes     Lamictal [Lamotrigine] Rash       MEDICATIONS    Current Outpatient Medications:       "amphetamine-dextroamphetamine (ADDERALL XR) 25 MG 24 hr capsule, Take 1 capsule (25 mg) by mouth daily, Disp: 30 capsule, Rfl: 0     amphetamine-dextroamphetamine (ADDERALL) 20 MG tablet, Take 1 tablet (20 mg) by mouth daily At noon., Disp: 30 tablet, Rfl: 0     ARIPiprazole (ABILIFY) 10 MG tablet, Take 1 tablet (10 mg) by mouth daily, Disp: 90 tablet, Rfl: 3     EPINEPHrine (ANY BX GENERIC EQUIV) 0.3 MG/0.3ML injection 2-pack, INJECT 0.3ML INTO THE MUSCLE EVERY 5 MINUTES AS NEEDED FOR ANAPHYLAXIS. MAY REPEAT DOSE ONCE IF ANAPHYLACTIC SYMPTOMS PERSIST., Disp: , Rfl:      hydrOXYzine (ATARAX) 25 MG tablet, 1-2 tabs every 6 hrs as needed anxiety, Disp: 30 tablet, Rfl: 1     ibuprofen (ADVIL/MOTRIN) 800 MG tablet, Take 1 tablet (800 mg) by mouth every 8 hours as needed for pain Take with full meal. Begin with onset of any menstrual like symptoms., Disp: 25 tablet, Rfl: 4     norethindrone (MICRONOR) 0.35 MG tablet, Take 1 tablet (0.35 mg) by mouth daily, Disp: 84 tablet, Rfl: 3     sertraline (ZOLOFT) 100 MG tablet, Take 1 tablet (100 mg) by mouth daily, Disp: 90 tablet, Rfl: 0     tiZANidine (ZANAFLEX) 2 MG tablet, Take 1 tablet (2 mg) by mouth 3 times daily as needed for muscle spasms May take one to two tablets tid prn, Disp: 15 tablet, Rfl: 0    REVIEW OF SYSTEMS  As per HPI, otherwise negative.    The patient's Medical Hx, Surgical Hx, Obstetrical Hx, Social Hx, and Family Hx have been reviewed and updated in the electronic medical record.    OBJECTIVE  /76   Pulse 60   Ht 1.651 m (5' 5\")   Wt 83.9 kg (185 lb)   LMP 05/03/2021 (Approximate)   BMI 30.79 kg/m      General:  Well-developed, well-nourished female in no apparent distress.  Neurological: Alert and oriented x3.  Abdomen: Soft, nontender, nondistended, positive bowel sounds.  No organomegaly. No rebound, no guarding.  Well-healed surgical incisions.  Pelvic exam: Deferred.  Extremities:  No clubbing cyanosis or edema. Nontender " bilaterally.    DIAGNOSTICS  2021 Hbg 8.7, hematocrit 42.3, Plt 225, BMP normal    2021 Pelvic ultrasound: The uterus measures 11.1 x 3.9 x 5.3 cm. Endometrial thickness is 11 mm. The right ovary is 3.2 x 2.3 x 2.2 cm. The left ovary is 4.3 x 2.4 x 3.4 cm. There is a 2.7 x 1.9 x 2.0 cm  simple cyst. Doppler flow is visible in both ovaries. There is no evidence of torsion. No adnexal masses or abnormal fluid collection is present  IMPRESSION: Small left ovarian cyst. The exam is otherwise unremarkable.    ASSESSMENT / PLAN  Jennifer Tatum is a 30 year old  female who presents for menorrhagia follow-up.    1 Menorrhagia  2 Dysmenorrhea  3 Premenstrual mood swings  4 Extensive pelvic adhesive disease  5 Tobacco abuse  6 Psychiatric history including major depression, ADHD, PTSD, anxiety    - I discussed menorrhagia, dysmenorrhea, and premenstrual mood swings.  Her symptoms have improved on Aygestin 5 mg daily.  - The patient is interested in endometrial ablation.  - I discussed the risk, benefits, alternatives, and indication endometrial ablation with the patient.  I reviewed with the patient that endometrial ablation is 90% successful at treating menorrhagia and abnormal bleeding symptoms.  However endometrial ablation is not effective at managing dysmenorrhea or premenstrual mood swings.  Given the patient's age less than 30, there is a very good chance that she will have continued menstrual bleeding and possibly return of menorrhagia symptoms. I discussed risk of bleeding, infection, injury to nearby organs - bowel, bladder, blood vessels, nerves, uterine perforation, and possible need for hysterectomy.  I discussed that there is a risk for post ablation syndrome resulting in pain and cramping post ablation that may require hysterectomy in the future. I discussed the need for reliable birth control which she has with her tubal fulguration. I discussed  that endometrial ablation is an outpatient  surgical procedure. I gave the patient literature to review on endometrial ablation.     - I discussed alternative options for treatment of menorrhagia and abnormal bleeding symptoms including oral hormonal, Depo Provera, Nexplanon, and progesterone IUD (Mirena).  - The patient would like to continue with oral hormonal therapy to help create an amenorrheic state, I feel is a very reasonable option.  - I am going to have the patient stop the Aygestin 5 mg daily prescription at the end of this pack and then change to Micronor prescription.  - I gave the patient a prescription for Micronor, progestin only pills, with instructions for use.  I recommend that she take the Micronor at the same time each day.  - Bleeding precautions are reviewed the patient.  - All the patient's questions were answered.  - I would like the patient to return to clinic if her menorrhagia, dysmenorrhea, or premenstrual mood swing symptoms worsen or do not improve.    - Problem list reviewed and updated.  - Follow up in 3-4 months or sooner as needed.    - 30 minute visit with greater than 50% spent in counseling.    Reji Tan MD  Obstetrics and Gynecology

## 2021-05-18 NOTE — NURSING NOTE
"Chief Complaint   Patient presents with     Follow Up     from Dr. Grimm-discuss ablation       Initial /76   Pulse 60   Ht 1.651 m (5' 5\")   Wt 83.9 kg (185 lb)   BMI 30.79 kg/m   Estimated body mass index is 30.79 kg/m  as calculated from the following:    Height as of this encounter: 1.651 m (5' 5\").    Weight as of this encounter: 83.9 kg (185 lb).  Medication Reconciliation: complete  Madie Garcia LPN    "

## 2021-05-22 ENCOUNTER — APPOINTMENT (OUTPATIENT)
Dept: GENERAL RADIOLOGY | Facility: HOSPITAL | Age: 31
End: 2021-05-22
Attending: NURSE PRACTITIONER
Payer: COMMERCIAL

## 2021-05-22 ENCOUNTER — HOSPITAL ENCOUNTER (EMERGENCY)
Facility: HOSPITAL | Age: 31
Discharge: HOME | End: 2021-05-22
Attending: NURSE PRACTITIONER | Admitting: NURSE PRACTITIONER
Payer: COMMERCIAL

## 2021-05-22 VITALS
DIASTOLIC BLOOD PRESSURE: 83 MMHG | SYSTOLIC BLOOD PRESSURE: 126 MMHG | OXYGEN SATURATION: 100 % | RESPIRATION RATE: 16 BRPM | HEART RATE: 81 BPM | TEMPERATURE: 97.8 F

## 2021-05-22 DIAGNOSIS — M54.16 LUMBAR BACK PAIN WITH RADICULOPATHY AFFECTING RIGHT LOWER EXTREMITY: ICD-10-CM

## 2021-05-22 PROCEDURE — 250N000011 HC RX IP 250 OP 636: Performed by: NURSE PRACTITIONER

## 2021-05-22 PROCEDURE — 96372 THER/PROPH/DIAG INJ SC/IM: CPT | Performed by: NURSE PRACTITIONER

## 2021-05-22 PROCEDURE — 72100 X-RAY EXAM L-S SPINE 2/3 VWS: CPT

## 2021-05-22 PROCEDURE — 99213 OFFICE O/P EST LOW 20 MIN: CPT | Performed by: NURSE PRACTITIONER

## 2021-05-22 PROCEDURE — G0463 HOSPITAL OUTPT CLINIC VISIT: HCPCS | Mod: 25

## 2021-05-22 RX ORDER — KETOROLAC TROMETHAMINE 30 MG/ML
60 INJECTION, SOLUTION INTRAMUSCULAR; INTRAVENOUS ONCE
Status: COMPLETED | OUTPATIENT
Start: 2021-05-22 | End: 2021-05-22

## 2021-05-22 RX ORDER — HYDROCODONE BITARTRATE AND ACETAMINOPHEN 5; 325 MG/1; MG/1
1 TABLET ORAL EVERY 6 HOURS PRN
Qty: 6 TABLET | Refills: 0 | Status: SHIPPED | OUTPATIENT
Start: 2021-05-22 | End: 2021-09-01

## 2021-05-22 RX ORDER — METAXALONE 800 MG/1
800 TABLET ORAL 4 TIMES DAILY
Qty: 10 TABLET | Refills: 0 | Status: SHIPPED | OUTPATIENT
Start: 2021-05-22 | End: 2021-05-25

## 2021-05-22 RX ADMIN — KETOROLAC TROMETHAMINE 60 MG: 30 INJECTION, SOLUTION INTRAMUSCULAR at 19:17

## 2021-05-22 ASSESSMENT — ENCOUNTER SYMPTOMS
DIZZINESS: 0
VOMITING: 0
SHORTNESS OF BREATH: 1
DIARRHEA: 0
NUMBNESS: 0
ACTIVITY CHANGE: 1
NAUSEA: 0
HEADACHES: 0
CHILLS: 0
FEVER: 0

## 2021-05-22 NOTE — ED TRIAGE NOTES
Pt presents with pain to her lower back . States that she originally hurt it at work at [employer] at the beginning of April. States that now her pain is going into her posterior right thigh. Took Ibuprofen at 1730 today.

## 2021-05-22 NOTE — ED PROVIDER NOTES
History     Chief Complaint   Patient presents with     Back Pain     HPI  Jennifer Tatum is a 30 year old female who presents with right-sided low back pain that extends into her right upper thigh and buttocks.  Her back pain initially started in April when she was lifting a resident off of the toilet.  She heard something pop at that time.  Was not evaluated at that time nor did she complete an incident report.  Denies any new weakness or numbness in her legs. denies bowel and bladder retention or incontinency.  She has had previous episodes of low back pain and had an x-ray completed in January 2020.  She has been evaluated in urgent care previously per myself May 11 of 2021.  She has not seen her primary care provider since that time.  Last menstrual period was 2 months ago. she has had her tubes tied.  Smoker.  Has taken Tylenol, ibuprofen, muscle relaxers, and has applied cold, heat, and massage with little relief.  Denies fevers, chills, nausea, diarrhea, vomiting, dizziness, and headaches.    Back Pain       Duration: started in April lifting resident off of the toilet. [place] something pop. Did not have evaluated at that time.         Specific cause: lifting    Description:   Location of pain: low back right  Character of pain: sharp at times with certain movements, dull ache and constant. Can feel into lateral right upper thigh when driving.  Pain radiation:radiates into the right buttocks and radiates into the right leg  New numbness or weakness in legs, not attributed to pain:  no     Intensity: Currently 8/10    History:   Pain interferes with job: YES  History of back problems: recurrent self limited episodes of low back pain in the past  Any previous MRI or X-rays: Yes- at [facility].  Date  X ray lumbar spine on 1/2020  Sees a specialist for back pain:  No  Therapies tried without relief: acetaminophen (Tylenol), cold, heat, massage, muscle relaxants and NSAIDs    Alleviating factors:   Improved by:  none      Precipitating factors:  Worsened by: everything makes it worse.      Accompanying Signs & Symptoms:  Risk of Fracture:  None  Risk of Cauda Equina:  None  Risk of Infection:  None  Risk of Cancer:  None  Risk of Ankylosing Spondylitis:  Onset at age <35, male, AND morning back stiffness. no     Allergies:  Allergies   Allergen Reactions     Wellbutrin [Bupropion] Other (See Comments)     Mood changes     Lamictal [Lamotrigine] Rash       Problem List:    Patient Active Problem List    Diagnosis Date Noted     Secondary dysmenorrhea 03/19/2021     Priority: Medium     Mood swings 03/19/2021     Priority: Medium     Tobacco abuse 03/12/2021     Priority: Medium     VINI (generalized anxiety disorder) 03/12/2021     Priority: Medium     Menorrhagia with regular cycle 03/12/2021     Priority: Medium     Female pelvic peritoneal adhesions--Severe 08/20/2020     Priority: Medium     Relationship problem between partners 12/01/2017     Priority: Medium     Major depressive disorder, recurrent episode, mild (H) 05/02/2017     Priority: Medium     ACP (advance care planning) 06/17/2016     Priority: Medium     Advance Care Planning 6/17/2016: ACP Review of Chart / Resources Provided:  Reviewed chart for advance care plan.  Jennifer Tatum has no plan or code status on file. Discussed available resources and provided with information.   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 08/20/2020     VINI (generalized anxiety disorder) 03/12/2021     H/O nephrolithotomy with removal of  "calculi      Incompetent cervix 11/10/2015     IUFD (intrauterine fetal death) 2016     Major depressive disorder, recurrent episode, mild (H) 2017     Menorrhagia with regular cycle 2021     Mild major depression (H) 2013     Mood swings 2021     Nephrolithiasis      Pelvic peritoneal adhesions, female 2020     PTSD (post-traumatic stress disorder) 2016     Secondary dysmenorrhea 2021     Smoker 2013     Tobacco abuse 2021       Past Surgical History:    Past Surgical History:   Procedure Laterality Date     CERCLAGE CERVICAL N/A 11/10/2015    Procedure: CERCLAGE CERVICAL;  Surgeon: Tucker Barragan MD;  [facility]      SECTION  2012    Pregnancy full-term; \"Oleksandr\"       SECTION N/A 2016    Procedure:  SECTION;  Surgeon: Nisha Mcleod MD;  Location: [facility]      SECTION N/A 2018     HC NEPHROSTOMY PERCUTUTANEOUS      left     impacted wisdom teeth removal       LAPAROSCOPIC TUBAL LIGATION Bilateral 2020    Procedure: LAPAROSCOPIC BILATERAL FALLOPIAN TUBE FULGRATION;  Surgeon: Frow, David Franke, MD;  Location: [facility]       Family History:    Family History   Problem Relation Age of Onset     Alcohol/Drug Father         alcoholism     Psychotic Disorder Father         Bipolar     Cerebrovascular Disease Other         Cerebrovascular accident     C.A.D. Other      Hypertension Other      Diabetes Other        Social History:  Marital Status:  Single [1]  Social History     Tobacco Use     Smoking status: Current Every Day Smoker     Packs/day: 1.00     Years: 10.00     Pack years: 10.00     Types: Cigarettes     Smokeless tobacco: Never Used     Tobacco comment: declines quitplan referral 2020   Substance Use Topics     Alcohol use: No     Alcohol/week: 0.0 standard drinks     Drug use: No        Medications:    amphetamine-dextroamphetamine (ADDERALL XR) 25 MG 24 hr " capsule  amphetamine-dextroamphetamine (ADDERALL) 20 MG tablet  ARIPiprazole (ABILIFY) 10 MG tablet  HYDROcodone-acetaminophen (NORCO) 5-325 MG tablet  hydrOXYzine (ATARAX) 25 MG tablet  ibuprofen (ADVIL/MOTRIN) 800 MG tablet  metaxalone (SKELAXIN) 800 MG tablet  sertraline (ZOLOFT) 100 MG tablet  tiZANidine (ZANAFLEX) 2 MG tablet  EPINEPHrine (ANY BX GENERIC EQUIV) 0.3 MG/0.3ML injection 2-pack  norethindrone (MICRONOR) 0.35 MG tablet          Review of Systems   Constitutional: Positive for activity change. Negative for chills and fever.   Respiratory: Positive for shortness of breath.    Gastrointestinal: Negative for diarrhea, nausea and vomiting.   Neurological: Negative for dizziness, numbness and headaches.       Physical Exam   BP: 126/83  Pulse: 81  Temp: 97.8  F (36.6  C)  Resp: 16  SpO2: 100 %      Physical Exam  Vitals signs and nursing note reviewed.   Constitutional:       General: She is in acute distress.      Appearance: She is overweight.   Cardiovascular:      Rate and Rhythm: Normal rate and regular rhythm.      Heart sounds: Normal heart sounds. No murmur.   Pulmonary:      Effort: Pulmonary effort is normal. No respiratory distress.      Breath sounds: Normal breath sounds. No wheezing.   Musculoskeletal:         General: Tenderness present.      Lumbar back: She exhibits decreased range of motion and tenderness.      Comments: Nature of onset exacerbation of back pain that started April after lifting a resident off the toilet.  Location right lower back pain  Character sharp at times with certain movements.  Otherwise dull aching constant  Radiation right buttocks and right lateral upper thigh.  Worse when driving    Musculoskeletal: Lumbar and thoracic spine without gross deformity, rash, erythema, or ecchymosis. No trauma noted.        No tenderness, spasms, pain, or step-offs noted with spinal palpation.  Paraspinous muscle tenderness over the right gluteal muscles. No numbness or tingling  in pelvic or gluteal muscles (spinal anesthesia). Pain radiates into right lateral upper thigh.  Pain is same as chronic pain. Denies incontinency of bowel or bladder.       Inspection:  Is able to get up from chair with minimal difficulty  Ambulation favors right side   Posture okay  Shoulders even bilaterally    iliac crest even bilaterally       Flexion at waist: 20 degrees. extension 0 degrees  Lateral flexion:   R) 15 degrees.  L) 10 degrees  Stand on tip toes (S1).  Yes  Rock on heels (L4).  Yes  Flex toes up (L5).  Yes    L1, 2, 3:  Bend right knee from standing position to 80 degrees before having pain.  Bend left  knee from standing position to 90 degrees before having pain.    L2, 3: quad muscles extend leg and hold against resistance. R) strong against resistance L) strong against resistance    L4: (anterior tibialis - dorsi flex foot R) yes L) yes    L5: press great toe up  R) yes L) yes    S1: gastrocnemius flex toe down R) yes L) yes    Sensation bilaterally in feet: L4 - medial malleolus yes         L 5 - dorsal web space 1st and 2nd toe yes         S1 - lateral malleolus yes  Strength equal and strong against resistance bilaterally     No neuro deficits, no bowel/bladder retention or incontinence. No spinal anesthesia.      Skin:     General: Skin is warm and dry.   Neurological:      Mental Status: She is alert and oriented to person, place, and time.      Sensory: Sensation is intact.      Motor: Motor function is intact.      Gait: Gait is intact.      Deep Tendon Reflexes:      Reflex Scores:       Patellar reflexes are 1+ on the right side and 1+ on the left side.       Achilles reflexes are 1+ on the right side and 1+ on the left side.  Psychiatric:         Mood and Affect: Affect is tearful.         Behavior: Behavior normal. Behavior is cooperative.         ED Course        Procedures             Results for orders placed or performed during the hospital encounter of 05/22/21 (from the past  24 hour(s))   Lumbar spine XR, 2-3 views    Narrative    Exam: XR LUMBAR SPINE 2-3 VIEWS     History:Female, age 30 years, tenderness over L3 to L5 regions. worse  on right    Comparison:  1/14/2020    Technique: Three views are submitted.    Findings: Bones are normally mineralized. No evidence of acute or  subacute fracture. No evidence of acute subluxation. Disc spaces are  well-maintained.           Impression    Impression:  No evidence of acute or subacute bony abnormality. Negative x-rays of  the lumbar spine.    ELEAZAR STEVENSON MD       Medications   ketorolac (TORADOL) injection 60 mg (60 mg Intramuscular Given 5/22/21 1917)       Assessments & Plan (with Medical Decision Making)     I have reviewed the nursing notes.    I have reviewed the findings, diagnosis, plan and need for follow up with the patient.  (M54.16) Lumbar back pain with radiculopathy affecting right lower extremity  Comment: 30 year old female who presents with right-sided low back pain that extends into her right upper thigh and buttocks.  Her back pain initially started in April when she was lifting a resident off of the toilet.  She heard something pop at that time.  Was not evaluated at that time nor did she complete an incident report.  Denies any new weakness or numbness in her legs. denies bowel and bladder retention or incontinency.  She has had previous episodes of low back pain and had an x-ray completed in January 2020.  She has been evaluated in urgent care previously per myself May 11 of 2021.  She has not seen her primary care provider since that time.  Last menstrual period was 2 months ago. she has had her tubes tied.  Smoker.  Has taken Tylenol, ibuprofen, muscle relaxers, and has applied cold, heat, and massage with little relief.  Denies fevers, chills, nausea, diarrhea, vomiting, dizziness, and headaches.    MDM: NHT. Lungs CTA  See back assessment    Lumbar spine x-ray reviewed and per radiology:  Impression:  No  evidence of acute or subacute bony abnormality. Negative x-rays of  the lumbar spine.    Plan: six hydrocodone's prescribed as needed every 6 hours for severe pain.  Skelaxin was ordered but not covered by insurance company so this was changed to methocarbamol.  Ice to affected area 20 minutes every hour as needed for comfort. After 48 hours you can apply heat.  After 3:30 am may start:   Ibuprofen 600 to 800 mg (3 - 4 tabs of over the counter med) every six to eight hours as needed;not to exceed maximum amount of 3200 mg in 24 hours. Take with food. For severe pain take two hydrocodone every four to six hours as needed. For moderate pain take one hydrocodone with 325 to 650 mg of acetaminophen (Tylenol). For mild pain take Tylenol 650 to 1000 mg every four to six hours as needed (not to exceed more than 4000 mg in a 24 hour period).  Tylenol 650 to 1000 mg every four to six hours as needed (not to exceed more than 4000 mg in a 24 hour period). May use interchangeably. Suggest medicating around the clock for the next 24-48 hours.  She was instructed that she needs to follow-up with her primary care provider.  Physical therapy referral placed.  These discharge instructions and medications were reviewed with her and understanding verbalized.    Discharge Medication List as of 5/22/2021  8:19 PM      START taking these medications    Details   HYDROcodone-acetaminophen (NORCO) 5-325 MG tablet Take 1 tablet by mouth every 6 hours as needed for severe pain, Disp-6 tablet, R-0, [facility]      metaxalone (SKELAXIN) 800 MG tablet Take 1 tablet (800 mg) by mouth 4 times daily for 10 doses, Disp-10 tablet, R-0, E-Prescribe             Final diagnoses:   Lumbar back pain with radiculopathy affecting right lower extremity       5/22/2021   [facility]       Luba Dumas, CNP  05/23/21 1953       Luba Dumas, CNP  05/23/21 1954

## 2021-05-23 NOTE — DISCHARGE INSTRUCTIONS
Keep affected extremity elevated as much as possible for next 24 - 48 hours. Ice to affected area 20 minutes every hour as needed for comfort. After 48 hours you can apply heat.  After 3:30 am may start:   Ibuprofen 600 to 800 mg (3 - 4 tabs of over the counter med) every six to eight hours as needed;not to exceed maximum amount of 3200 mg in 24 hours. Take with food. For severe pain take two hydrocodone every four to six hours as needed. For moderate pain take one hydrocodone with 325 to 650 mg of acetaminophen (Tylenol). For mild pain take Tylenol 650 to 1000 mg every four to six hours as needed (not to exceed more than 4000 mg in a 24 hour period).  Tylenol 650 to 1000 mg every four to six hours as needed (not to exceed more than 4000 mg in a 24 hour period). May use interchangeably. Suggest medicating around the clock for the next 24-48 hours. Follow up with primary provider as needed

## 2021-06-01 ENCOUNTER — OFFICE VISIT (OUTPATIENT)
Dept: FAMILY MEDICINE | Facility: OTHER | Age: 31
End: 2021-06-01
Attending: FAMILY MEDICINE
Payer: MEDICAID

## 2021-06-01 VITALS
SYSTOLIC BLOOD PRESSURE: 138 MMHG | RESPIRATION RATE: 18 BRPM | TEMPERATURE: 98.6 F | DIASTOLIC BLOOD PRESSURE: 64 MMHG | HEART RATE: 88 BPM | BODY MASS INDEX: 30.82 KG/M2 | HEIGHT: 65 IN | OXYGEN SATURATION: 98 % | WEIGHT: 185 LBS

## 2021-06-01 DIAGNOSIS — G89.29 CHRONIC RIGHT-SIDED LOW BACK PAIN WITH RIGHT-SIDED SCIATICA: Primary | ICD-10-CM

## 2021-06-01 DIAGNOSIS — M54.41 CHRONIC RIGHT-SIDED LOW BACK PAIN WITH RIGHT-SIDED SCIATICA: Primary | ICD-10-CM

## 2021-06-01 PROCEDURE — 99213 OFFICE O/P EST LOW 20 MIN: CPT | Performed by: FAMILY MEDICINE

## 2021-06-01 PROCEDURE — G0463 HOSPITAL OUTPT CLINIC VISIT: HCPCS

## 2021-06-01 RX ORDER — PREDNISONE 20 MG/1
TABLET ORAL
Qty: 14 TABLET | Refills: 0 | Status: SHIPPED | OUTPATIENT
Start: 2021-06-01 | End: 2021-06-09

## 2021-06-01 RX ORDER — CYCLOBENZAPRINE HCL 10 MG
TABLET ORAL
Qty: 30 TABLET | Refills: 1 | Status: SHIPPED | OUTPATIENT
Start: 2021-06-01 | End: 2021-09-01

## 2021-06-01 ASSESSMENT — ANXIETY QUESTIONNAIRES
5. BEING SO RESTLESS THAT IT IS HARD TO SIT STILL: NEARLY EVERY DAY
2. NOT BEING ABLE TO STOP OR CONTROL WORRYING: NEARLY EVERY DAY
1. FEELING NERVOUS, ANXIOUS, OR ON EDGE: NEARLY EVERY DAY
3. WORRYING TOO MUCH ABOUT DIFFERENT THINGS: NEARLY EVERY DAY
GAD7 TOTAL SCORE: 21
IF YOU CHECKED OFF ANY PROBLEMS ON THIS QUESTIONNAIRE, HOW DIFFICULT HAVE THESE PROBLEMS MADE IT FOR YOU TO DO YOUR WORK, TAKE CARE OF THINGS AT HOME, OR GET ALONG WITH OTHER PEOPLE: EXTREMELY DIFFICULT
6. BECOMING EASILY ANNOYED OR IRRITABLE: NEARLY EVERY DAY
7. FEELING AFRAID AS IF SOMETHING AWFUL MIGHT HAPPEN: NEARLY EVERY DAY
4. TROUBLE RELAXING: NEARLY EVERY DAY

## 2021-06-01 ASSESSMENT — PATIENT HEALTH QUESTIONNAIRE - PHQ9: SUM OF ALL RESPONSES TO PHQ QUESTIONS 1-9: 13

## 2021-06-01 ASSESSMENT — MIFFLIN-ST. JEOR: SCORE: 1565.03

## 2021-06-01 ASSESSMENT — PAIN SCALES - GENERAL: PAINLEVEL: MILD PAIN (2)

## 2021-06-01 NOTE — NURSING NOTE
"Chief Complaint   Patient presents with     Back Pain       Initial BP (!) 154/72   Pulse 88   Temp 98.6  F (37  C)   Resp 18   Ht 1.651 m (5' 5\")   Wt 83.9 kg (185 lb)   LMP 05/03/2021 (Approximate)   SpO2 98%   BMI 30.79 kg/m   Estimated body mass index is 30.79 kg/m  as calculated from the following:    Height as of this encounter: 1.651 m (5' 5\").    Weight as of this encounter: 83.9 kg (185 lb).  Medication Reconciliation: complete  Gilbert Titus LPN  "

## 2021-06-01 NOTE — PROGRESS NOTES
Assessment & Plan     Chronic right-sided low back pain with right-sided sciatica  SI vs disk vs both??  Will try the below. She is seeing Dr Hines and then has appt with PT in about 2 weeks or so. Discussed behavioral changes and proper body mechanics needed to help control patient's back pain.   Follow-up in 2-3 weeks.  May need MRI if not better. Discussed in length conservative measures of OTC medications for pain, Ice/Heat, elevation and the concept of R.I.C.E.. Continue behavioral changes and proper body mechanics to allow for healing. Follow up as directed.   - cyclobenzaprine (FLEXERIL) 10 MG tablet; 1-2 orally at bedtime  - predniSONE (DELTASONE) 20 MG tablet; Take 1 tablet (20 mg) by mouth 2 times daily for 4 days, THEN 1 tablet (20 mg) daily for 4 days. Then 1/2 tab daily for 4 days               No follow-ups on file.    Cornell Herron MD  [facility] - CAIO Rodriguez is a 30 year old who presents for the following health issues     HPI     ED/UC Followup:    [place]  [facility] [place] of visit: 5/29/21  Reason for visit: back pain down right leg to knee  Current Status: no pain, feels with elevating legs  Starting in April   Lifting and felt a pop  Has on and off pain  Chiropractor has helped  Stiff in am and not sleep well at night due to pain  Pain way worse in back - 10x worse she says   No weakness in legs.            Review of Systems   Constitutional, HEENT, cardiovascular, pulmonary, gi and gu systems are negative, except as otherwise noted.      Objective    LMP 05/03/2021 (Approximate)   There is no height or weight on file to calculate BMI.  Physical Exam   GENERAL: healthy, alert and no distress  MS: no gross musculoskeletal defects noted, no edema  NEURO: Normal strength and tone, mentation intact DTR intact/ positive HUE And SLR on right side. Can toe and heel walk   BACK: no CVA tenderness, very low midline and right paralumbar tenderness/ also tender over  right SI joint.

## 2021-06-02 ASSESSMENT — ANXIETY QUESTIONNAIRES: GAD7 TOTAL SCORE: 21

## 2021-09-01 ENCOUNTER — OFFICE VISIT (OUTPATIENT)
Dept: FAMILY MEDICINE | Facility: OTHER | Age: 31
End: 2021-09-01
Attending: NURSE PRACTITIONER
Payer: COMMERCIAL

## 2021-09-01 ENCOUNTER — NURSE TRIAGE (OUTPATIENT)
Dept: FAMILY MEDICINE | Facility: OTHER | Age: 31
End: 2021-09-01

## 2021-09-01 VITALS
OXYGEN SATURATION: 98 % | WEIGHT: 181 LBS | BODY MASS INDEX: 30.12 KG/M2 | TEMPERATURE: 98.1 F | SYSTOLIC BLOOD PRESSURE: 108 MMHG | HEART RATE: 76 BPM | DIASTOLIC BLOOD PRESSURE: 70 MMHG

## 2021-09-01 DIAGNOSIS — J06.9 VIRAL URI: Primary | ICD-10-CM

## 2021-09-01 LAB
GROUP A STREP BY PCR: NOT DETECTED
SARS-COV-2 RNA RESP QL NAA+PROBE: NEGATIVE

## 2021-09-01 PROCEDURE — G0463 HOSPITAL OUTPT CLINIC VISIT: HCPCS

## 2021-09-01 PROCEDURE — 87651 STREP A DNA AMP PROBE: CPT | Mod: ZL | Performed by: NURSE PRACTITIONER

## 2021-09-01 PROCEDURE — 99213 OFFICE O/P EST LOW 20 MIN: CPT | Performed by: NURSE PRACTITIONER

## 2021-09-01 PROCEDURE — U0005 INFEC AGEN DETEC AMPLI PROBE: HCPCS | Mod: ZL | Performed by: NURSE PRACTITIONER

## 2021-09-01 ASSESSMENT — PAIN SCALES - GENERAL: PAINLEVEL: MODERATE PAIN (5)

## 2021-09-01 NOTE — LETTER
September 1, 2021      Jennifer Tatum  [address] [place] [address] [facility] [id number]        To Whom It May Concern:    Jennifer Tatum  was seen on 9/1/2021. She was out of work 8/30/31 seen today  Please excuse her  due to illness.  Possible up to 10-14 days.          Sincerely,        Nesha Penn, JOHNSON CNP

## 2021-09-01 NOTE — TELEPHONE ENCOUNTER
Pt needs work note. Has been ill all week. Appt scheduled    Additional Information    Negative: Shock suspected (e.g., cold/pale/clammy skin, too weak to stand, low BP, rapid pulse)    Negative: Sounds like a life-threatening emergency to the triager    Negative: [1] Nausea or vomiting AND [2] pregnancy < 20 weeks    Negative: Menstrual Period - Missed or Late (i.e., pregnancy suspected)    Negative: Heat exhaustion suspected (i.e., dehydration from heat exposure)    Negative: Motion sickness suspected (i.e., nausea with car, plane, boat, or train travel)    Negative: Anxiety or stress suspected (i.e., nausea with anxiety attacks or stressful situations)    Negative: Traumatic Brain Injury (TBI) suspected    Negative: Nausea (or Vomiting) in a cancer patient who is currently (or recently) receiving chemotherapy or radiation therapy, or cancer patient who has metastatic or end-stage cancer and is receiving palliative care    Negative: Vomiting occurs    Negative: Other symptom is present, see that guideline.  (e.g., chest pain, headache, dizziness, abdominal pain, colds, sore throat, etc.).    Negative: Unable to walk, or can only walk with assistance (e.g., requires support)    Negative: Difficulty breathing    Negative: [1] Insulin-dependent diabetes (Type I) AND [2] glucose > 400 mg/dl (22 mmol/l)    Negative: [1] Drinking very little AND [2] dehydration suspected (e.g., no urine > 12 hours, very dry mouth, very lightheaded)    Negative: Patient sounds very sick or weak to the triager    Negative: Fever > 104 F (40 C)    Negative: [1] Fever > 101 F (38.3 C) AND [2] age > 60    Negative: [1] Fever > 100.0 F (37.8 C) AND [2] bedridden (e.g., nursing home patient, CVA, chronic illness, recovering from surgery)    Negative: [1] Fever > 100.0 F (37.8 C) AND [2] diabetes mellitus or weak immune system (e.g., HIV positive, cancer chemo, splenectomy, organ transplant, chronic steroids)    Negative: Taking any of the  "following medications: digoxin (Lanoxin), lithium, theophylline, phenytoin (Dilantin)    Negative: Yellowish color of the skin or white of the eye (i.e., jaundice)    Negative: Fever present > 3 days (72 hours)    Negative: Receiving cancer chemotherapy medication    Negative: Taking prescription medication that could cause nausea (e.g., narcotics/opiates, antibiotics, OCPs, many others)    Negative: Nausea lasts > 1 week    Negative: Alcohol or drug abuse, known or suspected    Negative: Severe difficulty breathing (e.g., struggling for each breath, speaks in single words, stridor)    Negative: Sounds like a life-threatening emergency to the triager    Negative: [1] Diagnosed strep throat AND [2] taking antibiotic AND [3] symptoms continue    Negative: Throat culture results, call about    Negative: Productive cough is main symptom    Negative: Non-productive cough is main symptom    Negative: Hoarseness is main symptom    Negative: Runny nose is main symptom    Negative: [1] Drooling or spitting out saliva (because can't swallow) AND [2] normal breathing    Negative: Unable to open mouth completely    Negative: [1] Difficulty breathing AND [2] not severe    Negative: Fever > 104 F (40 C)    Negative: [1] Refuses to drink anything AND [2] for > 12 hours    Negative: [1] Drinking very little AND [2] dehydration suspected (e.g., no urine > 12 hours, very dry mouth, very lightheaded)    Negative: Patient sounds very sick or weak to the triager    Negative: SEVERE (e.g., excruciating) throat pain    Negative: [1] Pus on tonsils (back of throat) AND [2]  fever AND [3] swollen neck lymph nodes (\"glands\")    Negative: [1] Rash AND [2] widespread (especially chest and abdomen)    Negative: Earache also present    Negative: Fever present > 3 days (72 hours)    Negative: Diabetes mellitus or weak immune system (e.g., HIV positive, cancer chemo, splenectomy, organ transplant, chronic steroids)    Negative: History of " "rheumatic fever    Negative: [1] Adult is leaving on a trip AND [2] requests an antibiotic NOW    Negative: [1] Positive throat culture or rapid strep test (according to lab, PCP, caller, etc.) AND [2] NO  standing order to call in prescription for antibiotic    Negative: [1] Exposure to family member (or spouse or boyfriend/girlfriend) with test-proven strep AND [2] within last 10 days    Negative: [1] Sore throat is the only symptom AND [2] present > 48 hours    Negative: [1] Sore throat with cough/cold symptoms AND [2] present > 5 days    Answer Assessment - Initial Assessment Questions  1. NAUSEA SEVERITY: \"How bad is the nausea?\" (e.g., mild, moderate, severe; dehydration, weight loss)    - MILD: loss of appetite without change in eating habits    - MODERATE: decreased oral intake without significant weight loss, dehydration, or malnutrition    - SEVERE: inadequate caloric or fluid intake, significant weight loss, symptoms of dehydration      moderate  2. ONSET: \"When did the nausea begin?\"      Earlier this week  3. VOMITING: \"Any vomiting?\" If so, ask: \"How many times today?\"      no  4. RECURRENT SYMPTOM: \"Have you had nausea before?\" If so, ask: \"When was the last time?\" \"What happened that time?\"      no  5. CAUSE: \"What do you think is causing the nausea?\"      Not sure   6. PREGNANCY: \"Is there any chance you are pregnant?\" (e.g., unprotected intercourse, missed birth control pill, broken condom)      no    Protocols used: NAUSEA-A-AH, SORE THROAT-A-AH      "

## 2021-09-01 NOTE — PROGRESS NOTES
"    Assessment & Plan     Viral URI  Negative strep  COVID pending.  Discussed symptomatic treatment for symptoms.  If no improvement she should follow up or if worsening symptoms, such as SOB, difficulty breathing, unable to swallow or any concerns she should go to the ER.  Encouraged to self quarantine until cleared for COVID per CDC guidelines.   - Symptomatic COVID-19 Virus (Coronavirus) by PCR Nasopharyngeal; Future  - Group A Streptococcus PCR Throat Swab ([place] ONLY)  - Symptomatic COVID-19 Virus (Coronavirus) by PCR Nasopharyngeal       BMI:   Estimated body mass index is 30.12 kg/m  as calculated from the following:    Height as of 6/1/21: 1.651 m (5' 5\").    Weight as of this encounter: 82.1 kg (181 lb).       See Patient Instructions    No follow-ups on file.    JOHNSON Cuevas [facility] [place] Rodriguez is a 31 year old who presents for the following health issues     HPI     Concern - nausea  Onset: Monday   Description: nausea  Intensity: moderate  Progression of Symptoms:  worsening  Accompanying Signs & Symptoms: headache, sore throat, nausea, fatigue  Previous history of similar problem: none  Precipitating factors:        Worsened by: none  Alleviating factors:        Improved by: none  Therapies tried and outcome: None        Review of Systems   CONSTITUTIONAL:chills and fatigue  INTEGUMENTARY/SKIN: NEGATIVE for worrisome rashes, moles or lesions  EYES: burning   ENT/MOUTH: ear pain bilateral, nasal congestion and sore throat  RESP:hard to take a deep breath NEGATIVE for cough   CV: NEGATIVE for chest pain, palpitations or peripheral edema  GI: abdominal pain upper abdomen and nausea  : denies dysuria   MUSCULOSKELETAL: generalized achy   NEURO: headache      Objective    /70   Pulse 76   Temp 98.1  F (36.7  C)   Wt 82.1 kg (181 lb)   SpO2 98%   BMI 30.12 kg/m    Body mass index is 30.12 kg/m .  Physical Exam   GENERAL: alert, no " distress and fatigued  HENT: normal cephalic/atraumatic, ear canals and TM's normal, nose and mouth without ulcers or lesions, oropharynx clear, oral mucous membranes moist and tonsillar erythema  NECK: no adenopathy, no asymmetry, masses, or scars and thyroid normal to palpation  RESP: lungs clear to auscultation - no rales, rhonchi or wheezes  CV: regular rate and rhythm, normal S1 S2, no S3 or S4, no murmur, click or rub, no peripheral edema and peripheral pulses strong  ABDOMEN: tenderness mild generalized tenderness and bowel sounds normal  MS: no gross musculoskeletal defects noted, no edema  SKIN: no suspicious lesions or rashes  NEURO: Normal strength and tone, mentation intact and speech normal  PSYCH: mentation appears normal, affect normal/bright  LYMPH: no cervical adenopathy    Results for orders placed or performed in visit on 09/01/21   Group A Streptococcus PCR Throat Swab ([place] ONLY)     Status: Normal    Specimen: Throat; Swab   Result Value Ref Range    Group A strep by PCR Not Detected Not Detected    Narrative    The Xpert Xpress Strep A test, performed on the [employer]  Instrument Systems, is a rapid, qualitative in vitro diagnostic test for the detection of Streptococcus pyogenes (Group A ß-hemolytic Streptococcus, Strep A) in throat swab specimens from patients with signs and symptoms of pharyngitis. The Xpert Xpress Strep A test can be used as an aid in the diagnosis of Group A Streptococcal pharyngitis. The assay is not intended to monitor treatment for Group A Streptococcus infections. The Xpert Xpress Strep A test utilizes an automated real-time polymerase chain reaction (PCR) to detect Streptococcus pyogenes DNA.

## 2021-09-01 NOTE — NURSING NOTE
"Chief Complaint   Patient presents with     Nausea       Initial /70   Pulse 76   Temp 98.1  F (36.7  C)   Wt 82.1 kg (181 lb)   SpO2 98%   BMI 30.12 kg/m   Estimated body mass index is 30.12 kg/m  as calculated from the following:    Height as of 6/1/21: 1.651 m (5' 5\").    Weight as of this encounter: 82.1 kg (181 lb).  Medication Reconciliation: complete  Sylvia Romo LPN  "

## 2021-10-02 ENCOUNTER — HEALTH MAINTENANCE LETTER (OUTPATIENT)
Age: 31
End: 2021-10-02

## 2021-10-06 ENCOUNTER — HOSPITAL ENCOUNTER (EMERGENCY)
Facility: HOSPITAL | Age: 31
Discharge: LEFT BEFORE DISCHARGE | End: 2021-10-06
Attending: NURSE PRACTITIONER | Admitting: NURSE PRACTITIONER
Payer: COMMERCIAL

## 2021-10-06 VITALS
HEART RATE: 73 BPM | SYSTOLIC BLOOD PRESSURE: 123 MMHG | DIASTOLIC BLOOD PRESSURE: 74 MMHG | WEIGHT: 180 LBS | OXYGEN SATURATION: 97 % | RESPIRATION RATE: 18 BRPM | TEMPERATURE: 98.7 F | BODY MASS INDEX: 29.95 KG/M2

## 2021-10-06 DIAGNOSIS — R10.9 LEFT FLANK PAIN: Primary | ICD-10-CM

## 2021-10-06 DIAGNOSIS — Z53.29 LEFT AGAINST MEDICAL ADVICE: ICD-10-CM

## 2021-10-06 LAB
ALBUMIN UR-MCNC: NEGATIVE MG/DL
ANION GAP SERPL CALCULATED.3IONS-SCNC: 3 MMOL/L (ref 3–14)
APPEARANCE UR: CLEAR
BACTERIA #/AREA URNS HPF: ABNORMAL /HPF
BASOPHILS # BLD AUTO: 0 10E3/UL (ref 0–0.2)
BASOPHILS NFR BLD AUTO: 1 %
BILIRUB UR QL STRIP: NEGATIVE
BUN SERPL-MCNC: 15 MG/DL (ref 7–30)
CALCIUM SERPL-MCNC: 8.9 MG/DL (ref 8.5–10.1)
CHLORIDE BLD-SCNC: 110 MMOL/L (ref 94–109)
CO2 SERPL-SCNC: 26 MMOL/L (ref 20–32)
COLOR UR AUTO: ABNORMAL
CREAT SERPL-MCNC: 0.78 MG/DL (ref 0.52–1.04)
EOSINOPHIL # BLD AUTO: 0.3 10E3/UL (ref 0–0.7)
EOSINOPHIL NFR BLD AUTO: 4 %
ERYTHROCYTE [DISTWIDTH] IN BLOOD BY AUTOMATED COUNT: 12.5 % (ref 10–15)
GFR SERPL CREATININE-BSD FRML MDRD: >90 ML/MIN/1.73M2
GLUCOSE BLD-MCNC: 91 MG/DL (ref 70–99)
GLUCOSE UR STRIP-MCNC: NEGATIVE MG/DL
HCG UR QL: NEGATIVE
HCT VFR BLD AUTO: 41.1 % (ref 35–47)
HGB BLD-MCNC: 14 G/DL (ref 11.7–15.7)
HGB UR QL STRIP: NEGATIVE
IMM GRANULOCYTES # BLD: 0 10E3/UL
IMM GRANULOCYTES NFR BLD: 0 %
KETONES UR STRIP-MCNC: NEGATIVE MG/DL
LEUKOCYTE ESTERASE UR QL STRIP: NEGATIVE
LYMPHOCYTES # BLD AUTO: 2.5 10E3/UL (ref 0.8–5.3)
LYMPHOCYTES NFR BLD AUTO: 38 %
MCH RBC QN AUTO: 30.1 PG (ref 26.5–33)
MCHC RBC AUTO-ENTMCNC: 34.1 G/DL (ref 31.5–36.5)
MCV RBC AUTO: 88 FL (ref 78–100)
MONOCYTES # BLD AUTO: 0.6 10E3/UL (ref 0–1.3)
MONOCYTES NFR BLD AUTO: 9 %
MUCOUS THREADS #/AREA URNS LPF: PRESENT /LPF
NEUTROPHILS # BLD AUTO: 3.2 10E3/UL (ref 1.6–8.3)
NEUTROPHILS NFR BLD AUTO: 48 %
NITRATE UR QL: NEGATIVE
NRBC # BLD AUTO: 0 10E3/UL
NRBC BLD AUTO-RTO: 0 /100
PH UR STRIP: 6 [PH] (ref 4.7–8)
PLATELET # BLD AUTO: 214 10E3/UL (ref 150–450)
POTASSIUM BLD-SCNC: 3.8 MMOL/L (ref 3.4–5.3)
RBC # BLD AUTO: 4.65 10E6/UL (ref 3.8–5.2)
RBC URINE: 1 /HPF
SODIUM SERPL-SCNC: 139 MMOL/L (ref 133–144)
SP GR UR STRIP: 1.02 (ref 1–1.03)
SQUAMOUS EPITHELIAL: 4 /HPF
UROBILINOGEN UR STRIP-MCNC: NORMAL MG/DL
WBC # BLD AUTO: 6.7 10E3/UL (ref 4–11)
WBC URINE: 1 /HPF

## 2021-10-06 PROCEDURE — 99284 EMERGENCY DEPT VISIT MOD MDM: CPT | Performed by: NURSE PRACTITIONER

## 2021-10-06 PROCEDURE — 250N000011 HC RX IP 250 OP 636: Performed by: NURSE PRACTITIONER

## 2021-10-06 PROCEDURE — 80048 BASIC METABOLIC PNL TOTAL CA: CPT | Performed by: NURSE PRACTITIONER

## 2021-10-06 PROCEDURE — 36415 COLL VENOUS BLD VENIPUNCTURE: CPT | Performed by: NURSE PRACTITIONER

## 2021-10-06 PROCEDURE — 85025 COMPLETE CBC W/AUTO DIFF WBC: CPT | Performed by: NURSE PRACTITIONER

## 2021-10-06 PROCEDURE — 99284 EMERGENCY DEPT VISIT MOD MDM: CPT | Mod: 25

## 2021-10-06 PROCEDURE — 81025 URINE PREGNANCY TEST: CPT | Performed by: STUDENT IN AN ORGANIZED HEALTH CARE EDUCATION/TRAINING PROGRAM

## 2021-10-06 PROCEDURE — 81001 URINALYSIS AUTO W/SCOPE: CPT | Performed by: NURSE PRACTITIONER

## 2021-10-06 PROCEDURE — 96374 THER/PROPH/DIAG INJ IV PUSH: CPT

## 2021-10-06 RX ORDER — IOPAMIDOL 612 MG/ML
100 INJECTION, SOLUTION INTRAVASCULAR ONCE
Status: DISCONTINUED | OUTPATIENT
Start: 2021-10-06 | End: 2021-10-06 | Stop reason: HOSPADM

## 2021-10-06 RX ORDER — KETOROLAC TROMETHAMINE 30 MG/ML
30 INJECTION, SOLUTION INTRAMUSCULAR; INTRAVENOUS ONCE
Status: COMPLETED | OUTPATIENT
Start: 2021-10-06 | End: 2021-10-06

## 2021-10-06 RX ADMIN — KETOROLAC TROMETHAMINE 30 MG: 30 INJECTION, SOLUTION INTRAMUSCULAR; INTRAVENOUS at 14:32

## 2021-10-06 ASSESSMENT — ENCOUNTER SYMPTOMS
DYSURIA: 0
ABDOMINAL PAIN: 1
FEVER: 0
FREQUENCY: 0
DIARRHEA: 0
CONSTIPATION: 0
NAUSEA: 0
CHILLS: 0
DIFFICULTY URINATING: 0
VOMITING: 0

## 2021-10-06 NOTE — ED NOTES
Pt states she started feeling pain last Friday in left flank and states it has become worse over time. Pain in right kidney started 2 hours ago.

## 2021-10-06 NOTE — PROGRESS NOTES
Pt left ER without scan.  Called nurse to confirm and pt left.  Aleshia Montgomery on 10/6/2021 at 4:46 PM

## 2021-10-06 NOTE — ED NOTES
Called CT to see when pts CT will be done. CT stated approx 10 minutes. Pt stated she can not wait for CT because of day care issues with her kids and she has no one to watch them at this time and needs to go home and relieve her sitter.

## 2021-10-06 NOTE — ED TRIAGE NOTES
"\"Having left back and left flank pain.  I have a hx of kidney stones and it feels like I have a kidney stone.\"   "

## 2021-10-07 ENCOUNTER — TELEPHONE (OUTPATIENT)
Dept: FAMILY MEDICINE | Facility: OTHER | Age: 31
End: 2021-10-07

## 2021-10-18 ENCOUNTER — HOSPITAL ENCOUNTER (EMERGENCY)
Facility: HOSPITAL | Age: 31
Discharge: HOME | End: 2021-10-18
Attending: NURSE PRACTITIONER | Admitting: NURSE PRACTITIONER
Payer: COMMERCIAL

## 2021-10-18 VITALS
RESPIRATION RATE: 16 BRPM | DIASTOLIC BLOOD PRESSURE: 73 MMHG | HEART RATE: 83 BPM | TEMPERATURE: 99.3 F | SYSTOLIC BLOOD PRESSURE: 125 MMHG | OXYGEN SATURATION: 99 %

## 2021-10-18 DIAGNOSIS — G43.909 MIGRAINE WITHOUT STATUS MIGRAINOSUS, NOT INTRACTABLE, UNSPECIFIED MIGRAINE TYPE: ICD-10-CM

## 2021-10-18 DIAGNOSIS — G43.909 MIGRAINE: ICD-10-CM

## 2021-10-18 PROCEDURE — 96372 THER/PROPH/DIAG INJ SC/IM: CPT | Performed by: NURSE PRACTITIONER

## 2021-10-18 PROCEDURE — G0463 HOSPITAL OUTPT CLINIC VISIT: HCPCS | Mod: 25

## 2021-10-18 PROCEDURE — 250N000011 HC RX IP 250 OP 636: Performed by: NURSE PRACTITIONER

## 2021-10-18 PROCEDURE — 99213 OFFICE O/P EST LOW 20 MIN: CPT | Performed by: NURSE PRACTITIONER

## 2021-10-18 RX ORDER — ONDANSETRON 4 MG/1
4 TABLET, FILM COATED ORAL EVERY 8 HOURS PRN
Qty: 6 TABLET | Refills: 0 | Status: SHIPPED | OUTPATIENT
Start: 2021-10-18 | End: 2023-03-14

## 2021-10-18 RX ORDER — ONDANSETRON 4 MG/1
4 TABLET, ORALLY DISINTEGRATING ORAL ONCE
Status: COMPLETED | OUTPATIENT
Start: 2021-10-18 | End: 2021-10-18

## 2021-10-18 RX ORDER — KETOROLAC TROMETHAMINE 30 MG/ML
60 INJECTION, SOLUTION INTRAMUSCULAR; INTRAVENOUS ONCE
Status: COMPLETED | OUTPATIENT
Start: 2021-10-18 | End: 2021-10-18

## 2021-10-18 RX ADMIN — ONDANSETRON 4 MG: 4 TABLET, ORALLY DISINTEGRATING ORAL at 18:13

## 2021-10-18 RX ADMIN — KETOROLAC TROMETHAMINE 60 MG: 30 INJECTION, SOLUTION INTRAMUSCULAR at 18:13

## 2021-10-18 ASSESSMENT — ENCOUNTER SYMPTOMS
SHORTNESS OF BREATH: 0
NAUSEA: 1
SORE THROAT: 0
RHINORRHEA: 0
NECK PAIN: 0
SINUS PRESSURE: 0
HEADACHES: 1
PHOTOPHOBIA: 1
SINUS PAIN: 0
FEVER: 0
VOMITING: 0
DIZZINESS: 1
ACTIVITY CHANGE: 1
CHILLS: 0
NECK STIFFNESS: 0
LIGHT-HEADEDNESS: 1

## 2021-10-18 NOTE — ED TRIAGE NOTES
Pt presents with a headache that she has had for 3 days Took Excedrin and magnesium at 1200 but they didn't help.

## 2021-10-18 NOTE — DISCHARGE INSTRUCTIONS
Ice to affected area 20 minutes every hour as needed for comfort. After 48 hours you can apply heat. Ibuprofen 600 to 800 mg (3 - 4 tabs of over the counter med) every six to eight hours as needed;not to exceed maximum amount of 3200 mg in 24 hours. Take with food. Tylenol 650 to 1000 mg every four to six hours as needed (not to exceed more than 4000 mg in a 24 hour period). May use interchangeably. Suggest medicating around the clock for the next 24-48 hours. Follow up with primary provider as needed  Return to ER/UC for worsening of symptoms

## 2021-10-18 NOTE — ED TRIAGE NOTES
Patient presents to emergency room with c/o migraine since Friday. Denies any injuries or trauma. Denies any visual disturbances. Reports dizziness with positional changes. Rates headache 8/10.

## 2021-10-18 NOTE — ED PROVIDER NOTES
History     Chief Complaint   Patient presents with     Headache     pt reports migraine since Friday. pt reports with changing of positions she gets dizzy. denies any trauma or injuries. denies fever or chills.      HPI  Jennifer Tatum is a 31 year old female who comes in for her fiancé.  She presents with a 6-day history of left sided headache that extends into the back of her head.  Has constant sharp pain, and feels like her brain is throbbing and has a pulse.  Accompanied with light sensitivity, nausea, lightheadedness, and eyesight goes black from dizziness when she stands up.  Denies loss of consciousness.  Usually when she takes Excedrin and magnesium her headaches resolve; this has not decreased her symptoms.  Last dose today.  Has migraines about every 6 months.  Never been evaluated for them.  Denies head trauma.  Denies fevers, chills, vomiting, shortness of breath, and blurry vision.  Denies URI symptoms.    Headaches      Duration: had one six days ago that resolved. Three days ago this headache started    Description  Location: unilateral in the left frontal area, unilateral in the left occipital area   Character: sharp pain, constant. Feels like brain was pulsing  Frequency:  Once every six months  Duration:  day    Intensity:  8/10    Accompanying signs and symptoms:    Precipitating or Alleviating factors:  Nausea/vomiting: occasionally  Dizziness: occasionally  Weakness or numbness: no  Visual changes: light sensitivity  Fever: no   Sinus or URI symptoms no     History  Head trauma: no   Family history of migraines: no   Previous tests for headaches: no   Neurologist evaluations: no   Able to do daily activities when headache present: no   Wake with headaches: YES  Daily pain medication use: no   Any changes in: none    Precipitating or Alleviating factors (light/sound/sleep/caffeine): excedrin usually helps    Therapies tried and outcome: Excedrin  And magnesium  Outcome - usually effective but  not today.  Frequent/daily pain medication use: no     Allergies:  Allergies   Allergen Reactions     Wellbutrin [Bupropion] Other (See Comments)     Mood changes     Lamictal [Lamotrigine] Rash       Problem List:    Patient Active Problem List    Diagnosis Date Noted     Secondary dysmenorrhea 03/19/2021     Priority: Medium     Mood swings 03/19/2021     Priority: Medium     Tobacco abuse 03/12/2021     Priority: Medium     VINI (generalized anxiety disorder) 03/12/2021     Priority: Medium     Menorrhagia with regular cycle 03/12/2021     Priority: Medium     Female pelvic peritoneal adhesions--Severe 08/20/2020     Priority: Medium     Relationship problem between partners 12/01/2017     Priority: Medium     Major depressive disorder, recurrent episode, mild (H) 05/02/2017     Priority: Medium     ACP (advance care planning) 06/17/2016     Priority: Medium     Advance Care Planning 6/17/2016: ACP Review of Chart / Resources Provided:  Reviewed chart for advance care plan.  Jennifer Tatum has no plan or code status on file. Discussed available resources and provided with information.   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 08/20/2020     VINI (generalized anxiety disorder) 03/12/2021     H/O nephrolithotomy with removal of calculi      Incompetent cervix 11/10/2015     IUFD (intrauterine fetal death) 02/17/2016     Major depressive disorder, recurrent episode, mild (H) 05/02/2017     Menorrhagia with regular cycle 03/12/2021     Mild major depression (H) 07/31/2013      "Mood swings 2021     Nephrolithiasis      Pelvic peritoneal adhesions, female 2020     PTSD (post-traumatic stress disorder) 2016     Secondary dysmenorrhea 2021     Smoker 2013     Tobacco abuse 2021       Past Surgical History:    Past Surgical History:   Procedure Laterality Date     CERCLAGE CERVICAL N/A 11/10/2015    Procedure: CERCLAGE CERVICAL;  Surgeon: Tucker Barragan MD;  [facility]: [facility]      SECTION  2012    Pregnancy full-term; \"Oleksandr\"       SECTION N/A 2016    Procedure:  SECTION;  Surgeon: Nisha Mcleod MD;  Location: [facility] OR      SECTION N/A 2018     HC NEPHROSTOMY PERCUTUTANEOUS      left     impacted wisdom teeth removal       LAPAROSCOPIC TUBAL LIGATION Bilateral 2020    Procedure: LAPAROSCOPIC BILATERAL FALLOPIAN TUBE FULGRATION;  Surgeon: Frow, David Franke, MD;  Location: [facility] OR       Family History:    Family History   Problem Relation Age of Onset     Alcohol/Drug Father         alcoholism     Psychotic Disorder Father         Bipolar     Cerebrovascular Disease Other         Cerebrovascular accident     C.A.D. Other      Hypertension Other      Diabetes Other        Social History:  Marital Status:  Single [1]  Social History     Tobacco Use     Smoking status: Current Every Day Smoker     Packs/day: 1.00     Years: 10.00     Pack years: 10.00     Types: Cigarettes     Smokeless tobacco: Never Used     Tobacco comment: declines quitplan referral 2020   Substance Use Topics     Alcohol use: No     Alcohol/week: 0.0 standard drinks     Drug use: No        Medications:    amphetamine-dextroamphetamine (ADDERALL XR) 25 MG 24 hr capsule  amphetamine-dextroamphetamine (ADDERALL) 20 MG tablet  ARIPiprazole (ABILIFY) 10 MG tablet  EPINEPHrine (ANY BX GENERIC EQUIV) 0.3 MG/0.3ML injection 2-pack  hydrOXYzine (ATARAX) 25 MG tablet  ibuprofen (ADVIL/MOTRIN) 800 MG tablet  norethindrone (MICRONOR) 0.35 MG " tablet  ondansetron (ZOFRAN) 4 MG tablet  sertraline (ZOLOFT) 100 MG tablet          Review of Systems   Constitutional: Positive for activity change. Negative for chills and fever.   HENT: Negative for ear discharge, rhinorrhea, sinus pressure, sinus pain and sore throat.    Eyes: Positive for photophobia. Negative for visual disturbance.   Respiratory: Negative for shortness of breath.    Gastrointestinal: Positive for nausea. Negative for vomiting.   Musculoskeletal: Negative for neck pain and neck stiffness.   Neurological: Positive for dizziness (goes black when she stands up), light-headedness and headaches.       Physical Exam   BP: 125/73  Pulse: 83  Temp: 99.3  F (37.4  C)  Resp: 16  SpO2: 99 %      Physical Exam  Vitals and nursing note reviewed.   Constitutional:       Appearance: She is overweight.   HENT:      Head: Normocephalic.      Nose:      Right Sinus: No maxillary sinus tenderness or frontal sinus tenderness.      Left Sinus: Frontal sinus tenderness present. No maxillary sinus tenderness.      Mouth/Throat:      Mouth: Mucous membranes are moist.   Eyes:      Extraocular Movements: Extraocular movements intact.      Conjunctiva/sclera: Conjunctivae normal.      Pupils: Pupils are equal, round, and reactive to light.   Cardiovascular:      Rate and Rhythm: Normal rate and regular rhythm.      Heart sounds: Normal heart sounds. No murmur heard.     Pulmonary:      Effort: Pulmonary effort is normal. No respiratory distress.      Breath sounds: Normal breath sounds. No wheezing.   Musculoskeletal:      Cervical back: Neck supple. No tenderness.   Skin:     General: Skin is warm and dry.      Capillary Refill: Capillary refill takes less than 2 seconds.   Neurological:      Mental Status: She is alert and oriented to person, place, and time.      GCS: GCS eye subscore is 4. GCS verbal subscore is 5. GCS motor subscore is 6.      Cranial Nerves: Cranial nerves are intact.      Sensory: Sensation is  intact.      Motor: Motor function is intact. No weakness.      Coordination: Coordination is intact. Finger-Nose-Finger Test normal.      Gait: Gait is intact.   Psychiatric:         Behavior: Behavior normal.         ED Course        Procedures             No results found for this or any previous visit (from the past 24 hour(s)).    Medications   ketorolac (TORADOL) injection 60 mg (60 mg Intramuscular Given 10/18/21 1813)   ondansetron (ZOFRAN-ODT) ODT tab 4 mg (4 mg Oral Given 10/18/21 1813)       Assessments & Plan (with Medical Decision Making)     I have reviewed the nursing notes.    I have reviewed the findings, diagnosis, plan and need for follow up with the patient.  (G43.692) Migraine  Comment: 31 year old female who comes in for her fiancé.  She presents with a 6-day history of left sided headache that extends into the back of her head.  Has constant sharp pain, and feels like her brain is throbbing and has a pulse.  Accompanied with light sensitivity, nausea, lightheadedness, and eyesight goes black from dizziness when she stands up.  Denies loss of consciousness.  Usually when she takes Excedrin and magnesium her headaches resolve; this has not decreased her symptoms.  Last dose today.  Has migraines about every 6 months.  Never been evaluated for them.  Denies head trauma.  Denies fevers, chills, vomiting, shortness of breath, and blurry vision.  Denies URI symptoms.    MDM: NHT. Lungs CTA  Neuro assessment negative    Ketorolac 60 mg given IM and 4 mg Zofran ODT did decrease her symptoms    Plan: Zofran every 8 hours as needed.  Education provided and/or discussed for this/these medication and for migraines.  Work note provided.  Ice to affected area 20 minutes every hour as needed for comfort. After 48 hours you can apply heat. Ibuprofen 600 to 800 mg (3 - 4 tabs of over the counter med) every six to eight hours as needed;not to exceed maximum amount of 3200 mg in 24 hours. Take with food. Tylenol  650 to 1000 mg every four to six hours as needed (not to exceed more than 4000 mg in a 24 hour period). May use interchangeably. Suggest medicating around the clock for the next 24-48 hours. Follow up with primary provider as needed  Return to ER/UC for worsening of symptoms    These discharge instructions and medications were reviewed with her and understanding verbalized.    This document was prepared using a combination of typing and voice generated software.  While every attempt was made for accuracy, spelling and grammatical errors may exist.    Discharge Medication List as of 10/18/2021  6:34 PM      START taking these medications    Details   ondansetron (ZOFRAN) 4 MG tablet Take 1 tablet (4 mg) by mouth every 8 hours as needed for nausea, Disp-6 tablet, R-0, E-Prescribe             Final diagnoses:   Migraine       10/18/2021   [facility]       Luba Dumas, CNP  10/18/21 [id number]

## 2021-10-18 NOTE — Clinical Note
Jennifer Tatum was seen and treated in our emergency department on 10/18/2021.  She may return to work on 10/19/2021.  Evaluated in Urgent Care     If you have any questions or concerns, please don't hesitate to call.      Luba Dumas, CNP

## 2021-10-19 ENCOUNTER — VIRTUAL VISIT (OUTPATIENT)
Dept: FAMILY MEDICINE | Facility: OTHER | Age: 31
End: 2021-10-19
Attending: FAMILY MEDICINE
Payer: COMMERCIAL

## 2021-10-19 DIAGNOSIS — G43.011 INTRACTABLE MIGRAINE WITHOUT AURA AND WITH STATUS MIGRAINOSUS: Primary | ICD-10-CM

## 2021-10-19 PROCEDURE — 99442 PR PHYSICIAN TELEPHONE EVALUATION 11-20 MIN: CPT | Performed by: FAMILY MEDICINE

## 2021-10-19 RX ORDER — SUMATRIPTAN 100 MG/1
100 TABLET, FILM COATED ORAL
Qty: 9 TABLET | Refills: 1 | Status: SHIPPED | OUTPATIENT
Start: 2021-10-19 | End: 2023-03-14

## 2021-10-19 RX ORDER — METHYLPREDNISOLONE 4 MG
TABLET, DOSE PACK ORAL
Qty: 21 TABLET | Refills: 0 | Status: SHIPPED | OUTPATIENT
Start: 2021-10-19 | End: 2021-11-20

## 2021-10-19 ASSESSMENT — ANXIETY QUESTIONNAIRES
6. BECOMING EASILY ANNOYED OR IRRITABLE: NEARLY EVERY DAY
7. FEELING AFRAID AS IF SOMETHING AWFUL MIGHT HAPPEN: NOT AT ALL
1. FEELING NERVOUS, ANXIOUS, OR ON EDGE: NOT AT ALL
5. BEING SO RESTLESS THAT IT IS HARD TO SIT STILL: NEARLY EVERY DAY
4. TROUBLE RELAXING: NEARLY EVERY DAY
3. WORRYING TOO MUCH ABOUT DIFFERENT THINGS: SEVERAL DAYS
IF YOU CHECKED OFF ANY PROBLEMS ON THIS QUESTIONNAIRE, HOW DIFFICULT HAVE THESE PROBLEMS MADE IT FOR YOU TO DO YOUR WORK, TAKE CARE OF THINGS AT HOME, OR GET ALONG WITH OTHER PEOPLE: SOMEWHAT DIFFICULT
2. NOT BEING ABLE TO STOP OR CONTROL WORRYING: NOT AT ALL
GAD7 TOTAL SCORE: 10

## 2021-10-19 ASSESSMENT — PATIENT HEALTH QUESTIONNAIRE - PHQ9: SUM OF ALL RESPONSES TO PHQ QUESTIONS 1-9: 9

## 2021-10-19 ASSESSMENT — PAIN SCALES - GENERAL: PAINLEVEL: EXTREME PAIN (8)

## 2021-10-19 NOTE — LETTER
October 19, 2021      Jennifer Tatum  [address] [place] [address] [address]  [facility] [id number]        To Whom It May Concern:    Jennifer Tatum  was seen on 10/18 and 10/19.  Please excuse her  until 10/20 or 10/21 due to non- covid related condition. .        Sincerely,        Cornell Herron MD

## 2021-10-19 NOTE — NURSING NOTE
"Chief Complaint   Patient presents with     Headache     [facility]       Initial LMP 09/20/2021 (Exact Date)  Estimated body mass index is 29.95 kg/m  as calculated from the following:    Height as of 6/1/21: 1.651 m (5' 5\").    Weight as of 10/6/21: 81.6 kg (180 lb).  Medication Reconciliation: complete  Gilbert Titus LPN  "

## 2021-10-19 NOTE — PROGRESS NOTES
"Jennifer is a 31 year old who is being evaluated via a billable telephone visit.      What phone number would you like to be contacted at? [phone]  How would you like to obtain your AVS? MyChart    Assessment & Plan     Intractable migraine without aura and with status migrainosus  Will try the below. Symptomatic treatment was discussed along when patient should call and/or come back into the clinic or go to ER/Urgent care. All questions answered.   Brain rest. No s/s of cause for this. Symptomatic treatment was discussed along when patient should call and/or come back into the clinic or go to ER/Urgent care. All questions answered.   Work excuse given   - methylPREDNISolone (MEDROL DOSEPAK) 4 MG tablet therapy pack; Follow Package Directions  - SUMAtriptan (IMITREX) 100 MG tablet; Take 1 tablet (100 mg) by mouth at onset of headache for migraine May repeat times one dose in 2hrs if not improved or resolved             BMI:   Estimated body mass index is 29.95 kg/m  as calculated from the following:    Height as of 6/1/21: 1.651 m (5' 5\").    Weight as of 10/6/21: 81.6 kg (180 lb).         No follow-ups on file.    Cornell Herron MD  [facility]    Subjective   Jennifer is a 31 year old who presents for the following health issues     HPI     ED/UC Followup:    Facility[place]  [facility]  Date of visit: yesterday  Reason for visit: migraine  Current Status: still has headache, same as yesterday, improves with sleeping for about 20 minutes and constant until able to fall back to sleep, nausea, ate a little bit today, nothing yesterday.     No sinus sx  Has period now but HA not going away  No real reason for persistant HA  Worse with activity 8/10 pain  Some vertigo  ER treatment not help   H/o remote migraines   Used imetrex in past and helped.         Review of Systems   Constitutional, HEENT, cardiovascular, pulmonary, gi and gu systems are negative, except as otherwise noted.      Objective     "       Vitals:  No vitals were obtained today due to virtual visit.    Physical Exam   healthy, alert and no distress  PSYCH: Alert and oriented times 3; coherent speech, normal   rate and volume, able to articulate logical thoughts, able   to abstract reason, no tangential thoughts, no hallucinations   or delusions  Her affect is normal  RESP: No cough, no audible wheezing, able to talk in full sentences  Remainder of exam unable to be completed due to telephone visits                Phone call duration: 11 minutes

## 2021-10-20 ASSESSMENT — ANXIETY QUESTIONNAIRES: GAD7 TOTAL SCORE: 10

## 2021-10-26 ASSESSMENT — ENCOUNTER SYMPTOMS
ARTHRALGIAS: 0
WOUND: 0
COLOR CHANGE: 0
MYALGIAS: 0

## 2021-10-26 NOTE — TELEPHONE ENCOUNTER
"DENIAL from [employer] received for Aripiprazole twice daily. Denial reason: \"Patient's dose can be made with a lower number of a higher strength. Patient can use one 10 mg tablet instead of two 5 mg tablets. Patient can get 1 tablet per day.\" Pharmacy advised. Documentation scanned to Epic.  "
Ask pharmacy if can split a 10 mg tablet -- if so write for 1/2 tab BID of 10 mg tabs.   
Instruct pt of change   
Patient notified via phone.    
Received PA request from [facility] for Aripiprazole 5MG Tablets. Submitted as urgent request through [facility]. Waiting for response.  
Yes can split tablet.  
Unable to assess due to medical condition

## 2021-11-20 ENCOUNTER — HOSPITAL ENCOUNTER (EMERGENCY)
Facility: HOSPITAL | Age: 31
Discharge: HOME | End: 2021-11-20
Attending: NURSE PRACTITIONER | Admitting: NURSE PRACTITIONER
Payer: COMMERCIAL

## 2021-11-20 VITALS
DIASTOLIC BLOOD PRESSURE: 76 MMHG | HEART RATE: 96 BPM | OXYGEN SATURATION: 98 % | TEMPERATURE: 98.7 F | SYSTOLIC BLOOD PRESSURE: 112 MMHG | RESPIRATION RATE: 17 BRPM

## 2021-11-20 DIAGNOSIS — J06.9 URI (UPPER RESPIRATORY INFECTION): ICD-10-CM

## 2021-11-20 DIAGNOSIS — Z20.822 EXPOSURE TO 2019 NOVEL CORONAVIRUS: ICD-10-CM

## 2021-11-20 PROCEDURE — 99213 OFFICE O/P EST LOW 20 MIN: CPT | Performed by: NURSE PRACTITIONER

## 2021-11-20 PROCEDURE — G0463 HOSPITAL OUTPT CLINIC VISIT: HCPCS

## 2021-11-20 PROCEDURE — U0003 INFECTIOUS AGENT DETECTION BY NUCLEIC ACID (DNA OR RNA); SEVERE ACUTE RESPIRATORY SYNDROME CORONAVIRUS 2 (SARS-COV-2) (CORONAVIRUS DISEASE [COVID-19]), AMPLIFIED PROBE TECHNIQUE, MAKING USE OF HIGH THROUGHPUT TECHNOLOGIES AS DESCRIBED BY CMS-2020-01-R: HCPCS | Performed by: NURSE PRACTITIONER

## 2021-11-20 PROCEDURE — C9803 HOPD COVID-19 SPEC COLLECT: HCPCS

## 2021-11-20 ASSESSMENT — ENCOUNTER SYMPTOMS
COUGH: 0
EYES NEGATIVE: 1
HEADACHES: 1
SHORTNESS OF BREATH: 0
SORE THROAT: 0
SINUS PRESSURE: 0
MYALGIAS: 1
APPETITE CHANGE: 0
ACTIVITY CHANGE: 1
FATIGUE: 1
RHINORRHEA: 1
FEVER: 1
SINUS PAIN: 0
VOMITING: 0
NAUSEA: 0
CHILLS: 1
DIARRHEA: 0
DIAPHORESIS: 1

## 2021-11-20 NOTE — DISCHARGE INSTRUCTIONS
Take:  Zinc 50 mg  Vitamin D 5000 international unit(s)  Vitamin C 1000 mg   Return to ER/UC for worsening of symptoms

## 2021-11-20 NOTE — ED PROVIDER NOTES
History     Chief Complaint   Patient presents with     Covid 19 Testing     c/o body aches and feeling hot and chilled. Reports symptoms started yesterday morning. Is living with 2 covid positive people.      HPI  Jennifer Tatum is a 31 year old female who presents with symptoms of fevers, chills, fatigue, sweating, runny nose, body aches, and headaches that started yesterday.  Has members of her family who have tested positive for Covid.  She has taken Tylenol and ibuprofen this morning with minimal relief of symptoms.  Has not received Covid vaccination.  Smoker.  Denies nausea, vomiting, diarrhea, and shortness of breath.    Allergies:  Allergies   Allergen Reactions     Wellbutrin [Bupropion] Other (See Comments)     Mood changes     Lamictal [Lamotrigine] Rash       Problem List:    Patient Active Problem List    Diagnosis Date Noted     Secondary dysmenorrhea 03/19/2021     Priority: Medium     Mood swings 03/19/2021     Priority: Medium     Tobacco abuse 03/12/2021     Priority: Medium     VINI (generalized anxiety disorder) 03/12/2021     Priority: Medium     Menorrhagia with regular cycle 03/12/2021     Priority: Medium     Female pelvic peritoneal adhesions--Severe 08/20/2020     Priority: Medium     Relationship problem between partners 12/01/2017     Priority: Medium     Major depressive disorder, recurrent episode, mild (H) 05/02/2017     Priority: Medium     ACP (advance care planning) 06/17/2016     Priority: Medium     Advance Care Planning 6/17/2016: ACP Review of Chart / Resources Provided:  Reviewed chart for advance care plan.  Jennifer Tatum has no plan or code status on file. Discussed available resources and provided with information.   Added by Gilbert Titus             Attention deficit hyperactivity disorder (ADHD), predominantly inattentive type 04/01/2016     Priority: Medium     PTSD (post-traumatic stress disorder) 04/01/2016     Priority: Medium        Past Medical History:    Past  "Medical History:   Diagnosis Date     Anxiety 2013     Attention deficit hyperactivity disorder (ADHD), predominantly inattentive type 2016     Complicated grieving 2017     Female pelvic peritoneal adhesions--Severe 2020     VINI (generalized anxiety disorder) 2021     H/O nephrolithotomy with removal of calculi      Incompetent cervix 11/10/2015     IUFD (intrauterine fetal death) 2016     Major depressive disorder, recurrent episode, mild (H) 2017     Menorrhagia with regular cycle 2021     Mild major depression (H) 2013     Mood swings 2021     Nephrolithiasis      Pelvic peritoneal adhesions, female 2020     PTSD (post-traumatic stress disorder) 2016     Secondary dysmenorrhea 2021     Smoker 2013     Tobacco abuse 2021       Past Surgical History:    Past Surgical History:   Procedure Laterality Date     CERCLAGE CERVICAL N/A 11/10/2015    Procedure: CERCLAGE CERVICAL;  Surgeon: Tucker Barragan MD;  [facility]      SECTION  2012    Pregnancy full-term; \"Oleksandr\"       SECTION N/A 2016    Procedure:  SECTION;  Surgeon: Nisha Mcleod MD;  Location: [facility]      SECTION N/A 2018     HC NEPHROSTOMY PERCUTUTANEOUS      left     impacted wisdom teeth removal       LAPAROSCOPIC TUBAL LIGATION Bilateral 2020    Procedure: LAPAROSCOPIC BILATERAL FALLOPIAN TUBE FULGRATION;  Surgeon: Frow, David Franke, MD;  Location: [facility]       Family History:    Family History   Problem Relation Age of Onset     Alcohol/Drug Father         alcoholism     Psychotic Disorder Father         Bipolar     Cerebrovascular Disease Other         Cerebrovascular accident     C.A.D. Other      Hypertension Other      Diabetes Other        Social History:  Marital Status:  Single [1]  Social History     Tobacco Use     Smoking status: Current Every Day Smoker     Packs/day: 1.00     Years: 10.00 "     Pack years: 10.00     Types: Cigarettes     Smokeless tobacco: Never Used     Tobacco comment: declines quitplan referral 9/8/2020   Substance Use Topics     Alcohol use: No     Alcohol/week: 0.0 standard drinks     Drug use: No        Medications:    amphetamine-dextroamphetamine (ADDERALL XR) 25 MG 24 hr capsule  amphetamine-dextroamphetamine (ADDERALL) 20 MG tablet  ARIPiprazole (ABILIFY) 10 MG tablet  EPINEPHrine (ANY BX GENERIC EQUIV) 0.3 MG/0.3ML injection 2-pack  hydrOXYzine (ATARAX) 25 MG tablet  ibuprofen (ADVIL/MOTRIN) 800 MG tablet  norethindrone (MICRONOR) 0.35 MG tablet  ondansetron (ZOFRAN) 4 MG tablet  sertraline (ZOLOFT) 100 MG tablet  SUMAtriptan (IMITREX) 100 MG tablet          Review of Systems   Constitutional: Positive for activity change, chills, diaphoresis (sweating), fatigue and fever. Negative for appetite change.   HENT: Positive for rhinorrhea. Negative for ear pain, sinus pressure, sinus pain and sore throat.    Eyes: Negative.    Respiratory: Negative for cough and shortness of breath.    Gastrointestinal: Negative for diarrhea, nausea and vomiting.   Genitourinary: Negative.    Musculoskeletal: Positive for myalgias.   Skin: Negative.    Neurological: Positive for headaches.       Physical Exam   BP: 112/76  Pulse: 96  Temp: 98.7  F (37.1  C)  Resp: 17  SpO2: 98 %      Physical Exam  Vitals and nursing note reviewed.   Constitutional:       General: She is not in acute distress.  HENT:      Head: Normocephalic.      Right Ear: Tympanic membrane and ear canal normal.      Left Ear: Tympanic membrane and ear canal normal.      Nose: Nose normal.      Right Turbinates: Enlarged.      Left Turbinates: Enlarged.      Right Sinus: No maxillary sinus tenderness or frontal sinus tenderness.      Left Sinus: No maxillary sinus tenderness or frontal sinus tenderness.      Mouth/Throat:      Lips: Pink.      Mouth: Mucous membranes are moist.      Pharynx: Uvula midline. No posterior  oropharyngeal erythema.   Eyes:      Conjunctiva/sclera: Conjunctivae normal.   Cardiovascular:      Rate and Rhythm: Normal rate and regular rhythm.      Heart sounds: Normal heart sounds. No murmur heard.      Pulmonary:      Effort: Pulmonary effort is normal. No respiratory distress.      Breath sounds: Normal breath sounds. No wheezing.   Lymphadenopathy:      Cervical: No cervical adenopathy.   Skin:     General: Skin is warm and dry.   Neurological:      Mental Status: She is alert and oriented to person, place, and time.   Psychiatric:         Behavior: Behavior normal.         ED Course        Procedures           No results found for this or any previous visit (from the past 24 hour(s)).    Medications - No data to display    Assessments & Plan (with Medical Decision Making)     I have reviewed the nursing notes.    I have reviewed the findings, diagnosis, plan and need for follow up with the patient.  (Z20.822) Exposure to 2019 novel coronavirus    (J06.9) URI (upper respiratory infection)  Comment: 31 year old female who presents with symptoms of fevers, chills, fatigue, sweating, runny nose, body aches, and headaches that started yesterday.  Has members of her family who have tested positive for Covid.  She has taken Tylenol and ibuprofen this morning with minimal relief of symptoms.  Has not received Covid vaccination.  Smoker.  Denies nausea, vomiting, diarrhea, and shortness of breath.    MDM: NHT. Lungs CTA  Covid test pending    Plan: Education provided for viral URI and after Covid testing.  Take:  Zinc 50 mg  Vitamin D 5000 international unit(s)  Vitamin C 1000 mg   Return to ER/UC for worsening of symptoms   Treat symptoms conservatively with acetaminophen and  ibuprofen (if applicable) for fevers, body aches, and headaches, guaifenesin and/or honey for cough. May use chest rubs for sore throat and congestion, hot and cold liquids may help decrease sore throat and help you feel better. Increase  fluids. You may utilize pseudoephedrine for congestion. Return to be reevaluated by ER/UC or your primary care provider if symptoms worsen, you develop breathing difficulties, or you do not improve in a reasonable time frame. It can take several days for a cough to resolve. It can take ten to fourteen days for upper respiratory symptoms to resolve.   These discharge instructions and medications were reviewed with her and understanding verbalized.    This document was prepared using a combination of typing and voice generated software.  While every attempt was made for accuracy, spelling and grammatical errors may exist.    New Prescriptions    No medications on file       Final diagnoses:   Exposure to 2019 novel coronavirus   URI (upper respiratory infection)       11/20/2021   [facility]       Luba Dumas, CNP  11/20/21 1522

## 2021-11-21 LAB — SARS-COV-2 RNA RESP QL NAA+PROBE: POSITIVE

## 2022-03-31 ENCOUNTER — APPOINTMENT (OUTPATIENT)
Dept: URGENT CARE | Facility: CLINIC | Age: 32
End: 2022-03-31
Payer: MEDICAID

## 2022-04-21 ENCOUNTER — E-VISIT (OUTPATIENT)
Dept: URGENT CARE | Facility: URGENT CARE | Age: 32
End: 2022-04-21
Payer: MEDICAID

## 2022-04-21 DIAGNOSIS — N39.0 ACUTE UTI (URINARY TRACT INFECTION): Primary | ICD-10-CM

## 2022-04-21 PROCEDURE — 99421 OL DIG E/M SVC 5-10 MIN: CPT | Performed by: PHYSICIAN ASSISTANT

## 2022-04-21 NOTE — PATIENT INSTRUCTIONS
Dear Jennifer Tatum    After reviewing your responses, I've been able to diagnose you with a urinary tract infection, which is a common infection of the bladder with bacteria.  This is not a sexually transmitted infection, though urinating immediately after intercourse can help prevent infections.  Drinking lots of fluids is also helpful to clear your current infection and prevent the next one.      I have sent a prescription for antibiotics to your pharmacy to treat this infection.    It is important that you take all of your prescribed medication even if your symptoms are improving after a few doses.  Taking all of your medicine helps prevent the symptoms from returning.     If your symptoms worsen, you develop pain in your back or stomach, develop fevers, or are not improving in 5 days, please contact your primary care provider for an appointment or visit any of our convenient Walk-in or Urgent Care Centers to be seen, which can be found on our website here.    Thanks again for choosing us as your health care partner,    Alex Dill, [facility], PA-C    Dysuria with Uncertain Cause (Adult)    The urethra is the tube that allows urine to pass out of the body. In a woman, the urethra is the opening above the vagina. In men, the urethra is the opening on the tip of the penis. Dysuria is the feeling of pain or burning in the urethra when passing urine.   Dysuria can be caused by anything that irritates or inflames the urethra. An infection or chemical irritation can cause this reaction. A bladder infection is the most common cause of dysuria in adults. A urine test can diagnose this. A bladder infection needs antibiotic treatment.   Soaps, lotions, colognes, and feminine hygiene products can cause dysuria. So can birth control jellies, creams, and foams. It will go away 1 to 3 days after using these irritants.   Sexually transmitted infections (STIs) such as chlamydia or gonorrhea can cause dysuria. Your healthcare  provider may take a culture sample. Your provider may start you on antibiotic medicine before the culture test returns.   In women who have gone through menopause, dysuria can be from dryness in the lining of the urethra. This can be treated with hormones. Dysuria becomes long-term (chronic) when it lasts for weeks or months. You may need to see a specialist (urologist) to diagnose and treat chronic dysuria.   Home care  These home care tips may help:    Don't use any chemicals or products that you think may be causing your symptoms.    If you were given a prescription medicine, take as directed. Take it until it is all used up.    If a culture was taken, don't have sex until you have been told that it is negative. A negative culture means you don't have an infection. Then follow your healthcare provider's advice to treat your condition.  If a culture was done and it is positive:     Both you and your sexual partner may need to be treated. This is true even if your partner has no symptoms.    Contact your healthcare provider or go to an urgent care clinic or the public health department to be looked at and treated.    Don't have sex until both you and your partner have finished all antibiotics and your healthcare provider says you are no longer contagious.    Learn about and use safe sex practices. The safest sex is with a partner who has tested negative and only has sex with you. Condoms can prevent STIs from spreading, but they aren't a guarantee.  Follow-up care  Follow up with your healthcare provider, or as advised. If a culture was taken, you may call as directed for the results. If you have an STI, follow up with your provider or the public health department for a complete STI screening, including HIV testing. For more information, contact CDC-INFO at 053-377-7072.   When to get medical advice  Call your healthcare provider right away if any of these occur:    You aren't better after 3 days of  treatment    Fever of 100.4 F (38 C) or higher, or as directed by your provider    Back or belly pain that gets worse    You can't urinate because of pain    New discharge from the urethra, vagina, or penis    Painful sores on the penis    Rash or joint pain    Painful lumps (lymph nodes) in the groin    Testicle pain or swelling of the scrotum  [employer] last reviewed this educational content on 10/1/2019    4242-6038 [employer]. All rights reserved. This information is not intended as a substitute for professional medical care. Always follow your healthcare professional's instructions.

## 2022-05-05 ENCOUNTER — HOSPITAL ENCOUNTER (EMERGENCY)
Facility: HOSPITAL | Age: 32
Discharge: HOME | End: 2022-05-05
Attending: NURSE PRACTITIONER | Admitting: NURSE PRACTITIONER
Payer: MEDICAID

## 2022-05-05 VITALS
SYSTOLIC BLOOD PRESSURE: 125 MMHG | RESPIRATION RATE: 16 BRPM | DIASTOLIC BLOOD PRESSURE: 81 MMHG | HEART RATE: 80 BPM | OXYGEN SATURATION: 98 % | TEMPERATURE: 98.5 F

## 2022-05-05 DIAGNOSIS — B34.9 VIRAL SYNDROME: Primary | ICD-10-CM

## 2022-05-05 LAB — GROUP A STREP BY PCR: NOT DETECTED

## 2022-05-05 PROCEDURE — G0463 HOSPITAL OUTPT CLINIC VISIT: HCPCS

## 2022-05-05 PROCEDURE — 87651 STREP A DNA AMP PROBE: CPT | Performed by: NURSE PRACTITIONER

## 2022-05-05 PROCEDURE — 99213 OFFICE O/P EST LOW 20 MIN: CPT | Performed by: NURSE PRACTITIONER

## 2022-05-05 ASSESSMENT — ENCOUNTER SYMPTOMS
MYALGIAS: 1
HEADACHES: 1
EYE PAIN: 0
SORE THROAT: 1
SHORTNESS OF BREATH: 0
DIARRHEA: 1
EYE ITCHING: 0
PSYCHIATRIC NEGATIVE: 1
COUGH: 1
VOMITING: 0
RHINORRHEA: 1
EYE REDNESS: 0
NAUSEA: 0
FEVER: 0
CHILLS: 1
PALPITATIONS: 0
FATIGUE: 1

## 2022-05-05 NOTE — ED PROVIDER NOTES
History     Chief Complaint   Patient presents with     Pharyngitis     Cough     HPI  Jennifer Tatum is a 31 year old female who presents to urgent care today (ambulatory) with complaints of fatigue, chills, congestion, rhinorrhea, sore throat, cough, diarrhea, myalgia and headache which started 2 days ago.  Family members sick with similar symptoms.  Staying hydrated, normal output.  No rashes.  Smokes 1 PPD.  No asthma.  NO COVID Vaccines.  Declines COVID, Influenza and RSV Test today.  No other concerns.     Allergies:  Allergies   Allergen Reactions     Wellbutrin [Bupropion] Other (See Comments)     Mood changes     Lamictal [Lamotrigine] Rash       Problem List:    Patient Active Problem List    Diagnosis Date Noted     Secondary dysmenorrhea 03/19/2021     Priority: Medium     Mood swings 03/19/2021     Priority: Medium     Tobacco abuse 03/12/2021     Priority: Medium     VINI (generalized anxiety disorder) 03/12/2021     Priority: Medium     Menorrhagia with regular cycle 03/12/2021     Priority: Medium     Female pelvic peritoneal adhesions--Severe 08/20/2020     Priority: Medium     Relationship problem between partners 12/01/2017     Priority: Medium     Major depressive disorder, recurrent episode, mild (H) 05/02/2017     Priority: Medium     ACP (advance care planning) 06/17/2016     Priority: Medium     Advance Care Planning 6/17/2016: ACP Review of Chart / Resources Provided:  Reviewed chart for advance care plan.  Jennifer Tatum has no plan or code status on file. Discussed available resources and provided with information.   Added by Gilbert Titus             Attention deficit hyperactivity disorder (ADHD), predominantly inattentive type 04/01/2016     Priority: Medium     PTSD (post-traumatic stress disorder) 04/01/2016     Priority: Medium        Past Medical History:    Past Medical History:   Diagnosis Date     Anxiety 07/31/2013     Attention deficit hyperactivity disorder (ADHD),  "predominantly inattentive type 2016     Complicated grieving 2017     Female pelvic peritoneal adhesions--Severe 2020     VINI (generalized anxiety disorder) 2021     H/O nephrolithotomy with removal of calculi      Incompetent cervix 11/10/2015     IUFD (intrauterine fetal death) 2016     Major depressive disorder, recurrent episode, mild (H) 2017     Menorrhagia with regular cycle 2021     Mild major depression (H) 2013     Mood swings 2021     Nephrolithiasis      Pelvic peritoneal adhesions, female 2020     PTSD (post-traumatic stress disorder) 2016     Secondary dysmenorrhea 2021     Smoker 2013     Tobacco abuse 2021       Past Surgical History:    Past Surgical History:   Procedure Laterality Date     CERCLAGE CERVICAL N/A 11/10/2015    Procedure: CERCLAGE CERVICAL;  Surgeon: Tucker Barragan MD;  [facility]      SECTION  2012    Pregnancy full-term; \"Oleksandr\"       SECTION N/A 2016    Procedure:  SECTION;  Surgeon: Nisha Mcleod MD;  Location: [facility]      SECTION N/A 2018     HC NEPHROSTOMY PERCUTUTANEOUS      left     impacted wisdom teeth removal       LAPAROSCOPIC TUBAL LIGATION Bilateral 2020    Procedure: LAPAROSCOPIC BILATERAL FALLOPIAN TUBE FULGRATION;  Surgeon: Frow, David Franke, MD;  Location: [facility]       Family History:    Family History   Problem Relation Age of Onset     Alcohol/Drug Father         alcoholism     Psychotic Disorder Father         Bipolar     Cerebrovascular Disease Other         Cerebrovascular accident     C.A.D. Other      Hypertension Other      Diabetes Other        Social History:  Marital Status:  Single [1]  Social History     Tobacco Use     Smoking status: Current Every Day Smoker     Packs/day: 1.00     Years: 10.00     Pack years: 10.00     Types: Cigarettes     Smokeless tobacco: Never Used     Tobacco comment: declines " quitplan referral 9/8/2020   Substance Use Topics     Alcohol use: No     Alcohol/week: 0.0 standard drinks     Drug use: No        Medications:    amphetamine-dextroamphetamine (ADDERALL XR) 25 MG 24 hr capsule  amphetamine-dextroamphetamine (ADDERALL) 20 MG tablet  ARIPiprazole (ABILIFY) 10 MG tablet  EPINEPHrine (ANY BX GENERIC EQUIV) 0.3 MG/0.3ML injection 2-pack  hydrOXYzine (ATARAX) 25 MG tablet  ibuprofen (ADVIL/MOTRIN) 800 MG tablet  norethindrone (MICRONOR) 0.35 MG tablet  sertraline (ZOLOFT) 100 MG tablet  SUMAtriptan (IMITREX) 100 MG tablet  ondansetron (ZOFRAN) 4 MG tablet      Review of Systems   Constitutional: Positive for chills and fatigue. Negative for fever.   HENT: Positive for congestion, rhinorrhea and sore throat. Negative for ear pain.    Eyes: Negative for pain, redness and itching.   Respiratory: Positive for cough. Negative for shortness of breath.    Cardiovascular: Negative for chest pain and palpitations.   Gastrointestinal: Positive for diarrhea. Negative for nausea and vomiting.   Genitourinary: Negative for decreased urine volume.   Musculoskeletal: Positive for myalgias.   Skin: Negative for rash.   Neurological: Positive for headaches.   Psychiatric/Behavioral: Negative.      Physical Exam   BP: 125/81  Pulse: 80  Temp: 98.5  F (36.9  C)  Resp: 16  SpO2: 98 %    Physical Exam  Vitals and nursing note reviewed.   Constitutional:       General: She is not in acute distress.     Appearance: Normal appearance. She is not ill-appearing or toxic-appearing.   HENT:      Head: Normocephalic.      Right Ear: Tympanic membrane, ear canal and external ear normal.      Left Ear: Tympanic membrane, ear canal and external ear normal.      Nose: Congestion and rhinorrhea present.      Mouth/Throat:      Mouth: Mucous membranes are moist.      Pharynx: Oropharynx is clear. Posterior oropharyngeal erythema present. No oropharyngeal exudate.      Tonsils: No tonsillar exudate or tonsillar abscesses.  1+ on the right. 1+ on the left.   Cardiovascular:      Rate and Rhythm: Normal rate and regular rhythm.      Pulses: Normal pulses.      Heart sounds: Normal heart sounds.   Pulmonary:      Effort: Pulmonary effort is normal.      Breath sounds: Normal breath sounds.   Abdominal:      General: Bowel sounds are normal.      Palpations: Abdomen is soft.      Tenderness: There is no abdominal tenderness.   Musculoskeletal:      Cervical back: Normal range of motion and neck supple. No rigidity or tenderness.   Lymphadenopathy:      Cervical: Cervical adenopathy present.   Skin:     General: Skin is warm and dry.   Neurological:      Mental Status: She is alert.   Psychiatric:         Mood and Affect: Mood normal.       ED Course     Results for orders placed or performed during the hospital encounter of 05/05/22 (from the past 24 hour(s))   Group A Streptococcus PCR Throat Swab    Specimen: Throat; Swab   Result Value Ref Range    Group A strep by PCR Not Detected Not Detected    Narrative    The Xpert Xpress Strep A test, performed on the [employer]  Instrument Systems, is a rapid, qualitative in vitro diagnostic test for the detection of Streptococcus pyogenes (Group A ß-hemolytic Streptococcus, Strep A) in throat swab specimens from patients with signs and symptoms of pharyngitis. The Xpert Xpress Strep A test can be used as an aid in the diagnosis of Group A Streptococcal pharyngitis. The assay is not intended to monitor treatment for Group A Streptococcus infections. The Xpert Xpress Strep A test utilizes an automated real-time polymerase chain reaction (PCR) to detect Streptococcus pyogenes DNA.       Medications - No data to display    Assessments & Plan (with Medical Decision Making)     I have reviewed the nursing notes.    I have reviewed the findings, diagnosis, plan and need for follow up with the patient.  (B34.9) Viral syndrome  (primary encounter diagnosis)  Plan:   Patient ambulatory with a nontoxic  appearance.  Lungs clear throughout.  No signs of otitis media.  Strep test negative.  Staying hydrated, normal output.  No rashes.  Declines COVID, influenza and RSV testing.  Family member sick with similar symptoms.  Symptomatic treatment recommendations provided.  Alternate Tylenol and ibuprofen as needed for pain or fever.  Push fluids to stay hydrated.  Follow-up with primary care provider or return to urgent care-ED with any worsening in condition or additional concerns.    Patient education  Symptomatic treatments recommended.  -Discussed that antibiotics would not help symptoms of viral URI. Education provided on symptoms of secondary bacterial infection such as new fever, chills, rigors, shortness of breath, increased work of breathing, that can occur with viral URI and need for further evaluation, if they occur.   - Ensure you are staying hydrated by drinking plenty of fluids or eating foods such as popsicles, jello, pudding.  - Honey can be soothing for sore throat  - Warm salt water gurgles can help soothe sore throat  - Rest  - Humidifier can help with congestion and help keep mucus membranes such as throat and nose from drying out.  - Sleeping slightly propped up can help with congestion and postnasal drainage that can worsen cough at bedtime.  - As long as you have never been told to take Tylenol and/or Ibuprofen you can use them to manage fever and body aches per package instructions  Make sure you eat when you take ibuprofen to avoid stomach upset.  - OTC cough medications per package instructions to help with cough. Check to see if the cough/cold medication already has acetaminophen (Tylenol) in it. If it does avoid taking additional Tylenol.  - If sudden onset of new fever, worsening symptoms return for further evaluation.  - OTC nasal steroid such as Flonase can help decrease sinus inflammation to help with congestion.  - Education provided on symptoms of post-viral bacterial infections  including ear infection and pneumonia. This would require re-evaluation for treatment.    Follow up with primary care provider or return to urgent care/ED with any worsening in condition or additional concerns.     New Prescriptions    No medications on file     Final diagnoses:   Viral syndrome     5/5/2022   [facility]     Ayaka Palafox NP  05/05/22 1424

## 2022-05-14 ENCOUNTER — HEALTH MAINTENANCE LETTER (OUTPATIENT)
Age: 32
End: 2022-05-14

## 2022-07-09 NOTE — ED PROVIDER NOTES
History     Chief Complaint   Patient presents with     Flank Pain     Back Pain     HPI  Jennifer Tatum is a 31 year old female who presents ambulatory for evaluation of left flank pain that started yesterday. Today, starting to feel flank pain in right side also. Currently 6/10, constant, aching, every once in a while it is sharp. Lower abdomen 5/10, constant aching, sometimes shooting. Last BM was this morning, soft, normal, no melena or hematochezia. Denies urinary symptoms. History of renal calculi and this feels similar. She tried acetaminophen last night- no improvement.     LMP: 9/20/2021. She is sexually active. Denies concerns of pregnancy - tubal, OCPs  Denies concerns for STIs.     Allergies:  Allergies   Allergen Reactions     Wellbutrin [Bupropion] Other (See Comments)     Mood changes     Lamictal [Lamotrigine] Rash       Problem List:    Patient Active Problem List    Diagnosis Date Noted     Secondary dysmenorrhea 03/19/2021     Priority: Medium     Mood swings 03/19/2021     Priority: Medium     Tobacco abuse 03/12/2021     Priority: Medium     VINI (generalized anxiety disorder) 03/12/2021     Priority: Medium     Menorrhagia with regular cycle 03/12/2021     Priority: Medium     Female pelvic peritoneal adhesions--Severe 08/20/2020     Priority: Medium     Relationship problem between partners 12/01/2017     Priority: Medium     Major depressive disorder, recurrent episode, mild (H) 05/02/2017     Priority: Medium     ACP (advance care planning) 06/17/2016     Priority: Medium     Advance Care Planning 6/17/2016: ACP Review of Chart / Resources Provided:  Reviewed chart for advance care plan.  Jennifer Tatum has no plan or code status on file. Discussed available resources and provided with information.   Added by Gilbert Titus             Attention deficit hyperactivity disorder (ADHD), predominantly inattentive type 04/01/2016     Priority: Medium     PTSD (post-traumatic stress disorder)  "2016     Priority: Medium        Past Medical History:    Past Medical History:   Diagnosis Date     Anxiety 2013     Attention deficit hyperactivity disorder (ADHD), predominantly inattentive type 2016     Complicated grieving 2017     Female pelvic peritoneal adhesions--Severe 2020     VINI (generalized anxiety disorder) 2021     H/O nephrolithotomy with removal of calculi      Incompetent cervix 11/10/2015     IUFD (intrauterine fetal death) 2016     Major depressive disorder, recurrent episode, mild (H) 2017     Menorrhagia with regular cycle 2021     Mild major depression (H) 2013     Mood swings 2021     Nephrolithiasis      Pelvic peritoneal adhesions, female 2020     PTSD (post-traumatic stress disorder) 2016     Secondary dysmenorrhea 2021     Smoker 2013     Tobacco abuse 2021       Past Surgical History:    Past Surgical History:   Procedure Laterality Date     CERCLAGE CERVICAL N/A 11/10/2015    Procedure: CERCLAGE CERVICAL;  Surgeon: Tucker Barragan MD;  Location: [facility]      SECTION  2012    Pregnancy full-term; \"Oleksandr\"       SECTION N/A 2016    Procedure:  SECTION;  Surgeon: Nisha Mcleod MD;  Location: [facility] OR      SECTION N/A 2018     HC NEPHROSTOMY PERCUTUTANEOUS      left     impacted wisdom teeth removal       LAPAROSCOPIC TUBAL LIGATION Bilateral 2020    Procedure: LAPAROSCOPIC BILATERAL FALLOPIAN TUBE FULGRATION;  Surgeon: Frow, David Franke, MD;  Location: [facility] OR       Family History:    Family History   Problem Relation Age of Onset     Alcohol/Drug Father         alcoholism     Psychotic Disorder Father         Bipolar     Cerebrovascular Disease Other         Cerebrovascular accident     C.A.D. Other      Hypertension Other      Diabetes Other        Social History:  Marital Status:  Single [1]  Social History     Tobacco Use     Smoking " status: Current Every Day Smoker     Packs/day: 1.00     Years: 10.00     Pack years: 10.00     Types: Cigarettes     Smokeless tobacco: Never Used     Tobacco comment: declines quitplan referral 9/8/2020   Substance Use Topics     Alcohol use: No     Alcohol/week: 0.0 standard drinks     Drug use: No        Medications:    amphetamine-dextroamphetamine (ADDERALL XR) 25 MG 24 hr capsule  amphetamine-dextroamphetamine (ADDERALL) 20 MG tablet  ARIPiprazole (ABILIFY) 10 MG tablet  EPINEPHrine (ANY BX GENERIC EQUIV) 0.3 MG/0.3ML injection 2-pack  hydrOXYzine (ATARAX) 25 MG tablet  ibuprofen (ADVIL/MOTRIN) 800 MG tablet  norethindrone (MICRONOR) 0.35 MG tablet  sertraline (ZOLOFT) 100 MG tablet  methylPREDNISolone (MEDROL DOSEPAK) 4 MG tablet therapy pack  ondansetron (ZOFRAN) 4 MG tablet  SUMAtriptan (IMITREX) 100 MG tablet          Review of Systems   Constitutional: Negative for chills and fever.   Gastrointestinal: Positive for abdominal pain (lower abdomen). Negative for constipation, diarrhea, nausea and vomiting.   Genitourinary: Negative for difficulty urinating, dysuria, frequency and urgency.   Musculoskeletal: Negative for arthralgias and myalgias.   Skin: Negative for color change, rash and wound.       Physical Exam   BP: 123/74  Pulse: 73  Temp: 98.7  F (37.1  C)  Resp: 18  Weight: 81.6 kg (180 lb)  SpO2: 97 %      Physical Exam  Constitutional:       General: She is not in acute distress.     Appearance: She is not ill-appearing or toxic-appearing.   Cardiovascular:      Rate and Rhythm: Normal rate and regular rhythm.      Heart sounds: S1 normal and S2 normal. No murmur heard.   No friction rub. No gallop.    Pulmonary:      Effort: Pulmonary effort is normal.      Breath sounds: Normal breath sounds.   Abdominal:      General: Abdomen is flat. Bowel sounds are normal.      Palpations: Abdomen is soft.      Tenderness: There is no abdominal tenderness. There is right CVA tenderness and left CVA  tenderness. There is no guarding or rebound.   Musculoskeletal:      Comments: FROM of upper and lower extremities   Skin:     General: Skin is warm and dry.   Neurological:      Mental Status: She is alert and oriented to person, place, and time.      Gait: Gait is intact.   Psychiatric:         Speech: Speech normal.         Behavior: Behavior is cooperative.         ED Course        Procedures          Results for orders placed or performed during the hospital encounter of 10/06/21   UA with Microscopic reflex to Culture     Status: Abnormal    Specimen: Urine, Midstream   Result Value Ref Range    Color Urine Light Yellow Colorless, Straw, Light Yellow, Yellow    Appearance Urine Clear Clear    Glucose Urine Negative Negative mg/dL    Bilirubin Urine Negative Negative    Ketones Urine Negative Negative mg/dL    Specific Gravity Urine 1.021 1.003 - 1.035    Blood Urine Negative Negative    pH Urine 6.0 4.7 - 8.0    Protein Albumin Urine Negative Negative mg/dL    Urobilinogen Urine Normal Normal, 2.0 mg/dL    Nitrite Urine Negative Negative    Leukocyte Esterase Urine Negative Negative    Bacteria Urine Few (A) None Seen /HPF    Mucus Urine Present (A) None Seen /LPF    RBC Urine 1 <=2 /HPF    WBC Urine 1 <=5 /HPF    Squamous Epithelials Urine 4 (H) <=1 /HPF    Narrative    Urine Culture not indicated   HCG qualitative urine     Status: Normal   Result Value Ref Range    hCG Urine Qualitative Negative Negative   Basic metabolic panel     Status: Abnormal   Result Value Ref Range    Sodium 139 133 - 144 mmol/L    Potassium 3.8 3.4 - 5.3 mmol/L    Chloride 110 (H) 94 - 109 mmol/L    Carbon Dioxide (CO2) 26 20 - 32 mmol/L    Anion Gap 3 3 - 14 mmol/L    Urea Nitrogen 15 7 - 30 mg/dL    Creatinine 0.78 0.52 - 1.04 mg/dL    Calcium 8.9 8.5 - 10.1 mg/dL    Glucose 91 70 - 99 mg/dL    GFR Estimate >90 >60 mL/min/1.73m2   CBC with platelets and differential     Status: None   Result Value Ref Range    WBC Count 6.7 4.0 -  11.0 10e3/uL    RBC Count 4.65 3.80 - 5.20 10e6/uL    Hemoglobin 14.0 11.7 - 15.7 g/dL    Hematocrit 41.1 35.0 - 47.0 %    MCV 88 78 - 100 fL    MCH 30.1 26.5 - 33.0 pg    MCHC 34.1 31.5 - 36.5 g/dL    RDW 12.5 10.0 - 15.0 %    Platelet Count 214 150 - 450 10e3/uL    % Neutrophils 48 %    % Lymphocytes 38 %    % Monocytes 9 %    % Eosinophils 4 %    % Basophils 1 %    % Immature Granulocytes 0 %    NRBCs per 100 WBC 0 <1 /100    Absolute Neutrophils 3.2 1.6 - 8.3 10e3/uL    Absolute Lymphocytes 2.5 0.8 - 5.3 10e3/uL    Absolute Monocytes 0.6 0.0 - 1.3 10e3/uL    Absolute Eosinophils 0.3 0.0 - 0.7 10e3/uL    Absolute Basophils 0.0 0.0 - 0.2 10e3/uL    Absolute Immature Granulocytes 0.0 <=0.0 10e3/uL    Absolute NRBCs 0.0 10e3/uL   CBC with platelets differential     Status: None    Narrative    The following orders were created for panel order CBC with platelets differential.  Procedure                               Abnormality         Status                     ---------                               -----------         ------                     CBC with platelets and d...[[id number]]                      Final result                 Please view results for these tests on the individual orders.         No results found for this or any previous visit (from the past 24 hour(s)).    Medications   ketorolac (TORADOL) injection 30 mg (30 mg Intravenous Given 10/6/21 1432)       Assessments & Plan (with Medical Decision Making)     I have reviewed the nursing notes.    I have reviewed the findings, diagnosis, plan and need for follow up with the patient.  (R10.9) Left flank pain  (primary encounter diagnosis)  (Z53.29) Left against medical advice  Alert, pleasant 31-year old female presents ambulatory in no acute distress for evaluation of left flank pain for the last couple days and now starting with right flank pain. She has not acute leukocytosis, afebrile, no urinary symptoms. UA negative for infection or hematuria.  No acute electrolyte or renal abnormalities on BMP. Urine pregnancy test is negative making ectopic unlikely. Given withdrawing during exam for CVA tenderness shared decision making to proceed with CT abdomen/pelvis. Unfortunately she does have to leave prior to this study being done to  [facility] the [facility]. Encouraged her to return to ED at anytime to complete evaluation. She verbalizes understanding.    Joanne Crane CNP         Discharge Medication List as of 10/6/2021  4:38 PM          Final diagnoses:   Left flank pain   Left against medical advice       10/6/2021   [facility]     Joanne Crane CNP  10/26/21 1022     Yes

## 2022-09-03 ENCOUNTER — HEALTH MAINTENANCE LETTER (OUTPATIENT)
Age: 32
End: 2022-09-03

## 2022-10-27 ENCOUNTER — OFFICE VISIT (OUTPATIENT)
Dept: FAMILY MEDICINE | Facility: OTHER | Age: 32
End: 2022-10-27
Attending: FAMILY MEDICINE
Payer: COMMERCIAL

## 2022-10-27 ENCOUNTER — TELEPHONE (OUTPATIENT)
Dept: FAMILY MEDICINE | Facility: OTHER | Age: 32
End: 2022-10-27

## 2022-10-27 VITALS
HEART RATE: 80 BPM | DIASTOLIC BLOOD PRESSURE: 72 MMHG | SYSTOLIC BLOOD PRESSURE: 119 MMHG | WEIGHT: 189 LBS | BODY MASS INDEX: 31.45 KG/M2 | TEMPERATURE: 98.8 F | OXYGEN SATURATION: 98 % | RESPIRATION RATE: 16 BRPM

## 2022-10-27 DIAGNOSIS — R11.0 NAUSEA: Primary | ICD-10-CM

## 2022-10-27 DIAGNOSIS — R53.83 FATIGUE, UNSPECIFIED TYPE: ICD-10-CM

## 2022-10-27 DIAGNOSIS — K29.00 ACUTE SUPERFICIAL GASTRITIS WITHOUT HEMORRHAGE: ICD-10-CM

## 2022-10-27 DIAGNOSIS — F41.1 GAD (GENERALIZED ANXIETY DISORDER): ICD-10-CM

## 2022-10-27 LAB
ALBUMIN SERPL BCG-MCNC: 4.3 G/DL (ref 3.5–5.2)
ALP SERPL-CCNC: 73 U/L (ref 35–104)
ALT SERPL W P-5'-P-CCNC: 25 U/L (ref 10–35)
ANION GAP SERPL CALCULATED.3IONS-SCNC: 11 MMOL/L (ref 7–15)
AST SERPL W P-5'-P-CCNC: 20 U/L (ref 10–35)
BASOPHILS # BLD AUTO: 0.1 10E3/UL (ref 0–0.2)
BASOPHILS NFR BLD AUTO: 1 %
BILIRUB SERPL-MCNC: <0.2 MG/DL
BUN SERPL-MCNC: 18.3 MG/DL (ref 6–20)
CALCIUM SERPL-MCNC: 9.2 MG/DL (ref 8.6–10)
CHLORIDE SERPL-SCNC: 104 MMOL/L (ref 98–107)
CREAT SERPL-MCNC: 0.65 MG/DL (ref 0.51–0.95)
CRP SERPL-MCNC: 3.51 MG/L
DEPRECATED HCO3 PLAS-SCNC: 22 MMOL/L (ref 22–29)
EOSINOPHIL # BLD AUTO: 0.3 10E3/UL (ref 0–0.7)
EOSINOPHIL NFR BLD AUTO: 3 %
ERYTHROCYTE [DISTWIDTH] IN BLOOD BY AUTOMATED COUNT: 13.6 % (ref 10–15)
ERYTHROCYTE [SEDIMENTATION RATE] IN BLOOD BY WESTERGREN METHOD: 7 MM/HR (ref 0–20)
GFR SERPL CREATININE-BSD FRML MDRD: >90 ML/MIN/1.73M2
GLUCOSE SERPL-MCNC: 92 MG/DL (ref 70–99)
HCG SERPL QL: NEGATIVE
HCT VFR BLD AUTO: 44 % (ref 35–47)
HGB BLD-MCNC: 14.8 G/DL (ref 11.7–15.7)
IMM GRANULOCYTES # BLD: 0 10E3/UL
IMM GRANULOCYTES NFR BLD: 0 %
LYMPHOCYTES # BLD AUTO: 2.5 10E3/UL (ref 0.8–5.3)
LYMPHOCYTES NFR BLD AUTO: 24 %
MCH RBC QN AUTO: 29.5 PG (ref 26.5–33)
MCHC RBC AUTO-ENTMCNC: 33.6 G/DL (ref 31.5–36.5)
MCV RBC AUTO: 88 FL (ref 78–100)
MONOCYTES # BLD AUTO: 0.8 10E3/UL (ref 0–1.3)
MONOCYTES NFR BLD AUTO: 8 %
NEUTROPHILS # BLD AUTO: 6.7 10E3/UL (ref 1.6–8.3)
NEUTROPHILS NFR BLD AUTO: 64 %
NRBC # BLD AUTO: 0 10E3/UL
NRBC BLD AUTO-RTO: 0 /100
PLATELET # BLD AUTO: 222 10E3/UL (ref 150–450)
POTASSIUM SERPL-SCNC: 4 MMOL/L (ref 3.4–5.3)
PROT SERPL-MCNC: 7.2 G/DL (ref 6.4–8.3)
RBC # BLD AUTO: 5.02 10E6/UL (ref 3.8–5.2)
SODIUM SERPL-SCNC: 137 MMOL/L (ref 136–145)
TSH SERPL DL<=0.005 MIU/L-ACNC: 1.38 UIU/ML (ref 0.3–4.2)
WBC # BLD AUTO: 10.4 10E3/UL (ref 4–11)

## 2022-10-27 PROCEDURE — 82040 ASSAY OF SERUM ALBUMIN: CPT | Mod: ZL | Performed by: FAMILY MEDICINE

## 2022-10-27 PROCEDURE — G0463 HOSPITAL OUTPT CLINIC VISIT: HCPCS

## 2022-10-27 PROCEDURE — 86140 C-REACTIVE PROTEIN: CPT | Mod: ZL | Performed by: FAMILY MEDICINE

## 2022-10-27 PROCEDURE — 85004 AUTOMATED DIFF WBC COUNT: CPT | Mod: ZL | Performed by: FAMILY MEDICINE

## 2022-10-27 PROCEDURE — 84443 ASSAY THYROID STIM HORMONE: CPT | Mod: ZL | Performed by: FAMILY MEDICINE

## 2022-10-27 PROCEDURE — 36415 COLL VENOUS BLD VENIPUNCTURE: CPT | Mod: ZL | Performed by: FAMILY MEDICINE

## 2022-10-27 PROCEDURE — 84703 CHORIONIC GONADOTROPIN ASSAY: CPT | Mod: ZL | Performed by: FAMILY MEDICINE

## 2022-10-27 PROCEDURE — 99214 OFFICE O/P EST MOD 30 MIN: CPT | Performed by: FAMILY MEDICINE

## 2022-10-27 PROCEDURE — 80053 COMPREHEN METABOLIC PANEL: CPT | Mod: ZL | Performed by: FAMILY MEDICINE

## 2022-10-27 PROCEDURE — 85652 RBC SED RATE AUTOMATED: CPT | Mod: ZL | Performed by: FAMILY MEDICINE

## 2022-10-27 RX ORDER — OMEPRAZOLE 40 MG/1
40 CAPSULE, DELAYED RELEASE ORAL DAILY
Qty: 30 CAPSULE | Refills: 1 | Status: SHIPPED | OUTPATIENT
Start: 2022-10-27 | End: 2023-03-17

## 2022-10-27 ASSESSMENT — PAIN SCALES - GENERAL: PAINLEVEL: MODERATE PAIN (5)

## 2022-10-27 NOTE — PROGRESS NOTES
Assessment & Plan     1. Nausea  Her nausea with cessation after eating, smoking history, and coffee consumption, and nasal drainage could be contributing to inflammation of stomach lining. Discussed to cut down on coffee and other acidic beverages. Also discussed to use musinex to help with nasal drainage. She should also see an optometrist for eye exam to see if that could be affecting nausea. Will put in labs to rule out other causes of nausea and fatigue and reach out to her with results. Will follow up in one weeks time. She has no questions or concerns at this time.  - CBC with Platelets & Differential  - hCG Qualitative Pregnancy  - Comprehensive metabolic panel  - TSH  - CRP inflammation  - Erythrocyte sedimentation rate auto  - omeprazole (PRILOSEC) 40 MG DR capsule; Take 1 capsule (40 mg) by mouth daily - 30-60 minutes before eating  Dispense: 30 capsule; Refill: 1    2. Fatigue, unspecified type  See above  - CBC with Platelets & Differential  - hCG Qualitative Pregnancy  - Comprehensive metabolic panel  - TSH  - CRP inflammation  - Erythrocyte sedimentation rate auto  - omeprazole (PRILOSEC) 40 MG DR capsule; Take 1 capsule (40 mg) by mouth daily - 30-60 minutes before eating  Dispense: 30 capsule; Refill: 1    3. Acute superficial gastritis without hemorrhage  See above  - omeprazole (PRILOSEC) 40 MG DR capsule; Take 1 capsule (40 mg) by mouth daily - 30-60 minutes before eating  Dispense: 30 capsule; Refill: 1    4. VINI (generalized anxiety disorder)  Discussed with her getting back onto Zoloft. She should return to office if mood worsens. Will follow up in one months time. She has no questions or concerns at this time.  - sertraline (ZOLOFT) 50 MG tablet; Take 1 tablet (50 mg) by mouth daily  Dispense: 30 tablet; Refill: 1    Marco Antonio Faust MS3    I was present with the medical student for the service. I personally verified the history of present illness and  performed the physical examination and  medical decision making. I have verified all of the medical student s  documentation for this encounter. Dr. Cornell Haynes   Jennifer is a 32 year old presenting for the following health issues:  Nausea    She has has nausea for the past 3 weeks with associated dizziness and tiredness. Also has had a headache and some nasal drainage over the last week. She notes that she feels fine after eating for about 20 minutes and then feels nausea again. She has had no recent diet changes. Stopped smoking about a week ago due to nausea. Does not drink alcohol. Drinks coffee regularly. She took a covid test 2-3 weeks ago and that was negative twice. Has not been on any prescribed medication in over a year. Her mood has been worsening as well. She does take Excedrin for headache. She reports night sweats last night and worsening nighttime vision. She has not been to an eye doctor in 2 years and notes her vision has been worsening.    HPI     Acute Illness  Acute illness concerns: nausea, tired constantly  Onset/Duration: 3 weeks ago  Symptoms:  Fever: No  Chills/Sweats: YES  Headache (location?): YES  Sinus Pressure: No  Conjunctivitis:  No  Ear Pain: no  Rhinorrhea: YES  Congestion: YES  Sore Throat: No  Cough: no  Wheeze: No  Decreased Appetite: increased. When not eating feels ill  Nausea: YES  Vomiting: No  Diarrhea: No  Dysuria/Freq.: No  Dysuria or Hematuria: No  Fatigue/Achiness: YES  Sick/Strep Exposure: No  Therapies tried and outcome: nyquil at night helped with sleep, emergen-c not helpful      Review of Systems   Constitutional, HEENT, cardiovascular, pulmonary, GI, , musculoskeletal, neuro, skin, endocrine and psych systems are negative, except as otherwise noted.      Objective    /72 (BP Location: Left arm, Patient Position: Sitting, Cuff Size: Adult Large)   Pulse 80   Temp 98.8  F (37.1  C) (Tympanic)   Resp 16   Wt 85.7 kg (189 lb)   SpO2 98%   BMI 31.45 kg/m    Body mass index is  31.45 kg/m .  Physical Exam   GENERAL: healthy, alert and no distress  EYES: Eyes grossly normal to inspection, PERRL and conjunctivae and sclerae normal  HENT: ear canals and TM's normal, nose and mouth without ulcers or lesions  NECK: no adenopathy, no asymmetry, masses, or scars and thyroid normal to palpation  RESP: lungs clear to auscultation - no rales, rhonchi or wheezes  BREAST: normal without masses, tenderness or nipple discharge and no palpable axillary masses or adenopathy  CV: regular rate and rhythm, normal S1 S2, no S3 or S4, no murmur, click or rub, no peripheral edema and peripheral pulses strong  ABDOMEN:RLQ pain noted with palpation.  MS: no gross musculoskeletal defects noted, no edema  SKIN: no suspicious lesions or rashes  NEURO: Normal strength and tone, mentation intact and speech normal  PSYCH: mentation appears normal, affect normal/bright    Results for orders placed or performed in visit on 10/27/22   CBC with platelets and differential     Status: None   Result Value Ref Range    WBC Count 10.4 4.0 - 11.0 10e3/uL    RBC Count 5.02 3.80 - 5.20 10e6/uL    Hemoglobin 14.8 11.7 - 15.7 g/dL    Hematocrit 44.0 35.0 - 47.0 %    MCV 88 78 - 100 fL    MCH 29.5 26.5 - 33.0 pg    MCHC 33.6 31.5 - 36.5 g/dL    RDW 13.6 10.0 - 15.0 %    Platelet Count 222 150 - 450 10e3/uL    % Neutrophils 64 %    % Lymphocytes 24 %    % Monocytes 8 %    % Eosinophils 3 %    % Basophils 1 %    % Immature Granulocytes 0 %    NRBCs per 100 WBC 0 <1 /100    Absolute Neutrophils 6.7 1.6 - 8.3 10e3/uL    Absolute Lymphocytes 2.5 0.8 - 5.3 10e3/uL    Absolute Monocytes 0.8 0.0 - 1.3 10e3/uL    Absolute Eosinophils 0.3 0.0 - 0.7 10e3/uL    Absolute Basophils 0.1 0.0 - 0.2 10e3/uL    Absolute Immature Granulocytes 0.0 <=0.4 10e3/uL    Absolute NRBCs 0.0 10e3/uL   CBC with Platelets & Differential     Status: None    Narrative    The following orders were created for panel order CBC with Platelets & Differential.  Procedure                                Abnormality         Status                     ---------                               -----------         ------                     CBC with platelets and d...[[id number]]                      Final result                 Please view results for these tests on the individual orders.

## 2022-10-27 NOTE — TELEPHONE ENCOUNTER
Next 5 appointments (look out 90 days)      Oct 27, 2022 11:15 AM  (Arrive by 11:00 AM)  SHORT with Cornell Herron MD  [facility] [place] ([facility] [place] ) [address]  [place] [address]  [phone]

## 2022-10-27 NOTE — TELEPHONE ENCOUNTER
8:11 AM    Reason for Call: Phone Call    Description: Patient called and states that she has been feeling nauseas all the time and is wondering if Dr Herron can order lab work to see what's going on. Please give her a call regarding this     Was an appointment offered for this call? Yes - I offered first available with Dr Herron - 11/09. Patient wondering if she can either be seen sooner then that or just get labs done     Preferred method for responding to this message: Telephone Call  What is your phone number ?[phone]    If we cannot reach you directly, may we leave a detailed response at the number you provided? Yes    Can this message wait until your PCP/provider returns, if available today? Not applicable    Aislinn Rome

## 2022-10-27 NOTE — NURSING NOTE
"Chief Complaint   Patient presents with     Nausea       Initial /72 (BP Location: Left arm, Patient Position: Sitting, Cuff Size: Adult Large)   Pulse 80   Temp 98.8  F (37.1  C) (Tympanic)   Resp 16   Wt 85.7 kg (189 lb)   SpO2 98%   BMI 31.45 kg/m   Estimated body mass index is 31.45 kg/m  as calculated from the following:    Height as of 6/1/21: 1.651 m (5' 5\").    Weight as of this encounter: 85.7 kg (189 lb).  Medication Reconciliation: complete  Reymundo Rahman LPN  "

## 2022-12-11 ENCOUNTER — MYC MEDICAL ADVICE (OUTPATIENT)
Dept: FAMILY MEDICINE | Facility: OTHER | Age: 32
End: 2022-12-11

## 2022-12-12 ENCOUNTER — OFFICE VISIT (OUTPATIENT)
Dept: FAMILY MEDICINE | Facility: OTHER | Age: 32
End: 2022-12-12
Attending: FAMILY MEDICINE
Payer: COMMERCIAL

## 2022-12-12 VITALS
HEART RATE: 79 BPM | BODY MASS INDEX: 31.28 KG/M2 | TEMPERATURE: 97.6 F | SYSTOLIC BLOOD PRESSURE: 122 MMHG | RESPIRATION RATE: 18 BRPM | OXYGEN SATURATION: 98 % | DIASTOLIC BLOOD PRESSURE: 83 MMHG | WEIGHT: 188 LBS

## 2022-12-12 DIAGNOSIS — J01.00 ACUTE NON-RECURRENT MAXILLARY SINUSITIS: Primary | ICD-10-CM

## 2022-12-12 PROCEDURE — 99213 OFFICE O/P EST LOW 20 MIN: CPT | Performed by: FAMILY MEDICINE

## 2022-12-12 PROCEDURE — G0463 HOSPITAL OUTPT CLINIC VISIT: HCPCS

## 2022-12-12 ASSESSMENT — ANXIETY QUESTIONNAIRES
1. FEELING NERVOUS, ANXIOUS, OR ON EDGE: MORE THAN HALF THE DAYS
5. BEING SO RESTLESS THAT IT IS HARD TO SIT STILL: SEVERAL DAYS
GAD7 TOTAL SCORE: 14
IF YOU CHECKED OFF ANY PROBLEMS ON THIS QUESTIONNAIRE, HOW DIFFICULT HAVE THESE PROBLEMS MADE IT FOR YOU TO DO YOUR WORK, TAKE CARE OF THINGS AT HOME, OR GET ALONG WITH OTHER PEOPLE: VERY DIFFICULT
4. TROUBLE RELAXING: MORE THAN HALF THE DAYS
GAD7 TOTAL SCORE: 14
7. FEELING AFRAID AS IF SOMETHING AWFUL MIGHT HAPPEN: MORE THAN HALF THE DAYS
3. WORRYING TOO MUCH ABOUT DIFFERENT THINGS: MORE THAN HALF THE DAYS
6. BECOMING EASILY ANNOYED OR IRRITABLE: NEARLY EVERY DAY
2. NOT BEING ABLE TO STOP OR CONTROL WORRYING: MORE THAN HALF THE DAYS

## 2022-12-12 ASSESSMENT — PAIN SCALES - GENERAL: PAINLEVEL: SEVERE PAIN (6)

## 2022-12-12 ASSESSMENT — PATIENT HEALTH QUESTIONNAIRE - PHQ9: SUM OF ALL RESPONSES TO PHQ QUESTIONS 1-9: 9

## 2022-12-12 NOTE — PROGRESS NOTES
Assessment & Plan     Acute non-recurrent maxillary sinusitis  Will treat. Symptomatic treatment was discussed along what is available for OTC medications for symptomatic relief.   Probiotic. Symptomatic treatment was discussed along when patient should call and/or come back into the clinic or go to ER/Urgent care. All questions answered.  - amoxicillin-clavulanate (AUGMENTIN) 875-125 MG tablet; Take 1 tablet by mouth 2 times daily               No follow-ups on file.    Cornell Herron MD  [facility] Rodriguez is a 32 year old, presenting for the following health issues:  Sinus Problem      HPI     Acute Illness  Acute illness concerns: Headache, sinus problem  Onset/Duration: 11/30/2022  Symptoms:  Fever: No  Chills/Sweats: No  Headache (location?): YES  Sinus Pressure: YES  Conjunctivitis:  YES  Ear Pain: no  Rhinorrhea: YES  Congestion: YES  Sore Throat: YES  Cough: YES-productive of green sputum  Wheeze: No  Decreased Appetite: No  Nausea: No  Vomiting: No  Diarrhea: No  Dysuria/Freq.: No  Dysuria or Hematuria: No  Fatigue/Achiness: YES  Sick/Strep Exposure: YES  Therapies tried and outcome: nyquil, dayquil, mucinex, tylenol, ibuprofen     Influenza on 11/30- got some better now worse with increase sinus sx and sinus pain /pressure   covid negative    Review of Systems   Constitutional, HEENT, cardiovascular, pulmonary, gi and gu systems are negative, except as otherwise noted.      Objective    /83 (BP Location: Right arm, Patient Position: Sitting, Cuff Size: Adult Large)   Pulse 79   Temp 97.6  F (36.4  C) (Tympanic)   Resp 18   Wt 85.3 kg (188 lb)   LMP 12/03/2022 (Approximate)   SpO2 98%   BMI 31.28 kg/m    Body mass index is 31.28 kg/m .  Physical Exam   GENERAL: healthy, alert and no distress  HENT: ear canals and TM's normal, nose and mouth without ulcers or lesions  NECK: no adenopathy, no asymmetry, masses, or scars and thyroid normal to  palpation  RESP: lungs clear to auscultation - no rales, rhonchi or wheezes  CV: regular rate and rhythm, normal S1 S2, no S3 or S4, no murmur, click or rub, no peripheral edema and peripheral pulses strong

## 2023-03-14 NOTE — PROGRESS NOTES
Assessment & Plan     Major depressive disorder, recurrent episode, mild (H)  VINI (generalized anxiety disorder)  Anxiety and depression have worsened, but she does not feel suicide. Will increase her sertraline to 100 mg from 50 mg and start Abilify 2 mg. She notes that she has taken Abilify in the past and it has really helped. Will then reassess in 4 weeks. Sooner with new or worsening symptoms.     Encouraged discussion with trusted relative or friend to monitor for negative mood changes and change in behaviour during medication initiation and titration. Recommended immediate help if increased thoughts of suicide and call if significant side effects occur. Encouraged patient that this type of medication is not effective immediately, and to be consistant with taking the medication.    - sertraline (ZOLOFT) 100 MG tablet; Take 1 tablet (100 mg) by mouth daily  - ARIPiprazole (ABILIFY) 2 MG tablet; Take 1 tablet (2 mg) by mouth daily    Tobacco abuse  Cessation encouraged.     Menorrhagia with regular cycle  Patient with heavy menses. Hgb stable. Wanting an ablation. Does not want to try contraception. Will refer to [facility] at [facility].     - Ob/Gyn Referral  - CBC with platelets and differential; Future  - CBC with platelets and differential    Obesity (BMI 30.0-34.9)  BMI 33.61. Trouble losing weight. When doing a diet recall, she is eating many carbs. Will track carbs and calories for 4 weeks and I will then review.     She is not concerned about her thyroid. Mother with thyroid issues. TSH and free T4 normal. Will also run TPO antibodies today. Will notify patient of the results when available and intervene accordingly.     - TSH with free T4 reflex; Future  - T4, free; Future  - Thyroid peroxidase antibody; Future  - Thyroid peroxidase antibody  - T4, free  - TSH    Encounter for HCV screening test for low risk patient  - Hepatitis C Screen Reflex to HCV RNA Quant and Genotype; Future  - Will notify  "patient of the results when available and intervene accordingly.        BMI:   Estimated body mass index is 33.61 kg/m  as calculated from the following:    Height as of this encounter: 1.651 m (5' 5\").    Weight as of this encounter: 91.6 kg (202 lb).   Weight management plan: Discussed healthy diet and exercise guidelines      Return in about 4 weeks (around 4/14/2023).    Nadya Currie NP  [facility] - CAIO Rodriguez is a 32 year old accompanied by her daughter, presenting for the following health issues:  Establish Care      HPI     NEW PATIENT  At this time, past medical history, current medications, allergies and drug sensitivities, immunizations, habits and life style, family history, and social history are reviewed and updated.    Due for several vaccines. Declines.     Due for Hep C testing. Willing to get with next blood draw.     She does smoke. Currently smoking 1 ppd. No interest in quitting.     Dysmenorrhea; menses occur every 4 weeks and are very heavy, last for 7 days, bleeds through an Ultra tampon in one hour, also passing many clots, follows with OB-GYN, was last seen on 5/18/21, birth control was recommended, but she is not interested in this, wanting an ablation, would like to get a second opinion. Denies any dizziness or lightheadedness.     Depression and Anxiety Follow-Up    How are you doing with your depression since your last visit? Worsened    How are you doing with your anxiety since your last visit?  Worsened; very stressed, has custody of nephew as father doing drugs     Are you having other symptoms that might be associated with depression or anxiety? No    Have you had a significant life event? Health Concerns When she gets her period, she gets fatigued    Do you have any concerns with your use of alcohol or other drugs? No     No thoughts of self harm.     Taking sertraline without side effects. Wondering if her dose can be increased.     Also " wonders if her thyroid is off. Unable to lose weight. Hair has also gotten very thin. No bowel issues. No rashes.     Social History     Tobacco Use     Smoking status: Every Day     Packs/day: 1.00     Years: 10.00     Pack years: 10.00     Types: Cigarettes, Cigars     Smokeless tobacco: Never     Tobacco comments:     declines quitplan referral 9/8/2020   Vaping Use     Vaping Use: Never used   Substance Use Topics     Alcohol use: No     Drug use: No     PHQ 10/19/2021 12/12/2022 3/17/2023   PHQ-9 Total Score 9 9 20   Q9: Thoughts of better off dead/self-harm past 2 weeks Not at all Not at all Not at all   F/U: Thoughts of suicide or self-harm - - -   F/U: Safety concerns - - -     VINI-7 SCORE 10/19/2021 12/12/2022 3/17/2023   Total Score 10 14 13     Last PHQ-9 3/17/2023   1.  Little interest or pleasure in doing things 3   2.  Feeling down, depressed, or hopeless 3   3.  Trouble falling or staying asleep, or sleeping too much 3   4.  Feeling tired or having little energy 3   5.  Poor appetite or overeating 3   6.  Feeling bad about yourself 1   7.  Trouble concentrating 3   8.  Moving slowly or restless 1   Q9: Thoughts of better off dead/self-harm past 2 weeks 0   PHQ-9 Total Score 20   Difficulty at work, home, or with people Very difficult   In the past two weeks have you had thoughts of suicide or self harm? -   Do you have concerns about your personal safety or the safety of others? -     VINI-7  3/17/2023   1. Feeling nervous, anxious, or on edge 1   2. Not being able to stop or control worrying 1   3. Worrying too much about different things 3   4. Trouble relaxing 3   5. Being so restless that it is hard to sit still 1   6. Becoming easily annoyed or irritable 3   7. Feeling afraid, as if something awful might happen 1   VINI-7 Total Score 13   If you checked any problems, how difficult have they made it for you to do your work, take care of things at home, or get along with other people? Somewhat  "difficult       Suicide Assessment Five-step Evaluation and Treatment (SAFE-T)        Review of Systems   Constitutional, HEENT, cardiovascular, pulmonary, gi and gu systems are negative, except as otherwise noted.      Objective    /80 (BP Location: Right arm, Patient Position: Sitting, Cuff Size: Adult Regular)   Pulse 82   Temp 97.8  F (36.6  C) (Tympanic)   Ht 1.651 m (5' 5\")   Wt 91.6 kg (202 lb)   SpO2 96%   BMI 33.61 kg/m    Body mass index is 33.61 kg/m .  Physical Exam   GENERAL: healthy, alert, no distress and over weight  EYES: Eyes grossly normal to inspection, PERRL and conjunctivae and sclerae normal  HENT: ear canals and TM's normal, nose and mouth without ulcers or lesions  NECK: no adenopathy, no asymmetry, masses, or scars and thyroid normal to palpation  RESP: lungs clear to auscultation - no rales, rhonchi or wheezes  CV: regular rate and rhythm, no murmur, click or rub, no peripheral edema  ABDOMEN: soft, nontender, no hepatosplenomegaly, no masses and bowel sounds normal  NEURO: Normal strength and tone, mentation intact and speech normal  PSYCH: mentation appears normal, affect normal/bright    Results for orders placed or performed in visit on 03/17/23 (from the past 24 hour(s))   CBC with platelets and differential    Narrative    The following orders were created for panel order CBC with platelets and differential.  Procedure                               Abnormality         Status                     ---------                               -----------         ------                     CBC with platelets and d...[[id number]]                      Final result                 Please view results for these tests on the individual orders.   T4, free   Result Value Ref Range    Free T4 1.04 0.90 - 1.70 ng/dL   CBC with platelets and differential   Result Value Ref Range    WBC Count 7.0 4.0 - 11.0 10e3/uL    RBC Count 5.11 3.80 - 5.20 10e6/uL    Hemoglobin 14.8 11.7 - 15.7 g/dL    " Hematocrit 44.0 35.0 - 47.0 %    MCV 86 78 - 100 fL    MCH 29.0 26.5 - 33.0 pg    MCHC 33.6 31.5 - 36.5 g/dL    RDW 12.8 10.0 - 15.0 %    Platelet Count 223 150 - 450 10e3/uL    % Neutrophils 57 %    % Lymphocytes 30 %    % Monocytes 9 %    % Eosinophils 3 %    % Basophils 1 %    % Immature Granulocytes 0 %    NRBCs per 100 WBC 0 <1 /100    Absolute Neutrophils 4.0 1.6 - 8.3 10e3/uL    Absolute Lymphocytes 2.1 0.8 - 5.3 10e3/uL    Absolute Monocytes 0.6 0.0 - 1.3 10e3/uL    Absolute Eosinophils 0.2 0.0 - 0.7 10e3/uL    Absolute Basophils 0.0 0.0 - 0.2 10e3/uL    Absolute Immature Granulocytes 0.0 <=0.4 10e3/uL    Absolute NRBCs 0.0 10e3/uL   TSH   Result Value Ref Range    TSH 1.58 0.30 - 4.20 uIU/mL

## 2023-03-17 ENCOUNTER — OFFICE VISIT (OUTPATIENT)
Dept: FAMILY MEDICINE | Facility: OTHER | Age: 33
End: 2023-03-17
Attending: NURSE PRACTITIONER
Payer: COMMERCIAL

## 2023-03-17 VITALS
OXYGEN SATURATION: 96 % | BODY MASS INDEX: 33.66 KG/M2 | TEMPERATURE: 97.8 F | WEIGHT: 202 LBS | HEART RATE: 82 BPM | SYSTOLIC BLOOD PRESSURE: 120 MMHG | HEIGHT: 65 IN | DIASTOLIC BLOOD PRESSURE: 80 MMHG

## 2023-03-17 DIAGNOSIS — E66.811 OBESITY (BMI 30.0-34.9): ICD-10-CM

## 2023-03-17 DIAGNOSIS — Z11.59 ENCOUNTER FOR HCV SCREENING TEST FOR LOW RISK PATIENT: ICD-10-CM

## 2023-03-17 DIAGNOSIS — Z72.0 TOBACCO ABUSE: ICD-10-CM

## 2023-03-17 DIAGNOSIS — F41.1 GAD (GENERALIZED ANXIETY DISORDER): ICD-10-CM

## 2023-03-17 DIAGNOSIS — F33.0 MAJOR DEPRESSIVE DISORDER, RECURRENT EPISODE, MILD (H): Primary | ICD-10-CM

## 2023-03-17 DIAGNOSIS — N92.0 MENORRHAGIA WITH REGULAR CYCLE: ICD-10-CM

## 2023-03-17 LAB
BASOPHILS # BLD AUTO: 0 10E3/UL (ref 0–0.2)
BASOPHILS NFR BLD AUTO: 1 %
EOSINOPHIL # BLD AUTO: 0.2 10E3/UL (ref 0–0.7)
EOSINOPHIL NFR BLD AUTO: 3 %
ERYTHROCYTE [DISTWIDTH] IN BLOOD BY AUTOMATED COUNT: 12.8 % (ref 10–15)
HCT VFR BLD AUTO: 44 % (ref 35–47)
HGB BLD-MCNC: 14.8 G/DL (ref 11.7–15.7)
IMM GRANULOCYTES # BLD: 0 10E3/UL
IMM GRANULOCYTES NFR BLD: 0 %
LYMPHOCYTES # BLD AUTO: 2.1 10E3/UL (ref 0.8–5.3)
LYMPHOCYTES NFR BLD AUTO: 30 %
MCH RBC QN AUTO: 29 PG (ref 26.5–33)
MCHC RBC AUTO-ENTMCNC: 33.6 G/DL (ref 31.5–36.5)
MCV RBC AUTO: 86 FL (ref 78–100)
MONOCYTES # BLD AUTO: 0.6 10E3/UL (ref 0–1.3)
MONOCYTES NFR BLD AUTO: 9 %
NEUTROPHILS # BLD AUTO: 4 10E3/UL (ref 1.6–8.3)
NEUTROPHILS NFR BLD AUTO: 57 %
NRBC # BLD AUTO: 0 10E3/UL
NRBC BLD AUTO-RTO: 0 /100
PLATELET # BLD AUTO: 223 10E3/UL (ref 150–450)
RBC # BLD AUTO: 5.11 10E6/UL (ref 3.8–5.2)
T4 FREE SERPL-MCNC: 1.04 NG/DL (ref 0.9–1.7)
TSH SERPL DL<=0.005 MIU/L-ACNC: 1.58 UIU/ML (ref 0.3–4.2)
WBC # BLD AUTO: 7 10E3/UL (ref 4–11)

## 2023-03-17 PROCEDURE — 86803 HEPATITIS C AB TEST: CPT | Mod: ZL | Performed by: NURSE PRACTITIONER

## 2023-03-17 PROCEDURE — 85025 COMPLETE CBC W/AUTO DIFF WBC: CPT | Mod: ZL | Performed by: NURSE PRACTITIONER

## 2023-03-17 PROCEDURE — 84443 ASSAY THYROID STIM HORMONE: CPT | Mod: ZL | Performed by: NURSE PRACTITIONER

## 2023-03-17 PROCEDURE — 99214 OFFICE O/P EST MOD 30 MIN: CPT | Performed by: NURSE PRACTITIONER

## 2023-03-17 PROCEDURE — G0463 HOSPITAL OUTPT CLINIC VISIT: HCPCS

## 2023-03-17 PROCEDURE — 84439 ASSAY OF FREE THYROXINE: CPT | Mod: ZL | Performed by: NURSE PRACTITIONER

## 2023-03-17 PROCEDURE — 86376 MICROSOMAL ANTIBODY EACH: CPT | Mod: ZL | Performed by: NURSE PRACTITIONER

## 2023-03-17 PROCEDURE — 36415 COLL VENOUS BLD VENIPUNCTURE: CPT | Mod: ZL | Performed by: NURSE PRACTITIONER

## 2023-03-17 RX ORDER — SERTRALINE HYDROCHLORIDE 100 MG/1
100 TABLET, FILM COATED ORAL DAILY
Qty: 90 TABLET | Refills: 1 | Status: SHIPPED | OUTPATIENT
Start: 2023-03-17 | End: 2023-04-24 | Stop reason: ALTCHOICE

## 2023-03-17 RX ORDER — ARIPIPRAZOLE 2 MG/1
2 TABLET ORAL DAILY
Qty: 90 TABLET | Refills: 0 | Status: SHIPPED | OUTPATIENT
Start: 2023-03-17 | End: 2023-04-24

## 2023-03-17 ASSESSMENT — ANXIETY QUESTIONNAIRES
GAD7 TOTAL SCORE: 13
4. TROUBLE RELAXING: NEARLY EVERY DAY
3. WORRYING TOO MUCH ABOUT DIFFERENT THINGS: NEARLY EVERY DAY
IF YOU CHECKED OFF ANY PROBLEMS ON THIS QUESTIONNAIRE, HOW DIFFICULT HAVE THESE PROBLEMS MADE IT FOR YOU TO DO YOUR WORK, TAKE CARE OF THINGS AT HOME, OR GET ALONG WITH OTHER PEOPLE: SOMEWHAT DIFFICULT
6. BECOMING EASILY ANNOYED OR IRRITABLE: NEARLY EVERY DAY
1. FEELING NERVOUS, ANXIOUS, OR ON EDGE: SEVERAL DAYS
GAD7 TOTAL SCORE: 13
2. NOT BEING ABLE TO STOP OR CONTROL WORRYING: SEVERAL DAYS
7. FEELING AFRAID AS IF SOMETHING AWFUL MIGHT HAPPEN: SEVERAL DAYS
5. BEING SO RESTLESS THAT IT IS HARD TO SIT STILL: SEVERAL DAYS

## 2023-03-17 ASSESSMENT — PAIN SCALES - GENERAL: PAINLEVEL: NO PAIN (0)

## 2023-03-17 ASSESSMENT — PATIENT HEALTH QUESTIONNAIRE - PHQ9: SUM OF ALL RESPONSES TO PHQ QUESTIONS 1-9: 20

## 2023-03-20 LAB
HCV AB SERPL QL IA: NONREACTIVE
THYROPEROXIDASE AB SERPL-ACNC: <10 IU/ML

## 2023-03-28 ENCOUNTER — TELEPHONE (OUTPATIENT)
Dept: FAMILY MEDICINE | Facility: OTHER | Age: 33
End: 2023-03-28

## 2023-03-28 NOTE — TELEPHONE ENCOUNTER
4:40 PM    Reason for Call: Phone Call    Description: pt wanted the referral to OB to go to [facility] in [facility]. Please call pt with question    Was an appointment offered for this call? No  If yes : Appointment type              Date    Preferred method for responding to this message: Telephone Call  What is your phone number ? [phone]    If we cannot reach you directly, may we leave a detailed response at the number you provided? Yes    Can this message wait until your PCP/provider returns, if available today? Jill Delgadillo

## 2023-04-20 NOTE — PROGRESS NOTES
Jennifer Tatum is presenting for evaluation of medical weight management. She has had weight problems for about 7 months. Started gaining weight when she was started on sertraline. Maximum weight is her current weight of 213 pounds. She would like to lose 15 pounds. Goal weight is anything under 200 pounds. Other methods the patient has tried to lose weight include keto and diet and exercise.     She has had a cough and nasal congestion for 4 days. Low grade temps yesterday. No nausea or vomiting. No chest pain or shortness of breath. She did have a sore throat and right ear pain. Eating and drinking well. She does smoke. No known asthma or COPD. She took Tylenol once. 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     Processed Foods: none  Alcohol: very rare-1-2 ultra per month  Soda/pop: rare  Meals Prepared by: self  Psychologic issues: she does have anxiety and depression-symptoms had worsened on 3/17/23 and Abilify was started. She, however, stopped this due to weight gain. Sertraline was also increased to 100 mg from 50 mg. Today she notes that her anxiety and depression are about the same. She does not feel suicidal.   Family Support: good  Activity/Exercise: started walking for 60 minutes daily  Insight/Motivation: good; clothes do not fit  Sleep habits: poor, snores, often feels fatigued   Cravings: salty foods  Appetite: good  Medications contributing to weight issues: taking sertraline for anxiety and depression; Abilify was stopped    Co-morbidities   Patient Active Problem List   Diagnosis     Attention deficit hyperactivity disorder (ADHD), predominantly inattentive type     PTSD (post-traumatic stress disorder)     ACP (advance care planning)     Major depressive disorder, recurrent episode, mild (H)     Relationship problem between partners     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       No current facility-administered medications for this visit.     Current Eating Patterns:   Generally 3 meals per day.   Breakfast: yogurt and granola, eggs with salsa  Midmorning snack: none  Lunch: salad with meat  Mid afternoon snack: chips  Supper: burger and potatoes, steak and potatoes  Grazing: none    OBJECTIVE:   /82 (BP Location: Right arm, Patient Position: Sitting, Cuff Size: Adult Large)   Pulse 79   Temp 97.5  F (36.4  C) (Tympanic)   Wt 97 kg (213 lb 14.4 oz)   LMP 04/17/2023 (Exact Date)   SpO2 97%   BMI 35.59 kg/m      Physical Exam   Exam:  Constitutional: alert, no distress and over weight  Head: Normocephalic. No masses, lesions, tenderness or abnormalities  Neck: Neck supple. No adenopathy. Thyroid symmetric, normal size,  ENT: ENT exam normal, no neck nodes or sinus tenderness  Cardiovascular: No lifts, heaves, or thrills. RRR. No murmurs, clicks gallops or rub  Respiratory: Good diaphragmatic excursion. Lungs clear  Gastrointestinal: Abdomen soft, non-tender. BS normal. No masses, organomegaly  Psychiatric: mentation appears normal and affect normal/bright      ASSESSMENT/PLAN:   -Obesity Stage 2. Body mass index is 35.59.  -Glucose was normal on 10/27/22.   -TSH was normal on 3/17/23.   -Anxiety and Depression; have not improved, no thoughts of self harm. Will increase sertraline -to 150 mg from 100 mg and reassess in 4 weeks. Sooner with new or worsening symptoms.   -No recent Vit D. Will check today.   -She does smoke, will order sleep study.     URI; symptoms times 4 days, no fevers. No trouble breathing. Oxygen sats 97 percent. Will swab for both Covid and strep. Will notify patient of the results when available and  intervene accordingly. Most likely viral, however, Symptomatic cares encouraged. The lack of efficacy of antibiotics was discussed. The patient will follow up with new or worsening symptoms.     Protein Goal: 70-90 grams  Carb Limit: less than 50 grams  Fat Goal: 60-70% of calories  Calorie Goal: 1200    Initiated a low carb diet. Will avoid a. Went over protein/carbohydrate/fat recommendations. Reminded patient to focus on getting appropriate amounts of protein. Discussed the rational for eating higher fat. This equates to around 60- 70% fat. Limit or avoid sugar, starches, bread products, rice and cereal, most grains and simple carbohydrates. Goal set for 6 lb weight loss over the next 4 weeks. F/U in 4 weeks with myself.

## 2023-04-24 ENCOUNTER — OFFICE VISIT (OUTPATIENT)
Dept: FAMILY MEDICINE | Facility: OTHER | Age: 33
End: 2023-04-24
Attending: NURSE PRACTITIONER
Payer: COMMERCIAL

## 2023-04-24 VITALS
SYSTOLIC BLOOD PRESSURE: 128 MMHG | BODY MASS INDEX: 35.59 KG/M2 | WEIGHT: 213.9 LBS | DIASTOLIC BLOOD PRESSURE: 82 MMHG | TEMPERATURE: 97.5 F | OXYGEN SATURATION: 97 % | HEART RATE: 79 BPM

## 2023-04-24 DIAGNOSIS — F33.0 MAJOR DEPRESSIVE DISORDER, RECURRENT EPISODE, MILD (H): ICD-10-CM

## 2023-04-24 DIAGNOSIS — E66.811 OBESITY (BMI 30.0-34.9): Primary | ICD-10-CM

## 2023-04-24 DIAGNOSIS — Z72.0 TOBACCO ABUSE: ICD-10-CM

## 2023-04-24 DIAGNOSIS — J06.9 ACUTE URI: ICD-10-CM

## 2023-04-24 DIAGNOSIS — F41.1 GAD (GENERALIZED ANXIETY DISORDER): ICD-10-CM

## 2023-04-24 DIAGNOSIS — R06.83 SNORES: ICD-10-CM

## 2023-04-24 LAB
FLUAV RNA SPEC QL NAA+PROBE: NEGATIVE
FLUBV RNA RESP QL NAA+PROBE: NEGATIVE
GROUP A STREP BY PCR: NOT DETECTED
RSV RNA SPEC NAA+PROBE: NEGATIVE
SARS-COV-2 RNA RESP QL NAA+PROBE: NEGATIVE

## 2023-04-24 PROCEDURE — 82306 VITAMIN D 25 HYDROXY: CPT | Mod: ZL | Performed by: NURSE PRACTITIONER

## 2023-04-24 PROCEDURE — 87637 SARSCOV2&INF A&B&RSV AMP PRB: CPT | Mod: ZL | Performed by: NURSE PRACTITIONER

## 2023-04-24 PROCEDURE — G0463 HOSPITAL OUTPT CLINIC VISIT: HCPCS

## 2023-04-24 PROCEDURE — 87651 STREP A DNA AMP PROBE: CPT | Mod: ZL | Performed by: NURSE PRACTITIONER

## 2023-04-24 PROCEDURE — 99214 OFFICE O/P EST MOD 30 MIN: CPT | Mod: CS | Performed by: NURSE PRACTITIONER

## 2023-04-24 PROCEDURE — 36415 COLL VENOUS BLD VENIPUNCTURE: CPT | Mod: ZL | Performed by: NURSE PRACTITIONER

## 2023-04-24 RX ORDER — SERTRALINE HYDROCHLORIDE 100 MG/1
150 TABLET, FILM COATED ORAL DAILY
Qty: 135 TABLET | Refills: 0 | Status: SHIPPED | OUTPATIENT
Start: 2023-04-24 | End: 2023-07-19

## 2023-04-24 ASSESSMENT — PAIN SCALES - GENERAL: PAINLEVEL: MODERATE PAIN (5)

## 2023-04-24 ASSESSMENT — PATIENT HEALTH QUESTIONNAIRE - PHQ9: SUM OF ALL RESPONSES TO PHQ QUESTIONS 1-9: 18

## 2023-04-25 LAB — DEPRECATED CALCIDIOL+CALCIFEROL SERPL-MC: 49 UG/L (ref 20–75)

## 2023-04-28 ENCOUNTER — DOCUMENTATION ONLY (OUTPATIENT)
Dept: SLEEP MEDICINE | Facility: HOSPITAL | Age: 33
End: 2023-04-28
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
## 2023-04-28 NOTE — PROGRESS NOTES
STOP BANG       Name: Jennifer Tatum MRN# [id number]   Age: 32 year old YOB: 1990     Stop Bang questionnaire completed with a score of >3 to allow for HST     Have you been told you snore loudly (louder than talking or loud enough to be heard through doors)? YES    Do you often feel tired, fatigued, or sleepy during the daytime? YES    Has anyone observed you stop breathing during your sleep? NO    Do you have or are you being treated for high blood pressure? NO    Is your BMI greater than 35? YES    Is your neck size circumference 16 inches or greater? YES    Are you over 50 years old? NO    Stop Bang Score (# of yes): 4

## 2023-04-30 NOTE — PROGRESS NOTES
"Chart review prior to sleep testing.    Patient Summary:  32 year old yo female who is referred for sleep-disordered breathing.    Patient Active Problem List    Diagnosis Date Noted     Secondary dysmenorrhea 03/19/2021     Priority: Medium     Mood swings 03/19/2021     Priority: Medium     Tobacco abuse 03/12/2021     Priority: Medium     VINI (generalized anxiety disorder) 03/12/2021     Priority: Medium     Menorrhagia with regular cycle 03/12/2021     Priority: Medium     Female pelvic peritoneal adhesions--Severe 08/20/2020     Priority: Medium     Relationship problem between partners 12/01/2017     Priority: Medium     Major depressive disorder, recurrent episode, mild (H) 05/02/2017     Priority: Medium     ACP (advance care planning) 06/17/2016     Priority: Medium     Advance Care Planning 6/17/2016: ACP Review of Chart / Resources Provided:  Reviewed chart for advance care plan.  Jennifer Tatum has no plan or code status on file. Discussed available resources and provided with information.   Added by Gilbert Titus             Attention deficit hyperactivity disorder (ADHD), predominantly inattentive type 04/01/2016     Priority: Medium     PTSD (post-traumatic stress disorder) 04/01/2016     Priority: Medium       Current Outpatient Medications   Medication     sertraline (ZOLOFT) 100 MG tablet     No current facility-administered medications for this visit.       Pertinent PMHx of obesity, tobacco use, anxiety, depression, ADHD, PTSD.    STOP-BANG score of 4, with unknown neck circumference.  [place] score of 11.  BMI of Estimated body mass index is 35.59 kg/m  as calculated from the following:    Height as of 3/17/23: 1.651 m (5' 5\").    Weight as of 4/24/23: 97 kg (213 lb 14.4 oz).     Per questionnaire: \"Tired daily, don't sleep through the night snore when sleeping.\"    Caffeine use:  Yes for 3+ per day.  No for within 6 hours of bed.    Tobacco use: Yes    Sleep pattern:  Workdays.  8:30pm to " 5:30-6am, total sleep time 4-6 hours.  Weekends.  8:30pm to 5:30-6am, total sleep time 4-6 hours.  Time to fall asleep: ~30-40 minutes.  Awakenings: 2-4 times per night, 30 minutes to return to sleep, awake for total of 30-40 minutes per night.  Napping.  7 days per week, 45 minutes - 2 hours per nap.    No for RLS screen.  No for sleep walking.  No for dream enactment behavior.  Yes for bruxism.    Yes for morning headaches.  Yes for snoring.  No for observed apnea.  Yes for FHx of FREDRICK.    SHx:  Single, lives with 2 children, fiance, brother, cousin.  Working as sub  / para.    A/P:    1.)  High likelihood of FREDRICK with STOP-BANG score of 4.   - Would appear to be candidate for either home sleep testing or in-lab PSG.  - Given that insurance appears to be [facility], will require in-lab PSG.    ---  This note was written with the assistance of the Dragon voice-dictation technology software. The final document, although reviewed, may contain errors. For corrections, please contact the office.    Simon Ordaz MD    Sleep Medicine    [place], [place]  o Main Office: [phone]    [facility] [place], [place]  o [address] [place] [address], [address]  o Schedule visits: [phone]  o Main Office: [phone]  o Fax: [phone]

## 2023-05-01 ENCOUNTER — TELEPHONE (OUTPATIENT)
Dept: PULMONOLOGY | Facility: OTHER | Age: 33
End: 2023-05-01

## 2023-05-24 ENCOUNTER — THERAPY VISIT (OUTPATIENT)
Dept: SLEEP MEDICINE | Facility: HOSPITAL | Age: 33
End: 2023-05-24
Attending: FAMILY MEDICINE
Payer: COMMERCIAL

## 2023-05-24 PROCEDURE — 95810 POLYSOM 6/> YRS 4/> PARAM: CPT

## 2023-05-24 PROCEDURE — 95810 POLYSOM 6/> YRS 4/> PARAM: CPT | Mod: 26 | Performed by: FAMILY MEDICINE

## 2023-05-25 NOTE — PROGRESS NOTES
The patient presents to the sleep center with complaints of sleep disordered breathing. The patient's AHI was 9.9, with the lowest o2 saturation being 91.8%, and the time below 89% was 0 minutes. Sleep onset was at 20:42. A normal sinus rhythm was observed. Multiple limb movements were present during the study. Mild to moderate snoring was noted by the tech. All stages of sleep were observed. The total sleep time was 411 minutes, sleep efficiency was 82.7%, and the sleep latency was 46.6 minutes. The patient tolerated the test well.

## 2023-05-30 NOTE — PROCEDURES
"-   SLEEP STUDY INTERPRETATION  DIAGNOSTIC POLYSOMNOGRAPHY REPORT      Patient: Jennifer Tatum  YOB: 1990  Study Date: 5/24/2023  MRN: [id number]  Referring Provider: Nadya Currie MD  Ordering Provider: Simon Ordaz MD    Indications for Polysomnography: The patient is a 32 year old Female who is 5' 5\" and weighs 213.0 lbs. Her BMI is 35.5, [place] sleepiness scale 11 and neck circumference is - cm. Relevant medical history includes obesity, tobacco use, anxiety, depression, ADHD, PTSD. A diagnostic polysomnogram was performed to evaluate for sleep apnea.    Polysomnogram Data: A full night polysomnogram recorded the standard physiologic parameters including EEG, EOG, EMG, ECG, nasal and oral airflow. Respiratory parameters of chest and abdominal movements were recorded with respiratory inductance plethysmography. Oxygen saturation was recorded by pulse oximetry. Hypopnea scoring rule used: 1B 4%.    Sleep Architecture: Delayed sleep latency and REM latency.  Low percentage of N3.  Increased arousal index.  Small amount of supine REM observed.  The total recording time of the polysomnogram was 497.1 minutes. The total sleep time was 411.0 minutes. Sleep latency was increased at 46.6 minutes without the use of a sleep aid. REM latency was 166.0 minutes. Arousal index was increased at 21.6 arousals per hour. Sleep efficiency was normal at 82.7%. Wake after sleep onset was 39.0 minutes. The patient spent 19.5% of total sleep time in Stage N1, 63.7% in Stage N2, 3.8% in Stage N3, and 13.0% in REM. Time in REM supine was 6.0 minutes.    Respiration: Mild FREDRICK (AHI 9.9) without sleep-associated hypoxemia (terry SpO2 89%).    Events ? The polysomnogram revealed a presence of 66 obstructive, - central, and - mixed apneas resulting in an apnea index of 9.6 events per hour. There were 2 obstructive hypopneas and - central hypopneas resulting in an obstructive hypopnea index of 0.3 and central hypopnea " index of - events per hour. The combined apnea/hypopnea index was 9.9 events per hour (central apnea/hypopnea index was - events per hour). The REM AHI was 28.0 events per hour. The supine AHI was 10.5 events per hour. The RERA index was - events per hour.  The RDI was 9.9 events per hour.    Snoring - was reported as mild to moderate.    Respiratory rate and pattern - was notable for normal respiratory rate and pattern.    Sustained Sleep Associated Hypoventilation - carbon dioxide monitoring was not used, however significant hypoventilation was not suggested by oximetry.    Sleep Associated Hypoxemia - (Greater than 5 minutes O2 sat at or below 88%) was not present. Baseline oxygen saturation was 95.7%. Lowest oxygen saturation was 89.0%. Time spent less than or equal to 88% was 0 minutes. Time spent less than or equal to 89% was 0 minutes.    Movement Activity: Infrequent PLM s.    Periodic Limb Activity - There were 31 PLMs during the entire study. The PLM index was 4.5 movements per hour. The PLM Arousal Index was 1.8 per hour.    REM EMG Activity - Excessive transient/sustained muscle activity was not present.    Nocturnal Behavior - Abnormal sleep related behaviors were not noted during/arising out of NREM / REM sleep.     Bruxism - None apparent.    Cardiac Summary: Appears NSR.  The average pulse rate was 68.4 bpm. The minimum pulse rate was 56.0 bpm while the maximum pulse rate was 94.0 bpm.      Assessment:     Delayed sleep latency and REM latency.  Low percentage of N3.  Increased arousal index.  Small amount of supine REM observed.    Mild FREDRICK (AHI 9.9) without sleep-associated hypoxemia (terry SpO2 89%).    Recommendations:    Consider repeat polysomnography with full night titration of positive airway pressure therapy for the control of sleep disordered breathing.    Based on the presence of mild obstructive sleep apnea and excessive daytime sleepiness, treatment could be empirically initiated with  Auto?titrating PAP therapy with a range of 5 to 15 cmH2O. Recommend clinical follow up with sleep management team.    Patient may be a candidate for dental appliance through referral to Sleep Dentistry for the treatment of obstructive sleep apnea and/or socially disruptive snoring.    If devices are not acceptable or effective, patient may benefit from evaluation of possible surgical options. If she is interested, would recommend referral to specialized ENT-Sleep provider.    Advice regarding the risks of drowsy driving.    Suggest optimizing sleep schedule and avoiding sleep deprivation.    Weight management (if BMI > 30).    Pharmacologic therapy should be used for management of restless legs syndrome only if present and clinically indicated and not based on the presence of periodic limb movements alone.    Diagnostic Codes:   Obstructive Sleep Apnea G47.33     _____________________________________   Electronically Signed By: Simon Ordaz MD (5/30/2023)

## 2023-05-31 ENCOUNTER — MYC MEDICAL ADVICE (OUTPATIENT)
Dept: FAMILY MEDICINE | Facility: OTHER | Age: 33
End: 2023-05-31

## 2023-06-01 ENCOUNTER — HOSPITAL ENCOUNTER (EMERGENCY)
Facility: HOSPITAL | Age: 33
Discharge: HOME | End: 2023-06-01
Attending: NURSE PRACTITIONER | Admitting: NURSE PRACTITIONER
Payer: COMMERCIAL

## 2023-06-01 VITALS
TEMPERATURE: 98.5 F | SYSTOLIC BLOOD PRESSURE: 110 MMHG | DIASTOLIC BLOOD PRESSURE: 67 MMHG | HEART RATE: 79 BPM | OXYGEN SATURATION: 96 % | RESPIRATION RATE: 16 BRPM

## 2023-06-01 DIAGNOSIS — M54.16 LUMBAR BACK PAIN WITH RADICULOPATHY AFFECTING LEFT LOWER EXTREMITY: ICD-10-CM

## 2023-06-01 PROCEDURE — 250N000013 HC RX MED GY IP 250 OP 250 PS 637: Performed by: NURSE PRACTITIONER

## 2023-06-01 PROCEDURE — G0463 HOSPITAL OUTPT CLINIC VISIT: HCPCS | Mod: 25

## 2023-06-01 PROCEDURE — 250N000011 HC RX IP 250 OP 636: Performed by: NURSE PRACTITIONER

## 2023-06-01 PROCEDURE — 99213 OFFICE O/P EST LOW 20 MIN: CPT | Performed by: NURSE PRACTITIONER

## 2023-06-01 PROCEDURE — 96372 THER/PROPH/DIAG INJ SC/IM: CPT | Performed by: NURSE PRACTITIONER

## 2023-06-01 PROCEDURE — 250N000009 HC RX 250: Performed by: NURSE PRACTITIONER

## 2023-06-01 RX ORDER — TIZANIDINE 2 MG/1
2-4 TABLET ORAL 3 TIMES DAILY PRN
Qty: 20 TABLET | Refills: 0 | Status: SHIPPED | OUTPATIENT
Start: 2023-06-01 | End: 2023-07-02

## 2023-06-01 RX ORDER — KETOROLAC TROMETHAMINE 30 MG/ML
60 INJECTION, SOLUTION INTRAMUSCULAR; INTRAVENOUS ONCE
Status: COMPLETED | OUTPATIENT
Start: 2023-06-01 | End: 2023-06-01

## 2023-06-01 RX ORDER — PREDNISONE 10 MG/1
30 TABLET ORAL DAILY
Qty: 15 TABLET | Refills: 0 | Status: SHIPPED | OUTPATIENT
Start: 2023-06-01 | End: 2023-06-06

## 2023-06-01 RX ORDER — LORAZEPAM 1 MG/1
1 TABLET ORAL ONCE
Status: COMPLETED | OUTPATIENT
Start: 2023-06-01 | End: 2023-06-01

## 2023-06-01 RX ORDER — DEXAMETHASONE SODIUM PHOSPHATE 10 MG/ML
10 INJECTION INTRAMUSCULAR; INTRAVENOUS ONCE
Status: COMPLETED | OUTPATIENT
Start: 2023-06-01 | End: 2023-06-01

## 2023-06-01 RX ADMIN — DEXAMETHASONE SODIUM PHOSPHATE 10 MG: 10 INJECTION INTRAMUSCULAR; INTRAVENOUS at 11:20

## 2023-06-01 RX ADMIN — KETOROLAC TROMETHAMINE 60 MG: 30 INJECTION, SOLUTION INTRAMUSCULAR at 11:20

## 2023-06-01 RX ADMIN — LORAZEPAM 1 MG: 1 TABLET ORAL at 11:19

## 2023-06-01 ASSESSMENT — ENCOUNTER SYMPTOMS
WHEEZING: 0
WOUND: 0
COUGH: 0
FEVER: 0
HEMATURIA: 0
DIZZINESS: 0
BACK PAIN: 1
NUMBNESS: 1
CHILLS: 0
DIFFICULTY URINATING: 0
SHORTNESS OF BREATH: 0
FLANK PAIN: 0
JOINT SWELLING: 0
ACTIVITY CHANGE: 1
DIAPHORESIS: 0
COLOR CHANGE: 0
MYALGIAS: 0

## 2023-06-01 ASSESSMENT — ACTIVITIES OF DAILY LIVING (ADL): ADLS_ACUITY_SCORE: 35

## 2023-06-01 NOTE — ED TRIAGE NOTES
"  Patient presents to urgent care for left low back pain that radiates down the left leg into the foot. Patient states her left foot is numb and tingling in her toes, needles in her foot and toes. Pain 8/10.   Ibuprofen around 0700. No injury. Patient just woke with pain in her left leg around 0300, \"can only sit for so long.'    "

## 2023-06-01 NOTE — ED PROVIDER NOTES
[facility] Emergency Department:   History     Chief Complaint   Patient presents with     Back Pain     Jennifer Tatum is a 32 year old female who presents to Urgent care today ambulatory for a chief complaint of left-sided leg pain, low back pain, radiculopathy to the left leg and toes.  History of low back pain, denies history of radiculopathy.  Woke up this morning with leg pain.  No injury    Specific cause: None  Description:   Location of pain: left leg, foot, left lower back  Character of pain: sharp, stabbing and constant  Pain radiation:radiates into the left leg and radiates into the left foot  Intensity: severe  Accompanying Signs & Symptoms:  Fever: no   Numbness or weakness in legs:  YES  Dysuria or Hematuria: no  Bowel or bladder incontinence: no  History: + history of low back pain in the past, never had issues with leg pain or radiculopathy in the past.   Any injury (lifting, bending, twisting): no   Work Injury: no  History of back problems: no prior back problems  Any previous MRI or X-rays: no  Any history of back surgery: no  Any cancer history: no  Precipitating factors: None identified  Worsened by: Lifting, Bending, Standing and Sitting.  Alleviating factors: laying down  Improved by: laying down  Therapies Tried and outcome: ibuprofen 400 mg daily          Allergies:  Allergies   Allergen Reactions     Wellbutrin [Bupropion] Other (See Comments)     Mood changes     Lamictal [Lamotrigine] Rash       Problem List:    Patient Active Problem List    Diagnosis Date Noted     Secondary dysmenorrhea 03/19/2021     Priority: Medium     Mood swings 03/19/2021     Priority: Medium     Tobacco abuse 03/12/2021     Priority: Medium     VINI (generalized anxiety disorder) 03/12/2021     Priority: Medium     Menorrhagia with regular cycle 03/12/2021     Priority: Medium     Female pelvic peritoneal adhesions--Severe 08/20/2020     Priority: Medium     Relationship problem between partners  "2017     Priority: Medium     Major depressive disorder, recurrent episode, mild (H) 2017     Priority: Medium     ACP (advance care planning) 2016     Priority: Medium     Advance Care Planning 2016: ACP Review of Chart / Resources Provided:  Reviewed chart for advance care plan.  Jennifer Tatum has no plan or code status on file. Discussed available resources and provided with information.   Added by Gilbert Titus             Attention deficit hyperactivity disorder (ADHD), predominantly inattentive type 2016     Priority: Medium     PTSD (post-traumatic stress disorder) 2016     Priority: Medium        Past Medical History:    Past Medical History:   Diagnosis Date     Anxiety 2013     Attention deficit hyperactivity disorder (ADHD), predominantly inattentive type 2016     Complicated grieving 2017     Depressive disorder      Female pelvic peritoneal adhesions--Severe 2020     VINI (generalized anxiety disorder) 2021     H/O nephrolithotomy with removal of calculi      Incompetent cervix 11/10/2015     IUFD (intrauterine fetal death) 2016     Major depressive disorder, recurrent episode, mild (H) 2017     Menorrhagia with regular cycle 2021     Mild major depression (H) 2013     Mood swings 2021     Nephrolithiasis      Pelvic peritoneal adhesions, female 2020     PTSD (post-traumatic stress disorder) 2016     Secondary dysmenorrhea 2021     Smoker 2013     Tobacco abuse 2021       Past Surgical History:    Past Surgical History:   Procedure Laterality Date     CERCLAGE CERVICAL N/A 11/10/2015    Procedure: CERCLAGE CERVICAL;  Surgeon: Tucker Barragan MD;  [facility]: [facility] L[facility]      SECTION  2012    Pregnancy full-term; \"Oleksandr\"       SECTION N/A 2016    Procedure:  SECTION;  Surgeon: Nisha Mcleod MD;  Location: [facility] OR      SECTION N/A 2018 "     EYE SURGERY       HC NEPHROSTOMY PERCUTUTANEOUS      left     impacted wisdom teeth removal       LAPAROSCOPIC TUBAL LIGATION Bilateral 08/20/2020    Procedure: LAPAROSCOPIC BILATERAL FALLOPIAN TUBE FULGRATION;  Surgeon: Frow, David Franke, MD;  Location: [facility] OR       Family History:    Family History   Problem Relation Age of Onset     Hypothyroidism Mother      Alcohol/Drug Father         alcoholism     Psychotic Disorder Father         Bipolar     Breast Cancer Paternal Grandmother 45     Cerebrovascular Disease Other         Cerebrovascular accident     C.A.D. Other      Hypertension Other      Diabetes Other        Social History:  Marital Status:  Single [1]  Social History     Tobacco Use     Smoking status: Every Day     Packs/day: 1.00     Years: 10.00     Pack years: 10.00     Types: Cigarettes, Cigars     Smokeless tobacco: Never     Tobacco comments:     declines quitplan referral 9/8/2020   Vaping Use     Vaping status: Never Used   Substance Use Topics     Alcohol use: No     Drug use: No        Medications:    predniSONE (DELTASONE) 10 MG tablet  sertraline (ZOLOFT) 100 MG tablet  tiZANidine (ZANAFLEX) 2 MG tablet      Review of Systems   Constitutional: Positive for activity change (due to pain). Negative for chills, diaphoresis and fever.   Respiratory: Negative for cough, shortness of breath and wheezing.    Genitourinary: Negative for decreased urine volume, difficulty urinating, flank pain and hematuria.   Musculoskeletal: Positive for back pain and gait problem (able to ambulate, but slow and deliberate due to pain). Negative for joint swelling and myalgias.   Skin: Negative for color change, pallor, rash and wound.   Neurological: Positive for numbness (dull feeling in left foot). Negative for dizziness.       Physical Exam   BP: 110/67  Pulse: 79  Temp: 98.5  F (36.9  C)  Resp: 16  SpO2: 96 %      Physical Exam  Vitals and nursing note reviewed.   Constitutional:       Appearance: Normal  appearance.   Cardiovascular:      Rate and Rhythm: Normal rate.   Pulmonary:      Effort: Pulmonary effort is normal.   Musculoskeletal:      Cervical back: Normal range of motion.      Lumbar back: Spasms (left lower back and hip), tenderness and bony tenderness (lumbar spine) present. No swelling, deformity, signs of trauma or lacerations. Normal range of motion (ROM not decreased but pain with movment).      Comments: Back: No obvious ecchymosis, edema or erythema.  BLE strength: WNL  Able to stand on heels and toes  Patellar reflexes intact  Bilateral pedal pulses intact  Sensation intact to left foot, able to distinguish between sharp and dull, capillary refill < 3 seconds bilateral LE's   Neurological:      Mental Status: She is alert.         ED Course     No imaging indicated, she is less than 50 years old, no history of cancer, no injury   Reviewed Lumbar spine x-ray completed 5/22/21:     Findings: Bones are normally mineralized. No evidence of acute or  subacute fracture. No evidence of acute subluxation. Disc spaces are  well-maintained.                                                      Impression:  No evidence of acute or subacute bony abnormality. Negative x-rays of  the lumbar spine.     Medications   ketorolac (TORADOL) injection 60 mg (60 mg Intramuscular $Given 6/1/23 1120)   LORazepam (ATIVAN) tablet 1 mg (1 mg Oral $Given 6/1/23 1119)   dexamethasone (DECADRON) injectable solution used ORALLY 10 mg (10 mg Oral $Given 6/1/23 1120)     She was monitored for a protracted time, reports her pain is significantly improved.  She has a ride home from a family member.    Assessments & Plan (with Medical Decision Making)     I have reviewed the nursing notes.    I have reviewed the findings, diagnosis, plan and need for follow up with the patient.      Discharge Medication List as of 6/1/2023 11:49 AM      START taking these medications    Details   predniSONE (DELTASONE) 10 MG tablet Take 3 tablets  "(30 mg) by mouth daily for 5 days, Disp-15 tablet, R-0, E-Prescribe      tiZANidine (ZANAFLEX) 2 MG tablet Take 1-2 tablets (2-4 mg) by mouth 3 times daily as needed for muscle spasms (back pain), Disp-20 tablet, R-0, E-Prescribe             Final diagnoses:   Lumbar back pain with radiculopathy affecting left lower extremity     (M54.16) Lumbar back pain with radiculopathy affecting left lower extremity  Comment: improved with medication therapy, no red flag symptomes  Plan: Rest, ice/heat for comfort   Prednisone for pain/inflammation   Zanaflex for muscle relaxer.  do not drive or participate in activities that require mental alertness while taking   Back stretches and exercises as discussed and per discharge instructions   Chiropractics or PT as warranted   Patient verbally educated and given appropriate education sheets for their diagnoses and has no questions.   Take medications as directed.    Return to ED/UC if symptoms increase or concerns develop such as those discussed and listed on the \"When to go the Emergency Room\" portion of your discharge instructions.     Follow up with your Primary Care provider if symptoms do not improve in 3 days      6/1/2023   [facility]     Rosa Logan NP  06/01/23 5810    "

## 2023-06-02 NOTE — PROGRESS NOTES
"Jennifer Tatum is a 32 year old female who is being evaluated via in-person clinic visit.       Visit Details     In-clinic visit for review of PSG results.     Assessment / Plan:    1.)  Mild FREDRICK (AHI 9.9) without sleep-associated hypoxemia.  -Comorbid ADHD, PTSD, depression, anxiety, obesity  - Presenting concerns of daytime fatigue, snoring  - We discussed that this amount of obstructive sleep apnea is not felt to have a significant increase in long-term cardiovascular factors, but I do feel treatment is medically necessary for reducing daytime sleepiness, but her comorbid mood disorders.  - We discussed treatment option including CPAP, oral appliances, weight management, upper airway surgery, upper airway stimulation.  - We agreed to proceed with CPAP auto titrate 5-15 cm H2O, orders placed today.      SUBJECTIVE:  Jennifer Tatum is a 32 year old female.    32 year old yo female who is referred for sleep-disordered breathing.    Pertinent PMHx of obesity, tobacco use, anxiety, depression, ADHD, PTSD.     STOP-BANG score of 4, with unknown neck circumference.  Lyman score of 11.  BMI of Estimated body mass index is 35.59 kg/m  as calculated from the following:    Height as of 3/17/23: 1.651 m (5' 5\").    Weight as of 4/24/23: 97 kg (213 lb 14.4 oz).      Today -we reviewed her sleep history more detail.  She summarizes her sleep concerns as worsening daytime fatigue and sleepiness, disruptive snoring, challenges with weight management.    Per questionnaire: \"Tired daily, don't sleep through the night snore when sleeping.\"     Caffeine use:  Yes for 3+ per day.  No for within 6 hours of bed.     Tobacco use: Yes     Sleep pattern:  Workdays.  8:30pm to 5:30-6am, total sleep time 4-6 hours.  Weekends.  8:30pm to 5:30-6am, total sleep time 4-6 hours.  Time to fall asleep: ~30-40 minutes.  Awakenings: 2-4 times per night, 30 minutes to return to sleep, awake for total of 30-40 minutes per night.  Napping.  7 days " "per week, 45 minutes - 2 hours per nap.     No for RLS screen.  No for sleep walking.  No for dream enactment behavior.  Yes for bruxism.     Yes for morning headaches.  Yes for snoring.  No for observed apnea.  Yes for FHx of FREDRICK in her mother and multiple maternal uncles and aunts.     SHx:  Single, lives with 2 children, fiance, brother, cousin.  Working as sub  / para.    SLEEP STUDY INTERPRETATION  DIAGNOSTIC POLYSOMNOGRAPHY REPORT        Patient: Jennifer Tatum  YOB: 1990  Study Date: 5/24/2023  MRN: [id number]  Referring Provider: Nadya Currie MD  Ordering Provider: Simon Ordaz MD     Indications for Polysomnography: The patient is a 32 year old Female who is 5' 5\" and weighs 213.0 lbs. Her BMI is 35.5, [place] sleepiness scale 11 and neck circumference is - cm. Relevant medical history includes obesity, tobacco use, anxiety, depression, ADHD, PTSD. A diagnostic polysomnogram was performed to evaluate for sleep apnea.     Polysomnogram Data: A full night polysomnogram recorded the standard physiologic parameters including EEG, EOG, EMG, ECG, nasal and oral airflow. Respiratory parameters of chest and abdominal movements were recorded with respiratory inductance plethysmography. Oxygen saturation was recorded by pulse oximetry. Hypopnea scoring rule used: 1B 4%.     Sleep Architecture: Delayed sleep latency and REM latency.  Low percentage of N3.  Increased arousal index.  Small amount of supine REM observed.  The total recording time of the polysomnogram was 497.1 minutes. The total sleep time was 411.0 minutes. Sleep latency was increased at 46.6 minutes without the use of a sleep aid. REM latency was 166.0 minutes. Arousal index was increased at 21.6 arousals per hour. Sleep efficiency was normal at 82.7%. Wake after sleep onset was 39.0 minutes. The patient spent 19.5% of total sleep time in Stage N1, 63.7% in Stage N2, 3.8% in Stage N3, and 13.0% in REM. Time in REM supine " was 6.0 minutes.     Respiration: Mild FREDRICK (AHI 9.9) without sleep-associated hypoxemia (terry SpO2 89%).    Events ? The polysomnogram revealed a presence of 66 obstructive, - central, and - mixed apneas resulting in an apnea index of 9.6 events per hour. There were 2 obstructive hypopneas and - central hypopneas resulting in an obstructive hypopnea index of 0.3 and central hypopnea index of - events per hour. The combined apnea/hypopnea index was 9.9 events per hour (central apnea/hypopnea index was - events per hour). The REM AHI was 28.0 events per hour. The supine AHI was 10.5 events per hour. The RERA index was - events per hour.  The RDI was 9.9 events per hour.    Snoring - was reported as mild to moderate.    Respiratory rate and pattern - was notable for normal respiratory rate and pattern.    Sustained Sleep Associated Hypoventilation - carbon dioxide monitoring was not used, however significant hypoventilation was not suggested by oximetry.    Sleep Associated Hypoxemia - (Greater than 5 minutes O2 sat at or below 88%) was not present. Baseline oxygen saturation was 95.7%. Lowest oxygen saturation was 89.0%. Time spent less than or equal to 88% was 0 minutes. Time spent less than or equal to 89% was 0 minutes.     Movement Activity: Infrequent PLM s.    Periodic Limb Activity - There were 31 PLMs during the entire study. The PLM index was 4.5 movements per hour. The PLM Arousal Index was 1.8 per hour.    REM EMG Activity - Excessive transient/sustained muscle activity was not present.    Nocturnal Behavior - Abnormal sleep related behaviors were not noted during/arising out of NREM / REM sleep.     Bruxism - None apparent.     Cardiac Summary: Appears NSR.  The average pulse rate was 68.4 bpm. The minimum pulse rate was 56.0 bpm while the maximum pulse rate was 94.0 bpm.       Assessment:     Delayed sleep latency and REM latency.  Low percentage of N3.  Increased arousal index.  Small amount of supine  REM observed.    Mild FREDRICK (AHI 9.9) without sleep-associated hypoxemia (terry SpO2 89%).     Recommendations:    Consider repeat polysomnography with full night titration of positive airway pressure therapy for the control of sleep disordered breathing.    Based on the presence of mild obstructive sleep apnea and excessive daytime sleepiness, treatment could be empirically initiated with Auto?titrating PAP therapy with a range of 5 to 15 cmH2O. Recommend clinical follow up with sleep management team.    Patient may be a candidate for dental appliance through referral to Sleep Dentistry for the treatment of obstructive sleep apnea and/or socially disruptive snoring.    If devices are not acceptable or effective, patient may benefit from evaluation of possible surgical options. If she is interested, would recommend referral to specialized ENT-Sleep provider.    Advice regarding the risks of drowsy driving.    Suggest optimizing sleep schedule and avoiding sleep deprivation.    Weight management (if BMI > 30).    Pharmacologic therapy should be used for management of restless legs syndrome only if present and clinically indicated and not based on the presence of periodic limb movements alone.     Diagnostic Codes:   Obstructive Sleep Apnea G47.33     _____________________________________   Electronically Signed By: Simon Ordaz MD (5/30/2023)         Past medical history:    Patient Active Problem List    Diagnosis Date Noted     Secondary dysmenorrhea 03/19/2021     Priority: Medium     Mood swings 03/19/2021     Priority: Medium     Tobacco abuse 03/12/2021     Priority: Medium     VINI (generalized anxiety disorder) 03/12/2021     Priority: Medium     Menorrhagia with regular cycle 03/12/2021     Priority: Medium     Female pelvic peritoneal adhesions--Severe 08/20/2020     Priority: Medium     Relationship problem between partners 12/01/2017     Priority: Medium     Major depressive disorder, recurrent episode,  mild (H) 05/02/2017     Priority: Medium     ACP (advance care planning) 06/17/2016     Priority: Medium     Advance Care Planning 6/17/2016: ACP Review of Chart / Resources Provided:  Reviewed chart for advance care plan.  Jennifer Tatum has no plan or code status on file. Discussed available resources and provided with information.   Added by Gilbert Titus             Attention deficit hyperactivity disorder (ADHD), predominantly inattentive type 04/01/2016     Priority: Medium     PTSD (post-traumatic stress disorder) 04/01/2016     Priority: Medium       10 point ROS of systems including Constitutional, Eyes, Respiratory, Cardiovascular, Gastroenterology, Genitourinary, Integumentary, Muscularskeletal, Psychiatric were all negative except for pertinent positives noted in my HPI.    Current Outpatient Medications   Medication Sig Dispense Refill     predniSONE (DELTASONE) 10 MG tablet Take 3 tablets (30 mg) by mouth daily for 5 days 15 tablet 0     sertraline (ZOLOFT) 100 MG tablet Take 1.5 tablets (150 mg) by mouth daily for 90 days 135 tablet 0     tiZANidine (ZANAFLEX) 2 MG tablet Take 1-2 tablets (2-4 mg) by mouth 3 times daily as needed for muscle spasms (back pain) 20 tablet 0       OBJECTIVE:  LMP 04/17/2023 (Exact Date)     Physical Exam     ---  This note was written with the assistance of the Dragon voice-dictation technology software. The final document, although reviewed, may contain errors. For corrections, please contact the office.    Total time spent preparing to see the patient, review of chart, obtaining history and physical examination, review of sleep testing, review of treatment options, education, discussion with patient and documenting in Epic / EMR was 30 minutes.  All time involved was spent on the day of service for the patient (the same day as the patient's appointment).    Simon Ordaz MD    Sleep Medicine    [facility]  Office: [phone]    [facility] - [facility], [facility] - IVONNE Mendoza  o [address] [place] [address], [place], [address]  o Schedule visits: [phone]  o Main Office: [phone]  o Fax: [phone]

## 2023-06-05 ENCOUNTER — OFFICE VISIT (OUTPATIENT)
Dept: PULMONOLOGY | Facility: OTHER | Age: 33
End: 2023-06-05
Attending: FAMILY MEDICINE
Payer: COMMERCIAL

## 2023-06-05 DIAGNOSIS — G47.33 OSA (OBSTRUCTIVE SLEEP APNEA): Primary | ICD-10-CM

## 2023-06-05 DIAGNOSIS — F33.0 MAJOR DEPRESSIVE DISORDER, RECURRENT EPISODE, MILD (H): ICD-10-CM

## 2023-06-05 DIAGNOSIS — F43.10 PTSD (POST-TRAUMATIC STRESS DISORDER): ICD-10-CM

## 2023-06-05 DIAGNOSIS — F41.1 GAD (GENERALIZED ANXIETY DISORDER): ICD-10-CM

## 2023-06-05 PROCEDURE — G0463 HOSPITAL OUTPT CLINIC VISIT: HCPCS

## 2023-06-05 PROCEDURE — 99214 OFFICE O/P EST MOD 30 MIN: CPT | Performed by: FAMILY MEDICINE

## 2023-06-05 NOTE — PROGRESS NOTES
Sleep Medicine Clinic Rooming Note    Patient was identified appropriately by name and date of birth.    Medication Reconciliation: complete    Chief complaint: sleep study results      Height: 65 ft/inches  Weight: 211 lbs  SpO2: 98  HR: 67  BP: 128/80  Neck circumference: 16 inches     [place] Sleepiness Scale    How likely are you to doze off or fall asleep in the following situations, in contrast to just feeling tired?    This refers to your usual way of life recently.    Even if you haven't done some of these things recently, try to figure out how they would have affected you.    Use the following scale to choose the most appropriate number for each situation:  0 = NO CHANCE of dozing  1 = SLIGHT CHANCE of dozing  2 = MODERATE CHANCE of dozing  3 = HIGH CHANCE of dozing    Sitting and Readin  Watching television: 2  Sitting inactive in a public place:2  Riding in car:3  Laying down to rest in the afternoon:3  Sitting and talking to someone:0  Sitting quietly after lunch without alcohol:2  In a car, stopped in traffic for a few minutes:0    Score: 12    DME needs: -    Additional Notes: -

## 2023-06-06 ENCOUNTER — TELEPHONE (OUTPATIENT)
Dept: HOME HEALTH SERVICES | Facility: CLINIC | Age: 33
End: 2023-06-06
Payer: COMMERCIAL

## 2023-06-07 NOTE — TELEPHONE ENCOUNTER
Thank you for the update. Please let us know if there is anything else you need once this patient is scheduled.

## 2023-06-16 ENCOUNTER — DOCUMENTATION ONLY (OUTPATIENT)
Dept: HOME HEALTH SERVICES | Facility: CLINIC | Age: 33
End: 2023-06-16
Payer: COMMERCIAL

## 2023-06-16 DIAGNOSIS — G47.33 OSA (OBSTRUCTIVE SLEEP APNEA): Primary | ICD-10-CM

## 2023-06-16 NOTE — PROGRESS NOTES
Patient was offered choice of vendor and chose [facility].  Patient Jennifer Tatum was set up at [facility] [place] on June 16, 2023. Patient received a Resmed Airsense 11 Pressures were set at 5-15 cm H2O.   Patient s ramp is 5 cm H2O for Off and FLEX/EPR is 2.  Patient received a Resmed Mask name: Airfit F30  Full Face mask size Small, heated tubing and heated humidifier.  Patient has the following compliance requirements: usage only  Patient has a follow up on 7/18/23 with Dr. Ordaz.    Anthony Church

## 2023-07-02 ENCOUNTER — HOSPITAL ENCOUNTER (EMERGENCY)
Facility: HOSPITAL | Age: 33
Discharge: HOME | End: 2023-07-02
Payer: COMMERCIAL

## 2023-07-02 VITALS
WEIGHT: 213.85 LBS | HEART RATE: 71 BPM | SYSTOLIC BLOOD PRESSURE: 117 MMHG | DIASTOLIC BLOOD PRESSURE: 71 MMHG | TEMPERATURE: 98.1 F | RESPIRATION RATE: 16 BRPM | OXYGEN SATURATION: 98 % | BODY MASS INDEX: 35.59 KG/M2

## 2023-07-02 DIAGNOSIS — M54.9 BACK PAIN: ICD-10-CM

## 2023-07-02 PROCEDURE — G0463 HOSPITAL OUTPT CLINIC VISIT: HCPCS | Mod: 25

## 2023-07-02 PROCEDURE — 250N000009 HC RX 250

## 2023-07-02 PROCEDURE — 250N000011 HC RX IP 250 OP 636: Mod: JZ

## 2023-07-02 PROCEDURE — 96372 THER/PROPH/DIAG INJ SC/IM: CPT

## 2023-07-02 PROCEDURE — 99213 OFFICE O/P EST LOW 20 MIN: CPT

## 2023-07-02 RX ORDER — KETOROLAC TROMETHAMINE 30 MG/ML
30 INJECTION, SOLUTION INTRAMUSCULAR; INTRAVENOUS ONCE
Status: COMPLETED | OUTPATIENT
Start: 2023-07-02 | End: 2023-07-02

## 2023-07-02 RX ORDER — NAPROXEN 500 MG/1
500 TABLET ORAL 2 TIMES DAILY WITH MEALS
Qty: 24 TABLET | Refills: 0 | Status: SHIPPED | OUTPATIENT
Start: 2023-07-02 | End: 2023-07-10

## 2023-07-02 RX ORDER — DEXAMETHASONE SODIUM PHOSPHATE 10 MG/ML
10 INJECTION INTRAMUSCULAR; INTRAVENOUS ONCE
Status: COMPLETED | OUTPATIENT
Start: 2023-07-02 | End: 2023-07-02

## 2023-07-02 RX ORDER — TIZANIDINE 2 MG/1
4 TABLET ORAL 2 TIMES DAILY
Qty: 30 TABLET | Refills: 0 | Status: SHIPPED | OUTPATIENT
Start: 2023-07-02 | End: 2023-07-10

## 2023-07-02 RX ADMIN — DEXAMETHASONE SODIUM PHOSPHATE 10 MG: 10 INJECTION INTRAMUSCULAR; INTRAVENOUS at 13:27

## 2023-07-02 RX ADMIN — KETOROLAC TROMETHAMINE 30 MG: 30 INJECTION, SOLUTION INTRAMUSCULAR; INTRAVENOUS at 13:27

## 2023-07-02 ASSESSMENT — ENCOUNTER SYMPTOMS
BACK PAIN: 1
NECK PAIN: 0
NUMBNESS: 1
SHORTNESS OF BREATH: 0
ABDOMINAL PAIN: 0
APPETITE CHANGE: 0
DIARRHEA: 0
HEMATURIA: 0
NAUSEA: 0
DIZZINESS: 0
COUGH: 0
ARTHRALGIAS: 0
VOMITING: 0
ACTIVITY CHANGE: 1
LIGHT-HEADEDNESS: 0
COLOR CHANGE: 0
DYSURIA: 0

## 2023-07-02 ASSESSMENT — ACTIVITIES OF DAILY LIVING (ADL): ADLS_ACUITY_SCORE: 33

## 2023-07-02 NOTE — ED TRIAGE NOTES
Pt presents with c/o lower back pain.   Pt was recently seen for a pinched sciatica nerve pain and is now thinking having a flare up of it again.   Pain is mostly localized to lower back pt states sometimes it radiates down the left leg but not often   Denies any injury   S/x started on Friday   Pt has had ibuprofen today the last time at 1100 which did not help

## 2023-07-02 NOTE — DISCHARGE INSTRUCTIONS
Naproxen 500mg twice daily for 5-7 days, max of 1500mg in 24 hours.    Tylenol 1000mg every 6 hours, max of 4000mg in 24 hours.     Do not take ibuprofen while taking the narpoxen    Follow up with PCP this week. Consider physical therapy.     Return with any numbness to groin/buttocks, loss of bowel/bladder, or concerns

## 2023-07-02 NOTE — ED PROVIDER NOTES
History     Chief Complaint   Patient presents with     Back Pain     HPI  Jennifer Tatum is a 32 year old female who presents to the urgent care with complaints of a 3 day history of lower back pain into left SI and left leg. She has a history of similar pain and was seen on 6/1. She had been going to see her chiropractor frequently and has since tapered down how often she is going to once per week. She has taken tylenol and ibuprofen without relief. Denies saddle paraesthesias, loss of bowel/bladder, falls, and injuries. She also denies abd pain, n/v/d, hematuria, and dysuria. Able to bear weight and ambulate.     Allergies:  Allergies   Allergen Reactions     Wellbutrin [Bupropion] Other (See Comments)     Mood changes     Lamictal [Lamotrigine] Rash       Problem List:    Patient Active Problem List    Diagnosis Date Noted     FREDRICK (obstructive sleep apnea) 06/05/2023     Priority: Medium     Secondary dysmenorrhea 03/19/2021     Priority: Medium     Mood swings 03/19/2021     Priority: Medium     Tobacco abuse 03/12/2021     Priority: Medium     VINI (generalized anxiety disorder) 03/12/2021     Priority: Medium     Menorrhagia with regular cycle 03/12/2021     Priority: Medium     Female pelvic peritoneal adhesions--Severe 08/20/2020     Priority: Medium     Relationship problem between partners 12/01/2017     Priority: Medium     Major depressive disorder, recurrent episode, mild (H) 05/02/2017     Priority: Medium     ACP (advance care planning) 06/17/2016     Priority: Medium     Advance Care Planning 6/17/2016: ACP Review of Chart / Resources Provided:  Reviewed chart for advance care plan.  Jennifer Tatum has no plan or code status on file. Discussed available resources and provided with information.   Added by Gilbert Titus             Attention deficit hyperactivity disorder (ADHD), predominantly inattentive type 04/01/2016     Priority: Medium     PTSD (post-traumatic stress disorder) 04/01/2016      "Priority: Medium        Past Medical History:    Past Medical History:   Diagnosis Date     Anxiety 2013     Attention deficit hyperactivity disorder (ADHD), predominantly inattentive type 2016     Complicated grieving 2017     Depressive disorder      Female pelvic peritoneal adhesions--Severe 2020     VINI (generalized anxiety disorder) 2021     H/O nephrolithotomy with removal of calculi      Incompetent cervix 11/10/2015     IUFD (intrauterine fetal death) 2016     Major depressive disorder, recurrent episode, mild (H) 2017     Menorrhagia with regular cycle 2021     Mild major depression (H) 2013     Mood swings 2021     Nephrolithiasis      Pelvic peritoneal adhesions, female 2020     PTSD (post-traumatic stress disorder) 2016     Secondary dysmenorrhea 2021     Smoker 2013     Tobacco abuse 2021       Past Surgical History:    Past Surgical History:   Procedure Laterality Date     CERCLAGE CERVICAL N/A 11/10/2015    Procedure: CERCLAGE CERVICAL;  Surgeon: Tucker Barragan MD;  [facility]: [facility] L[facility]      SECTION  2012    Pregnancy full-term; \"Oleksandr\"       SECTION N/A 2016    Procedure:  SECTION;  Surgeon: Nisha Mcleod MD;  Location: [facility] OR      SECTION N/A 2018     EYE SURGERY       HC NEPHROSTOMY PERCUTUTANEOUS      left     impacted wisdom teeth removal       LAPAROSCOPIC TUBAL LIGATION Bilateral 2020    Procedure: LAPAROSCOPIC BILATERAL FALLOPIAN TUBE FULGRATION;  Surgeon: Frow, David Franke, MD;  Location: [facility] OR       Family History:    Family History   Problem Relation Age of Onset     Hypothyroidism Mother      Alcohol/Drug Father         alcoholism     Psychotic Disorder Father         Bipolar     Breast Cancer Paternal Grandmother 45     Cerebrovascular Disease Other         Cerebrovascular accident     C.A.D. Other      Hypertension Other      Diabetes Other  "       Social History:  Marital Status:  Single [1]  Social History     Tobacco Use     Smoking status: Every Day     Packs/day: 1.00     Years: 10.00     Pack years: 10.00     Types: Cigarettes, Cigars     Smokeless tobacco: Never     Tobacco comments:     declines quitplan referral 9/8/2020   Vaping Use     Vaping Use: Never used   Substance Use Topics     Alcohol use: No     Drug use: No        Medications:    naproxen (NAPROSYN) 500 MG tablet  sertraline (ZOLOFT) 100 MG tablet  tiZANidine (ZANAFLEX) 2 MG tablet          Review of Systems   Constitutional: Positive for activity change. Negative for appetite change.   Respiratory: Negative for cough and shortness of breath.    Cardiovascular: Negative for chest pain.   Gastrointestinal: Negative for abdominal pain, diarrhea, nausea and vomiting.   Genitourinary: Negative for decreased urine volume, dysuria and hematuria.   Musculoskeletal: Positive for back pain and gait problem. Negative for arthralgias and neck pain.   Skin: Negative for color change, pallor and rash.   Neurological: Positive for numbness (intermittent left leg/foot). Negative for dizziness and light-headedness.   All other systems reviewed and are negative.      Physical Exam   BP: 117/71  Pulse: 71  Temp: 98.1  F (36.7  C)  Resp: 16  Weight: 97 kg (213 lb 13.5 oz)  SpO2: 98 %      Physical Exam  Vitals and nursing note reviewed.   Constitutional:       General: She is in acute distress.      Appearance: Normal appearance. She is not ill-appearing.   Cardiovascular:      Rate and Rhythm: Normal rate and regular rhythm.      Pulses: Normal pulses.      Heart sounds: Normal heart sounds. No murmur heard.  Pulmonary:      Effort: Pulmonary effort is normal. No respiratory distress.      Breath sounds: Normal breath sounds. No stridor. No wheezing, rhonchi or rales.   Musculoskeletal:         General: Tenderness present. No swelling, deformity or signs of injury.      Cervical back: No tenderness or  bony tenderness. No pain with movement.      Thoracic back: No tenderness or bony tenderness.      Lumbar back: Spasms, tenderness and bony tenderness present. No swelling, edema, deformity or signs of trauma. Decreased range of motion. Positive left straight leg raise test.      Comments: No step offs, edema, or erythema    Skin:     General: Skin is warm and dry.      Capillary Refill: Capillary refill takes less than 2 seconds.   Neurological:      General: No focal deficit present.      Mental Status: She is alert and oriented to person, place, and time.      Motor: No weakness.      Gait: Gait normal.         ED Course                 Procedures             No results found for this or any previous visit (from the past 24 hour(s)).    Medications   ketorolac (TORADOL) injection 30 mg (30 mg Intramuscular $Given 7/2/23 6433)   dexamethasone (DECADRON) injectable solution used ORALLY 10 mg (10 mg Oral $Given 7/2/23 1327)       Assessments & Plan (with Medical Decision Making)     I have reviewed the nursing notes.    I have reviewed the findings, diagnosis, plan and need for follow up with the patient.  Jennifer Tatum is a 32 year old female who presents to the urgent care with complaints of a 3 day history of lower back pain into left SI and left leg. She has a history of similar pain and was seen on 6/1. She had been going to see her chiropractor frequently and has since tapered down how often she is going to once per week. She has taken tylenol and ibuprofen without relief. Denies saddle paraesthesias, loss of bowel/bladder, falls, and injuries. She also denies abd pain, n/v/d, hematuria, and dysuria. Able to bear weight and ambulate.     MDM: VSS and afebrile. Lungs clear, heart tones regular. There is no edema or stepoffs. No spinal tenderness. Tenderness over left SI that radiates down into left leg. No saddle paraesthesias or loss of bowel/bladder. No injuries. Less suspicious for cauda equina. She is able  to bear weight and ambulate. Discussed treating with short burst of prednisone and muscle relaxers. Jennifer states that she was unable tolerate the prednisone when she took it in June. Discussed other options. Will prescribe naproxen. Advised against taking ibuprofen with naproxen. Will also prescribe more tizanidine. Educated on no driving or drinking alcohol while taking. Also discussed returning with any saddle paraesthesias or loss of bowel/bladder. toradol injection and oral decadron given in UC at time of discharge. She has tolerated these in the past.     (M54.9) Back pain  Plan: naproxen and tizanidine prescribed. Naproxen 500mg twice daily for 5-7 days, max of 1500mg in 24 hours.    Tylenol 1000mg every 6 hours, max of 4000mg in 24 hours.     Do not take ibuprofen while taking the narpoxen    Follow up with PCP this week. Consider physical therapy.     Return with any numbness to groin/buttocks, loss of bowel/bladder, or concerns. Understanding verbalized.         Discharge Medication List as of 7/2/2023  1:24 PM          Final diagnoses:   Back pain       7/2/2023   [facility]     Vashti Minaya NP  07/02/23 4563

## 2023-07-18 ENCOUNTER — VIRTUAL VISIT (OUTPATIENT)
Dept: PULMONOLOGY | Facility: OTHER | Age: 33
End: 2023-07-18
Attending: FAMILY MEDICINE
Payer: COMMERCIAL

## 2023-07-18 VITALS — BODY MASS INDEX: 36.15 KG/M2 | HEIGHT: 65 IN | WEIGHT: 217 LBS

## 2023-07-18 DIAGNOSIS — G47.33 OSA (OBSTRUCTIVE SLEEP APNEA): Primary | ICD-10-CM

## 2023-07-18 DIAGNOSIS — F33.0 MAJOR DEPRESSIVE DISORDER, RECURRENT EPISODE, MILD (H): ICD-10-CM

## 2023-07-18 DIAGNOSIS — F41.1 GAD (GENERALIZED ANXIETY DISORDER): ICD-10-CM

## 2023-07-18 DIAGNOSIS — E66.812 CLASS 2 SEVERE OBESITY DUE TO EXCESS CALORIES WITH SERIOUS COMORBIDITY IN ADULT, UNSPECIFIED BMI (H): ICD-10-CM

## 2023-07-18 DIAGNOSIS — E66.01 CLASS 2 SEVERE OBESITY DUE TO EXCESS CALORIES WITH SERIOUS COMORBIDITY IN ADULT, UNSPECIFIED BMI (H): ICD-10-CM

## 2023-07-18 PROCEDURE — 99213 OFFICE O/P EST LOW 20 MIN: CPT | Mod: 95 | Performed by: FAMILY MEDICINE

## 2023-07-18 ASSESSMENT — PAIN SCALES - GENERAL: PAINLEVEL: NO PAIN (0)

## 2023-07-18 NOTE — PROGRESS NOTES
"Jennifer Tatum is a 32 year old female who is being evaluated via a billable video visit.       The patient has been notified of following:      \"This video visit will be conducted via a call between you and your physician/provider. We have found that certain health care needs can be provided without the need for an in-person physical exam.  This service lets us provide the care you need with a video conversation.  If a prescription is necessary we can send it directly to your pharmacy.  If lab work is needed we can place an order for that and you can then stop by our lab to have the test done at a later time.     Video visits are billed at different rates depending on your insurance coverage.  Please reach out to your insurance provider with any questions.     If during the course of the call the physician/provider feels a video visit is not appropriate, you will not be charged for this service.\"     Patient has given verbal consent for Video visit? Yes  How would you like to obtain your AVS? Mail a copy  If you are dropped from the video visit, the video invite should be resent to: Text to cell phone: -  Will anyone else be joining your video visit? No  If patient encounters technical issues they should call [phone]      Video-Visit Details     Type of service:  Video Visit     Start Time:  3:05pm  End Time: 3:23 PM    Originating Location (pt. Location): Home     Distant Location (provider location):  Off-site, [facility] [place]       Platform used for Video Visit: Postmates    Virtual visit for CPAP compliance follow-up for mild obstructive sleep apnea.     Assessment / Plan:     1.)  Mild FREDRICK (AHI 9.9) without sleep-associated hypoxemia.  -Comorbid ADHD, PTSD, depression, anxiety, obesity  - Presenting concerns of daytime fatigue, snoring  - We discussed that this amount of obstructive sleep apnea is not felt to have a significant increase in long-term cardiovascular factors, but I do feel " "treatment is medically necessary for reducing daytime sleepiness, comorbid mood disorders.  - We discussed treatment option including CPAP, oral appliances, weight management, upper airway surgery, upper airway stimulation.  -Primary initial barrier to use of CPAP is skin breakout underneath mask in the perioral distribution, she does have a history of acne.    I would like to try either a mask liner with her existing fullface mask or attempting a trial of a nasal mask interface to see if mouth leak hopefully is not significant.  My first choice would be to try the nasal mask interface, but this is not allowed based on insurance replacement criteria, then I would proceed with mask liner.    SUBJECTIVE:  Jennifer Tatum is a 32 year old female.    Pertinent PMHx of obesity, tobacco use, anxiety, depression, ADHD, PTSD.     Prior Sleep Testin2023 - PPSG with weight 213 lbs, BMI 35.5.  Mild FREDRICK (AHI 9.9) without sleep-associated hypoxemia (terry SpO2 89%).  PLM index 1.8.  EKG NSR.    2023 -we reviewed her sleep history more detail.  She summarizes her sleep concerns as worsening daytime fatigue and sleepiness, disruptive snoring, challenges with weight management.     Per questionnaire: \"Tired daily, don't sleep through the night snore when sleeping.\"    A/P to start CPAP auto-titrate 5-15 cm H2O.    Today -she notes that the primary reason that she has not been using her CPAP outside of the 3 nights that were recorded is that with the full facemask that she would have almost immediate skin breakout where the mask contacted in the perioral area, but did not seem to notice significant nasal irritation.  A fullface mask was chosen due to her history of being a mouth breather.    CPAP download was reviewed over the past 30 days on auto titrate 5-15 cm H2O.  It was used for only 3 of 30 days, average usage on nights used of 1.5 hours.  AHI less than 1.  Leak did not appear excessive.      Past medical " history:    Patient Active Problem List    Diagnosis Date Noted    FREDRICK (obstructive sleep apnea) 06/05/2023     Priority: Medium    Secondary dysmenorrhea 03/19/2021     Priority: Medium    Mood swings 03/19/2021     Priority: Medium    Tobacco abuse 03/12/2021     Priority: Medium    VINI (generalized anxiety disorder) 03/12/2021     Priority: Medium    Menorrhagia with regular cycle 03/12/2021     Priority: Medium    Female pelvic peritoneal adhesions--Severe 08/20/2020     Priority: Medium    Relationship problem between partners 12/01/2017     Priority: Medium    Major depressive disorder, recurrent episode, mild (H) 05/02/2017     Priority: Medium    ACP (advance care planning) 06/17/2016     Priority: Medium     Advance Care Planning 6/17/2016: ACP Review of Chart / Resources Provided:  Reviewed chart for advance care plan.  Jennifer Tatum has no plan or code status on file. Discussed available resources and provided with information.   Added by Gilbert Titus            Attention deficit hyperactivity disorder (ADHD), predominantly inattentive type 04/01/2016     Priority: Medium    PTSD (post-traumatic stress disorder) 04/01/2016     Priority: Medium       10 point ROS of systems including Constitutional, Eyes, Respiratory, Cardiovascular, Gastroenterology, Genitourinary, Integumentary, Muscularskeletal, Psychiatric were all negative except for pertinent positives noted in my HPI.    Current Outpatient Medications   Medication Sig Dispense Refill    sertraline (ZOLOFT) 100 MG tablet Take 1.5 tablets (150 mg) by mouth daily for 90 days 135 tablet 0       OBJECTIVE:  There were no vitals taken for this visit.    Physical Exam     ---  This note was written with the assistance of the Dragon voice-dictation technology software. The final document, although reviewed, may contain errors. For corrections, please contact the office.    Total time spent preparing to see the patient, review of chart, obtaining history  and physical examination, review of sleep testing, review of treatment options, education, discussion with patient and documenting in Epic / EMR was 20 minutes.  All time involved was spent on the day of service for the patient (the same day as the patient's appointment).    Simon Ordaz MD    Sleep Medicine  [place], [place]  Main Office: [phone]  [facility] [address], [address]  Schedule visits: [phone]  Main Office: [phone]  Fax: [phone]

## 2023-07-18 NOTE — Clinical Note
Can we reach out to Joanne to see if it is possible to do a replacement for a nasal mask or to help arrange the mask liners given her skin breakout issues?

## 2023-07-18 NOTE — PROGRESS NOTES
CPAP Compliance Visit:       Name: Jennifer Tatum MRN# [id number]   Age: 32 year old YOB: 1990     Date of Consultation: July 18, 2023  Primary care provider: Nadya Currie    Compliance:   0 % of days with >4 hours of use.   27days/30 with no use   Average use: 1hours 27minutes per day   95%ile leak 0.9 L/min   CPAP 95% pressure 11.1 cm   AHI 0.9 events/hour   EVERETT 0.0  PB 0%     Impression:   Patient is {CPAP Compliance :088361}

## 2023-07-18 NOTE — PROGRESS NOTES
Jennifer Tatum is presenting for follow up of medical weight management. She is initially seen for her weight on 4/24/23. Weight at this time was 213 pounds. Since this visit, her weight has increased by 6 pounds. Weight today is 219 pounds.     Overall she feels about the same.   Energy levels are slightly better.   Perceived compliance with lifestyle changes has been fair.   Appetite/Hunger issues: controlled  Goal: under 200 .   New issues:    Having more low back pain. Present for about 8 weeks. No known injury. Radiates into left leg to foot. No fevers. No bowel or bladder incontinence. No saddle anesthesia. Some numbness in her left toes. She has been working with the chiropractor-does not feel it is helping. Taking ibuprofen and Tylenol without relief in her symptoms. She also tried Zanaflex and did not feel it helped.     She also feels she has a vaginal infection. Some onion like odor in her vaginal area. No vaginal discharge. No dysuria or hematuria. Some urinary frequency, but she has been drinking a lot of water. No concern about STDs.   ?  Current Eating Patterns: good. Generally 3 meals per day.  Breakfast: egg with sausage and one slice of toast, coffee with cold stone creamer   Lunch: often skips   Supper: chicken gravy and salad, pasta-limits her noodle  Snacks cheese, watermelon  Grazing: some  Processed Foods: rare   Alcohol: none   Wasted Calories: occasional    Supplements: none, will start a multivitamin; recent Vit D normal  Meals Prepared by: self  Nutrition: moderate carb, high protein, high fat  ?  Diet diary reviewed and estimated daily caloric intake is around 2100.  ?  Activity/Exercise: Compliance has been poor, very busy with children. Willing to try walking several times weekly.     Co-morbidities:       Patient Active Problem List   Diagnosis     Attention deficit hyperactivity disorder (ADHD), predominantly inattentive type     PTSD (post-traumatic stress disorder)     ACP (advance  "care planning)     Major depressive disorder, recurrent episode, mild (H)     Relationship problem between partners     Female pelvic peritoneal adhesions--Severe     Tobacco abuse     VINI (generalized anxiety disorder)     Menorrhagia with regular cycle     Secondary dysmenorrhea     Mood swings      Meds:       Current Outpatient Medications   Medication     sertraline (ZOLOFT) 100 MG tablet      No current facility-administered medications for this visit.      Psychologic issues/Triggers: some anxiety and depression; sertraline was increased to 150 mg from 100 mg on 4/24/23. Today she notes that the increase caused sweating and weight gain. Now weaning herself off; currently taking 50 mg every 3 days. Still has a lot of anxiety. Bipolar runs in her family. Would like to see psychiatry.   ?  Family Support: good   ?  Insight/Motivation: good, wanting to feel better   ?  OBJECTIVE:   /76 (BP Location: Right arm, Patient Position: Chair, Cuff Size: Adult Large)   Pulse 85   Temp 97.5  F (36.4  C) (Tympanic)   Resp 16   Ht 1.651 m (5' 5\")   Wt 99.5 kg (219 lb 4.8 oz)   LMP 07/08/2023 (Exact Date)   SpO2 97%   BMI 36.49 kg/m      Obesity Stage 2  Weight up 6 pounds since 4/2023    Exam:  Constitutional: healthy, alert, no distress and over weight  Head: Normocephalic. No masses, lesions, tenderness or abnormalities  Neck: Neck supple. No adenopathy  ENT: ENT exam normal, no neck nodes or sinus tenderness  Cardiovascular: RRR. No murmurs, clicks gallops or rub  Respiratory: Good diaphragmatic excursion. Lungs clear  Gastrointestinal: Abdomen soft, non-tender. BS normal. No masses, organomegaly  Psychiatric: mentation appears normal and affect normal/bright  Musculoskeletal: Lumbar spine without gross deformity, rash, erythema, or ecchymosis. No point tenderness. Some Paraspinous muscle tenderness on the left. Mild pain with extension. Moderate pain with flexion. No pain with lateral flexion. No palpable " spasm. Negative straight leg raises bilaterally. Patellar reflexes 2+, LE strength 5/5 bilaterally. Sensation intact to light touch. Posterior tib pulses intact.         Results for orders placed or performed in visit on 07/19/23   UA with Microscopic reflex to Culture - [place]     Status: Abnormal    Specimen: Urine, Clean Catch   Result Value Ref Range    Color Urine Light Yellow Colorless, Straw, Light Yellow, Yellow    Appearance Urine Clear Clear    Glucose Urine Negative Negative mg/dL    Bilirubin Urine Negative Negative    Ketones Urine Negative Negative mg/dL    Specific Gravity Urine 1.006 1.003 - 1.035    Blood Urine Negative Negative    pH Urine 6.0 4.7 - 8.0    Protein Albumin Urine Negative Negative mg/dL    Urobilinogen Urine Normal Normal, 2.0 mg/dL    Nitrite Urine Negative Negative    Leukocyte Esterase Urine Negative Negative    Bacteria Urine Few (A) None Seen /HPF    RBC Urine 2 <=2 /HPF    WBC Urine <1 <=5 /HPF    Squamous Epithelials Urine 3 (H) <=1 /HPF    Narrative    Urine Culture not indicated   Wet prep     Status: Abnormal    Specimen: Vagina; Swab   Result Value Ref Range    Trichomonas Absent Absent    Yeast Absent Absent    Clue Cells Absent Absent    WBCs/high power field 1+ (A) None     ?  ASSESSMENT:   -Obesity Stage 2. Body mass index is 36.49. Weight up 6 pounds since 4/2023. Eating too many carbs and calories. Will limit carbs to 50 and calories to 1200. Will then reassess in 4 weeks.   -Glucose was normal on 10/27/22.   -TSH was normal on 3/17/23.   -Vit D was normal on 4/24/23 at 49.   -Recently diagnosed with FREDRICK. Should improve with weight loss.      -Anxiety and Depression; was taking sertraline 150 mg, but it was causing diaphoresis and weight gain. Weaning off. Currently taking 50 mg every three days. Still anxious. Strong family history of bipolar. She would like to see psychiatry. Referral placed to [place].     -Was having some vaginal odor. Wet prep and UA negative.  No signs of infection. Encouraged to push fluids.     -Also having more low back pain. No red flags. No fevers. No bowel or bladder incontinence. No saddle anesthesia. Will send to PT and try Robaxin. She was made aware of the side effects. If PT does not help, will order MRI.   ?  PLAN:  Discussed in detail and reinforced continued dietary and activity modifications including diet and exercise logs. New goal set for 5# weight loss over the next 4 weeks. F/U in 4 weeks.     Answers for HPI/ROS submitted by the patient on 7/19/2023  If you checked off any problems, how difficult have these problems made it for you to do your work, take care of things at home, or get along with other people?: Extremely difficult  PHQ9 TOTAL SCORE: 13  VINI 7 TOTAL SCORE: 14

## 2023-07-18 NOTE — NURSING NOTE
Is the patient currently in the state of [place]? YES    Visit mode:VIDEO    If the visit is dropped, the patient can be reconnected by: VIDEO VISIT: Send to e-mail at: [email or web address]    Will anyone else be joining the visit? NO      How would you like to obtain your AVS? MyChart    Are changes needed to the allergy or medication list? NO    Reason for visit: RECHECK (compliance)

## 2023-07-19 ENCOUNTER — OFFICE VISIT (OUTPATIENT)
Dept: FAMILY MEDICINE | Facility: OTHER | Age: 33
End: 2023-07-19
Attending: NURSE PRACTITIONER
Payer: COMMERCIAL

## 2023-07-19 VITALS
SYSTOLIC BLOOD PRESSURE: 116 MMHG | HEIGHT: 65 IN | WEIGHT: 219.3 LBS | BODY MASS INDEX: 36.54 KG/M2 | OXYGEN SATURATION: 97 % | DIASTOLIC BLOOD PRESSURE: 76 MMHG | HEART RATE: 85 BPM | TEMPERATURE: 97.5 F | RESPIRATION RATE: 16 BRPM

## 2023-07-19 DIAGNOSIS — F33.0 MAJOR DEPRESSIVE DISORDER, RECURRENT EPISODE, MILD (H): Primary | ICD-10-CM

## 2023-07-19 DIAGNOSIS — N89.8 VAGINAL ODOR: ICD-10-CM

## 2023-07-19 DIAGNOSIS — F43.10 PTSD (POST-TRAUMATIC STRESS DISORDER): ICD-10-CM

## 2023-07-19 DIAGNOSIS — M54.42 ACUTE BILATERAL LOW BACK PAIN WITH LEFT-SIDED SCIATICA: ICD-10-CM

## 2023-07-19 DIAGNOSIS — E66.811 OBESITY (BMI 30.0-34.9): ICD-10-CM

## 2023-07-19 DIAGNOSIS — F41.1 GAD (GENERALIZED ANXIETY DISORDER): ICD-10-CM

## 2023-07-19 LAB
ALBUMIN UR-MCNC: NEGATIVE MG/DL
APPEARANCE UR: CLEAR
BACTERIA #/AREA URNS HPF: ABNORMAL /HPF
BILIRUB UR QL STRIP: NEGATIVE
CLUE CELLS: ABNORMAL
COLOR UR AUTO: ABNORMAL
GLUCOSE UR STRIP-MCNC: NEGATIVE MG/DL
HGB UR QL STRIP: NEGATIVE
KETONES UR STRIP-MCNC: NEGATIVE MG/DL
LEUKOCYTE ESTERASE UR QL STRIP: NEGATIVE
NITRATE UR QL: NEGATIVE
PH UR STRIP: 6 [PH] (ref 4.7–8)
RBC URINE: 2 /HPF
SP GR UR STRIP: 1.01 (ref 1–1.03)
SQUAMOUS EPITHELIAL: 3 /HPF
TRICHOMONAS, WET PREP: ABNORMAL
UROBILINOGEN UR STRIP-MCNC: NORMAL MG/DL
WBC URINE: <1 /HPF
WBC'S/HIGH POWER FIELD, WET PREP: ABNORMAL
YEAST, WET PREP: ABNORMAL

## 2023-07-19 PROCEDURE — 87210 SMEAR WET MOUNT SALINE/INK: CPT | Mod: ZL | Performed by: NURSE PRACTITIONER

## 2023-07-19 PROCEDURE — G0463 HOSPITAL OUTPT CLINIC VISIT: HCPCS

## 2023-07-19 PROCEDURE — 250N000011 HC RX IP 250 OP 636: Performed by: NURSE PRACTITIONER

## 2023-07-19 PROCEDURE — 99214 OFFICE O/P EST MOD 30 MIN: CPT | Performed by: NURSE PRACTITIONER

## 2023-07-19 PROCEDURE — 81001 URINALYSIS AUTO W/SCOPE: CPT | Mod: ZL | Performed by: NURSE PRACTITIONER

## 2023-07-19 PROCEDURE — 96372 THER/PROPH/DIAG INJ SC/IM: CPT | Performed by: NURSE PRACTITIONER

## 2023-07-19 PROCEDURE — G0463 HOSPITAL OUTPT CLINIC VISIT: HCPCS | Mod: 25

## 2023-07-19 RX ORDER — METHOCARBAMOL 500 MG/1
500 TABLET, FILM COATED ORAL 3 TIMES DAILY PRN
Qty: 30 TABLET | Refills: 0 | Status: SHIPPED | OUTPATIENT
Start: 2023-07-19 | End: 2023-08-21

## 2023-07-19 RX ORDER — KETOROLAC TROMETHAMINE 30 MG/ML
30 INJECTION, SOLUTION INTRAMUSCULAR; INTRAVENOUS ONCE
Status: COMPLETED | OUTPATIENT
Start: 2023-07-19 | End: 2023-07-19

## 2023-07-19 RX ADMIN — KETOROLAC TROMETHAMINE 30 MG: 30 INJECTION, SOLUTION INTRAMUSCULAR; INTRAVENOUS at 09:54

## 2023-07-19 ASSESSMENT — PATIENT HEALTH QUESTIONNAIRE - PHQ9
SUM OF ALL RESPONSES TO PHQ QUESTIONS 1-9: 13
10. IF YOU CHECKED OFF ANY PROBLEMS, HOW DIFFICULT HAVE THESE PROBLEMS MADE IT FOR YOU TO DO YOUR WORK, TAKE CARE OF THINGS AT HOME, OR GET ALONG WITH OTHER PEOPLE: EXTREMELY DIFFICULT
SUM OF ALL RESPONSES TO PHQ QUESTIONS 1-9: 13

## 2023-07-19 ASSESSMENT — ANXIETY QUESTIONNAIRES
IF YOU CHECKED OFF ANY PROBLEMS ON THIS QUESTIONNAIRE, HOW DIFFICULT HAVE THESE PROBLEMS MADE IT FOR YOU TO DO YOUR WORK, TAKE CARE OF THINGS AT HOME, OR GET ALONG WITH OTHER PEOPLE: EXTREMELY DIFFICULT
6. BECOMING EASILY ANNOYED OR IRRITABLE: NEARLY EVERY DAY
1. FEELING NERVOUS, ANXIOUS, OR ON EDGE: NEARLY EVERY DAY
2. NOT BEING ABLE TO STOP OR CONTROL WORRYING: SEVERAL DAYS
4. TROUBLE RELAXING: NEARLY EVERY DAY
GAD7 TOTAL SCORE: 14
5. BEING SO RESTLESS THAT IT IS HARD TO SIT STILL: SEVERAL DAYS
7. FEELING AFRAID AS IF SOMETHING AWFUL MIGHT HAPPEN: SEVERAL DAYS
3. WORRYING TOO MUCH ABOUT DIFFERENT THINGS: MORE THAN HALF THE DAYS
GAD7 TOTAL SCORE: 14

## 2023-07-19 ASSESSMENT — PAIN SCALES - GENERAL: PAINLEVEL: MODERATE PAIN (5)
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
## 2023-08-10 ENCOUNTER — THERAPY VISIT (OUTPATIENT)
Dept: PHYSICAL THERAPY | Facility: HOSPITAL | Age: 33
End: 2023-08-10
Attending: NURSE PRACTITIONER
Payer: COMMERCIAL

## 2023-08-10 DIAGNOSIS — M54.42 ACUTE BILATERAL LOW BACK PAIN WITH LEFT-SIDED SCIATICA: ICD-10-CM

## 2023-08-10 PROCEDURE — 97162 PT EVAL MOD COMPLEX 30 MIN: CPT | Mod: GP | Performed by: PHYSICAL THERAPIST

## 2023-08-10 PROCEDURE — 97012 MECHANICAL TRACTION THERAPY: CPT | Mod: GP | Performed by: PHYSICAL THERAPIST

## 2023-08-10 NOTE — PROGRESS NOTES
"PHYSICAL THERAPY EVALUATION  Type of Visit: Evaluation    See electronic medical record for Abuse and Falls Screening details.    Subjective     Presenting condition or subjective complaint: Pinched sciatic nerve. Woke up with low back pain and a numb left leg. Went to urgent care and was given a steroid shot and Adavan for pain reduction.  Pain continues to be present in her low back and  now only travels to her left posterior knee.  Currently feels \"like there is a pulled muscle in my foot\". Has been receiving chiropractic care with minimal long term relief. 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    Positive CSS. No change in bowel or bladder. Currently has difficulty sleeping at night and getting out of pain.  Date of onset: 06/01/23   Relevant medical history: Depression   Dates & types of surgery:  c-sections, nephrostomy tube    Prior diagnostic imaging/testing results:     No special tests currently.  Prior therapy history for the same diagnosis, illness or injury: No      Prior Level of Function  Transfers: Independent  Ambulation: Independent  ADL: Independent  IADL: Childcare, Driving, Finances, Housekeeping, Laundry, Meal preparation, Medication management, Work    Living Environment  Social support:   Spouse  Type of home:   2 story  Stairs to enter the home:       4  Ramp:   No  Stairs inside the home:       12  Help at home:  None  Equipment owned:   None    Employment:    [employer], [employer], Sub   Hobbies/Interests:  Crafts, riding 4 wheels/dirt bikes/motorcycle, enduro and camping.    Patient goals for therapy: Get out of bed, walk longer, hardly backPain    Pain assessment: See objective evaluation for additional pain details     Objective   LUMBAR SPINE EVALUATION  PAIN: Pain Level at Rest: 0/10  Pain Level with Use: 6/10  Pain Location: lumbar spine and Across sacrum  Pain Quality: Aching, Burning, " Dull, Throbbing, Tingling, Unbearable, and Radiates to knee  Pain Frequency: intermittent or constant  Pain is Worst: nighttime  Pain is Exacerbated By: Sleeping, standing  30 min +, lifting 40+ pounds, Prolonged walking >'r 30,  Pain is Relieved By: Pressure on HS, pain shot in ER  Pain Progression: Unchanged  POSTURE: WFL  GAIT: WFL  WEIGHTBEARING ALIGNMENT: WFL  NON-WEIGHTBEARING ALIGNMENT: WFL  ROM:  Lumbar flexion - 50% normal with pain, Extension 50% normal with pain, Sidebending left 0 -pain, right sidebending 50% normal  PELVIC/SI SCREEN: Standing Forward Bend: + left, Stork + left  STRENGTH: WFL  DTR S: Equally brisk bilaterally  DERMATOMES:  Numb left toes and bottom of foot  NEURAL TENSION:    Left Right   SLR Positive Positive   SLR with DF Positive Positive   Femoral Nerve     Slump Positive Negative    Fabio (Lumbar)     Fabio (Thoracic)     Fabio (Cervical)     Median     Ulnar     Radial          FLEXIBILITY: Sever tighghtness of HS Left> right, bilateral piriformis tightness  LUMBAR/HIP Special Tests:    Left Right   HUE     FADIR/Labrum/KAREY     Femoral Nerve     Karen's     Piriformis     Quadrant Testing Positive Negative    SLR Positive Positive   Slump Positive Positive   Stork with Extension     Simon             PELVIS/SI SPECIAL TESTS:  Positive stork left    PALPATION:  Tender left ischial tuberosity and sciatic nerve in postierior thigh  SPINAL SEGMENTAL CONCLUSIONS:   Sidebending left - radiating pain to foot.    Assessment & Plan   CLINICAL IMPRESSIONS  Medical Diagnosis: Acute bilateral low back pain with left-sided sciatica    Treatment Diagnosis: Acute bilateral low back pain with left-sided sciatica with dural tightness left> right   Impression/Assessment: Patient is a 32 year old female with Low back pain with left LE radiation complaints.  The following significant findings have been identified: Pain, Decreased ROM/flexibility, and Decreased strength. These impairments interfere with  their ability to perform self care tasks, work tasks, recreational activities, and household chores as compared to previous level of function.     Clinical Decision Making (Complexity):  Clinical Presentation: Evolving/Changing  Clinical Presentation Rationale: based on medical and personal factors listed in PT evaluation  Clinical Decision Making (Complexity): Moderate complexity    PLAN OF CARE  Treatment Interventions:  Modalities: Cryotherapy, Dry Needling, E-stim, Hot Pack, Mechanical Traction, Ultrasound  Interventions: Manual Therapy, Therapeutic Activity, Therapeutic Exercise    Long Term Goals     PT Goal 1  Goal Identifier: STG #1  Goal Description: Patient will be compliant with HEP and limit lifting to 15-20 and avoid prolonged positions for symptom management.  Rationale: to maximize safety and independence with performance of ADLs and functional tasks  Target Date: 10/08/23  PT Goal 2  Goal Identifier: LTG #2  Goal Description: Patient will demonstrate improved trunk and SI mobility with reports of decreased pain with motion.  Rationale: to maximize safety and independence with performance of ADLs and functional tasks  Target Date: 10/08/23  PT Goal 3  Goal Identifier: LTG #3  Goal Description: Patient will report of decreased pain with sitting (30 min), Walking (1/4th mile), and standing up to 30 minutes for improved tolerance to ADL's.  Target Date: 10/08/23  PT Goal 4  Goal Identifier: LTG #4  Goal Description: Patient will demonstrated a good understanding of positioning with sleeping, body mechanics and work simplification to manage low back symptoms.  Rationale: to maximize safety and independence with performance of ADLs and functional tasks  Target Date: 10/08/23      Frequency of Treatment: 2x/week  Duration of Treatment: 8 weeks    Recommended Referrals to Other Professionals:  None  Education Assessment:   Learner/Method: Patient;No Barriers to Learning    Risks and benefits of  evaluation/treatment have been explained.   Patient/Family/caregiver agrees with Plan of Care.     Evaluation Time:     PT Dodie, Moderate Complexity Minutes (34519): 45       Signing Clinician: AIDEE KINNEY [facility]                                                                                   OUTPATIENT PHYSICAL THERAPY      PLAN OF TREATMENT FOR OUTPATIENT REHABILITATION   Patient's Last Name, First Name, Jennifer Walton YOB: 1990   Provider's Name   [facility]   Medical Record No.  [id number]     Onset Date: 06/01/23  Start of Care Date:       Medical Diagnosis:  Acute bilateral low back pain with left-sided sciatica      PT Treatment Diagnosis:  Acute bilateral low back pain with left-sided sciatica with dural tightness left> right Plan of Treatment  Frequency/Duration: 2x/week/ 8 weeks    Certification date from   8- to    10-7-2023       See note for plan of treatment details and functional goals     IGOR ALBERTO, PT                         I CERTIFY THE NEED FOR THESE SERVICES FURNISHED UNDER        THIS PLAN OF TREATMENT AND WHILE UNDER MY CARE     (Physician attestation of this document indicates review and certification of the therapy plan).                Referring Provider:  Nadya Currie      Initial Assessment  See Epic Evaluation-

## 2023-08-17 ENCOUNTER — THERAPY VISIT (OUTPATIENT)
Dept: PHYSICAL THERAPY | Facility: HOSPITAL | Age: 33
End: 2023-08-17
Attending: NURSE PRACTITIONER
Payer: COMMERCIAL

## 2023-08-17 DIAGNOSIS — M54.42 ACUTE BILATERAL LOW BACK PAIN WITH LEFT-SIDED SCIATICA: Primary | ICD-10-CM

## 2023-08-17 PROCEDURE — 97110 THERAPEUTIC EXERCISES: CPT | Mod: GP | Performed by: PHYSICAL THERAPIST

## 2023-08-21 ENCOUNTER — OFFICE VISIT (OUTPATIENT)
Dept: FAMILY MEDICINE | Facility: OTHER | Age: 33
End: 2023-08-21
Attending: NURSE PRACTITIONER
Payer: COMMERCIAL

## 2023-08-21 VITALS
OXYGEN SATURATION: 96 % | SYSTOLIC BLOOD PRESSURE: 126 MMHG | RESPIRATION RATE: 16 BRPM | BODY MASS INDEX: 36.32 KG/M2 | TEMPERATURE: 97.7 F | DIASTOLIC BLOOD PRESSURE: 74 MMHG | HEART RATE: 85 BPM | HEIGHT: 65 IN | WEIGHT: 218 LBS

## 2023-08-21 DIAGNOSIS — Z23 NEED FOR VACCINATION: Primary | ICD-10-CM

## 2023-08-21 DIAGNOSIS — E66.01 CLASS 2 SEVERE OBESITY DUE TO EXCESS CALORIES WITH SERIOUS COMORBIDITY IN ADULT, UNSPECIFIED BMI (H): ICD-10-CM

## 2023-08-21 DIAGNOSIS — M54.42 ACUTE BILATERAL LOW BACK PAIN WITH LEFT-SIDED SCIATICA: ICD-10-CM

## 2023-08-21 DIAGNOSIS — F41.1 GAD (GENERALIZED ANXIETY DISORDER): ICD-10-CM

## 2023-08-21 DIAGNOSIS — F33.0 MAJOR DEPRESSIVE DISORDER, RECURRENT EPISODE, MILD (H): ICD-10-CM

## 2023-08-21 DIAGNOSIS — E66.812 CLASS 2 SEVERE OBESITY DUE TO EXCESS CALORIES WITH SERIOUS COMORBIDITY IN ADULT, UNSPECIFIED BMI (H): ICD-10-CM

## 2023-08-21 PROCEDURE — G0463 HOSPITAL OUTPT CLINIC VISIT: HCPCS | Mod: 25

## 2023-08-21 PROCEDURE — 90472 IMMUNIZATION ADMIN EACH ADD: CPT

## 2023-08-21 PROCEDURE — G0010 ADMIN HEPATITIS B VACCINE: HCPCS

## 2023-08-21 PROCEDURE — 90677 PCV20 VACCINE IM: CPT

## 2023-08-21 PROCEDURE — 99213 OFFICE O/P EST LOW 20 MIN: CPT | Performed by: NURSE PRACTITIONER

## 2023-08-21 RX ORDER — HYDROXYZINE HYDROCHLORIDE 25 MG/1
25 TABLET, FILM COATED ORAL 3 TIMES DAILY PRN
COMMUNITY
End: 2024-09-09

## 2023-08-21 ASSESSMENT — PAIN SCALES - GENERAL: PAINLEVEL: SEVERE PAIN (7)

## 2023-08-21 ASSESSMENT — PATIENT HEALTH QUESTIONNAIRE - PHQ9: SUM OF ALL RESPONSES TO PHQ QUESTIONS 1-9: 19

## 2023-08-24 ENCOUNTER — THERAPY VISIT (OUTPATIENT)
Dept: PHYSICAL THERAPY | Facility: HOSPITAL | Age: 33
End: 2023-08-24
Attending: NURSE PRACTITIONER
Payer: COMMERCIAL

## 2023-08-24 DIAGNOSIS — M54.42 ACUTE BILATERAL LOW BACK PAIN WITH LEFT-SIDED SCIATICA: Primary | ICD-10-CM

## 2023-08-24 PROCEDURE — 97110 THERAPEUTIC EXERCISES: CPT | Mod: GP | Performed by: PHYSICAL THERAPIST

## 2023-09-07 ENCOUNTER — THERAPY VISIT (OUTPATIENT)
Dept: PHYSICAL THERAPY | Facility: HOSPITAL | Age: 33
End: 2023-09-07
Attending: NURSE PRACTITIONER
Payer: COMMERCIAL

## 2023-09-07 DIAGNOSIS — M54.42 ACUTE BILATERAL LOW BACK PAIN WITH LEFT-SIDED SCIATICA: Primary | ICD-10-CM

## 2023-09-07 PROCEDURE — 97110 THERAPEUTIC EXERCISES: CPT | Mod: GP,CQ

## 2023-09-25 ENCOUNTER — TELEPHONE (OUTPATIENT)
Dept: FAMILY MEDICINE | Facility: OTHER | Age: 33
End: 2023-09-25

## 2023-09-25 ENCOUNTER — OFFICE VISIT (OUTPATIENT)
Dept: FAMILY MEDICINE | Facility: OTHER | Age: 33
End: 2023-09-25
Attending: NURSE PRACTITIONER
Payer: COMMERCIAL

## 2023-09-25 VITALS
DIASTOLIC BLOOD PRESSURE: 80 MMHG | OXYGEN SATURATION: 98 % | SYSTOLIC BLOOD PRESSURE: 124 MMHG | BODY MASS INDEX: 36.11 KG/M2 | HEART RATE: 78 BPM | WEIGHT: 217 LBS | TEMPERATURE: 98.1 F

## 2023-09-25 DIAGNOSIS — R10.9 ABDOMINAL CRAMPS: ICD-10-CM

## 2023-09-25 DIAGNOSIS — J02.9 SORE THROAT: Primary | ICD-10-CM

## 2023-09-25 LAB
ALBUMIN SERPL BCG-MCNC: 4.4 G/DL (ref 3.5–5.2)
ALP SERPL-CCNC: 74 U/L (ref 35–104)
ALT SERPL W P-5'-P-CCNC: 26 U/L (ref 0–50)
ANION GAP SERPL CALCULATED.3IONS-SCNC: 10 MMOL/L (ref 7–15)
AST SERPL W P-5'-P-CCNC: 23 U/L (ref 0–45)
BASOPHILS # BLD AUTO: 0 10E3/UL (ref 0–0.2)
BASOPHILS NFR BLD AUTO: 1 %
BILIRUB SERPL-MCNC: 0.2 MG/DL
BUN SERPL-MCNC: 11.9 MG/DL (ref 6–20)
CALCIUM SERPL-MCNC: 9.8 MG/DL (ref 8.6–10)
CHLORIDE SERPL-SCNC: 105 MMOL/L (ref 98–107)
CREAT SERPL-MCNC: 0.67 MG/DL (ref 0.51–0.95)
CRP SERPL-MCNC: <3 MG/L
DEPRECATED HCO3 PLAS-SCNC: 23 MMOL/L (ref 22–29)
EGFRCR SERPLBLD CKD-EPI 2021: >90 ML/MIN/1.73M2
EOSINOPHIL # BLD AUTO: 0.2 10E3/UL (ref 0–0.7)
EOSINOPHIL NFR BLD AUTO: 2 %
ERYTHROCYTE [DISTWIDTH] IN BLOOD BY AUTOMATED COUNT: 12.7 % (ref 10–15)
FLUAV RNA SPEC QL NAA+PROBE: NEGATIVE
FLUBV RNA RESP QL NAA+PROBE: NEGATIVE
GLUCOSE SERPL-MCNC: 96 MG/DL (ref 70–99)
GROUP A STREP BY PCR: NOT DETECTED
HCT VFR BLD AUTO: 45.8 % (ref 35–47)
HGB BLD-MCNC: 15.4 G/DL (ref 11.7–15.7)
IMM GRANULOCYTES # BLD: 0 10E3/UL
IMM GRANULOCYTES NFR BLD: 0 %
LIPASE SERPL-CCNC: 20 U/L (ref 13–60)
LYMPHOCYTES # BLD AUTO: 2.5 10E3/UL (ref 0.8–5.3)
LYMPHOCYTES NFR BLD AUTO: 34 %
MCH RBC QN AUTO: 29.3 PG (ref 26.5–33)
MCHC RBC AUTO-ENTMCNC: 33.6 G/DL (ref 31.5–36.5)
MCV RBC AUTO: 87 FL (ref 78–100)
MONOCYTES # BLD AUTO: 0.5 10E3/UL (ref 0–1.3)
MONOCYTES NFR BLD AUTO: 6 %
MONOCYTES NFR BLD AUTO: NEGATIVE %
NEUTROPHILS # BLD AUTO: 4.3 10E3/UL (ref 1.6–8.3)
NEUTROPHILS NFR BLD AUTO: 57 %
NRBC # BLD AUTO: 0 10E3/UL
NRBC BLD AUTO-RTO: 0 /100
PLATELET # BLD AUTO: 211 10E3/UL (ref 150–450)
POTASSIUM SERPL-SCNC: 4.3 MMOL/L (ref 3.4–5.3)
PROT SERPL-MCNC: 7 G/DL (ref 6.4–8.3)
RBC # BLD AUTO: 5.25 10E6/UL (ref 3.8–5.2)
RSV RNA SPEC NAA+PROBE: NEGATIVE
SARS-COV-2 RNA RESP QL NAA+PROBE: NEGATIVE
SODIUM SERPL-SCNC: 138 MMOL/L (ref 136–145)
WBC # BLD AUTO: 7.5 10E3/UL (ref 4–11)

## 2023-09-25 PROCEDURE — 86308 HETEROPHILE ANTIBODY SCREEN: CPT | Mod: ZL | Performed by: NURSE PRACTITIONER

## 2023-09-25 PROCEDURE — 86140 C-REACTIVE PROTEIN: CPT | Mod: ZL | Performed by: NURSE PRACTITIONER

## 2023-09-25 PROCEDURE — 87651 STREP A DNA AMP PROBE: CPT | Mod: ZL | Performed by: NURSE PRACTITIONER

## 2023-09-25 PROCEDURE — 85025 COMPLETE CBC W/AUTO DIFF WBC: CPT | Mod: ZL | Performed by: NURSE PRACTITIONER

## 2023-09-25 PROCEDURE — G0463 HOSPITAL OUTPT CLINIC VISIT: HCPCS

## 2023-09-25 PROCEDURE — 36415 COLL VENOUS BLD VENIPUNCTURE: CPT | Mod: ZL | Performed by: NURSE PRACTITIONER

## 2023-09-25 PROCEDURE — 87637 SARSCOV2&INF A&B&RSV AMP PRB: CPT | Mod: ZL | Performed by: NURSE PRACTITIONER

## 2023-09-25 PROCEDURE — 83690 ASSAY OF LIPASE: CPT | Mod: ZL | Performed by: NURSE PRACTITIONER

## 2023-09-25 PROCEDURE — 99213 OFFICE O/P EST LOW 20 MIN: CPT | Performed by: NURSE PRACTITIONER

## 2023-09-25 PROCEDURE — 80053 COMPREHEN METABOLIC PANEL: CPT | Mod: ZL | Performed by: NURSE PRACTITIONER

## 2023-09-25 RX ORDER — TIZANIDINE 2 MG/1
TABLET ORAL
COMMUNITY
Start: 2023-07-02 | End: 2023-11-15

## 2023-09-25 RX ORDER — ARIPIPRAZOLE 2 MG/1
2 TABLET ORAL
COMMUNITY
Start: 2023-03-17 | End: 2023-11-15

## 2023-09-25 ASSESSMENT — PAIN SCALES - GENERAL: PAINLEVEL: MODERATE PAIN (5)

## 2023-09-25 NOTE — TELEPHONE ENCOUNTER
Nadya Currie NP    Caller: Unspecified (Today,  8:12 AM)  Can you have her come right now        Patient is on her way to the clinic to see Nadya Currie.

## 2023-09-25 NOTE — COMMUNITY RESOURCES LIST (ENGLISH)
09/25/2023   [facility] - Outpatient Clinics  N/A  For additional resource needs, please contact your health insurance member services or your primary care team.  Phone: [phone]   Email: N/A   Address: [facility] [place] [address]   Hours: N/A        Financial Stability       Utility payment assistance  1  [place] [employer] - [place]'s Telephone Assistance Plan (TAP) and Federal Lifeline and Affordable Connectivity Program (ACP) Distance: 168.57 miles      Phone/Virtual   [address] [place] [address]  Language: English  Fees: Free   Phone: (797) 424-8296 Email: consumer.puc@[facility].mn. Website: https://[place].gov/puc/consumers/telephone/     2  [place] [employer] - Energy and Utilities Distance: 170.28 miles      In-Person, Phone/Virtual   [address] [place] [address]  Language: English  Hours: Mon - Fri 8:30 AM - 4:30 PM  Fees: Free   Phone: (189) 749-9720 Website: https://[place].gov/[employer]/energy/consumer-assistance/energy-assistance-program/          Important Numbers & Websites       [facility]   211 211itedway.org  Poison Control   (339) 347-6047 [place]poison.[place]  Suicide and Crisis Lifeline   [address] [place], [place] [address]line.org  Childhelp [place] Child Abuse Hotline   808.483.6327 Childhelphotline.org  National Sexual Assault Hotline   (451) 635-3033 (HOPE) Rainn.org  National Runaway Safeline   (625) 637-2610 (RUNAWAY) 1800runaway.org  Pregnancy & Postpartum Support [place]   Call/text 641-411-8323 Ppsupportmn.org  Substance Abuse National Helpline ([facility]   246-818-HELP (3576) Findtreatment.gov  Emergency Services   911

## 2023-09-25 NOTE — LETTER
September 25, 2023      Jennifer Tatum  [address] [place] [id number]        To Whom It May Concern:    Jennifer Tatum  was seen on 9/25/2023.  Please excuse her from work today, 9/25/202, and tomorrow 9/26/23.         Sincerely,        Nadya Currie, NP

## 2023-09-25 NOTE — PROGRESS NOTES
"  Assessment & Plan     Sore throat  Abdominal cramps  Patient with a sore throat, abdominal cramping, and fatigue times 4 days. Has also had headaches for 1.5 weeks. No fevers. Still drinking well. Will run basic labs and run a strep and multiplex swab. Will notify patient of the results when available and intervene accordingly.     I do suspect she has a viral illness as  and children have similar symptoms. Symptomatic cares were encouraged. The patient will follow up with new or worsening symptoms.     - CBC with platelets and differential; Future  - CRP, inflammation; Future  - Comprehensive metabolic panel (BMP + Alb, Alk Phos, ALT, AST, Total. Bili, TP); Future  - Lipase; Future    56}     Nicotine/Tobacco Cessation:  She reports that she has been smoking cigarettes and cigars. She has a 10.00 pack-year smoking history. She has been exposed to tobacco smoke. She has never used smokeless tobacco.  Nicotine/Tobacco Cessation Plan:   Information offered: Patient not interested at this time      BMI:   Estimated body mass index is 36.11 kg/m  as calculated from the following:    Height as of 8/21/23: 1.651 m (5' 5\").    Weight as of this encounter: 98.4 kg (217 lb).   Weight management plan: Discussed healthy diet and exercise guidelines        Nadya Currie NP  [facility] - CA[facility] [place] is a 33 year old, presenting for the following health issues:  Pharyngitis, Fatigue, and Headache        9/25/2023     9:51 AM   Additional Questions   Roomed by Raul Dumas CMA   Accompanied by Self         9/25/2023     9:51 AM   Patient Reported Additional Medications   Patient reports taking the following new medications None       HPI     Acute Illness  Acute illness concerns: sore throat, headache, really tired  Onset/Duration: 1 week ago  Symptoms:  Fever: No  Chills/Sweats: YES  Headache (location?): YES  Sinus Pressure: No  Conjunctivitis:  No  Ear Pain: no  Rhinorrhea: " YES  Congestion: No  Sore Throat: YES  Cough: no  Wheeze: No  Decreased Appetite: YES  Nausea: YES  Vomiting: No  Diarrhea: YES  Dysuria/Freq.: No  Dysuria or Hematuria: No  Fatigue/Achiness: No  Sick/Strep Exposure: was in [place]-got back on 10/3  Therapies tried and outcome: Tylenol and mucinex, pepto-bismal     and children have similar symptoms.     Still drinking well.     She does smoke. No known asthma or COPD.       Review of Systems   Constitutional, HEENT, cardiovascular, pulmonary, gi and gu systems are negative, except as otherwise noted.      Objective    /80   Pulse 78   Temp 98.1  F (36.7  C) (Tympanic)   Wt 98.4 kg (217 lb)   SpO2 98%   BMI 36.11 kg/m    Body mass index is 36.11 kg/m .  Physical Exam   GENERAL: healthy, alert and no distress  EYES: Eyes grossly normal to inspection, PERRL and conjunctivae and sclerae normal  HENT: ear canals and TM's normal, nose and mouth without ulcers or lesions  NECK: no adenopathy, no asymmetry, masses, or scars and thyroid normal to palpation  RESP: lungs clear to auscultation - no rales, rhonchi or wheezes  CV: regular rate and rhythm, no murmur, click or rub, no peripheral edema and peripheral pulses strong  ABDOMEN: soft, no distension, some cramping with deep palpation, no hepatosplenomegaly, no masses and bowel sounds normal  MS: no gross musculoskeletal defects noted, no edema  NEURO: Normal strength and tone, mentation intact and speech normal  PSYCH: mentation appears normal, affect normal/bright    Labs and Strep/Multiplex in process

## 2023-09-25 NOTE — TELEPHONE ENCOUNTER
8:12 AM    Reason for Call: OVERBOOK    Patient is having the following symptoms: Headaches for week and stomach upset started yesterday and she has been tired since Wednesday and the weekness since Saturday.    The patient is requesting an appointment for today with Nadya Currie.    Was an appointment offered for this call? No  If yes : Appointment type              Date    Preferred method for responding to this message: Telephone Call  What is your phone number ? [phone]    If we cannot reach you directly, may we leave a detailed response at the number you provided? Yes    Can this message wait until your PCP/provider returns, if unavailable today? No,

## 2023-09-25 NOTE — COMMUNITY RESOURCES LIST (ENGLISH)
09/25/2023   [facility]  N/A  For questions about this resource list or additional care needs, please contact your primary care clinic or care manager.  Phone: [phone]   Email: N/A   Address: [address] [place] [address]   Hours: N/A        Financial Stability       Utility payment assistance  1  [facility] and Service [place] Distance: 3.33 miles      In-Person   [address] [place] [address]  Language: English  Hours: Mon - Fri 9:00 AM - 12:00 PM , Mon - Fri 1:00 PM - 5:00 PM  Fees: Free, Self Pay   Phone: (338) 576-6807 Email: [email or web address] Website: [email or web address]     2  [facility] THU[employer] [facility] Distance: 23.4 miles      In-Person   [address] [place] [address]  Language: English  Hours: Mon - Fri 8:00 AM - 4:30 PM  Fees: Free   Phone: (692) 765-5611 Email: freddie@[employer].org Website: http://www.aeoa.org          Important Numbers & Websites       Emergency Services   911  City Services   311  Poison Control   (684) 544-5732  Suicide Prevention Lifeline   (273) 545-2776 (TALK)  Child Abuse Hotline   (781) 912-3029 (4-A-Child)  Sexual Assault Hotline   (670) 193-9201 (HOPE)  National Runaway Safeline   (525) 181-2000 (RUNAWAY)  All-Options Talkline   (733) 791-1375  Substance Abuse Referral   (284) 295-4342 (HELP)

## 2023-10-09 NOTE — PROGRESS NOTES
Jennifer Tatum is presenting for follow up of medical weight management. She was initially seen for her weight on 4/24/23. Weight was 213 pounds. Last seen on 8/21/23. Weight was 218 pounds. Wanted to get back on track. Weight today is 219 pounds. Up slightly-getting menses.     Overall she feels better.  Energy levels are slightly better.   Perceived compliance with lifestyle changes has been fair.   Appetite/Hunger issues: controlled  Goal: under 200 .   New issues: getting menses    Due for several vaccines. Willing to get her Hep B vaccine at her next visit.     Current Eating Patterns: good. Generally 3 meals per day.   Breakfast: none-fasting, has water and coffee with a small amount of creamer  Lunch: nuts and string cheese around 10 mg; then eating gas station chicken and cheese sandwich-kiev    Supper: meat and veggie  Snacks: ice cream  Grazing: some  Processed Foods: occasional   Alcohol: rare beer  Wasted Calories: some-ice cream daily   Supplements: multivitamin with Vit D  Meals Prepared by: self  Nutrition: moderate carb, high protein, high fat   ?  Diet diary reviewed and estimated daily caloric intake is around 2500   ?  Activity/Exercise: Compliance has been good-often walking.      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      Psychologic issues/Triggers: some anxiety and depression; was taking  "sertraline, but she felt it was causing weight gain and weaned herself off; follows with Danilo Wolf, was recently started on Vraylar, but it caused insomnia, stopped and has a follow-up with Danilo Wolf  ?  Family Support: good   ?  Insight/Motivation: good, wanting to feel better   ?  OBJECTIVE:   /84   Pulse 82   Temp 98.2  F (36.8  C) (Tympanic)   Resp 18   Ht 1.651 m (5' 5\")   Wt 99.4 kg (219 lb 3.2 oz)   SpO2 96%   BMI 36.48 kg/m      Obesity Stage 2  Weight up one pound since 8/21/23. Weight was 218 on 10/9/23. Weight was 219 on 10/11/23.    Exam:  Constitutional: healthy, alert, no distress and over weight   Psychiatric: mentation appears normal and affect normal/bright        ?  ASSESSMENT:   -Obesity Stage 2. Body mass index is 36.48. Weight was 218 pounds 4 weeks ago. Weight today is 219 pounds. Eating too many carbs. Will track and limit carbs to 50 or less, calories to 1200 of less.   -Did discuss the GLP-1; not interested.   -Glucose was normal on 10/27/22.   -TSH was normal on 3/17/23.   -Vit D was normal on 4/24/23 at 49.   -Recently diagnosed with FREDRICK. Should improve with weight loss.      -Anxiety and Depression; was taking sertraline 150 mg, but weaned herself off; was then started on Vrylrar-caused insomnia; sees Danilo Wolf next week     ?  PLAN:  Discussed in detail and reinforced continued dietary and activity modifications including diet and exercise logs. New goal set for 4 # weight loss over the next 4 weeks. F/U in 4 weeks.     "

## 2023-10-11 ENCOUNTER — OFFICE VISIT (OUTPATIENT)
Dept: FAMILY MEDICINE | Facility: OTHER | Age: 33
End: 2023-10-11
Attending: NURSE PRACTITIONER
Payer: COMMERCIAL

## 2023-10-11 VITALS
HEIGHT: 65 IN | OXYGEN SATURATION: 96 % | DIASTOLIC BLOOD PRESSURE: 84 MMHG | SYSTOLIC BLOOD PRESSURE: 118 MMHG | WEIGHT: 219.2 LBS | BODY MASS INDEX: 36.52 KG/M2 | TEMPERATURE: 98.2 F | HEART RATE: 82 BPM | RESPIRATION RATE: 18 BRPM

## 2023-10-11 DIAGNOSIS — E66.01 CLASS 2 SEVERE OBESITY DUE TO EXCESS CALORIES WITH SERIOUS COMORBIDITY IN ADULT, UNSPECIFIED BMI (H): Primary | ICD-10-CM

## 2023-10-11 DIAGNOSIS — E66.812 CLASS 2 SEVERE OBESITY DUE TO EXCESS CALORIES WITH SERIOUS COMORBIDITY IN ADULT, UNSPECIFIED BMI (H): Primary | ICD-10-CM

## 2023-10-11 DIAGNOSIS — F41.1 GAD (GENERALIZED ANXIETY DISORDER): ICD-10-CM

## 2023-10-11 DIAGNOSIS — F33.0 MAJOR DEPRESSIVE DISORDER, RECURRENT EPISODE, MILD (H): ICD-10-CM

## 2023-10-11 PROCEDURE — 99213 OFFICE O/P EST LOW 20 MIN: CPT | Performed by: NURSE PRACTITIONER

## 2023-10-11 PROCEDURE — G0463 HOSPITAL OUTPT CLINIC VISIT: HCPCS

## 2023-10-11 ASSESSMENT — PAIN SCALES - GENERAL: PAINLEVEL: SEVERE PAIN (7)

## 2023-10-17 NOTE — PATIENT INSTRUCTIONS
Patient Education     Viral Upper Respiratory Illness (Adult)    You have a viral upper respiratory illness (URI), which is another term for the common cold. This illness is contagious during the first few days. It is spread through the air by coughing and sneezing. It may also be spread by direct contact (touching the sick person and then touching your own eyes, nose, or mouth). Frequent handwashing will decrease risk of spread. Most viral illnesses go away within 7 to 10 days with rest and simple home remedies. Sometimes the illness may last for several weeks. Antibiotics will not kill a virus, and they are generally not prescribed for this condition.  Home care    If symptoms are severe, rest at home for the first 2 to 3 days. When you resume activity, don't let yourself get too tired.    Don't smoke. If you need help stopping, talk with your healthcare provider.    Avoid being exposed to cigarette smoke (yours or others ).    You may use acetaminophen or ibuprofen to control pain and fever, unless another medicine was prescribed. If you have chronic liver or kidney disease, have ever had a stomach ulcer or gastrointestinal bleeding, or are taking blood-thinning medicines, talk with your healthcare provider before using these medicines. Aspirin should never be given to anyone under 18 years of age who is ill with a viral infection or fever. It may cause severe liver or brain damage.    Your appetite may be poor, so a light diet is fine. Stay well hydrated by drinking 6 to 8 glasses of fluids per day (water, soft drinks, juices, tea, or soup). Extra fluids will help loosen secretions in the nose and lungs.    Over-the-counter cold medicines will not shorten the length of time you re sick, but they may be helpful for the following symptoms: cough, sore throat, and nasal and sinus congestion. If you take prescription medicines, ask your healthcare provider or pharmacist which over-the-counter medicines are safe to  use. (Note: Don't use decongestants if you have high blood pressure.)  Follow-up care  Follow up with your healthcare provider, or as advised.  When to seek medical advice  Call your healthcare provider right away if any of these occur:    Cough with lots of colored sputum (mucus)    Severe headache; face, neck, or ear pain    Difficulty swallowing due to throat pain    Fever of 100.4 F (38 C) or higher, or as directed by your healthcare provider  Call 911  Call 911 if any of these occur:    Chest pain, shortness of breath, wheezing, or difficulty breathing    Coughing up blood    Very severe pain with swallowing, especially if it goes along with a muffled voice   [employer] last reviewed this educational content on 6/1/2018 2000-2021 [employer]. All rights reserved. This information is not intended as a substitute for professional medical care. Always follow your healthcare professional's instructions.            Rinvoq Pregnancy And Lactation Text: Based on animal studies, Rinvoq may cause embryo-fetal harm when administered to pregnant women.  The medication should not be used in pregnancy.  Breastfeeding is not recommended during treatment and for 6 days after the last dose.

## 2023-10-20 ENCOUNTER — DOCUMENTATION ONLY (OUTPATIENT)
Dept: HOME HEALTH SERVICES | Facility: CLINIC | Age: 33
End: 2023-10-20
Payer: COMMERCIAL

## 2023-11-20 ENCOUNTER — HOSPITAL ENCOUNTER (EMERGENCY)
Facility: HOSPITAL | Age: 33
Discharge: HOME | End: 2023-11-20
Attending: NURSE PRACTITIONER | Admitting: NURSE PRACTITIONER
Payer: COMMERCIAL

## 2023-11-20 VITALS
WEIGHT: 215 LBS | TEMPERATURE: 98.6 F | HEIGHT: 65 IN | OXYGEN SATURATION: 97 % | RESPIRATION RATE: 18 BRPM | HEART RATE: 87 BPM | DIASTOLIC BLOOD PRESSURE: 86 MMHG | SYSTOLIC BLOOD PRESSURE: 132 MMHG | BODY MASS INDEX: 35.82 KG/M2

## 2023-11-20 DIAGNOSIS — R52 GENERALIZED BODY ACHES: ICD-10-CM

## 2023-11-20 DIAGNOSIS — J02.9 ACUTE PHARYNGITIS: ICD-10-CM

## 2023-11-20 DIAGNOSIS — J06.9 VIRAL URI WITH COUGH: ICD-10-CM

## 2023-11-20 LAB
FLUAV RNA SPEC QL NAA+PROBE: NEGATIVE
FLUBV RNA RESP QL NAA+PROBE: NEGATIVE
GROUP A STREP BY PCR: NOT DETECTED
RSV RNA SPEC NAA+PROBE: NEGATIVE
SARS-COV-2 RNA RESP QL NAA+PROBE: POSITIVE

## 2023-11-20 PROCEDURE — 99213 OFFICE O/P EST LOW 20 MIN: CPT | Performed by: NURSE PRACTITIONER

## 2023-11-20 PROCEDURE — 87651 STREP A DNA AMP PROBE: CPT | Performed by: NURSE PRACTITIONER

## 2023-11-20 PROCEDURE — 87637 SARSCOV2&INF A&B&RSV AMP PRB: CPT | Performed by: NURSE PRACTITIONER

## 2023-11-20 PROCEDURE — G0463 HOSPITAL OUTPT CLINIC VISIT: HCPCS

## 2023-11-20 PROCEDURE — C9803 HOPD COVID-19 SPEC COLLECT: HCPCS

## 2023-11-20 ASSESSMENT — ENCOUNTER SYMPTOMS
FEVER: 1
TROUBLE SWALLOWING: 0
APPETITE CHANGE: 0
SORE THROAT: 1
SHORTNESS OF BREATH: 0
MYALGIAS: 1
COUGH: 1

## 2023-11-20 ASSESSMENT — ACTIVITIES OF DAILY LIVING (ADL): ADLS_ACUITY_SCORE: 35

## 2023-11-20 NOTE — DISCHARGE INSTRUCTIONS
We will notify you of your results when they are available.  Take Tylenol or ibuprofen as needed for the pain or fever.  Continue drinking plenty of fluids to stay hydrated.    Follow-up with your doctor if no improvement in symptoms.    Return to urgent care or emergency department for any worsening or concerning symptoms.

## 2023-11-20 NOTE — ED PROVIDER NOTES
History   No chief complaint on file.    HPI  Jennifer Tatum is a 33 year old female who presents ambulatory to urgent care for evaluation of URI symptoms that started 1 week ago.  The patient reports cough, sore throat, generalized body aches, fever of up to 100  F and congestion.  No shortness of breath or chest pain.  No nausea, vomiting or diarrhea.  No known recent ill contacts.  She has been taking Tylenol, Sudafed and pushing fluids with minimal relief.  She notes that she was coughing a lot while she was in class earlier today and had to leave.  The patient will require a school excuse note.    Allergies:  Allergies   Allergen Reactions    Wellbutrin [Bupropion] Other (See Comments)     Mood changes    Lamictal [Lamotrigine] Rash       Problem List:    Patient Active Problem List    Diagnosis Date Noted    Acute bilateral low back pain with left-sided sciatica 08/17/2023     Priority: Medium    Class 2 severe obesity due to excess calories with serious comorbidity in adult (H) 07/18/2023     Priority: Medium    FREDRICK (obstructive sleep apnea) 06/05/2023     Priority: Medium    Secondary dysmenorrhea 03/19/2021     Priority: Medium    Mood swings 03/19/2021     Priority: Medium    Tobacco abuse 03/12/2021     Priority: Medium    VINI (generalized anxiety disorder) 03/12/2021     Priority: Medium    Menorrhagia with regular cycle 03/12/2021     Priority: Medium    Female pelvic peritoneal adhesions--Severe 08/20/2020     Priority: Medium    Relationship problem between partners 12/01/2017     Priority: Medium    Major depressive disorder, recurrent episode, mild (H24) 05/02/2017     Priority: Medium    ACP (advance care planning) 06/17/2016     Priority: Medium     Advance Care Planning 6/17/2016: ACP Review of Chart / Resources Provided:  Reviewed chart for advance care plan.  Jennifer Tatum has no plan or code status on file. Discussed available resources and provided with information.   Added by Gilbert PHAN  "Ariela            Attention deficit hyperactivity disorder (ADHD), predominantly inattentive type 2016     Priority: Medium    PTSD (post-traumatic stress disorder) 2016     Priority: Medium        Past Medical History:    Past Medical History:   Diagnosis Date    Anxiety 2013    Attention deficit hyperactivity disorder (ADHD), predominantly inattentive type 2016    Complicated grieving 2017    Depressive disorder     Female pelvic peritoneal adhesions--Severe 2020    VINI (generalized anxiety disorder) 2021    H/O nephrolithotomy with removal of calculi     Incompetent cervix 11/10/2015    IUFD (intrauterine fetal death) 2016    Major depressive disorder, recurrent episode, mild (H24) 2017    Menorrhagia with regular cycle 2021    Mild major depression (H24) 2013    Mood swings 2021    Nephrolithiasis     Pelvic peritoneal adhesions, female 2020    PTSD (post-traumatic stress disorder) 2016    Secondary dysmenorrhea 2021    Smoker 2013    Tobacco abuse 2021       Past Surgical History:    Past Surgical History:   Procedure Laterality Date    CERCLAGE CERVICAL N/A 11/10/2015    Procedure: CERCLAGE CERVICAL;  Surgeon: Tucker Barragan MD;  Location: [facility]     SECTION  2012    Pregnancy full-term; \"Oleksandr\"      SECTION N/A 2016    Procedure:  SECTION;  Surgeon: Nisha Mcleod MD;  Location: [facility]     SECTION N/A 2018    EYE SURGERY      HC NEPHROSTOMY PERCUTUTANEOUS      left    impacted wisdom teeth removal      LAPAROSCOPIC TUBAL LIGATION Bilateral 2020    Procedure: LAPAROSCOPIC BILATERAL FALLOPIAN TUBE FULGRATION;  Surgeon: Frow, David Franke, MD;  Location: [facility]       Family History:    Family History   Problem Relation Age of Onset    Hypothyroidism Mother     Alcohol/Drug Father         alcoholism    Psychotic Disorder Father         Bipolar    Breast " "Cancer Paternal Grandmother 45    Cerebrovascular Disease Other         Cerebrovascular accident    C.A.D. Other     Hypertension Other     Diabetes Other        Social History:  Marital Status:  Single [1]  Social History     Tobacco Use    Smoking status: Every Day     Packs/day: 1.00     Years: 10.00     Additional pack years: 0.00     Total pack years: 10.00     Types: Cigarettes, Cigars     Passive exposure: Current    Smokeless tobacco: Never    Tobacco comments:     declines quitplan referral 9/8/2020   Vaping Use    Vaping Use: Never used   Substance Use Topics    Alcohol use: No    Drug use: No        Medications:    cariprazine (VRAYLAR) 1.5 MG capsule  hydrOXYzine (ATARAX) 25 MG tablet          Review of Systems   Constitutional:  Positive for fever. Negative for appetite change.   HENT:  Positive for congestion and sore throat. Negative for trouble swallowing.    Respiratory:  Positive for cough. Negative for shortness of breath.    Cardiovascular:  Negative for chest pain.   Musculoskeletal:  Positive for myalgias.   All other systems reviewed and are negative.      Physical Exam   BP: 132/86  Pulse: 87  Temp: 98.6  F (37  C)  Resp: 18  Height: 165.1 cm (5' 5\")  Weight: 97.5 kg (215 lb)  SpO2: 97 %      Physical Exam  Vitals and nursing note reviewed.   Constitutional:       General: She is not in acute distress.     Appearance: Normal appearance. She is well-developed. She is not ill-appearing or toxic-appearing.   HENT:      Head: Normocephalic and atraumatic.      Right Ear: Tympanic membrane and ear canal normal.      Left Ear: Tympanic membrane and ear canal normal.      Nose: Nose normal.      Mouth/Throat:      Mouth: Mucous membranes are moist.      Pharynx: Oropharynx is clear. Uvula midline. No posterior oropharyngeal erythema.      Tonsils: No tonsillar exudate or tonsillar abscesses. 0 on the right. 0 on the left.   Eyes:      Conjunctiva/sclera: Conjunctivae normal.   Cardiovascular:      " Rate and Rhythm: Normal rate and regular rhythm.      Heart sounds: Normal heart sounds.   Pulmonary:      Effort: Pulmonary effort is normal. No respiratory distress.      Breath sounds: Normal breath sounds. No wheezing, rhonchi or rales.   Abdominal:      General: Abdomen is flat.   Musculoskeletal:      Cervical back: Normal range of motion and neck supple.   Skin:     General: Skin is warm and dry.      Coloration: Skin is not pale.   Neurological:      Mental Status: She is alert and oriented to person, place, and time.         ED Course                 Procedures              No results found for this or any previous visit (from the past 24 hour(s)).    Medications - No data to display    Assessments & Plan (with Medical Decision Making)   33-year-old female that presented for evaluation of URI symptoms that started 1 week ago.  Heart rate and rhythm are regular.  Respirations are nonlabored.  No obvious tonsillar abscess.  Vital signs within normal limits.    Strep throat, respiratory viral panel testing done with results pending.  Patient opted to be called with results when they are available.  Likely viral etiology.  At this time I recommended continuing with Tylenol or ibuprofen as needed for the pain.  Push fluids.  Follow-up with primary doctor if no improvement in symptoms.  Return to urgent care or emergency department for any worsening or concerning symptoms.  School excuse note given.    I have reviewed the nursing notes.    I have reviewed the findings, diagnosis, plan and need for follow up with the patient.  This document was prepared using a combination of typing and voice generated software.  While every attempt was made for accuracy, spelling and grammatical errors may exist.         New Prescriptions    No medications on file       Final diagnoses:   Acute pharyngitis   Viral URI with cough   Generalized body aches       11/20/2023   [facility]       Mpofu, Prudence,  CNP  11/20/23 0912

## 2023-11-20 NOTE — Clinical Note
Rc was seen and treated in our emergency department on 11/20/2023.  She may return to school on 11/21/2023.      If you have any questions or concerns, please don't hesitate to call.      Mpofu, Prudence, CNP

## 2023-11-20 NOTE — ED TRIAGE NOTES
Pt presents with c/o sore throat, body aches, feverish, cough, congestion. Symptom onset was 11/12. Otc tylenol, various cough and cold medications. Needs note for work.

## 2024-04-16 ENCOUNTER — HOSPITAL ENCOUNTER (EMERGENCY)
Facility: HOSPITAL | Age: 34
Discharge: HOME | End: 2024-04-16
Payer: COMMERCIAL

## 2024-04-16 ENCOUNTER — APPOINTMENT (OUTPATIENT)
Dept: GENERAL RADIOLOGY | Facility: HOSPITAL | Age: 34
End: 2024-04-16
Payer: COMMERCIAL

## 2024-04-16 VITALS
OXYGEN SATURATION: 97 % | TEMPERATURE: 98.6 F | RESPIRATION RATE: 15 BRPM | SYSTOLIC BLOOD PRESSURE: 120 MMHG | HEART RATE: 92 BPM | WEIGHT: 214.73 LBS | HEIGHT: 65 IN | BODY MASS INDEX: 35.78 KG/M2 | DIASTOLIC BLOOD PRESSURE: 83 MMHG

## 2024-04-16 DIAGNOSIS — M54.16 LUMBAR BACK PAIN WITH RADICULOPATHY AFFECTING RIGHT LOWER EXTREMITY: ICD-10-CM

## 2024-04-16 PROCEDURE — 99213 OFFICE O/P EST LOW 20 MIN: CPT

## 2024-04-16 PROCEDURE — G0463 HOSPITAL OUTPT CLINIC VISIT: HCPCS

## 2024-04-16 PROCEDURE — 73630 X-RAY EXAM OF FOOT: CPT | Mod: RT

## 2024-04-16 RX ORDER — ALPRAZOLAM 0.5 MG
0.5 TABLET ORAL 3 TIMES DAILY PRN
COMMUNITY
End: 2024-09-09

## 2024-04-16 RX ORDER — PREDNISONE 10 MG/1
40 TABLET ORAL DAILY
Qty: 30 TABLET | Refills: 0 | Status: SHIPPED | OUTPATIENT
Start: 2024-04-16 | End: 2024-04-21

## 2024-04-16 RX ORDER — PROPRANOLOL HYDROCHLORIDE 20 MG/1
20 TABLET ORAL 3 TIMES DAILY
COMMUNITY
End: 2024-09-10

## 2024-04-16 RX ORDER — PAROXETINE 30 MG/1
TABLET, FILM COATED ORAL EVERY MORNING
COMMUNITY
End: 2024-09-10

## 2024-04-16 ASSESSMENT — ENCOUNTER SYMPTOMS
ABDOMINAL PAIN: 0
ARTHRALGIAS: 1
COLOR CHANGE: 0
NAUSEA: 0
CHILLS: 0
FEVER: 0
BACK PAIN: 1
WOUND: 0
ACTIVITY CHANGE: 1
DIFFICULTY URINATING: 0
SHORTNESS OF BREATH: 0
VOMITING: 0
DIARRHEA: 0

## 2024-04-16 ASSESSMENT — ACTIVITIES OF DAILY LIVING (ADL): ADLS_ACUITY_SCORE: 35

## 2024-04-16 NOTE — ED PROVIDER NOTES
History     Chief Complaint   Patient presents with    Foot Pain     Right foot pain     HPI  Jennifer Tatum is a 33 year old female who presents to the urgent care with a 1 week history of right foot pain. She has a history of chronic right lower back pain, unsure if back pain is causing foot pain. She denies numbness/tingling, fevers, chills, falls, and injuries. Is able to bear weight and ambulate with discomfort. Has tried wearing different shoes along with tylenol and ibuprofen with minimal change.     Allergies:  Allergies   Allergen Reactions    Wellbutrin [Bupropion] Other (See Comments)     Mood changes    Lamictal [Lamotrigine] Rash       Problem List:    Patient Active Problem List    Diagnosis Date Noted    Acute bilateral low back pain with left-sided sciatica 08/17/2023     Priority: Medium    Class 2 severe obesity due to excess calories with serious comorbidity in adult (H) 07/18/2023     Priority: Medium    FREDRICK (obstructive sleep apnea) 06/05/2023     Priority: Medium    Secondary dysmenorrhea 03/19/2021     Priority: Medium    Mood swings 03/19/2021     Priority: Medium    Tobacco abuse 03/12/2021     Priority: Medium    VINI (generalized anxiety disorder) 03/12/2021     Priority: Medium    Menorrhagia with regular cycle 03/12/2021     Priority: Medium    Female pelvic peritoneal adhesions--Severe 08/20/2020     Priority: Medium    Relationship problem between partners 12/01/2017     Priority: Medium    Major depressive disorder, recurrent episode, mild (H24) 05/02/2017     Priority: Medium    ACP (advance care planning) 06/17/2016     Priority: Medium     Advance Care Planning 6/17/2016: ACP Review of Chart / Resources Provided:  Reviewed chart for advance care plan.  Jennifer Tatum has no plan or code status on file. Discussed available resources and provided with information.   Added by Gilbert Titus            Attention deficit hyperactivity disorder (ADHD), predominantly inattentive type  "2016     Priority: Medium    PTSD (post-traumatic stress disorder) 2016     Priority: Medium        Past Medical History:    Past Medical History:   Diagnosis Date    Anxiety 2013    Attention deficit hyperactivity disorder (ADHD), predominantly inattentive type 2016    Complicated grieving 2017    Depressive disorder     Female pelvic peritoneal adhesions--Severe 2020    VINI (generalized anxiety disorder) 2021    H/O nephrolithotomy with removal of calculi     Incompetent cervix 11/10/2015    IUFD (intrauterine fetal death) 2016    Major depressive disorder, recurrent episode, mild (H24) 2017    Menorrhagia with regular cycle 2021    Mild major depression (H24) 2013    Mood swings 2021    Nephrolithiasis     Pelvic peritoneal adhesions, female 2020    PTSD (post-traumatic stress disorder) 2016    Secondary dysmenorrhea 2021    Smoker 2013    Tobacco abuse 2021       Past Surgical History:    Past Surgical History:   Procedure Laterality Date    CERCLAGE CERVICAL N/A 11/10/2015    Procedure: CERCLAGE CERVICAL;  Surgeon: Tucker Barragan MD;  [facility]     SECTION  2012    Pregnancy full-term; \"Oleksandr\"      SECTION N/A 2016    Procedure:  SECTION;  Surgeon: Nisha Mcleod MD;  Location: [facility]     SECTION N/A 2018    EYE SURGERY      HC NEPHROSTOMY PERCUTUTANEOUS      left    impacted wisdom teeth removal      LAPAROSCOPIC TUBAL LIGATION Bilateral 2020    Procedure: LAPAROSCOPIC BILATERAL FALLOPIAN TUBE FULGRATION;  Surgeon: Frow, David Franke, MD;  Location: [facility]       Family History:    Family History   Problem Relation Age of Onset    Hypothyroidism Mother     Alcohol/Drug Father         alcoholism    Psychotic Disorder Father         Bipolar    Breast Cancer Paternal Grandmother 45    Cerebrovascular Disease Other         Cerebrovascular accident " "   C.A.D. Other     Hypertension Other     Diabetes Other        Social History:  Marital Status:  Single [1]  Social History     Tobacco Use    Smoking status: Every Day     Current packs/day: 1.00     Average packs/day: 1 pack/day for 10.0 years (10.0 ttl pk-yrs)     Types: Cigarettes, Cigars     Passive exposure: Current    Smokeless tobacco: Never    Tobacco comments:     declines quitplan referral 9/8/2020   Vaping Use    Vaping status: Never Used   Substance Use Topics    Alcohol use: No    Drug use: No        Medications:    ALPRAZolam (XANAX) 0.5 MG tablet  cariprazine (VRAYLAR) 1.5 MG capsule  hydrOXYzine (ATARAX) 25 MG tablet  PARoxetine (PAXIL) 30 MG tablet  propranolol (INDERAL) 20 MG tablet  predniSONE (DELTASONE) 10 MG tablet          Review of Systems   Constitutional:  Positive for activity change. Negative for chills and fever.   Respiratory:  Negative for shortness of breath.    Cardiovascular:  Negative for chest pain.   Gastrointestinal:  Negative for abdominal pain, diarrhea, nausea and vomiting.   Genitourinary:  Negative for difficulty urinating.   Musculoskeletal:  Positive for arthralgias and back pain.   Skin:  Negative for color change, pallor, rash and wound.   All other systems reviewed and are negative.      Physical Exam   BP: 120/83  Pulse: 92  Temp: 98.6  F (37  C)  Resp: 15  Height: 165.1 cm (5' 5\")  Weight: 97.4 kg (214 lb 11.7 oz)  SpO2: 97 %      Physical Exam  Vitals and nursing note reviewed.   Constitutional:       General: She is not in acute distress.     Appearance: Normal appearance. She is not ill-appearing, toxic-appearing or diaphoretic.   Musculoskeletal:         General: Tenderness present. No swelling, deformity or signs of injury.      Cervical back: No tenderness or bony tenderness. No pain with movement.      Thoracic back: No tenderness or bony tenderness. Normal range of motion.      Lumbar back: Tenderness present. No swelling or bony tenderness. Normal range " of motion. Positive right straight leg raise test. Negative left straight leg raise test.        Back:       Right lower leg: No edema.      Left lower leg: No edema.   Skin:     General: Skin is warm and dry.      Capillary Refill: Capillary refill takes less than 2 seconds.      Coloration: Skin is not pale.      Findings: No bruising or erythema.   Neurological:      General: No focal deficit present.      Mental Status: She is alert and oriented to person, place, and time.         ED Course        Procedures              Results for orders placed or performed during the hospital encounter of 04/16/24 (from the past 24 hour(s))   Foot  XR, G/E 3 views, right    Narrative    PROCEDURE:  XR FOOT RIGHT G/E 3 VIEWS    HISTORY: generalized pain and tenderness, denies injury.    COMPARISON:  None.    TECHNIQUE:  3 views right foot.    FINDINGS:  No fracture or dislocation is identified. The joint spaces  are preserved. Plantar calcaneal spurring is noted. No foreign body is  seen.       Impression    IMPRESSION: No acute fracture.      ANGELINA PIMENTEL MD         SYSTEM ID:  [id number]       Medications - No data to display    Assessments & Plan (with Medical Decision Making)     I have reviewed the nursing notes.    I have reviewed the findings, diagnosis, plan and need for follow up with the patient.  Jennifer Tatum is a 33 year old female who presents to the urgent care with a 1 week history of right foot pain. She has a history of chronic right lower back pain, unsure if back pain is causing foot pain. She denies numbness/tingling, fevers, chills, falls, and injuries. Is able to bear weight and ambulate with discomfort. Has tried wearing different shoes along with tylenol and ibuprofen with minimal change.     MDM: vital signs normal, afebrile. Strong pulses to right lower extremity. There is generalized tenderness to right foot. No pallor, erythema, bruising, or wounds. XR of right foot reviewed with  No  fracture or dislocation is identified. The joint spaces  are preserved. Plantar calcaneal spurring is noted. No foreign body is seen, per radiologist. There is tenderness to right lower back over sciatic area. No midline tenderness or palpable step offs. No saddle paraesthesias or bowel/urinary retention or incontinence. Able to fully bear weight and ambulate. With the tenderness over sciatic area, concern for lower back pain being the contributor to right foot pain. Ace wrap placed to right foot. She declines crutches. Prednisone burst prescribed. Supportive measures and return precautions discussed. She has tried PT in the past, not interested at this time. Encouraged close follow up in the clinic. She is in agreement with plan.     (M54.16) Lumbar back pain with radiculopathy affecting right lower extremity  Plan: You can take 650-1000mg of tylenol every 6 hours as needed, max of 3000mg in 24 hours.    Prednisone once daily for 5 days. Avoid ibuprofen while taking.      Ice for 15-20 minutes every 2-3 hours. Please make sure to protect skin to prevent frost bite.     Return with any increased pain, numbness to groin, urinary or bowel concerns, inability to bear weight, or other concerns. Understanding verbalized.       Discharge Medication List as of 4/16/2024  7:20 PM        START taking these medications    Details   predniSONE (DELTASONE) 10 MG tablet Take 4 tablets (40 mg) by mouth daily for 5 days, Disp-30 tablet, R-0, [facility]             Final diagnoses:   Lumbar back pain with radiculopathy affecting right lower extremity       4/16/2024   [facility]       Vashti Minaya NP  04/16/24 1929

## 2024-04-16 NOTE — ED TRIAGE NOTES
Pt presents with c/o right foot pain. Reports she doesn't know if she pinched a nerve in her back but she can't put her foot down and walk. Sx started last week. States she has switched shoes and taped her foot also. Pt has been taking tylenol and ibuprofen. Pulse on the foot is strong.     Denies trauma or injury

## 2024-04-17 NOTE — DISCHARGE INSTRUCTIONS
Armand Mcdonnell,   Your tacrolimus level was 8.4 at 12 hours on 12/1/21 which is within your goal range of 8-10. No dose change at this time. Please call the transplant office ([phone]) with any questions.   Thanks,   Mikayla You can take 650-1000mg of tylenol every 6 hours as needed, max of 3000mg in 24 hours.    Prednisone once daily for 5 days. Avoid ibuprofen while taking.      Ice for 15-20 minutes every 2-3 hours. Please make sure to protect skin to prevent frost bite.     Return with any increased pain, numbness to groin, urinary or bowel concerns, inability to bear weight, or other concerns.

## 2024-04-24 ENCOUNTER — MEDICAL CORRESPONDENCE (OUTPATIENT)
Dept: MRI IMAGING | Facility: HOSPITAL | Age: 34
End: 2024-04-24

## 2024-05-01 ENCOUNTER — APPOINTMENT (OUTPATIENT)
Dept: CT IMAGING | Facility: HOSPITAL | Age: 34
End: 2024-05-01
Attending: PHYSICIAN ASSISTANT
Payer: COMMERCIAL

## 2024-05-01 ENCOUNTER — HOSPITAL ENCOUNTER (EMERGENCY)
Facility: HOSPITAL | Age: 34
Discharge: HOME | End: 2024-05-01
Attending: PHYSICIAN ASSISTANT | Admitting: PHYSICIAN ASSISTANT
Payer: COMMERCIAL

## 2024-05-01 VITALS
TEMPERATURE: 97.3 F | HEART RATE: 84 BPM | RESPIRATION RATE: 16 BRPM | DIASTOLIC BLOOD PRESSURE: 92 MMHG | SYSTOLIC BLOOD PRESSURE: 140 MMHG | OXYGEN SATURATION: 99 %

## 2024-05-01 DIAGNOSIS — R10.9 ABDOMINAL PAIN OF UNKNOWN ETIOLOGY: ICD-10-CM

## 2024-05-01 LAB
ALBUMIN SERPL BCG-MCNC: 4.1 G/DL (ref 3.5–5.2)
ALBUMIN UR-MCNC: NEGATIVE MG/DL
ALP SERPL-CCNC: 97 U/L (ref 40–150)
ALT SERPL W P-5'-P-CCNC: 30 U/L (ref 0–50)
ANION GAP SERPL CALCULATED.3IONS-SCNC: 12 MMOL/L (ref 7–15)
APPEARANCE UR: CLEAR
AST SERPL W P-5'-P-CCNC: 24 U/L (ref 0–45)
BACTERIA #/AREA URNS HPF: ABNORMAL /HPF
BASOPHILS # BLD AUTO: 0.1 10E3/UL (ref 0–0.2)
BASOPHILS NFR BLD AUTO: 1 %
BILIRUB SERPL-MCNC: 0.3 MG/DL
BILIRUB UR QL STRIP: NEGATIVE
BUN SERPL-MCNC: 13.8 MG/DL (ref 6–20)
CALCIUM SERPL-MCNC: 9.5 MG/DL (ref 8.6–10)
CHLORIDE SERPL-SCNC: 102 MMOL/L (ref 98–107)
COLOR UR AUTO: ABNORMAL
CREAT SERPL-MCNC: 0.74 MG/DL (ref 0.51–0.95)
DEPRECATED HCO3 PLAS-SCNC: 22 MMOL/L (ref 22–29)
EGFRCR SERPLBLD CKD-EPI 2021: >90 ML/MIN/1.73M2
EOSINOPHIL # BLD AUTO: 0.3 10E3/UL (ref 0–0.7)
EOSINOPHIL NFR BLD AUTO: 3 %
ERYTHROCYTE [DISTWIDTH] IN BLOOD BY AUTOMATED COUNT: 13.7 % (ref 10–15)
GLUCOSE SERPL-MCNC: 87 MG/DL (ref 70–99)
GLUCOSE UR STRIP-MCNC: NEGATIVE MG/DL
HCG UR QL: NEGATIVE
HCT VFR BLD AUTO: 42.8 % (ref 35–47)
HGB BLD-MCNC: 14.3 G/DL (ref 11.7–15.7)
HGB UR QL STRIP: NEGATIVE
HOLD SPECIMEN: NORMAL
HOLD SPECIMEN: NORMAL
IMM GRANULOCYTES # BLD: 0 10E3/UL
IMM GRANULOCYTES NFR BLD: 0 %
KETONES UR STRIP-MCNC: NEGATIVE MG/DL
LEUKOCYTE ESTERASE UR QL STRIP: NEGATIVE
LIPASE SERPL-CCNC: 19 U/L (ref 13–60)
LYMPHOCYTES # BLD AUTO: 2.8 10E3/UL (ref 0.8–5.3)
LYMPHOCYTES NFR BLD AUTO: 27 %
MCH RBC QN AUTO: 28.3 PG (ref 26.5–33)
MCHC RBC AUTO-ENTMCNC: 33.4 G/DL (ref 31.5–36.5)
MCV RBC AUTO: 85 FL (ref 78–100)
MONOCYTES # BLD AUTO: 0.9 10E3/UL (ref 0–1.3)
MONOCYTES NFR BLD AUTO: 9 %
NEUTROPHILS # BLD AUTO: 6.3 10E3/UL (ref 1.6–8.3)
NEUTROPHILS NFR BLD AUTO: 61 %
NITRATE UR QL: NEGATIVE
NRBC # BLD AUTO: 0 10E3/UL
NRBC BLD AUTO-RTO: 0 /100
PH UR STRIP: 7 [PH] (ref 4.7–8)
PLATELET # BLD AUTO: 251 10E3/UL (ref 150–450)
POTASSIUM SERPL-SCNC: 4.4 MMOL/L (ref 3.4–5.3)
PROT SERPL-MCNC: 7.5 G/DL (ref 6.4–8.3)
RBC # BLD AUTO: 5.06 10E6/UL (ref 3.8–5.2)
RBC URINE: 0 /HPF
SODIUM SERPL-SCNC: 136 MMOL/L (ref 135–145)
SP GR UR STRIP: 1.01 (ref 1–1.03)
SQUAMOUS EPITHELIAL: 1 /HPF
UROBILINOGEN UR STRIP-MCNC: NORMAL MG/DL
WBC # BLD AUTO: 10.3 10E3/UL (ref 4–11)
WBC URINE: 1 /HPF

## 2024-05-01 PROCEDURE — 81025 URINE PREGNANCY TEST: CPT | Performed by: STUDENT IN AN ORGANIZED HEALTH CARE EDUCATION/TRAINING PROGRAM

## 2024-05-01 PROCEDURE — 74177 CT ABD & PELVIS W/CONTRAST: CPT

## 2024-05-01 PROCEDURE — 99284 EMERGENCY DEPT VISIT MOD MDM: CPT | Performed by: PHYSICIAN ASSISTANT

## 2024-05-01 PROCEDURE — 81003 URINALYSIS AUTO W/O SCOPE: CPT | Performed by: STUDENT IN AN ORGANIZED HEALTH CARE EDUCATION/TRAINING PROGRAM

## 2024-05-01 PROCEDURE — 96374 THER/PROPH/DIAG INJ IV PUSH: CPT | Mod: XU

## 2024-05-01 PROCEDURE — 250N000009 HC RX 250: Performed by: PHYSICIAN ASSISTANT

## 2024-05-01 PROCEDURE — 85049 AUTOMATED PLATELET COUNT: CPT | Performed by: STUDENT IN AN ORGANIZED HEALTH CARE EDUCATION/TRAINING PROGRAM

## 2024-05-01 PROCEDURE — 83690 ASSAY OF LIPASE: CPT | Performed by: STUDENT IN AN ORGANIZED HEALTH CARE EDUCATION/TRAINING PROGRAM

## 2024-05-01 PROCEDURE — 82040 ASSAY OF SERUM ALBUMIN: CPT | Performed by: STUDENT IN AN ORGANIZED HEALTH CARE EDUCATION/TRAINING PROGRAM

## 2024-05-01 PROCEDURE — 250N000013 HC RX MED GY IP 250 OP 250 PS 637: Performed by: PHYSICIAN ASSISTANT

## 2024-05-01 PROCEDURE — 36415 COLL VENOUS BLD VENIPUNCTURE: CPT | Performed by: STUDENT IN AN ORGANIZED HEALTH CARE EDUCATION/TRAINING PROGRAM

## 2024-05-01 PROCEDURE — 99285 EMERGENCY DEPT VISIT HI MDM: CPT | Mod: 25

## 2024-05-01 PROCEDURE — 250N000011 HC RX IP 250 OP 636: Performed by: PHYSICIAN ASSISTANT

## 2024-05-01 PROCEDURE — 250N000011 HC RX IP 250 OP 636: Performed by: RADIOLOGY

## 2024-05-01 RX ORDER — KETOROLAC TROMETHAMINE 30 MG/ML
30 INJECTION, SOLUTION INTRAMUSCULAR; INTRAVENOUS ONCE
Status: COMPLETED | OUTPATIENT
Start: 2024-05-01 | End: 2024-05-01

## 2024-05-01 RX ORDER — LIDOCAINE HYDROCHLORIDE 20 MG/ML
10 SOLUTION OROPHARYNGEAL ONCE
Status: COMPLETED | OUTPATIENT
Start: 2024-05-01 | End: 2024-05-01

## 2024-05-01 RX ORDER — IOPAMIDOL 755 MG/ML
105 INJECTION, SOLUTION INTRAVASCULAR ONCE
Status: COMPLETED | OUTPATIENT
Start: 2024-05-01 | End: 2024-05-01

## 2024-05-01 RX ORDER — ONDANSETRON 4 MG/1
4 TABLET, ORALLY DISINTEGRATING ORAL EVERY 8 HOURS PRN
Qty: 10 TABLET | Refills: 0 | Status: SHIPPED | OUTPATIENT
Start: 2024-05-01 | End: 2024-05-04

## 2024-05-01 RX ORDER — MAGNESIUM HYDROXIDE/ALUMINUM HYDROXICE/SIMETHICONE 120; 1200; 1200 MG/30ML; MG/30ML; MG/30ML
15 SUSPENSION ORAL ONCE
Status: COMPLETED | OUTPATIENT
Start: 2024-05-01 | End: 2024-05-01

## 2024-05-01 RX ORDER — HYDROCODONE BITARTRATE AND ACETAMINOPHEN 5; 325 MG/1; MG/1
1 TABLET ORAL EVERY 6 HOURS PRN
Qty: 8 TABLET | Refills: 0 | Status: SHIPPED | OUTPATIENT
Start: 2024-05-01 | End: 2024-05-04

## 2024-05-01 RX ADMIN — IOPAMIDOL 105 ML: 755 INJECTION, SOLUTION INTRAVENOUS at 12:09

## 2024-05-01 RX ADMIN — LIDOCAINE HYDROCHLORIDE 10 ML: 20 SOLUTION ORAL at 11:53

## 2024-05-01 RX ADMIN — KETOROLAC TROMETHAMINE 30 MG: 30 INJECTION, SOLUTION INTRAMUSCULAR at 11:53

## 2024-05-01 RX ADMIN — ALUMINUM HYDROXIDE, MAGNESIUM HYDROXIDE, AND DIMETHICONE 15 ML: 200; 20; 200 SUSPENSION ORAL at 11:53

## 2024-05-01 ASSESSMENT — ENCOUNTER SYMPTOMS
ABDOMINAL PAIN: 1
ACTIVITY CHANGE: 0
FATIGUE: 0
SHORTNESS OF BREATH: 0
FEVER: 0
APPETITE CHANGE: 0
ROS GI COMMENTS: SEE HPI

## 2024-05-01 ASSESSMENT — COLUMBIA-SUICIDE SEVERITY RATING SCALE - C-SSRS
2. HAVE YOU ACTUALLY HAD ANY THOUGHTS OF KILLING YOURSELF IN THE PAST MONTH?: NO
6. HAVE YOU EVER DONE ANYTHING, STARTED TO DO ANYTHING, OR PREPARED TO DO ANYTHING TO END YOUR LIFE?: NO
1. IN THE PAST MONTH, HAVE YOU WISHED YOU WERE DEAD OR WISHED YOU COULD GO TO SLEEP AND NOT WAKE UP?: NO

## 2024-05-01 ASSESSMENT — ACTIVITIES OF DAILY LIVING (ADL): ADLS_ACUITY_SCORE: 35

## 2024-05-01 NOTE — ED NOTES
Patient reports LUQ abdominal pain that started suddenly at work around 0900. Reports loose stools, nausea, no emesis. Denies fevers or chills. Denies any blood in urine or stool. Reports intermittent dysuria. Denies taking any thing for pain.

## 2024-05-01 NOTE — LETTER
May 1, 2024      To Whom It May Concern:      Jennifer Tatum was seen in our Emergency Department today, 05/01/24. Please excuse her from work on 5/1 due to illness.    Sincerely,            Jacquie Rust PA-C

## 2024-05-01 NOTE — ED PROVIDER NOTES
History     Chief Complaint   Patient presents with    Abdominal Pain     The history is provided by the patient.     Jennifer Tatum is a 33 year old female who presented to the emergency department ambulatory for evaluation of an abrupt onset of left upper/mid abdominal discomfort beginning after urinating.  Symptoms began approximate 2 and half hours ago.  No vomiting.  No fevers.  No hematochezia, melena, hematemesis.  She does have intermittent loose stools but does not describe them as diarrhea.  She takes routine ibuprofen for chronic low back pain.  She has had no intra-abdominal surgeries.    Allergies:  Allergies   Allergen Reactions    Wellbutrin [Bupropion] Other (See Comments)     Mood changes    Lamictal [Lamotrigine] Rash       Problem List:    Patient Active Problem List    Diagnosis Date Noted    Acute bilateral low back pain with left-sided sciatica 08/17/2023     Priority: Medium    Class 2 severe obesity due to excess calories with serious comorbidity in adult (H) 07/18/2023     Priority: Medium    FREDRICK (obstructive sleep apnea) 06/05/2023     Priority: Medium    Secondary dysmenorrhea 03/19/2021     Priority: Medium    Mood swings 03/19/2021     Priority: Medium    Tobacco abuse 03/12/2021     Priority: Medium    VINI (generalized anxiety disorder) 03/12/2021     Priority: Medium    Menorrhagia with regular cycle 03/12/2021     Priority: Medium    Female pelvic peritoneal adhesions--Severe 08/20/2020     Priority: Medium    Relationship problem between partners 12/01/2017     Priority: Medium    Major depressive disorder, recurrent episode, mild (H24) 05/02/2017     Priority: Medium    ACP (advance care planning) 06/17/2016     Priority: Medium     Advance Care Planning 6/17/2016: ACP Review of Chart / Resources Provided:  Reviewed chart for advance care plan.  Jennifer Tatum has no plan or code status on file. Discussed available resources and provided with information.   Added by Gilbert PHAN  "Ariela            Attention deficit hyperactivity disorder (ADHD), predominantly inattentive type 2016     Priority: Medium    PTSD (post-traumatic stress disorder) 2016     Priority: Medium        Past Medical History:    Past Medical History:   Diagnosis Date    Anxiety 2013    Attention deficit hyperactivity disorder (ADHD), predominantly inattentive type 2016    Complicated grieving 2017    Depressive disorder     Female pelvic peritoneal adhesions--Severe 2020    VINI (generalized anxiety disorder) 2021    H/O nephrolithotomy with removal of calculi     Incompetent cervix 11/10/2015    IUFD (intrauterine fetal death) 2016    Major depressive disorder, recurrent episode, mild (H24) 2017    Menorrhagia with regular cycle 2021    Mild major depression (H24) 2013    Mood swings 2021    Nephrolithiasis     Pelvic peritoneal adhesions, female 2020    PTSD (post-traumatic stress disorder) 2016    Secondary dysmenorrhea 2021    Smoker 2013    Tobacco abuse 2021       Past Surgical History:    Past Surgical History:   Procedure Laterality Date    CERCLAGE CERVICAL N/A 11/10/2015    Procedure: CERCLAGE CERVICAL;  Surgeon: Tucker Barragan MD;  Location: [facility]     SECTION  2012    Pregnancy full-term; \"Oleksandr\"      SECTION N/A 2016    Procedure:  SECTION;  Surgeon: Nisha Mcleod MD;  Location: [facility] OR     SECTION N/A 2018    EYE SURGERY      HC NEPHROSTOMY PERCUTUTANEOUS      left    impacted wisdom teeth removal      LAPAROSCOPIC TUBAL LIGATION Bilateral 2020    Procedure: LAPAROSCOPIC BILATERAL FALLOPIAN TUBE FULGRATION;  Surgeon: Frow, David Franke, MD;  Location: [facility] OR       Family History:    Family History   Problem Relation Age of Onset    Hypothyroidism Mother     Alcohol/Drug Father         alcoholism    Psychotic Disorder Father         Bipolar    Breast " Cancer Paternal Grandmother 45    Cerebrovascular Disease Other         Cerebrovascular accident    C.A.D. Other     Hypertension Other     Diabetes Other        Social History:  Marital Status:  Single [1]  Social History     Tobacco Use    Smoking status: Every Day     Current packs/day: 1.00     Average packs/day: 1 pack/day for 10.0 years (10.0 ttl pk-yrs)     Types: Cigarettes, Cigars     Passive exposure: Current    Smokeless tobacco: Never    Tobacco comments:     declines quitplan referral 9/8/2020   Vaping Use    Vaping status: Never Used   Substance Use Topics    Alcohol use: No    Drug use: No        Medications:    HYDROcodone-acetaminophen (NORCO) 5-325 MG tablet  ondansetron (ZOFRAN ODT) 4 MG ODT tab  ALPRAZolam (XANAX) 0.5 MG tablet  cariprazine (VRAYLAR) 1.5 MG capsule  hydrOXYzine (ATARAX) 25 MG tablet  PARoxetine (PAXIL) 30 MG tablet  propranolol (INDERAL) 20 MG tablet          Review of Systems   Constitutional:  Negative for activity change, appetite change, fatigue and fever.   Respiratory:  Negative for shortness of breath.    Cardiovascular:  Negative for chest pain.   Gastrointestinal:  Positive for abdominal pain.        See HPI   Genitourinary: Negative.        Physical Exam   BP: 120/85  Pulse: 84  Temp: 98.9  F (37.2  C)  Resp: 18  SpO2: 97 %      Physical Exam  Vitals and nursing note reviewed.   Constitutional:       Appearance: Normal appearance.   Cardiovascular:      Rate and Rhythm: Normal rate and regular rhythm.   Pulmonary:      Effort: Pulmonary effort is normal.   Abdominal:      Palpations: Abdomen is soft.      Tenderness: There is abdominal tenderness. There is no guarding.       Skin:     General: Skin is warm and dry.      Capillary Refill: Capillary refill takes less than 2 seconds.   Neurological:      General: No focal deficit present.      Mental Status: She is alert and oriented to person, place, and time.         ED Course     ED Course as of 05/01/24 1249   Wed May  01, 2024   1120 Broad differential diagnosis is considered and includes but is not limited to gastritis, peptic ulcer disease, perforated viscus, acute pancreatitis, acute cholecystitis, small bowel obstruction, aortic dissection, diverticulitis, pyelonephritis, mesenteric ischemia.  Plan will be for pain medications and cross-sectional imaging of the abdomen pelvis given the abrupt onset of severe pain.   1243 Symptoms are improved.  Patient is declining further pain medications.  Imaging as noted.     Procedures              Critical Care time:  none               Results for orders placed or performed during the hospital encounter of 05/01/24 (from the past 24 hour(s))   CBC with platelets differential    Narrative    The following orders were created for panel order CBC with platelets differential.  Procedure                               Abnormality         Status                     ---------                               -----------         ------                     CBC with platelets and d...[[id number]]                      Final result                 Please view results for these tests on the individual orders.   Comprehensive metabolic panel   Result Value Ref Range    Sodium 136 135 - 145 mmol/L    Potassium 4.4 3.4 - 5.3 mmol/L    Carbon Dioxide (CO2) 22 22 - 29 mmol/L    Anion Gap 12 7 - 15 mmol/L    Urea Nitrogen 13.8 6.0 - 20.0 mg/dL    Creatinine 0.74 0.51 - 0.95 mg/dL    GFR Estimate >90 >60 mL/min/1.73m2    Calcium 9.5 8.6 - 10.0 mg/dL    Chloride 102 98 - 107 mmol/L    Glucose 87 70 - 99 mg/dL    Alkaline Phosphatase 97 40 - 150 U/L    AST 24 0 - 45 U/L    ALT 30 0 - 50 U/L    Protein Total 7.5 6.4 - 8.3 g/dL    Albumin 4.1 3.5 - 5.2 g/dL    Bilirubin Total 0.3 <=1.2 mg/dL   Lipase   Result Value Ref Range    Lipase 19 13 - 60 U/L   CBC with platelets and differential   Result Value Ref Range    WBC Count 10.3 4.0 - 11.0 10e3/uL    RBC Count 5.06 3.80 - 5.20 10e6/uL    Hemoglobin 14.3 11.7 -  15.7 g/dL    Hematocrit 42.8 35.0 - 47.0 %    MCV 85 78 - 100 fL    MCH 28.3 26.5 - 33.0 pg    MCHC 33.4 31.5 - 36.5 g/dL    RDW 13.7 10.0 - 15.0 %    Platelet Count 251 150 - 450 10e3/uL    % Neutrophils 61 %    % Lymphocytes 27 %    % Monocytes 9 %    % Eosinophils 3 %    % Basophils 1 %    % Immature Granulocytes 0 %    NRBCs per 100 WBC 0 <1 /100    Absolute Neutrophils 6.3 1.6 - 8.3 10e3/uL    Absolute Lymphocytes 2.8 0.8 - 5.3 10e3/uL    Absolute Monocytes 0.9 0.0 - 1.3 10e3/uL    Absolute Eosinophils 0.3 0.0 - 0.7 10e3/uL    Absolute Basophils 0.1 0.0 - 0.2 10e3/uL    Absolute Immature Granulocytes 0.0 <=0.4 10e3/uL    Absolute NRBCs 0.0 10e3/uL   Extra Tube    Narrative    The following orders were created for panel order Extra Tube.  Procedure                               Abnormality         Status                     ---------                               -----------         ------                     Extra Red Top Tube[[id number]]                               Final result               Extra Heparinized Syringe[[id number]]                        Final result                 Please view results for these tests on the individual orders.   Extra Red Top Tube   Result Value Ref Range    Hold Specimen JIC    Extra Heparinized Syringe   Result Value Ref Range    Hold Specimen JIC    UA with Microscopic reflex to Culture    Specimen: Urine, Clean Catch   Result Value Ref Range    Color Urine Light Yellow Colorless, Straw, Light Yellow, Yellow    Appearance Urine Clear Clear    Glucose Urine Negative Negative mg/dL    Bilirubin Urine Negative Negative    Ketones Urine Negative Negative mg/dL    Specific Gravity Urine 1.007 1.003 - 1.035    Blood Urine Negative Negative    pH Urine 7.0 4.7 - 8.0    Protein Albumin Urine Negative Negative mg/dL    Urobilinogen Urine Normal Normal, 2.0 mg/dL    Nitrite Urine Negative Negative    Leukocyte Esterase Urine Negative Negative    Bacteria Urine Few (A) None Seen /HPF     RBC Urine 0 <=2 /HPF    WBC Urine 1 <=5 /HPF    Squamous Epithelials Urine 1 <=1 /HPF    Narrative    Urine Culture not indicated   HCG qualitative urine   Result Value Ref Range    hCG Urine Qualitative Negative Negative   CT Abdomen Pelvis w Contrast    Narrative    CT ABDOMEN PELVIS W CONTRAST    CLINICAL HISTORY: Female, age 33 years,  Acute onset of left upper/mid  abdominal pain;    Comparison:  CT scan abdomen and pelvis 3/7/2016    TECHNIQUE:  CT scanwas performed of the abdomen and pelvis with IV  contrast. Axial, sagittal and coronal images were reviewed. If  present, MIP and/or 3-D images were constructed by the technologist.    FINDINGS:  The lung bases and visualized portions of the heart are unremarkable.    Stomach and duodenum: Unremarkable.    Liver: Unremarkable    Gallbladder: Unremarkable.    Spleen: Unremarkable.    Pancreas: Unremarkable.    Adrenal glands: Unremarkable    Kidneys: No acute abnormality. Low dense foci of the left and right  kidney likely representing cortical cysts. Punctate calculus in one of  the lower pole calyces of the left kidney.    Ureters: Unremarkable    Urinary bladder: Unremarkable    Large and small bowel: Unremarkable.    Appendix: Unremarkable.    The right and left ovaries are heterogeneous in appearance suggesting  a number of normal-appearing follicles. There is a corpus luteal cyst  seen on the left ovary. The uterus is unremarkable.    The abdominal aorta and inferior vena cava are unremarkable.  Retroperitoneal and mesenteric nodes are unremarkable.    Bony structures: Unremarkable.      Impression    IMPRESSION:   No evidence of acute abnormality.    A corpus luteal cyst is seen on the left ovary which could be  associated with any history of left-sided abdominal/pelvic pain.    Nonacute findings as described above.      This facility minimizes radiation dose by adjusting the mA and/or kV  according to each patient size.      This CT scan was performed  using one or more the following dose  reduction techniques:    -Automated exposure control,  -Adjustment of the mA and/or kV according to patient's size, and/or,  -Use of iterative reconstruction technique.    ELEAZAR STEVENSON MD         SYSTEM ID:  [id number]       Medications   alum & mag hydroxide-simethicone (MAALOX) suspension 15 mL (15 mLs Oral $Given 5/1/24 1153)   lidocaine (viscous) (XYLOCAINE) 2 % solution 10 mL (10 mLs Mouth/Throat $Given 5/1/24 1153)   ketorolac (TORADOL) injection 30 mg (30 mg Intravenous $Given 5/1/24 1153)   sodium chloride (PF) 0.9% PF flush 50 mL (50 mLs Intravenous $Given 5/1/24 1209)   iopamidol (ISOVUE-370) solution 105 mL (105 mLs Intravenous $Given 5/1/24 1209)       Assessments & Plan (with Medical Decision Making)   33-year-old female with a few hours of left-sided abdominal pain that began abruptly.  Laboratory evaluation is unrevealing.  Cross-sectional imaging of the abdomen and pelvis showed no catastrophic findings.  Pain improved with GI cocktail and Toradol.  She declined further pain medications.  At this time there is no reasonable indication for further emergent evaluation.  Discussed antinausea medications and pain medications for home.  Work note provided.  Strict return precautions were discussed.  Clinic follow-up was also discussed.    Ms. Tatum has also agreed to schedule a follow up appointment with her primary provider for re-evaluation and further management. She verbalized an understanding of the results of our workup and agrees with the complete discharge plan including instructions for return and follow up.  There is no indication for further investigation or treatment on an emergent basis.  There is no reasonably foreseeable injury that would be associated with discharge and close outpatient follow-up.      This document was prepared using a combination of typing and voice generated software.  While every attempt was made for accuracy, spelling and  grammatical errors may exist.     I have reviewed the nursing notes.    I have reviewed the findings, diagnosis, plan and need for follow up with the patient.           Medical Decision Making  The patient's presentation was of moderate complexity (an undiagnosed new problem with uncertain diagnosis).    The patient's evaluation involved:  ordering and/or review of 3+ test(s) in this encounter (labs and images)    The patient's management necessitated moderate risk (prescription drug management including medications given in the ED).        New Prescriptions    HYDROCODONE-ACETAMINOPHEN (NORCO) 5-325 MG TABLET    Take 1 tablet by mouth every 6 hours as needed for moderate to severe pain    ONDANSETRON (ZOFRAN ODT) 4 MG ODT TAB    Take 1 tablet (4 mg) by mouth every 8 hours as needed       Final diagnoses:   Abdominal pain of unknown etiology       5/1/2024   [facility]       Jacquie Rust PA-C  05/01/24 1249

## 2024-05-01 NOTE — DISCHARGE INSTRUCTIONS
Pain medications as needed.    Nausea medications as needed.    Advance diet as tolerated.    Close clinic follow-up for persistent symptoms.    Please return to this emergency department for any new or worsening symptoms or other questions or concerns.

## 2024-05-06 ENCOUNTER — HOSPITAL ENCOUNTER (OUTPATIENT)
Dept: MRI IMAGING | Facility: HOSPITAL | Age: 34
Discharge: HOME | End: 2024-05-06
Attending: ORTHOPAEDIC SURGERY | Admitting: ORTHOPAEDIC SURGERY
Payer: COMMERCIAL

## 2024-05-06 DIAGNOSIS — M54.10 BACK PAIN WITH RIGHT-SIDED RADICULOPATHY: ICD-10-CM

## 2024-05-06 PROCEDURE — 72148 MRI LUMBAR SPINE W/O DYE: CPT

## 2024-05-13 ENCOUNTER — TELEPHONE (OUTPATIENT)
Dept: FAMILY MEDICINE | Facility: OTHER | Age: 34
End: 2024-05-13

## 2024-05-13 NOTE — TELEPHONE ENCOUNTER
Patient had a MRI done.  She is needing to do a cortisone injection, but in the meantime tylenol and ibuprofen are not helping. Can she get something for the pain?

## 2024-05-13 NOTE — TELEPHONE ENCOUNTER
Unable to make it to appt on Wednesday, but does have a up coming appointment on 5/20 that she will discuss this issue with her primary.

## 2024-05-16 ENCOUNTER — HOSPITAL ENCOUNTER (EMERGENCY)
Facility: HOSPITAL | Age: 34
Discharge: HOME | End: 2024-05-16
Payer: COMMERCIAL

## 2024-05-16 VITALS
WEIGHT: 227.8 LBS | BODY MASS INDEX: 37.95 KG/M2 | TEMPERATURE: 97.4 F | HEART RATE: 73 BPM | DIASTOLIC BLOOD PRESSURE: 96 MMHG | RESPIRATION RATE: 16 BRPM | SYSTOLIC BLOOD PRESSURE: 157 MMHG | HEIGHT: 65 IN | OXYGEN SATURATION: 97 %

## 2024-05-16 DIAGNOSIS — M54.9 BACK PAIN: ICD-10-CM

## 2024-05-16 PROCEDURE — 96372 THER/PROPH/DIAG INJ SC/IM: CPT

## 2024-05-16 PROCEDURE — 99213 OFFICE O/P EST LOW 20 MIN: CPT

## 2024-05-16 PROCEDURE — 250N000011 HC RX IP 250 OP 636

## 2024-05-16 PROCEDURE — G0463 HOSPITAL OUTPT CLINIC VISIT: HCPCS | Mod: 25

## 2024-05-16 RX ORDER — KETOROLAC TROMETHAMINE 30 MG/ML
30 INJECTION, SOLUTION INTRAMUSCULAR; INTRAVENOUS ONCE
Status: COMPLETED | OUTPATIENT
Start: 2024-05-16 | End: 2024-05-16

## 2024-05-16 RX ORDER — CYCLOBENZAPRINE HCL 10 MG
10 TABLET ORAL 3 TIMES DAILY PRN
Qty: 20 TABLET | Refills: 0 | Status: SHIPPED | OUTPATIENT
Start: 2024-05-16 | End: 2024-05-20

## 2024-05-16 RX ORDER — KETOROLAC TROMETHAMINE 10 MG/1
10 TABLET, FILM COATED ORAL EVERY 6 HOURS PRN
Qty: 20 TABLET | Refills: 0 | Status: SHIPPED | OUTPATIENT
Start: 2024-05-16 | End: 2024-05-20

## 2024-05-16 RX ADMIN — KETOROLAC TROMETHAMINE 30 MG: 30 INJECTION, SOLUTION INTRAMUSCULAR at 11:23

## 2024-05-16 ASSESSMENT — ENCOUNTER SYMPTOMS
ARTHRALGIAS: 1
CONSTIPATION: 0
FEVER: 0
BACK PAIN: 1
DYSURIA: 0
CHILLS: 0
ACTIVITY CHANGE: 1
DIFFICULTY URINATING: 0
NUMBNESS: 1

## 2024-05-16 ASSESSMENT — ACTIVITIES OF DAILY LIVING (ADL): ADLS_ACUITY_SCORE: 35

## 2024-05-16 NOTE — ED TRIAGE NOTES
Patient presents to urgent care for low right back pain that radiates down right leg. Patient had an MRI last Monday. Patient reports will be having back injections but not until June 27th but she states can't wait that long. Patient states the pain is an 8/10. Patient is taking ibuprofen and tylenol and reports it don't even help.

## 2024-05-16 NOTE — DISCHARGE INSTRUCTIONS
You can take 650-1000mg of tylenol every 6 hours as needed, max of 3000mg in 24 hours     Toradol every 6 hours as needed for pain. Push fluids while taking this. Avoid ibuprofen and naproxen while taking.     Flexeril (muscle relaxer) every 8 hours as needed. Do not drive or drink alcohol while taking this as it may cause drowsiness.     Ice for 15-20 minutes every 2-3 hours. Please make sure to protect skin to prevent frost bite.     Return with any increased pain, inability to bear weight, numbness in groin, bowel/bladder concern, or other concerns.

## 2024-05-16 NOTE — Clinical Note
Jennifer Tatum was seen and treated in our emergency department on 5/16/2024.  She may return to work on 05/20/2024.       If you have any questions or concerns, please don't hesitate to call.      Vsahti Minaya, NP

## 2024-05-16 NOTE — ED PROVIDER NOTES
History     Chief Complaint   Patient presents with    Back Pain     HPI  Jennifer Tatum is a 33 year old female who presents to the urgent care with complaints of right lower back pain radiating down right leg. She has a history of back pain. Had an MRI on 5/6 and has injection with Dr. Rubio on 6/27. She denies falls, injuries, fevers, chills, dysuria, numbness in groin, and loss/retention of urine or stool. Has been taking tylenol and ibuprofen without change in pain.     Allergies:  Allergies   Allergen Reactions    Wellbutrin [Bupropion] Other (See Comments)     Mood changes    Lamictal [Lamotrigine] Rash       Problem List:    Patient Active Problem List    Diagnosis Date Noted    Acute bilateral low back pain with left-sided sciatica 08/17/2023     Priority: Medium    Class 2 severe obesity due to excess calories with serious comorbidity in adult (H) 07/18/2023     Priority: Medium    FREDRICK (obstructive sleep apnea) 06/05/2023     Priority: Medium    Secondary dysmenorrhea 03/19/2021     Priority: Medium    Mood swings 03/19/2021     Priority: Medium    Tobacco abuse 03/12/2021     Priority: Medium    VINI (generalized anxiety disorder) 03/12/2021     Priority: Medium    Menorrhagia with regular cycle 03/12/2021     Priority: Medium    Female pelvic peritoneal adhesions--Severe 08/20/2020     Priority: Medium    Relationship problem between partners 12/01/2017     Priority: Medium    Major depressive disorder, recurrent episode, mild (H24) 05/02/2017     Priority: Medium    ACP (advance care planning) 06/17/2016     Priority: Medium     Advance Care Planning 6/17/2016: ACP Review of Chart / Resources Provided:  Reviewed chart for advance care plan.  Jennifer Tatum has no plan or code status on file. Discussed available resources and provided with information.   Added by Gilbert Titus            Attention deficit hyperactivity disorder (ADHD), predominantly inattentive type 04/01/2016     Priority: Medium     "PTSD (post-traumatic stress disorder) 2016     Priority: Medium        Past Medical History:    Past Medical History:   Diagnosis Date    Anxiety 2013    Attention deficit hyperactivity disorder (ADHD), predominantly inattentive type 2016    Complicated grieving 2017    Depressive disorder     Female pelvic peritoneal adhesions--Severe 2020    VINI (generalized anxiety disorder) 2021    H/O nephrolithotomy with removal of calculi     Incompetent cervix 11/10/2015    IUFD (intrauterine fetal death) 2016    Major depressive disorder, recurrent episode, mild (H24) 2017    Menorrhagia with regular cycle 2021    Mild major depression (H24) 2013    Mood swings 2021    Nephrolithiasis     Pelvic peritoneal adhesions, female 2020    PTSD (post-traumatic stress disorder) 2016    Secondary dysmenorrhea 2021    Smoker 2013    Tobacco abuse 2021       Past Surgical History:    Past Surgical History:   Procedure Laterality Date    CERCLAGE CERVICAL N/A 11/10/2015    Procedure: CERCLAGE CERVICAL;  Surgeon: Tucker Barragan MD;  [facility]     SECTION  2012    Pregnancy full-term; \"Oleksandr\"      SECTION N/A 2016    Procedure:  SECTION;  Surgeon: Nisha Mcleod MD;  Location: [facility]     SECTION N/A 2018    EYE SURGERY      HC NEPHROSTOMY PERCUTUTANEOUS      left    impacted wisdom teeth removal      LAPAROSCOPIC TUBAL LIGATION Bilateral 2020    Procedure: LAPAROSCOPIC BILATERAL FALLOPIAN TUBE FULGRATION;  Surgeon: Frow, David Franke, MD;  Location: [facility]       Family History:    Family History   Problem Relation Age of Onset    Hypothyroidism Mother     Alcohol/Drug Father         alcoholism    Psychotic Disorder Father         Bipolar    Breast Cancer Paternal Grandmother 45    Cerebrovascular Disease Other         Cerebrovascular accident    C.A.D. Other     Hypertension " "Other     Diabetes Other        Social History:  Marital Status:  Single [1]  Social History     Tobacco Use    Smoking status: Every Day     Current packs/day: 1.00     Average packs/day: 1 pack/day for 10.0 years (10.0 ttl pk-yrs)     Types: Cigarettes, Cigars     Passive exposure: Current    Smokeless tobacco: Never    Tobacco comments:     declines quitplan referral 9/8/2020   Vaping Use    Vaping status: Never Used   Substance Use Topics    Alcohol use: No    Drug use: No        Medications:    ALPRAZolam (XANAX) 0.5 MG tablet  cyclobenzaprine (FLEXERIL) 10 MG tablet  hydrOXYzine (ATARAX) 25 MG tablet  ketorolac (TORADOL) 10 MG tablet  PARoxetine (PAXIL) 30 MG tablet  propranolol (INDERAL) 20 MG tablet          Review of Systems   Constitutional:  Positive for activity change. Negative for chills and fever.   Gastrointestinal:  Negative for constipation.   Genitourinary:  Negative for difficulty urinating and dysuria.   Musculoskeletal:  Positive for arthralgias and back pain.   Neurological:  Positive for numbness (right leg, chronic).   All other systems reviewed and are negative.      Physical Exam   BP: 157/96  Pulse: 73  Temp: 97.4  F (36.3  C)  Resp: 16  Height: 165.1 cm (5' 5\")  Weight: 103.3 kg (227 lb 12.8 oz)  SpO2: 97 %      Physical Exam  Vitals and nursing note reviewed.   Constitutional:       General: She is not in acute distress.     Appearance: Normal appearance. She is not ill-appearing, toxic-appearing or diaphoretic.   Cardiovascular:      Rate and Rhythm: Normal rate and regular rhythm.      Pulses: Normal pulses.      Heart sounds: Normal heart sounds. No murmur heard.  Pulmonary:      Effort: Pulmonary effort is normal.      Breath sounds: Normal breath sounds. No wheezing, rhonchi or rales.   Musculoskeletal:         General: Tenderness present. No deformity or signs of injury.      Cervical back: No tenderness or bony tenderness. No pain with movement.      Thoracic back: No tenderness " or bony tenderness. Normal range of motion.      Lumbar back: Tenderness and bony tenderness present. Decreased range of motion. Negative right straight leg raise test and negative left straight leg raise test.      Right lower leg: No edema.      Left lower leg: No edema.   Skin:     General: Skin is warm and dry.   Neurological:      Mental Status: She is alert.         ED Course        Procedures        No results found for this or any previous visit (from the past 24 hour(s)).    Medications   ketorolac (TORADOL) injection 30 mg (has no administration in time range)       Assessments & Plan (with Medical Decision Making)     I have reviewed the nursing notes.    I have reviewed the findings, diagnosis, plan and need for follow up with the patient.  Jennifer Tatum is a 33 year old female who presents to the urgent care with complaints of right lower back pain radiating down right leg. She has a history of back pain. Had an MRI on 5/6 and has injection with Dr. Rubio on 6/27. She denies falls, injuries, fevers, chills, dysuria, numbness in groin, and loss/retention of urine or stool. Has been taking tylenol and ibuprofen without change in pain.     MDM: vital signs normal, afebrile. Non toxic in appearance with no noted distress. Lungs clear, heart tones regular. Midline tenderness over lumbar back radiating right. No palpable step offs. No saddle paraesthesias or bowel/bladder concerns. Toradol injection given. Toradol and flexeril prescribed. Prednisone avoided as she is having injections in ~6 weeks. Supportive measures and strict return precautions discussed. She is in agreement with plan.     (M54.9) Back pain  Plan: You can take 650-1000mg of tylenol every 6 hours as needed, max of 3000mg in 24 hours     Toradol every 6 hours as needed for pain. Push fluids while taking this. Avoid ibuprofen and naproxen while taking.     Flexeril (muscle relaxer) every 8 hours as needed. Do not drive or drink alcohol while  taking this as it may cause drowsiness.     Ice for 15-20 minutes every 2-3 hours. Please make sure to protect skin to prevent frost bite.     Return with any increased pain, inability to bear weight, numbness in groin, bowel/bladder concern, or other concerns.         New Prescriptions    CYCLOBENZAPRINE (FLEXERIL) 10 MG TABLET    Take 1 tablet (10 mg) by mouth 3 times daily as needed for muscle spasms    KETOROLAC (TORADOL) 10 MG TABLET    Take 1 tablet (10 mg) by mouth every 6 hours as needed for pain       Final diagnoses:   Back pain       5/16/2024   [facility]       Vashti Minaya NP  05/16/24 1139

## 2024-05-17 NOTE — PROGRESS NOTES
Preoperative Evaluation  [facility] [place]  [address]  [place] [address]  Phone: [phone]  Primary Provider: Nadya Currie NP  Pre-op Performing Provider: Nadya Currie NP  May 20, 2024   [id number]}          5/20/2024   Surgical Information   What procedure is being done? Robotic hysterectomy   Facility or Hospital where procedure/surgery will be performed: [facility]   Who is doing the procedure / surgery? Dr. Esha Castro   Date of surgery / procedure: June 10th   Time of surgery / procedure: 8   Where do you plan to recover after surgery? at home with family       Fax number for surgical facility: [facility]    Assessment & Plan     The proposed surgical procedure is considered INTERMEDIATE risk.    (Z01.818) Preop general physical exam  (primary encounter diagnosis)  (N92.0) Menorrhagia with regular cycle  (N73.6) Female pelvic peritoneal adhesions--Severe  (N94.5) Secondary dysmenorrhea  Plan: No concerns noted. CBC and BMP unremarkable. Mets greater than 4. Cleared for surgery.     (Z72.0) Tobacco abuse  Plan: Cessation encouraged.     (F33.0) Major depressive disorder, recurrent episode, mild (H24)  (F41.1) VINI (generalized anxiety disorder)  Comment: stable  Plan: Will continue f/up with psychiatry.     (E66.01) Class 2 severe obesity due to excess calories with serious comorbidity in adult, unspecified BMI (H)  Plan: BMI 37. FYI for surgical team.     (G47.33) FREDRICK (obstructive sleep apnea)  Plan: Does not tolerate CPAP.       Risks and Recommendations  The patient has the following additional risks and recommendations for perioperative complications:  Pulmonary:    - Incentive spirometry post-op   - Active nicotine user, advised smoking cessation    Will hold all medications the morning of surgery except the propranolol.   Hold all NSAIDS and supplements 10 days prior. Ok to use Tylenol.     Recommendation  Approval given to proceed with proposed procedure, without further  diagnostic evaluation.    Jennifer Tatum with a functional capacity of greater than four MET's. There are no obvious contraindications to proceeding with surgery at this time. Recommend that the surgeon review risks and benefits of the procedure prior to proceeding.     Was recommended to avoid eating and drinking anything 12 hours prior to surgery unless his surgeon tells him otherwise.       Dallas Rodriguez is a 33 year old, presenting for the following:  Pre-Op Exam        HPI related to upcoming procedure: Patient with menorrhagia with dysmenorrhea. Plans to proceed with hysterectomy.           5/20/2024   Pre-Op Questionnaire   Have you ever had a heart attack or stroke? No   Have you ever had surgery on your heart or blood vessels, such as a stent placement, a coronary artery bypass, or surgery on an artery in your head, neck, heart, or legs? No   Do you have chest pain with activity? No   Do you have a history of heart failure? No   Do you currently have a cold, bronchitis or symptoms of other infection? No   Do you have a cough, shortness of breath, or wheezing? No   Do you or anyone in your family have previous history of blood clots? No   Do you or does anyone in your family have a serious bleeding problem such as prolonged bleeding following surgeries or cuts? No   Have you ever had problems with anemia or been told to take iron pills? No   Have you had any abnormal blood loss such as black, tarry or bloody stools, or abnormal vaginal bleeding? Yes-related to heavy menses   Have you ever had a blood transfusion? (!) UNKNOWN-thinks she might have needed blood after childbirth    Are you willing to have a blood transfusion if it is medically needed before, during, or after your surgery? Yes   Have you or any of your relatives ever had problems with anesthesia? No   Do you have sleep apnea, excessive snoring or daytime drowsiness? Has sleep apnea-did not tolerate CPAP   Do you have a CPAP machine? (!)  NO    Do you have any artifical heart valves or other implanted medical devices like a pacemaker, defibrillator, or continuous glucose monitor? No   Do you have artificial joints? No   Are you allergic to latex? No       Health Care Directive  Patient does not have a Health Care Directive or Living Will: Discussed advance care planning with patient; however, patient declined at this time.    Preoperative Review of    reviewed - controlled substances reflected in medication list.      Status of Chronic Conditions:  DEPRESSION/ANXIETY - Patient has a long history of Depression of moderate severity requiring medication for control with recent symptoms being stable. Current symptoms of depression include depressed mood. No thoughts of self harm. Taking Paxil 30 mg with PRN propranolol. Follows with Danilo Wolf and therapist.     Tobacco abuse; smoking 1 ppd.    FREDRICK; did not tolerate CPAP    Mets greater than 4; able to walk a mile without stopping.     She does have back pain; set up for steroid injections on 6/27/24.     Patient Active Problem List    Diagnosis Date Noted    Acute bilateral low back pain with left-sided sciatica 08/17/2023     Priority: Medium    Class 2 severe obesity due to excess calories with serious comorbidity in adult (H) 07/18/2023     Priority: Medium    FREDRICK (obstructive sleep apnea) 06/05/2023     Priority: Medium    Secondary dysmenorrhea 03/19/2021     Priority: Medium    Mood swings 03/19/2021     Priority: Medium    Tobacco abuse 03/12/2021     Priority: Medium    VINI (generalized anxiety disorder) 03/12/2021     Priority: Medium    Menorrhagia with regular cycle 03/12/2021     Priority: Medium    Female pelvic peritoneal adhesions--Severe 08/20/2020     Priority: Medium    Relationship problem between partners 12/01/2017     Priority: Medium    Major depressive disorder, recurrent episode, mild (H24) 05/02/2017     Priority: Medium    ACP (advance care planning) 06/17/2016      "Priority: Medium     Advance Care Planning 2016: ACP Review of Chart / Resources Provided:  Reviewed chart for advance care plan.  Jennifer Tatum has no plan or code status on file. Discussed available resources and provided with information.   Added by Gilbert Titus            Attention deficit hyperactivity disorder (ADHD), predominantly inattentive type 2016     Priority: Medium    PTSD (post-traumatic stress disorder) 2016     Priority: Medium      Past Medical History:   Diagnosis Date    Anxiety 2013    Feels it is going away     Attention deficit hyperactivity disorder (ADHD), predominantly inattentive type 2016    Complicated grieving 2017    Depressive disorder     Female pelvic peritoneal adhesions--Severe 2020    VINI (generalized anxiety disorder) 2021    H/O nephrolithotomy with removal of calculi     Incompetent cervix 11/10/2015    To [place] per Dr. Evan RICKS     IUFD (intrauterine fetal death) 2016    Heart rate dropped during BPP and patient was taken for code pink c/s where baby was noted to be dead at birth     Major depressive disorder, recurrent episode, mild (H24) 2017    Menorrhagia with regular cycle 2021    Mild major depression (H24) 2013    Mood swings 2021    Nephrolithiasis     bilateral /complicating pregnancy- Seen Dr Bennie Lucero     Pelvic peritoneal adhesions, female 2020    PTSD (post-traumatic stress disorder) 2016    Secondary dysmenorrhea 2021    Smoker 2013    Tobacco abuse 2021     Past Surgical History:   Procedure Laterality Date    CERCLAGE CERVICAL N/A 11/10/2015    Procedure: CERCLAGE CERVICAL;  Surgeon: Tucker Barragan MD;  [facility]     SECTION  2012    Pregnancy full-term; \"Oleksandr\"      SECTION N/A 2016    Procedure:  SECTION;  Surgeon: Nisha Mcleod MD;  Location: [facility]     SECTION N/A 2018    EYE " SURGERY      HC NEPHROSTOMY PERCUTUTANEOUS      left    impacted wisdom teeth removal      LAPAROSCOPIC TUBAL LIGATION Bilateral 08/20/2020    Procedure: LAPAROSCOPIC BILATERAL FALLOPIAN TUBE FULGRATION;  Surgeon: Frow, David Franke, MD;  Location: [facility]     Current Outpatient Medications   Medication Sig Dispense Refill    ALPRAZolam (XANAX) 0.5 MG tablet Take 0.5 mg by mouth 3 times daily as needed for anxiety      hydrOXYzine (ATARAX) 25 MG tablet Take 25 mg by mouth 3 times daily as needed for itching      PARoxetine (PAXIL) 30 MG tablet Take by mouth every morning      propranolol (INDERAL) 20 MG tablet Take 20 mg by mouth 3 times daily      cyclobenzaprine (FLEXERIL) 10 MG tablet Take 1 tablet (10 mg) by mouth 3 times daily as needed for muscle spasms (Patient not taking: Reported on 5/20/2024) 20 tablet 0    ketorolac (TORADOL) 10 MG tablet Take 1 tablet (10 mg) by mouth every 6 hours as needed for pain (Patient not taking: Reported on 5/20/2024) 20 tablet 0       Allergies   Allergen Reactions    Wellbutrin [Bupropion] Other (See Comments)     Mood changes    Lamictal [Lamotrigine] Rash        Social History     Tobacco Use    Smoking status: Every Day     Current packs/day: 1.00     Average packs/day: 1 pack/day for 10.0 years (10.0 ttl pk-yrs)     Types: Cigarettes, Cigars     Passive exposure: Current    Smokeless tobacco: Never    Tobacco comments:     declines quitplan referral 9/8/2020   Substance Use Topics    Alcohol use: No     Family History   Problem Relation Age of Onset    Hypothyroidism Mother     Alcohol/Drug Father         alcoholism    Psychotic Disorder Father         Bipolar    Breast Cancer Paternal Grandmother 45    Cerebrovascular Disease Other         Cerebrovascular accident    C.A.D. Other     Hypertension Other     Diabetes Other      History   Drug Use No             Review of Systems  CONSTITUTIONAL: NEGATIVE for fever, chills, change in weight  INTEGUMENTARY/SKIN: NEGATIVE for  "worrisome rashes, moles or lesions  EYES: NEGATIVE for vision changes or irritation  ENT/MOUTH: NEGATIVE for ear, mouth and throat problems  RESP: NEGATIVE for significant cough or SOB  CV: NEGATIVE for chest pain, palpitations or peripheral edema  GI: NEGATIVE for nausea, abdominal pain, heartburn, or change in bowel habits  : NEGATIVE for frequency, dysuria, or hematuria  MUSCULOSKELETAL: back pain  NEURO: NEGATIVE for weakness, dizziness or paresthesias  ENDOCRINE: NEGATIVE for temperature intolerance, skin/hair changes  HEME: NEGATIVE for bleeding problems  PSYCHIATRIC: NEGATIVE for changes in mood or affect    Objective    /84 (BP Location: Right arm, Patient Position: Sitting, Cuff Size: Adult Large)   Pulse 87   Temp 98.7  F (37.1  C) (Tympanic)   Resp 16   Wt 102.7 kg (226 lb 8 oz)   LMP 05/13/2024 (Within Days)   SpO2 97%   BMI 37.69 kg/m     Estimated body mass index is 37.69 kg/m  as calculated from the following:    Height as of 5/16/24: 1.651 m (5' 5\").    Weight as of this encounter: 102.7 kg (226 lb 8 oz).  Physical Exam  GENERAL: alert, no distress, and obese  EYES: Eyes grossly normal to inspection, PERRL and conjunctivae and sclerae normal  HENT: ear canals and TM's normal, nose and mouth without ulcers or lesions  NECK: no adenopathy, no asymmetry, masses, or scars  RESP: lungs clear to auscultation - no rales, rhonchi or wheezes  CV: regular rate and rhythm, no murmur, click or rub, no peripheral edema  ABDOMEN: soft, nontender, overweight, no masses and bowel sounds normal  MS: no gross musculoskeletal defects noted, no edema  NEURO: Normal strength and tone, mentation intact and speech normal  PSYCH: mentation appears normal, affect normal/bright    Recent Labs   Lab Test 05/01/24  1012 09/25/23  1026   HGB 14.3 15.4    211    138   POTASSIUM 4.4 4.3   CR 0.74 0.67        Diagnostics  Recent Results (from the past 24 hour(s))   Basic metabolic panel    Collection " Time: 05/20/24  3:43 PM   Result Value Ref Range    Sodium 139 135 - 145 mmol/L    Potassium 4.3 3.4 - 5.3 mmol/L    Chloride 103 98 - 107 mmol/L    Carbon Dioxide (CO2) 26 22 - 29 mmol/L    Anion Gap 10 7 - 15 mmol/L    Urea Nitrogen 14.2 6.0 - 20.0 mg/dL    Creatinine 0.83 0.51 - 0.95 mg/dL    GFR Estimate >90 >60 mL/min/1.73m2    Calcium 10.1 (H) 8.6 - 10.0 mg/dL    Glucose 95 70 - 99 mg/dL   CBC with platelets and differential    Collection Time: 05/20/24  3:43 PM   Result Value Ref Range    WBC Count 8.2 4.0 - 11.0 10e3/uL    RBC Count 4.77 3.80 - 5.20 10e6/uL    Hemoglobin 13.6 11.7 - 15.7 g/dL    Hematocrit 41.2 35.0 - 47.0 %    MCV 86 78 - 100 fL    MCH 28.5 26.5 - 33.0 pg    MCHC 33.0 31.5 - 36.5 g/dL    RDW 13.8 10.0 - 15.0 %    Platelet Count 250 150 - 450 10e3/uL    % Neutrophils 56 %    % Lymphocytes 34 %    % Monocytes 8 %    % Eosinophils 2 %    % Basophils 1 %    % Immature Granulocytes 0 %    NRBCs per 100 WBC 0 <1 /100    Absolute Neutrophils 4.6 1.6 - 8.3 10e3/uL    Absolute Lymphocytes 2.7 0.8 - 5.3 10e3/uL    Absolute Monocytes 0.6 0.0 - 1.3 10e3/uL    Absolute Eosinophils 0.2 0.0 - 0.7 10e3/uL    Absolute Basophils 0.1 0.0 - 0.2 10e3/uL    Absolute Immature Granulocytes 0.0 <=0.4 10e3/uL    Absolute NRBCs 0.0 10e3/uL        No EKG required, no history of coronary heart disease, significant arrhythmia, peripheral arterial disease or other structural heart disease.    Revised Cardiac Risk Index (RCRI)  The patient has the following serious cardiovascular risks for perioperative complications:   - No serious cardiac risks = 0 points     RCRI Interpretation: 0 points: Class I (very low risk - 0.4% complication rate)         Signed Electronically by: Nadya Currie NP  Copy of this evaluation report is provided to requesting physician.    Answers submitted by the patient for this visit:  Patient Health Questionnaire (Submitted on 5/20/2024)  If you checked off any problems, how difficult have  these problems made it for you to do your work, take care of things at home, or get along with other people?: Extremely difficult  PHQ9 TOTAL SCORE: 18  VINI-7 (Submitted on 5/20/2024)  VINI 7 TOTAL SCORE: 16

## 2024-05-20 ENCOUNTER — OFFICE VISIT (OUTPATIENT)
Dept: FAMILY MEDICINE | Facility: OTHER | Age: 34
End: 2024-05-20
Attending: NURSE PRACTITIONER
Payer: COMMERCIAL

## 2024-05-20 VITALS
SYSTOLIC BLOOD PRESSURE: 128 MMHG | HEART RATE: 87 BPM | OXYGEN SATURATION: 97 % | RESPIRATION RATE: 16 BRPM | TEMPERATURE: 98.7 F | DIASTOLIC BLOOD PRESSURE: 84 MMHG | BODY MASS INDEX: 37.69 KG/M2 | WEIGHT: 226.5 LBS

## 2024-05-20 DIAGNOSIS — N92.0 MENORRHAGIA WITH REGULAR CYCLE: ICD-10-CM

## 2024-05-20 DIAGNOSIS — Z72.0 TOBACCO ABUSE: ICD-10-CM

## 2024-05-20 DIAGNOSIS — F33.0 MAJOR DEPRESSIVE DISORDER, RECURRENT EPISODE, MILD (H): ICD-10-CM

## 2024-05-20 DIAGNOSIS — E66.01 CLASS 2 SEVERE OBESITY DUE TO EXCESS CALORIES WITH SERIOUS COMORBIDITY IN ADULT, UNSPECIFIED BMI (H): ICD-10-CM

## 2024-05-20 DIAGNOSIS — G47.33 OSA (OBSTRUCTIVE SLEEP APNEA): ICD-10-CM

## 2024-05-20 DIAGNOSIS — E66.812 CLASS 2 SEVERE OBESITY DUE TO EXCESS CALORIES WITH SERIOUS COMORBIDITY IN ADULT, UNSPECIFIED BMI (H): ICD-10-CM

## 2024-05-20 DIAGNOSIS — F41.1 GAD (GENERALIZED ANXIETY DISORDER): ICD-10-CM

## 2024-05-20 DIAGNOSIS — N73.6 FEMALE PELVIC PERITONEAL ADHESIONS: ICD-10-CM

## 2024-05-20 DIAGNOSIS — Z01.818 PREOP GENERAL PHYSICAL EXAM: Primary | ICD-10-CM

## 2024-05-20 DIAGNOSIS — N94.5 SECONDARY DYSMENORRHEA: ICD-10-CM

## 2024-05-20 LAB
ANION GAP SERPL CALCULATED.3IONS-SCNC: 10 MMOL/L (ref 7–15)
BASOPHILS # BLD AUTO: 0.1 10E3/UL (ref 0–0.2)
BASOPHILS NFR BLD AUTO: 1 %
BUN SERPL-MCNC: 14.2 MG/DL (ref 6–20)
CALCIUM SERPL-MCNC: 10.1 MG/DL (ref 8.6–10)
CHLORIDE SERPL-SCNC: 103 MMOL/L (ref 98–107)
CREAT SERPL-MCNC: 0.83 MG/DL (ref 0.51–0.95)
DEPRECATED HCO3 PLAS-SCNC: 26 MMOL/L (ref 22–29)
EGFRCR SERPLBLD CKD-EPI 2021: >90 ML/MIN/1.73M2
EOSINOPHIL # BLD AUTO: 0.2 10E3/UL (ref 0–0.7)
EOSINOPHIL NFR BLD AUTO: 2 %
ERYTHROCYTE [DISTWIDTH] IN BLOOD BY AUTOMATED COUNT: 13.8 % (ref 10–15)
GLUCOSE SERPL-MCNC: 95 MG/DL (ref 70–99)
HCT VFR BLD AUTO: 41.2 % (ref 35–47)
HGB BLD-MCNC: 13.6 G/DL (ref 11.7–15.7)
IMM GRANULOCYTES # BLD: 0 10E3/UL
IMM GRANULOCYTES NFR BLD: 0 %
LYMPHOCYTES # BLD AUTO: 2.7 10E3/UL (ref 0.8–5.3)
LYMPHOCYTES NFR BLD AUTO: 34 %
MCH RBC QN AUTO: 28.5 PG (ref 26.5–33)
MCHC RBC AUTO-ENTMCNC: 33 G/DL (ref 31.5–36.5)
MCV RBC AUTO: 86 FL (ref 78–100)
MONOCYTES # BLD AUTO: 0.6 10E3/UL (ref 0–1.3)
MONOCYTES NFR BLD AUTO: 8 %
NEUTROPHILS # BLD AUTO: 4.6 10E3/UL (ref 1.6–8.3)
NEUTROPHILS NFR BLD AUTO: 56 %
NRBC # BLD AUTO: 0 10E3/UL
NRBC BLD AUTO-RTO: 0 /100
PLATELET # BLD AUTO: 250 10E3/UL (ref 150–450)
POTASSIUM SERPL-SCNC: 4.3 MMOL/L (ref 3.4–5.3)
RBC # BLD AUTO: 4.77 10E6/UL (ref 3.8–5.2)
SODIUM SERPL-SCNC: 139 MMOL/L (ref 135–145)
WBC # BLD AUTO: 8.2 10E3/UL (ref 4–11)

## 2024-05-20 PROCEDURE — 99214 OFFICE O/P EST MOD 30 MIN: CPT | Performed by: NURSE PRACTITIONER

## 2024-05-20 PROCEDURE — 36415 COLL VENOUS BLD VENIPUNCTURE: CPT | Performed by: NURSE PRACTITIONER

## 2024-05-20 PROCEDURE — 85025 COMPLETE CBC W/AUTO DIFF WBC: CPT | Performed by: NURSE PRACTITIONER

## 2024-05-20 PROCEDURE — 80048 BASIC METABOLIC PNL TOTAL CA: CPT | Performed by: NURSE PRACTITIONER

## 2024-05-20 ASSESSMENT — PATIENT HEALTH QUESTIONNAIRE - PHQ9
SUM OF ALL RESPONSES TO PHQ QUESTIONS 1-9: 18
SUM OF ALL RESPONSES TO PHQ QUESTIONS 1-9: 18
10. IF YOU CHECKED OFF ANY PROBLEMS, HOW DIFFICULT HAVE THESE PROBLEMS MADE IT FOR YOU TO DO YOUR WORK, TAKE CARE OF THINGS AT HOME, OR GET ALONG WITH OTHER PEOPLE: EXTREMELY DIFFICULT

## 2024-05-20 ASSESSMENT — ANXIETY QUESTIONNAIRES
5. BEING SO RESTLESS THAT IT IS HARD TO SIT STILL: MORE THAN HALF THE DAYS
4. TROUBLE RELAXING: MORE THAN HALF THE DAYS
7. FEELING AFRAID AS IF SOMETHING AWFUL MIGHT HAPPEN: MORE THAN HALF THE DAYS
6. BECOMING EASILY ANNOYED OR IRRITABLE: NEARLY EVERY DAY
1. FEELING NERVOUS, ANXIOUS, OR ON EDGE: MORE THAN HALF THE DAYS
IF YOU CHECKED OFF ANY PROBLEMS ON THIS QUESTIONNAIRE, HOW DIFFICULT HAVE THESE PROBLEMS MADE IT FOR YOU TO DO YOUR WORK, TAKE CARE OF THINGS AT HOME, OR GET ALONG WITH OTHER PEOPLE: EXTREMELY DIFFICULT
2. NOT BEING ABLE TO STOP OR CONTROL WORRYING: MORE THAN HALF THE DAYS
3. WORRYING TOO MUCH ABOUT DIFFERENT THINGS: NEARLY EVERY DAY
GAD7 TOTAL SCORE: 16
7. FEELING AFRAID AS IF SOMETHING AWFUL MIGHT HAPPEN: MORE THAN HALF THE DAYS
GAD7 TOTAL SCORE: 16
GAD7 TOTAL SCORE: 16
8. IF YOU CHECKED OFF ANY PROBLEMS, HOW DIFFICULT HAVE THESE MADE IT FOR YOU TO DO YOUR WORK, TAKE CARE OF THINGS AT HOME, OR GET ALONG WITH OTHER PEOPLE?: EXTREMELY DIFFICULT

## 2024-05-20 ASSESSMENT — PAIN SCALES - GENERAL: PAINLEVEL: EXTREME PAIN (8)

## 2024-05-20 NOTE — PATIENT INSTRUCTIONS
Patient Education   Preparing for Your Surgery  Getting started  A nurse will call you to review your health history and instructions. They will give you an arrival time based on your scheduled surgery time. Please be ready to share:  Your doctor's clinic name and phone number  Your medical, surgical, and anesthesia history  A list of allergies and sensitivities  A list of medicines, including herbal treatments and over-the-counter drugs  Whether the patient has a legal guardian (ask how to send us the papers in advance)  Please tell us if you're pregnant--or if there's any chance you might be pregnant. Some surgeries may injure a fetus (unborn baby), so they require a pregnancy test. Surgeries that are safe for a fetus don't always need a test, and you can choose whether to have one.   If you have a child who's having surgery, please ask for a copy of Preparing for Your Child's Surgery.    Preparing for surgery  Within 10 to 30 days of surgery: Have a pre-op exam (sometimes called an H&P, or History and Physical). This can be done at a clinic or pre-operative center.  If you're having a , you may not need this exam. Talk to your care team.  At your pre-op exam, talk to your care team about all medicines you take. If you need to stop any medicines before surgery, ask when to start taking them again.  We do this for your safety. Many medicines can make you bleed too much during surgery. Some change how well surgery (anesthesia) drugs work.  Call your insurance company to let them know you're having surgery. (If you don't have insurance, call [phone].)  Call your clinic if there's any change in your health. This includes signs of a cold or flu (sore throat, runny nose, cough, rash, fever). It also includes a scrape or scratch near the surgery site.  If you have questions on the day of surgery, call your hospital or surgery center.  Eating and drinking guidelines  For your safety: Unless your surgeon  tells you otherwise, follow the guidelines below.  Eat and drink as usual until 8 hours before you arrive for surgery. After that, no food or milk.  Drink clear liquids until 2 hours before you arrive. These are liquids you can see through, like water, Gatorade, and Propel Water. They also include plain black coffee and tea (no cream or milk), candy, and breath mints. You can spit out gum when you arrive.  If you drink alcohol: Stop drinking it the night before surgery.  If your care team tells you to take medicine on the morning of surgery, it's okay to take it with a sip of water.  Preventing infection  Shower or bathe the night before and morning of your surgery. Follow the instructions your clinic gave you. (If no instructions, use regular soap.)  Don't shave or clip hair near your surgery site. We'll remove the hair if needed.  Don't smoke or vape the morning of surgery. You may chew nicotine gum up to 2 hours before surgery. A nicotine patch is okay.  Note: Some surgeries require you to completely quit smoking and nicotine. Check with your surgeon.  Your care team will make every effort to keep you safe from infection. We will:  Clean our hands often with soap and water (or an alcohol-based hand rub).  Clean the skin at your surgery site with a special soap that kills germs.  Give you a special gown to keep you warm. (Cold raises the risk of infection.)  Wear special hair covers, masks, gowns and gloves during surgery.  Give antibiotic medicine, if prescribed. Not all surgeries need antibiotics.  What to bring on the day of surgery  Photo ID and insurance card  Copy of your health care directive, if you have one  Glasses and hearing aids (bring cases)  You can't wear contacts during surgery  Inhaler and eye drops, if you use them (tell us about these when you arrive)  CPAP machine or breathing device, if you use them  A few personal items, if spending the night  If you have . . .  A pacemaker, ICD (cardiac  defibrillator) or other implant: Bring the ID card.  An implanted stimulator: Bring the remote control.  A legal guardian: Bring a copy of the certified (court-stamped) guardianship papers.  Please remove any jewelry, including body piercings. Leave jewelry and other valuables at home.  If you're going home the day of surgery  You must have a responsible adult drive you home. They should stay with you overnight as well.  If you don't have someone to stay with you, and you aren't safe to go home alone, we may keep you overnight. Insurance often won't pay for this.  After surgery  If it's hard to control your pain or you need more pain medicine, please call your surgeon's office.  Questions?   If you have any questions for your care team, list them here: _________________________________________________________________________________________________________________________________________________________________________ ____________________________________ ____________________________________ ____________________________________  For informational purposes only. Not to replace the advice of your health care provider. Copyright   2003, 2019 [facility]. All rights reserved. Clinically reviewed by Kandace Chisholm MD. [employer]ART[employer] [id number] - REV 12/22.

## 2024-06-17 ENCOUNTER — NURSE TRIAGE (OUTPATIENT)
Dept: CARE COORDINATION | Facility: OTHER | Age: 34
End: 2024-06-17

## 2024-06-17 ENCOUNTER — TELEPHONE (OUTPATIENT)
Dept: FAMILY MEDICINE | Facility: OTHER | Age: 34
End: 2024-06-17

## 2024-06-17 ENCOUNTER — OFFICE VISIT (OUTPATIENT)
Dept: FAMILY MEDICINE | Facility: OTHER | Age: 34
End: 2024-06-17
Attending: NURSE PRACTITIONER
Payer: COMMERCIAL

## 2024-06-17 ENCOUNTER — LAB (OUTPATIENT)
Dept: LAB | Facility: OTHER | Age: 34
End: 2024-06-17
Payer: COMMERCIAL

## 2024-06-17 VITALS
HEART RATE: 73 BPM | TEMPERATURE: 98.2 F | SYSTOLIC BLOOD PRESSURE: 126 MMHG | HEIGHT: 65 IN | WEIGHT: 223.9 LBS | BODY MASS INDEX: 37.3 KG/M2 | DIASTOLIC BLOOD PRESSURE: 80 MMHG | OXYGEN SATURATION: 98 %

## 2024-06-17 DIAGNOSIS — R39.9 UTI SYMPTOMS: ICD-10-CM

## 2024-06-17 DIAGNOSIS — R39.9 UTI SYMPTOMS: Primary | ICD-10-CM

## 2024-06-17 PROBLEM — N73.6 PELVIC ADHESIVE DISEASE: Status: ACTIVE | Noted: 2023-08-14

## 2024-06-17 LAB
ALBUMIN UR-MCNC: NEGATIVE MG/DL
APPEARANCE UR: CLEAR
BACTERIA #/AREA URNS HPF: ABNORMAL /HPF
BILIRUB UR QL STRIP: NEGATIVE
COLOR UR AUTO: ABNORMAL
GLUCOSE UR STRIP-MCNC: NEGATIVE MG/DL
HGB UR QL STRIP: NEGATIVE
KETONES UR STRIP-MCNC: NEGATIVE MG/DL
LEUKOCYTE ESTERASE UR QL STRIP: NEGATIVE
MUCOUS THREADS #/AREA URNS LPF: PRESENT /LPF
NITRATE UR QL: NEGATIVE
PH UR STRIP: 6 [PH] (ref 4.7–8)
RBC URINE: 0 /HPF
SP GR UR STRIP: 1.01 (ref 1–1.03)
SQUAMOUS EPITHELIAL: 5 /HPF
UROBILINOGEN UR STRIP-MCNC: NORMAL MG/DL
WBC URINE: 0 /HPF

## 2024-06-17 PROCEDURE — 81001 URINALYSIS AUTO W/SCOPE: CPT

## 2024-06-17 PROCEDURE — 99213 OFFICE O/P EST LOW 20 MIN: CPT | Performed by: NURSE PRACTITIONER

## 2024-06-17 PROCEDURE — 87086 URINE CULTURE/COLONY COUNT: CPT

## 2024-06-17 ASSESSMENT — PAIN SCALES - GENERAL: PAINLEVEL: SEVERE PAIN (7)

## 2024-06-17 NOTE — TELEPHONE ENCOUNTER
Symptom or reason needing to speak to RN: Hysterectomy was last Monday. Now extremely painful while urinating. Would like to be seen ASAP.     Best number to return call: [phone]     Best time to return call: Anytime

## 2024-06-17 NOTE — PROGRESS NOTES
"  Assessment & Plan     (R39.9) UTI symptoms  (primary encounter diagnosis)  Comment: UA does not appear infected  Plan: UA with Microscopic reflex to Culture - [place]        Will culture to be sure. Will notify patient of the results when available and intervene accordingly.     Symptoms most likely related to her surgery. She was encouraged to push fluids.     The longitudinal plan of care for the diagnosis(es)/condition(s) as documented were addressed during this visit. Due to the added complexity in care, I will continue to support Jennifer in the subsequent management and with ongoing continuity of care.    BMI  Estimated body mass index is 37.26 kg/m  as calculated from the following:    Height as of this encounter: 1.651 m (5' 5\").    Weight as of this encounter: 101.6 kg (223 lb 14.4 oz).   Weight management plan: Discussed healthy diet and exercise guidelines          Subjective   Jennifer is a 33 year old, presenting for the following health issues:  possible UTI     History of Present Illness       Reason for visit:  Burning when urinating    She eats 2-3 servings of fruits and vegetables daily.She consumes 1 sweetened beverage(s) daily.She exercises with enough effort to increase her heart rate 9 or less minutes per day.  She exercises with enough effort to increase her heart rate 3 or less days per week.   She is taking medications regularly.       Genitourinary - Female  Onset/Duration: started on Saturday   Description:   Painful urination (Dysuria): YES           Frequency: YES  Blood in urine (Hematuria): YES- mild  Delay in urine (Hesitency): YES  Intensity: 7/10  Progression of Symptoms:  same  Accompanying Signs & Symptoms:  Fever/chills: No  Flank pain: No  Nausea and vomiting: No  Vaginal symptoms: none  Abdominal/Pelvic Pain: YES- sore   History:   History of frequent UTI s: YES  History of kidney stones: YES  Sexually Active: No  Possibility of pregnancy: No  Precipitating or alleviating " "factors: None  Therapies tried and outcome: Increase fluid intake and OTC advil or tylenol   She did have a hysterectomy on 6/10/24. Catheter was placed.         Review of Systems  Constitutional, HEENT, cardiovascular, pulmonary, gi and gu systems are negative, except as otherwise noted.      Objective    /80 (BP Location: Left arm, Patient Position: Sitting, Cuff Size: Adult Regular)   Pulse 73   Temp 98.2  F (36.8  C) (Tympanic)   Ht 1.651 m (5' 5\")   Wt 101.6 kg (223 lb 14.4 oz)   LMP 05/13/2024 (Within Days)   SpO2 98%   BMI 37.26 kg/m    Body mass index is 37.26 kg/m .  Physical Exam   GENERAL: alert and no distress  RESP: lungs clear to auscultation - no rales, rhonchi or wheezes  CV: regular rate and rhythm, no murmur, click or rub, no peripheral edema  ABDOMEN: soft, tender over lap sites, no masses and bowel sounds normal  NEURO: Normal strength and tone, mentation intact and speech normal  BACK: no CVA tenderness, no paralumbar tenderness  PSYCH: mentation appears normal, affect normal/bright    Results for orders placed or performed in visit on 06/17/24 (from the past 24 hour(s))   UA with Microscopic reflex to Culture - [place]    Specimen: Urine, Clean Catch   Result Value Ref Range    Color Urine Light Yellow Colorless, Straw, Light Yellow, Yellow    Appearance Urine Clear Clear    Glucose Urine Negative Negative mg/dL    Bilirubin Urine Negative Negative    Ketones Urine Negative Negative mg/dL    Specific Gravity Urine 1.012 1.003 - 1.035    Blood Urine Negative Negative    pH Urine 6.0 4.7 - 8.0    Protein Albumin Urine Negative Negative mg/dL    Urobilinogen Urine Normal Normal, 2.0 mg/dL    Nitrite Urine Negative Negative    Leukocyte Esterase Urine Negative Negative    Bacteria Urine Few (A) None Seen /HPF    Mucus Urine Present (A) None Seen /LPF    RBC Urine 0 <=2 /HPF    WBC Urine 0 <=5 /HPF    Squamous Epithelials Urine 5 (H) <=1 /HPF    Narrative    Urine Culture not indicated "           Signed Electronically by: Nadya Currie, NP

## 2024-06-17 NOTE — TELEPHONE ENCOUNTER
"Patient is requesting a lab urine order if possible.  Can you squeeze her in?    Surgery Details 6/10/24  Panel 1 Procedure [facility] Anes Op Region Wound Class Comments   XI ROBOTIC HYSTERECTOMY with bilateral salpingectomy, lysis of adhesions and cystoscopy                  Answer Assessment - Initial Assessment Questions  1. SYMPTOM: \"What's the main symptom you're concerned about?\" (e.g., frequency, incontinence)      Frequency  when she finishes a stream it hurts like knife blades  2. ONSET: \"When did the      start?\"      Saturday  3. PAIN: \"Is there any pain?\" If Yes, ask: \"How bad is it?\" (Scale: 1-10; mild, moderate, severe)      6/10  4. CAUSE: \"What do you think is causing the symptoms?\"      Patient had a hysterectomy Monday  5. OTHER SYMPTOMS: \"Do you have any other symptoms?\" (e.g., blood in urine, fever, flank pain, pain with urination)      Every now and then she is spotting  6. PREGNANCY: \"Is there any chance you are pregnant?\" \"When was your last menstrual period?\"      no    Protocols used: Urinary Symptoms-A-[place]    "

## 2024-06-18 LAB — BACTERIA UR CULT: NORMAL

## 2024-07-24 ENCOUNTER — TELEPHONE (OUTPATIENT)
Dept: FAMILY MEDICINE | Facility: OTHER | Age: 34
End: 2024-07-24

## 2024-07-24 NOTE — TELEPHONE ENCOUNTER
10:35 AM    Reason for Call: OVERBOOK    Patient is having the following symptoms: Migraines for 2 weeks. States they also want to discuss weight loss.     The patient is requesting an appointment for Soon with GAGE Currie .    Was an appointment offered for this call? No  If yes : Appointment type              Date    Preferred method for responding to this message: Telephone Call  What is your phone number ?[phone]     If we cannot reach you directly, may we leave a detailed response at the number you provided? Yes    Can this message wait until your PCP/provider returns, if unavailable today? Not applicable    Anne-Marie Bone

## 2024-08-20 ENCOUNTER — TELEPHONE (OUTPATIENT)
Dept: FAMILY MEDICINE | Facility: OTHER | Age: 34
End: 2024-08-20

## 2024-08-20 DIAGNOSIS — M54.42 ACUTE BILATERAL LOW BACK PAIN WITH LEFT-SIDED SCIATICA: Primary | ICD-10-CM

## 2024-08-20 NOTE — TELEPHONE ENCOUNTER
1:59 PM    Reason for Call: Phone Call    Description: back issues/injections in June they can't get other injection scheduled in aug because they need to be 3 mths apart/they told her to check with Nadya about pain meds    Was an appointment offered for this call? No  If yes : Appointment type              Date    Preferred method for responding to this message: Telephone Call  What is your phone number ?[phone]     If we cannot reach you directly, may we leave a detailed response at the number you provided? Yes    Can this message wait until your PCP/provider returns, if available today? Not applicable    Diana Rivera

## 2024-08-21 RX ORDER — METHOCARBAMOL 750 MG/1
750 TABLET, FILM COATED ORAL 3 TIMES DAILY PRN
Qty: 20 TABLET | Refills: 0 | Status: SHIPPED | OUTPATIENT
Start: 2024-08-21

## 2024-08-23 ENCOUNTER — MEDICAL CORRESPONDENCE (OUTPATIENT)
Dept: MRI IMAGING | Facility: HOSPITAL | Age: 34
End: 2024-08-23

## 2024-09-09 NOTE — PROGRESS NOTES
Jennifer Tatum is presenting for follow up of medical weight management. She was initially seen for her weight on 4/24/23. Weight was 213 pounds. Last seen for her weight on 10/11/23. Weight was 219 pounds. Weight today is 227 pounds. She would like to get back on track.     Overall she feels ok. Often fatigued. Energy levels poor.   Perceived compliance with lifestyle changes has been fair.   Appetite/Hunger issues: always hungry  Goal: under 200 .   New issues: stopped her paxil and propranolol approx 6 weeks ago-felt they were causing weight gain.     Seasonal allergies have worsened. Taking Allegra with Claritin and she still has a lot of nasal congestion. Sneezing often. Eyes itch and are very watery.     Due for several vaccines. Agreeable to her Hep B vaccine.     Current Eating Patterns: good. Generally 3 meals per day.   Breakfast: eggs and 1/2 bagel with cream cheese and coffee  Lunch: salad or chicken nuggets  Supper: meat and potatoes or rice, some pasta  Snacks: veggies, fruits  Grazing: some  Processed Foods: occasional   Alcohol: none  Wasted Calories: rare  Supplements: none, encouraged to start  Meals Prepared by: self  Nutrition: moderate carb, high protein, high fat   ?  Diet diary reviewed and estimated daily caloric intake is around 1200  ?  Activity/Exercise: Compliance has been poor due to foot and back pain.      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      Psychologic issues/Triggers: some anxiety and depression, but she recently stopped all medications due to weight gain, stable, no issues  ?  Family Support: good   ?  Insight/Motivation: good, wanting to feel better   ?  OBJECTIVE:   /88   Pulse 93   Temp 98.9  F (37.2  C) (Tympanic)   Resp 16   Ht 1.651 m (5' 5\")   Wt 103.1 kg (227 lb 6.4 oz)   LMP 05/13/2024 (Within Days)   SpO2 97%   BMI 37.84 kg/m      Obesity Stage 2  Weight today is 227 pounds. Weight was 219 on 10/11/23. Weight up 8 pounds in the past year.    Exam:  Constitutional: healthy, alert, no distress and over weight   Psychiatric: mentation appears normal and affect normal/bright        ?  ASSESSMENT:   -Obesity Stage 2. Body mass index is 37.84. Will track and limit carbs to 50 or less, calories to 1200 of less.   -Did discuss the GLP-1; she is interested. No personal or family history of thyroid cancer. Wegovy ordered. She was made aware of the side effects. Will see back in 4 weeks to recheck.    -Glucose was normal on 10/27/22.   -TSH was normal on 3/17/23.   -Vit D was normal on 4/24/23 at 49.   -Recently diagnosed with FREDRICK. Should improve with weight loss.     -Seasonal allergies. Will start Zyrtec and see her back in 4 weeks. If they have not improved, will start Singulair. Occasional shortness of breath with wheezes. Will also give her an albuterol inhaler. If symptoms worsen in the meantime, she will follow-up right away.     -Anxiety and Depression; stable without medications. Does not feel she needs to see psychiatry.     ?  PLAN:  Discussed in detail and reinforced continued dietary and activity modifications including diet and exercise logs. New goal set for 4 # weight loss over the next 4 weeks. F/U in 4 weeks.     The longitudinal plan of care for the diagnosis(es)/condition(s) as documented were addressed during this visit. Due to the added " complexity in care, I will continue to support Jennifer in the subsequent management and with ongoing continuity of care.

## 2024-09-10 ENCOUNTER — OFFICE VISIT (OUTPATIENT)
Dept: FAMILY MEDICINE | Facility: OTHER | Age: 34
End: 2024-09-10
Attending: NURSE PRACTITIONER
Payer: COMMERCIAL

## 2024-09-10 ENCOUNTER — HOSPITAL ENCOUNTER (OUTPATIENT)
Dept: MRI IMAGING | Facility: HOSPITAL | Age: 34
Discharge: HOME | End: 2024-09-10
Attending: PODIATRIST
Payer: COMMERCIAL

## 2024-09-10 VITALS
DIASTOLIC BLOOD PRESSURE: 88 MMHG | OXYGEN SATURATION: 97 % | TEMPERATURE: 98.9 F | HEIGHT: 65 IN | BODY MASS INDEX: 37.89 KG/M2 | WEIGHT: 227.4 LBS | RESPIRATION RATE: 16 BRPM | HEART RATE: 93 BPM | SYSTOLIC BLOOD PRESSURE: 126 MMHG

## 2024-09-10 DIAGNOSIS — F41.1 GAD (GENERALIZED ANXIETY DISORDER): ICD-10-CM

## 2024-09-10 DIAGNOSIS — J30.2 SEASONAL ALLERGIC RHINITIS, UNSPECIFIED TRIGGER: ICD-10-CM

## 2024-09-10 DIAGNOSIS — E66.01 CLASS 2 SEVERE OBESITY DUE TO EXCESS CALORIES WITH SERIOUS COMORBIDITY IN ADULT, UNSPECIFIED BMI (H): Primary | ICD-10-CM

## 2024-09-10 DIAGNOSIS — E66.812 CLASS 2 SEVERE OBESITY DUE TO EXCESS CALORIES WITH SERIOUS COMORBIDITY IN ADULT, UNSPECIFIED BMI (H): Primary | ICD-10-CM

## 2024-09-10 DIAGNOSIS — F33.0 MAJOR DEPRESSIVE DISORDER, RECURRENT EPISODE, MILD (H): ICD-10-CM

## 2024-09-10 DIAGNOSIS — M25.571 PAIN, JOINT, FOOT, RIGHT: ICD-10-CM

## 2024-09-10 DIAGNOSIS — G47.33 OSA (OBSTRUCTIVE SLEEP APNEA): ICD-10-CM

## 2024-09-10 PROCEDURE — 99214 OFFICE O/P EST MOD 30 MIN: CPT | Mod: 25 | Performed by: NURSE PRACTITIONER

## 2024-09-10 PROCEDURE — A9585 GADOBUTROL INJECTION: HCPCS | Performed by: RADIOLOGY

## 2024-09-10 PROCEDURE — 90471 IMMUNIZATION ADMIN: CPT | Performed by: NURSE PRACTITIONER

## 2024-09-10 PROCEDURE — 90746 HEPB VACCINE 3 DOSE ADULT IM: CPT | Performed by: NURSE PRACTITIONER

## 2024-09-10 PROCEDURE — 73720 MRI LWR EXTREMITY W/O&W/DYE: CPT | Mod: RT

## 2024-09-10 PROCEDURE — 255N000002 HC RX 255 OP 636: Performed by: RADIOLOGY

## 2024-09-10 RX ORDER — CETIRIZINE HYDROCHLORIDE 10 MG/1
10 TABLET ORAL 2 TIMES DAILY
Qty: 60 TABLET | Refills: 0 | Status: SHIPPED | OUTPATIENT
Start: 2024-09-10

## 2024-09-10 RX ORDER — ALBUTEROL SULFATE 90 UG/1
2 AEROSOL, METERED RESPIRATORY (INHALATION) EVERY 6 HOURS PRN
Qty: 18 G | Refills: 0 | Status: SHIPPED | OUTPATIENT
Start: 2024-09-10

## 2024-09-10 RX ORDER — GADOBUTROL 604.72 MG/ML
10 INJECTION INTRAVENOUS ONCE
Status: COMPLETED | OUTPATIENT
Start: 2024-09-10 | End: 2024-09-10

## 2024-09-10 RX ADMIN — GADOBUTROL 10 ML: 604.72 INJECTION INTRAVENOUS at 16:14

## 2024-09-10 ASSESSMENT — ANXIETY QUESTIONNAIRES
GAD7 TOTAL SCORE: 14
7. FEELING AFRAID AS IF SOMETHING AWFUL MIGHT HAPPEN: MORE THAN HALF THE DAYS
8. IF YOU CHECKED OFF ANY PROBLEMS, HOW DIFFICULT HAVE THESE MADE IT FOR YOU TO DO YOUR WORK, TAKE CARE OF THINGS AT HOME, OR GET ALONG WITH OTHER PEOPLE?: VERY DIFFICULT

## 2024-09-10 ASSESSMENT — PATIENT HEALTH QUESTIONNAIRE - PHQ9
10. IF YOU CHECKED OFF ANY PROBLEMS, HOW DIFFICULT HAVE THESE PROBLEMS MADE IT FOR YOU TO DO YOUR WORK, TAKE CARE OF THINGS AT HOME, OR GET ALONG WITH OTHER PEOPLE: SOMEWHAT DIFFICULT
SUM OF ALL RESPONSES TO PHQ QUESTIONS 1-9: 12
SUM OF ALL RESPONSES TO PHQ QUESTIONS 1-9: 12

## 2024-09-10 ASSESSMENT — PAIN SCALES - GENERAL: PAINLEVEL: MODERATE PAIN (5)

## 2024-09-11 ENCOUNTER — TELEPHONE (OUTPATIENT)
Dept: FAMILY MEDICINE | Facility: OTHER | Age: 34
End: 2024-09-11

## 2024-09-12 ENCOUNTER — TELEPHONE (OUTPATIENT)
Dept: FAMILY MEDICINE | Facility: OTHER | Age: 34
End: 2024-09-12

## 2024-09-12 NOTE — TELEPHONE ENCOUNTER
Received a PA request from [employer] for Semaglutide-Weight Management (WEGOVY) 0.25 MG/0.5ML pen. Submitted on CMM and got message that the drug is not covered by the plan.

## 2024-09-18 ENCOUNTER — TELEPHONE (OUTPATIENT)
Dept: FAMILY MEDICINE | Facility: OTHER | Age: 34
End: 2024-09-18

## 2024-09-18 DIAGNOSIS — E66.812 CLASS 2 SEVERE OBESITY DUE TO EXCESS CALORIES WITH SERIOUS COMORBIDITY IN ADULT, UNSPECIFIED BMI (H): Primary | ICD-10-CM

## 2024-09-18 DIAGNOSIS — E66.01 CLASS 2 SEVERE OBESITY DUE TO EXCESS CALORIES WITH SERIOUS COMORBIDITY IN ADULT, UNSPECIFIED BMI (H): Primary | ICD-10-CM

## 2024-09-18 NOTE — TELEPHONE ENCOUNTER
10:57 AM    Reason for Call: Phone Call    Description: Patient called to report that her insurance is denying Wegovy and cetirizine (ZYRTEC) 10 MG tablet because it is twice a day. She is wondering what else she could be prescribed. Please call her to advise.    Was an appointment offered for this call? No  If yes : Appointment type              Date    Preferred method for responding to this message: Telephone Call  What is your phone number ?  [phone]    If we cannot reach you directly, may we leave a detailed response at the number you provided? Yes    Can this message wait until your PCP/provider returns, if available today? Not applicable    Emely Granados

## 2024-09-27 ENCOUNTER — TRANSFERRED RECORDS (OUTPATIENT)
Dept: HEALTH INFORMATION MANAGEMENT | Facility: CLINIC | Age: 34
End: 2024-09-27
Payer: COMMERCIAL

## 2024-10-01 ENCOUNTER — HOSPITAL ENCOUNTER (OUTPATIENT)
Facility: HOSPITAL | Age: 34
End: 2024-10-01
Attending: PODIATRIST | Admitting: PODIATRIST
Payer: COMMERCIAL

## 2024-10-09 DIAGNOSIS — J30.2 SEASONAL ALLERGIC RHINITIS, UNSPECIFIED TRIGGER: ICD-10-CM

## 2024-10-09 RX ORDER — ALBUTEROL SULFATE 90 UG/1
INHALANT RESPIRATORY (INHALATION)
Qty: 8.5 G | Refills: 1 | Status: SHIPPED | OUTPATIENT
Start: 2024-10-09

## 2024-10-14 ASSESSMENT — PATIENT HEALTH QUESTIONNAIRE - PHQ9: SUM OF ALL RESPONSES TO PHQ QUESTIONS 1-9: 11

## 2024-10-15 ASSESSMENT — PATIENT HEALTH QUESTIONNAIRE - PHQ9
SUM OF ALL RESPONSES TO PHQ QUESTIONS 1-9: 11
10. IF YOU CHECKED OFF ANY PROBLEMS, HOW DIFFICULT HAVE THESE PROBLEMS MADE IT FOR YOU TO DO YOUR WORK, TAKE CARE OF THINGS AT HOME, OR GET ALONG WITH OTHER PEOPLE: SOMEWHAT DIFFICULT

## 2024-10-29 ENCOUNTER — TELEPHONE (OUTPATIENT)
Dept: FAMILY MEDICINE | Facility: OTHER | Age: 34
End: 2024-10-29

## 2024-10-29 PROBLEM — E66.01 CLASS 2 SEVERE OBESITY DUE TO EXCESS CALORIES WITH SERIOUS COMORBIDITY IN ADULT (H): Status: RESOLVED | Noted: 2023-07-18 | Resolved: 2024-10-29

## 2024-10-29 PROBLEM — E66.812 CLASS 2 SEVERE OBESITY DUE TO EXCESS CALORIES WITH SERIOUS COMORBIDITY IN ADULT (H): Status: RESOLVED | Noted: 2023-07-18 | Resolved: 2024-10-29

## 2024-10-29 NOTE — TELEPHONE ENCOUNTER
9:06 AM    Reason for Call: OVERBOOK    Patient is having the following symptoms: Pre-op / 11/07 Surgery on Right toe (next to big toe) at [facility] by Dr. Bello.    The patient is requesting an appointment for Pre-op with PEDRO Currie.    Was an appointment offered for this call? Yes  If yes : Appointment type: Pre-op                Date: 10/29 patient couldn't get out of work    Preferred method for responding to this message: Telephone Call  What is your phone number ?  [phone]     If we cannot reach you directly, may we leave a detailed response at the number you provided? Yes    Can this message wait until your PCP/provider returns, if unavailable today? Not applicable    Emely Granados

## 2024-10-29 NOTE — PROGRESS NOTES
Preoperative Evaluation  [facility] [place]  [address]  [place] [address]  Phone: [phone]  Primary Provider: Nadya Currie NP  Pre-op Performing Provider: Nadya Currie NP  Oct 31, 2024           10/31/2024   Surgical Information   What procedure is being done? Second Metatarsal Osteotomy   Facility or Hospital where procedure/surgery will be performed: [facility]   Who is doing the procedure / surgery? Dr. Partida    Date of surgery / procedure: 11/07/24    Time of surgery / procedure: unsure    Where do you plan to recover after surgery? at home with family        Patient-reported     Fax number for surgical facility: Note does not need to be faxed, will be available electronically in Epic.    Assessment & Plan     The proposed surgical procedure is considered INTERMEDIATE risk.    (Z01.818) Preop general physical exam  (primary encounter diagnosis)  (M79.671) Right foot pain  Plan: CBC and BMP unremarkable. Does not need EKG. Cleared for surgery.     Does need urine pregnancy test the morning of surgery.     (Z72.0) Tobacco abuse  Plan: Will avoid smoking the morning of surgery.     (F33.0) Major depressive disorder, recurrent episode, mild (H)  (F41.1) VINI (generalized anxiety disorder)  Plan: Stable without medications.    (G47.33) FREDRICK (obstructive sleep apnea)  Plan: Does not wear CPAP.     (E66.9) Obesity (BMI 35.0-39.9 without comorbidity)  Comment: BMI 37.77  Plan: FYI for surgery.            Risks and Recommendations  The patient has the following additional risks and recommendations for perioperative complications:  Pulmonary:    - Incentive spirometry post-op   - Active nicotine user, advised smoking cessation    Will hold all medications the morning of surgery.     Hold all NSAIDs 10 days prior.       Recommendation  Approval given to proceed with proposed procedure, without further diagnostic evaluation.    Jennifer Tatum with a functional capacity of greater than  four MET's. There are no obvious contraindications to proceeding with surgery at this time. Recommend that the surgeon review risks and benefits of the procedure prior to proceeding.     Was recommended to avoid eating and drinking anything 12 hours prior to surgery unless his surgeon tells him otherwise.     Does need urine pregnancy test the morning of surgery.    Dallas Rodriguez is a 34 year old, presenting for the following:  Pre-Op Exam (Second Metatarsal Osteotomy with possible plantar plate repair on right foot)    HPI related to upcoming procedure: Patient with chronic right foot pain. Ortho plans to proceed with MTP fusion.           10/31/2024   Pre-Op Questionnaire   Have you ever had a heart attack or stroke? No    Have you ever had surgery on your heart or blood vessels, such as a stent placement, a coronary artery bypass, or surgery on an artery in your head, neck, heart, or legs? No    Do you have chest pain with activity? No    Do you have a history of heart failure? No    Do you currently have a cold, bronchitis or symptoms of other infection? No    Do you have a cough, shortness of breath, or wheezing? No    Do you or anyone in your family have previous history of blood clots? No    Do you or does anyone in your family have a serious bleeding problem such as prolonged bleeding following surgeries or cuts? No    Have you ever had problems with anemia or been told to take iron pills? No    Have you had any abnormal blood loss such as black, tarry or bloody stools, or abnormal vaginal bleeding? No    Have you ever had a blood transfusion? No    Are you willing to have a blood transfusion if it is medically needed before, during, or after your surgery? Yes    Have you or any of your relatives ever had problems with anesthesia? No    Do you have sleep apnea, excessive snoring or daytime drowsiness? (!) YES Mild FREDRICK; does not wear CPAP   Do you have a CPAP machine? (!) NO     Do you have any  artifical heart valves or other implanted medical devices like a pacemaker, defibrillator, or continuous glucose monitor? No    Do you have artificial joints? No    Are you allergic to latex? No        Patient-reported       Health Care Directive  Patient does not have a Health Care Directive: Discussed advance care planning with patient; however, patient declined at this time.    Preoperative Review of    reviewed - no record of controlled substances prescribed.      Status of Chronic Conditions:  DEPRESSION - Patient has a long history of Depression of moderate severity requiring medication for control with recent symptoms being stable. Current symptoms of depression include depressed mood. Not taking any medications for her mental health.     Mets greater than 4, able to walk a mile without stopping.     She does smoke.     FREDRICK, does not wear CPAP.     Obesity; BMI 37.77, insurance would not cover Zepbound    Patient Active Problem List    Diagnosis Date Noted    Acute bilateral low back pain with left-sided sciatica 08/17/2023     Priority: Medium    Pelvic adhesive disease 08/14/2023     Priority: Medium    FREDRICK (obstructive sleep apnea) 06/05/2023     Priority: Medium    Secondary dysmenorrhea 03/19/2021     Priority: Medium    Mood swings 03/19/2021     Priority: Medium    Tobacco abuse 03/12/2021     Priority: Medium    VINI (generalized anxiety disorder) 03/12/2021     Priority: Medium    Menorrhagia with regular cycle 03/12/2021     Priority: Medium    Female pelvic peritoneal adhesions--Severe 08/20/2020     Priority: Medium    Relationship problem between partners 12/01/2017     Priority: Medium    Major depressive disorder, recurrent episode, mild (H) 05/02/2017     Priority: Medium    ACP (advance care planning) 06/17/2016     Priority: Medium     Advance Care Planning 6/17/2016: ACP Review of Chart / Resources Provided:  Reviewed chart for advance care plan.  Jennifer Tatum has no plan or code  "status on file. Discussed available resources and provided with information.   Added by Gilbert Titus            Attention deficit hyperactivity disorder (ADHD), predominantly inattentive type 2016     Priority: Medium    PTSD (post-traumatic stress disorder) 2016     Priority: Medium      Past Medical History:   Diagnosis Date    Anxiety 2013    Feels it is going away     Attention deficit hyperactivity disorder (ADHD), predominantly inattentive type 2016    Complicated grieving 2017    Depressive disorder     Female pelvic peritoneal adhesions--Severe 2020    VINI (generalized anxiety disorder) 2021    H/O nephrolithotomy with removal of calculi     Incompetent cervix 11/10/2015    To [place] per Dr. Evan RICKS     IUFD (intrauterine fetal death) 2016    Heart rate dropped during BPP and patient was taken for code pink c/s where baby was noted to be dead at birth     Major depressive disorder, recurrent episode, mild (H) 2017    Menorrhagia with regular cycle 2021    Mild major depression (H) 2013    Mood swings 2021    Nephrolithiasis     bilateral /complicating pregnancy- Seen Dr [facility]     Pelvic peritoneal adhesions, female 2020    PTSD (post-traumatic stress disorder) 2016    Secondary dysmenorrhea 2021    Smoker 2013    Tobacco abuse 2021     Past Surgical History:   Procedure Laterality Date    CERCLAGE CERVICAL N/A 11/10/2015    Procedure: CERCLAGE CERVICAL;  Surgeon: Tucker Barragan MD;  Location: [facility] L+D     SECTION  2012    Pregnancy full-term; \"Oleksandr\"      SECTION N/A 2016    Procedure:  SECTION;  Surgeon: Nisha Mcleod MD;  Location: [facility] OR     SECTION N/A 2018    EYE SURGERY      HC NEPHROSTOMY PERCUTUTANEOUS      left    HYSTERECTOMY, PAP NO LONGER INDICATED N/A 06/10/2024    Ovaries remain. Cervix removed per [facility] Hysterectomy " path report.    HYSTERECTOMY, TOTAL, ROBOT-ASSISTED, USING DA GARY SI, WITH SALPINGECTOMY Bilateral 06/10/2024    [facility]    impacted wisdom teeth removal      LAPAROSCOPIC TUBAL LIGATION Bilateral 08/20/2020    Procedure: LAPAROSCOPIC BILATERAL FALLOPIAN TUBE FULGRATION;  Surgeon: Frow, David Franke, MD;  Location: [facility] OR     Current Outpatient Medications   Medication Sig Dispense Refill    albuterol (PROAIR HFA/PROVENTIL HFA/VENTOLIN HFA) 108 (90 Base) MCG/ACT inhaler INHALE 2 PUFFS INTO THE LUNGS EVERY 6 HOURS AS NEEDED FOR SHORTNESS OF BREATH OR WHEEZING OR COUGH 8.5 g 1    cetirizine (ZYRTEC) 10 MG tablet Take 1 tablet (10 mg) by mouth 2 times daily. 60 tablet 0       Allergies   Allergen Reactions    Wellbutrin [Bupropion] Other (See Comments)     Mood changes    Lamictal [Lamotrigine] Rash        Social History     Tobacco Use    Smoking status: Every Day     Current packs/day: 1.00     Average packs/day: 1 pack/day for 10.0 years (10.0 ttl pk-yrs)     Types: Cigarettes, Cigars     Passive exposure: Current    Smokeless tobacco: Never    Tobacco comments:     declines quitplan referral 9/8/2020   Substance Use Topics    Alcohol use: No     Family History   Problem Relation Age of Onset    Hypothyroidism Mother     Alcohol/Drug Father         alcoholism    Psychotic Disorder Father         Bipolar    Breast Cancer Paternal Grandmother 45    Cerebrovascular Disease Other         Cerebrovascular accident    C.A.D. Other     Hypertension Other     Diabetes Other      History   Drug Use No             Review of Systems  CONSTITUTIONAL: NEGATIVE for fever, chills, change in weight  INTEGUMENTARY/SKIN: NEGATIVE for worrisome rashes, moles or lesions  EYES: NEGATIVE for vision changes or irritation  ENT/MOUTH: NEGATIVE for ear, mouth and throat problems  RESP: NEGATIVE for significant cough or SOB  BREAST: NEGATIVE for masses, tenderness or discharge  CV: NEGATIVE for chest pain, palpitations or  "peripheral edema  GI: NEGATIVE for nausea, abdominal pain, heartburn, or change in bowel habits  : NEGATIVE for frequency, dysuria, or hematuria  MUSCULOSKELETAL: right foot pain  NEURO: NEGATIVE for weakness, dizziness or paresthesias  ENDOCRINE: NEGATIVE for temperature intolerance, skin/hair changes  HEME: NEGATIVE for bleeding problems  PSYCHIATRIC: NEGATIVE for changes in mood or affect    Objective    /76   Pulse 87   Temp 97.8  F (36.6  C) (Tympanic)   Resp 16   Ht 1.651 m (5' 5\")   Wt 103 kg (227 lb)   LMP 05/13/2024 (Within Days)   SpO2 98%   BMI 37.77 kg/m     Estimated body mass index is 37.77 kg/m  as calculated from the following:    Height as of this encounter: 1.651 m (5' 5\").    Weight as of this encounter: 103 kg (227 lb).  Physical Exam  GENERAL: alert and no distress, obese  EYES: Eyes grossly normal to inspection, PERRL and conjunctivae and sclerae normal  HENT: ear canals and TM's normal, nose and mouth without ulcers or lesions  NECK: no adenopathy, no asymmetry, masses, or scars  RESP: lungs clear to auscultation - no rales, rhonchi or wheezes  CV: regular rate and rhythm, no murmur, click or rub, no peripheral edema  ABDOMEN: soft, nontender, no hepatosplenomegaly, no masses and bowel sounds normal  MS: no gross musculoskeletal defects noted, no edema  NEURO: Normal strength and tone, mentation intact and speech normal  PSYCH: mentation appears normal, affect normal/bright    Recent Labs   Lab Test 05/20/24  1543 05/01/24  1012   HGB 13.6 14.3    251    136   POTASSIUM 4.3 4.4   CR 0.83 0.74        Diagnostics  Recent Results (from the past 24 hours)   Basic metabolic panel    Collection Time: 10/31/24 12:44 PM   Result Value Ref Range    Sodium 140 135 - 145 mmol/L    Potassium 3.8 3.4 - 5.3 mmol/L    Chloride 104 98 - 107 mmol/L    Carbon Dioxide (CO2) 23 22 - 29 mmol/L    Anion Gap 13 7 - 15 mmol/L    Urea Nitrogen 13.2 6.0 - 20.0 mg/dL    Creatinine 0.77 0.51 " - 0.95 mg/dL    GFR Estimate >90 >60 mL/min/1.73m2    Calcium 9.2 8.8 - 10.4 mg/dL    Glucose 102 (H) 70 - 99 mg/dL   CBC with platelets and differential    Collection Time: 10/31/24 12:44 PM   Result Value Ref Range    WBC Count 6.1 4.0 - 11.0 10e3/uL    RBC Count 4.84 3.80 - 5.20 10e6/uL    Hemoglobin 14.2 11.7 - 15.7 g/dL    Hematocrit 41.7 35.0 - 47.0 %    MCV 86 78 - 100 fL    MCH 29.3 26.5 - 33.0 pg    MCHC 34.1 31.5 - 36.5 g/dL    RDW 12.5 10.0 - 15.0 %    Platelet Count 234 150 - 450 10e3/uL    % Neutrophils 50 %    % Lymphocytes 39 %    % Monocytes 7 %    % Eosinophils 4 %    % Basophils 1 %    % Immature Granulocytes 0 %    NRBCs per 100 WBC 0 <1 /100    Absolute Neutrophils 3.0 1.6 - 8.3 10e3/uL    Absolute Lymphocytes 2.3 0.8 - 5.3 10e3/uL    Absolute Monocytes 0.4 0.0 - 1.3 10e3/uL    Absolute Eosinophils 0.2 0.0 - 0.7 10e3/uL    Absolute Basophils 0.0 0.0 - 0.2 10e3/uL    Absolute Immature Granulocytes 0.0 <=0.4 10e3/uL    Absolute NRBCs 0.0 10e3/uL        No EKG required, no history of coronary heart disease, significant arrhythmia, peripheral arterial disease or other structural heart disease.    Revised Cardiac Risk Index (RCRI)  The patient has the following serious cardiovascular risks for perioperative complications:   - No serious cardiac risks = 0 points     RCRI Interpretation: 0 points: Class I (very low risk - 0.4% complication rate)         Signed Electronically by: Nadya Currie NP  A copy of this evaluation report is provided to the requesting physician.

## 2024-10-31 ENCOUNTER — APPOINTMENT (OUTPATIENT)
Dept: LAB | Facility: OTHER | Age: 34
End: 2024-10-31
Attending: NURSE PRACTITIONER
Payer: COMMERCIAL

## 2024-10-31 ENCOUNTER — TELEPHONE (OUTPATIENT)
Dept: FAMILY MEDICINE | Facility: OTHER | Age: 34
End: 2024-10-31

## 2024-10-31 ENCOUNTER — OFFICE VISIT (OUTPATIENT)
Dept: FAMILY MEDICINE | Facility: OTHER | Age: 34
End: 2024-10-31
Attending: NURSE PRACTITIONER
Payer: COMMERCIAL

## 2024-10-31 VITALS
OXYGEN SATURATION: 98 % | RESPIRATION RATE: 16 BRPM | WEIGHT: 227 LBS | BODY MASS INDEX: 37.82 KG/M2 | HEIGHT: 65 IN | DIASTOLIC BLOOD PRESSURE: 76 MMHG | HEART RATE: 87 BPM | TEMPERATURE: 97.8 F | SYSTOLIC BLOOD PRESSURE: 128 MMHG

## 2024-10-31 DIAGNOSIS — E66.9 OBESITY (BMI 35.0-39.9 WITHOUT COMORBIDITY): ICD-10-CM

## 2024-10-31 DIAGNOSIS — M79.671 RIGHT FOOT PAIN: ICD-10-CM

## 2024-10-31 DIAGNOSIS — G47.33 OSA (OBSTRUCTIVE SLEEP APNEA): ICD-10-CM

## 2024-10-31 DIAGNOSIS — Z01.818 PREOP GENERAL PHYSICAL EXAM: Primary | ICD-10-CM

## 2024-10-31 DIAGNOSIS — Z72.0 TOBACCO ABUSE: ICD-10-CM

## 2024-10-31 DIAGNOSIS — F41.1 GAD (GENERALIZED ANXIETY DISORDER): ICD-10-CM

## 2024-10-31 DIAGNOSIS — F33.0 MAJOR DEPRESSIVE DISORDER, RECURRENT EPISODE, MILD (H): ICD-10-CM

## 2024-10-31 LAB
ANION GAP SERPL CALCULATED.3IONS-SCNC: 13 MMOL/L (ref 7–15)
BASOPHILS # BLD AUTO: 0 10E3/UL (ref 0–0.2)
BASOPHILS NFR BLD AUTO: 1 %
BUN SERPL-MCNC: 13.2 MG/DL (ref 6–20)
CALCIUM SERPL-MCNC: 9.2 MG/DL (ref 8.8–10.4)
CHLORIDE SERPL-SCNC: 104 MMOL/L (ref 98–107)
CREAT SERPL-MCNC: 0.77 MG/DL (ref 0.51–0.95)
EGFRCR SERPLBLD CKD-EPI 2021: >90 ML/MIN/1.73M2
EOSINOPHIL # BLD AUTO: 0.2 10E3/UL (ref 0–0.7)
EOSINOPHIL NFR BLD AUTO: 4 %
ERYTHROCYTE [DISTWIDTH] IN BLOOD BY AUTOMATED COUNT: 12.5 % (ref 10–15)
GLUCOSE SERPL-MCNC: 102 MG/DL (ref 70–99)
HCO3 SERPL-SCNC: 23 MMOL/L (ref 22–29)
HCT VFR BLD AUTO: 41.7 % (ref 35–47)
HGB BLD-MCNC: 14.2 G/DL (ref 11.7–15.7)
IMM GRANULOCYTES # BLD: 0 10E3/UL
IMM GRANULOCYTES NFR BLD: 0 %
LYMPHOCYTES # BLD AUTO: 2.3 10E3/UL (ref 0.8–5.3)
LYMPHOCYTES NFR BLD AUTO: 39 %
MCH RBC QN AUTO: 29.3 PG (ref 26.5–33)
MCHC RBC AUTO-ENTMCNC: 34.1 G/DL (ref 31.5–36.5)
MCV RBC AUTO: 86 FL (ref 78–100)
MONOCYTES # BLD AUTO: 0.4 10E3/UL (ref 0–1.3)
MONOCYTES NFR BLD AUTO: 7 %
NEUTROPHILS # BLD AUTO: 3 10E3/UL (ref 1.6–8.3)
NEUTROPHILS NFR BLD AUTO: 50 %
NRBC # BLD AUTO: 0 10E3/UL
NRBC BLD AUTO-RTO: 0 /100
PLATELET # BLD AUTO: 234 10E3/UL (ref 150–450)
POTASSIUM SERPL-SCNC: 3.8 MMOL/L (ref 3.4–5.3)
RBC # BLD AUTO: 4.84 10E6/UL (ref 3.8–5.2)
SODIUM SERPL-SCNC: 140 MMOL/L (ref 135–145)
WBC # BLD AUTO: 6.1 10E3/UL (ref 4–11)

## 2024-10-31 PROCEDURE — G2211 COMPLEX E/M VISIT ADD ON: HCPCS | Performed by: NURSE PRACTITIONER

## 2024-10-31 PROCEDURE — 80048 BASIC METABOLIC PNL TOTAL CA: CPT | Mod: ZL | Performed by: NURSE PRACTITIONER

## 2024-10-31 PROCEDURE — 85004 AUTOMATED DIFF WBC COUNT: CPT | Mod: ZL | Performed by: NURSE PRACTITIONER

## 2024-10-31 PROCEDURE — G0463 HOSPITAL OUTPT CLINIC VISIT: HCPCS

## 2024-10-31 PROCEDURE — 99214 OFFICE O/P EST MOD 30 MIN: CPT | Performed by: NURSE PRACTITIONER

## 2024-10-31 PROCEDURE — 36415 COLL VENOUS BLD VENIPUNCTURE: CPT | Mod: ZL | Performed by: NURSE PRACTITIONER

## 2024-10-31 ASSESSMENT — PAIN SCALES - GENERAL: PAINLEVEL_OUTOF10: EXTREME PAIN (8)

## 2024-10-31 NOTE — LETTER
October 31, 2024      Jennifer Tatum  [address] [place] [id number]        To Whom It May Concern:    Jennifer Tatum  was seen on 10/31/2024.  She will be having surgery on 11/7/24. Please except paperwork from orthopedic surgeon.         Sincerely,        Nadya Currie, NP

## 2024-11-06 ENCOUNTER — ANESTHESIA EVENT (OUTPATIENT)
Dept: SURGERY | Facility: OTHER | Age: 34
End: 2024-11-06
Payer: COMMERCIAL

## 2024-11-07 ENCOUNTER — HOSPITAL ENCOUNTER (OUTPATIENT)
Facility: OTHER | Age: 34
Discharge: HOME | End: 2024-11-07
Attending: PODIATRIST | Admitting: PODIATRIST
Payer: COMMERCIAL

## 2024-11-07 ENCOUNTER — HOSPITAL ENCOUNTER (OUTPATIENT)
Dept: GENERAL RADIOLOGY | Facility: OTHER | Age: 34
Discharge: HOME | End: 2024-11-07
Attending: PODIATRIST | Admitting: PODIATRIST
Payer: COMMERCIAL

## 2024-11-07 ENCOUNTER — ANESTHESIA (OUTPATIENT)
Dept: SURGERY | Facility: OTHER | Age: 34
End: 2024-11-07
Payer: COMMERCIAL

## 2024-11-07 VITALS
SYSTOLIC BLOOD PRESSURE: 103 MMHG | DIASTOLIC BLOOD PRESSURE: 75 MMHG | HEIGHT: 65 IN | WEIGHT: 227 LBS | RESPIRATION RATE: 18 BRPM | BODY MASS INDEX: 37.82 KG/M2 | OXYGEN SATURATION: 96 % | HEART RATE: 75 BPM | TEMPERATURE: 96.7 F

## 2024-11-07 DIAGNOSIS — M25.374 INSTABILITY OF METATARSOPHALANGEAL JOINT OF RIGHT FOOT: ICD-10-CM

## 2024-11-07 DIAGNOSIS — G89.18 POST-OP PAIN: Primary | ICD-10-CM

## 2024-11-07 PROCEDURE — 28308 INCISION OF METATARSAL: CPT | Performed by: NURSE ANESTHETIST, CERTIFIED REGISTERED

## 2024-11-07 PROCEDURE — 272N000001 HC OR GENERAL SUPPLY STERILE: Performed by: PODIATRIST

## 2024-11-07 PROCEDURE — 999N000141 HC STATISTIC PRE-PROCEDURE NURSING ASSESSMENT: Performed by: PODIATRIST

## 2024-11-07 PROCEDURE — 710N000012 HC RECOVERY PHASE 2, PER MINUTE: Performed by: PODIATRIST

## 2024-11-07 PROCEDURE — 250N000011 HC RX IP 250 OP 636: Performed by: NURSE ANESTHETIST, CERTIFIED REGISTERED

## 2024-11-07 PROCEDURE — 370N000017 HC ANESTHESIA TECHNICAL FEE, PER MIN: Performed by: PODIATRIST

## 2024-11-07 PROCEDURE — C1713 ANCHOR/SCREW BN/BN,TIS/BN: HCPCS | Performed by: PODIATRIST

## 2024-11-07 PROCEDURE — 250N000009 HC RX 250: Performed by: NURSE ANESTHETIST, CERTIFIED REGISTERED

## 2024-11-07 PROCEDURE — 360N000083 HC SURGERY LEVEL 3 W/ FLUORO, PER MIN: Performed by: PODIATRIST

## 2024-11-07 PROCEDURE — C1769 GUIDE WIRE: HCPCS | Performed by: PODIATRIST

## 2024-11-07 PROCEDURE — 250N000009 HC RX 250: Performed by: PODIATRIST

## 2024-11-07 PROCEDURE — 999N000180 XR SURGERY CARM FLUORO LESS THAN 5 MIN: Mod: TC

## 2024-11-07 PROCEDURE — 250N000011 HC RX IP 250 OP 636: Mod: JZ | Performed by: PODIATRIST

## 2024-11-07 PROCEDURE — 28308 INCISION OF METATARSAL: CPT | Mod: T6 | Performed by: PODIATRIST

## 2024-11-07 PROCEDURE — 258N000003 HC RX IP 258 OP 636

## 2024-11-07 DEVICE — IMPLANT SYSTEM, CPR MINI SCORPION DX
Type: IMPLANTABLE DEVICE | Site: ANKLE | Status: FUNCTIONAL
Brand: ARTHREX®

## 2024-11-07 RX ORDER — OXYCODONE HYDROCHLORIDE 5 MG/1
5 TABLET ORAL EVERY 6 HOURS PRN
Qty: 12 TABLET | Refills: 0 | Status: SHIPPED | OUTPATIENT
Start: 2024-11-07

## 2024-11-07 RX ORDER — ONDANSETRON 2 MG/ML
INJECTION INTRAMUSCULAR; INTRAVENOUS PRN
Status: DISCONTINUED | OUTPATIENT
Start: 2024-11-07 | End: 2024-11-07

## 2024-11-07 RX ORDER — MAGNESIUM HYDROXIDE 1200 MG/15ML
LIQUID ORAL PRN
Status: DISCONTINUED | OUTPATIENT
Start: 2024-11-07 | End: 2024-11-07 | Stop reason: HOSPADM

## 2024-11-07 RX ORDER — SODIUM CHLORIDE, SODIUM LACTATE, POTASSIUM CHLORIDE, CALCIUM CHLORIDE 600; 310; 30; 20 MG/100ML; MG/100ML; MG/100ML; MG/100ML
INJECTION, SOLUTION INTRAVENOUS CONTINUOUS
Status: DISCONTINUED | OUTPATIENT
Start: 2024-11-07 | End: 2024-11-07 | Stop reason: HOSPADM

## 2024-11-07 RX ORDER — CEFAZOLIN SODIUM/WATER 2 G/20 ML
2 SYRINGE (ML) INTRAVENOUS SEE ADMIN INSTRUCTIONS
Status: DISCONTINUED | OUTPATIENT
Start: 2024-11-07 | End: 2024-11-07 | Stop reason: HOSPADM

## 2024-11-07 RX ORDER — KETOROLAC TROMETHAMINE 30 MG/ML
INJECTION, SOLUTION INTRAMUSCULAR; INTRAVENOUS PRN
Status: DISCONTINUED | OUTPATIENT
Start: 2024-11-07 | End: 2024-11-07

## 2024-11-07 RX ORDER — OXYCODONE HYDROCHLORIDE 5 MG/1
10 TABLET ORAL
Status: DISCONTINUED | OUTPATIENT
Start: 2024-11-07 | End: 2024-11-07 | Stop reason: HOSPADM

## 2024-11-07 RX ORDER — OXYCODONE HYDROCHLORIDE 5 MG/1
5 TABLET ORAL
Status: DISCONTINUED | OUTPATIENT
Start: 2024-11-07 | End: 2024-11-07 | Stop reason: HOSPADM

## 2024-11-07 RX ORDER — PROPOFOL 10 MG/ML
INJECTION, EMULSION INTRAVENOUS CONTINUOUS PRN
Status: DISCONTINUED | OUTPATIENT
Start: 2024-11-07 | End: 2024-11-07

## 2024-11-07 RX ORDER — ONDANSETRON 2 MG/ML
4 INJECTION INTRAMUSCULAR; INTRAVENOUS EVERY 30 MIN PRN
Status: DISCONTINUED | OUTPATIENT
Start: 2024-11-07 | End: 2024-11-07 | Stop reason: HOSPADM

## 2024-11-07 RX ORDER — ONDANSETRON 4 MG/1
4 TABLET, ORALLY DISINTEGRATING ORAL EVERY 30 MIN PRN
Status: DISCONTINUED | OUTPATIENT
Start: 2024-11-07 | End: 2024-11-07 | Stop reason: HOSPADM

## 2024-11-07 RX ORDER — HYDROMORPHONE HCL IN WATER/PF 6 MG/30 ML
0.4 PATIENT CONTROLLED ANALGESIA SYRINGE INTRAVENOUS EVERY 5 MIN PRN
Status: DISCONTINUED | OUTPATIENT
Start: 2024-11-07 | End: 2024-11-07 | Stop reason: HOSPADM

## 2024-11-07 RX ORDER — PROPOFOL 10 MG/ML
INJECTION, EMULSION INTRAVENOUS PRN
Status: DISCONTINUED | OUTPATIENT
Start: 2024-11-07 | End: 2024-11-07

## 2024-11-07 RX ORDER — LIDOCAINE HYDROCHLORIDE 20 MG/ML
INJECTION, SOLUTION INFILTRATION; PERINEURAL PRN
Status: DISCONTINUED | OUTPATIENT
Start: 2024-11-07 | End: 2024-11-07

## 2024-11-07 RX ORDER — LIDOCAINE 40 MG/G
CREAM TOPICAL
Status: DISCONTINUED | OUTPATIENT
Start: 2024-11-07 | End: 2024-11-07 | Stop reason: HOSPADM

## 2024-11-07 RX ORDER — HYDROXYZINE HYDROCHLORIDE 10 MG/1
10 TABLET, FILM COATED ORAL
Status: DISCONTINUED | OUTPATIENT
Start: 2024-11-07 | End: 2024-11-07 | Stop reason: HOSPADM

## 2024-11-07 RX ORDER — HYDROMORPHONE HCL IN WATER/PF 6 MG/30 ML
0.2 PATIENT CONTROLLED ANALGESIA SYRINGE INTRAVENOUS EVERY 5 MIN PRN
Status: DISCONTINUED | OUTPATIENT
Start: 2024-11-07 | End: 2024-11-07 | Stop reason: HOSPADM

## 2024-11-07 RX ORDER — IBUPROFEN 600 MG/1
600 TABLET, FILM COATED ORAL EVERY 6 HOURS
Qty: 12 TABLET | Refills: 0 | Status: SHIPPED | OUTPATIENT
Start: 2024-11-07 | End: 2024-11-10

## 2024-11-07 RX ORDER — DEXAMETHASONE SODIUM PHOSPHATE 10 MG/ML
4 INJECTION, SOLUTION INTRAMUSCULAR; INTRAVENOUS
Status: DISCONTINUED | OUTPATIENT
Start: 2024-11-07 | End: 2024-11-07 | Stop reason: HOSPADM

## 2024-11-07 RX ORDER — CEFAZOLIN SODIUM/WATER 2 G/20 ML
2 SYRINGE (ML) INTRAVENOUS
Status: DISCONTINUED | OUTPATIENT
Start: 2024-11-07 | End: 2024-11-07 | Stop reason: HOSPADM

## 2024-11-07 RX ORDER — FENTANYL CITRATE 50 UG/ML
25 INJECTION, SOLUTION INTRAMUSCULAR; INTRAVENOUS EVERY 5 MIN PRN
Status: DISCONTINUED | OUTPATIENT
Start: 2024-11-07 | End: 2024-11-07 | Stop reason: HOSPADM

## 2024-11-07 RX ORDER — NALOXONE HYDROCHLORIDE 0.4 MG/ML
0.1 INJECTION, SOLUTION INTRAMUSCULAR; INTRAVENOUS; SUBCUTANEOUS
Status: DISCONTINUED | OUTPATIENT
Start: 2024-11-07 | End: 2024-11-07 | Stop reason: HOSPADM

## 2024-11-07 RX ORDER — ACETAMINOPHEN 500 MG
1000 TABLET ORAL EVERY 8 HOURS
Qty: 30 TABLET | Refills: 0 | Status: SHIPPED | OUTPATIENT
Start: 2024-11-07 | End: 2024-11-12

## 2024-11-07 RX ORDER — ASPIRIN 81 MG/1
81 TABLET ORAL 2 TIMES DAILY
Qty: 60 TABLET | Refills: 0 | Status: SHIPPED | OUTPATIENT
Start: 2024-11-07

## 2024-11-07 RX ORDER — FENTANYL CITRATE 50 UG/ML
INJECTION, SOLUTION INTRAMUSCULAR; INTRAVENOUS PRN
Status: DISCONTINUED | OUTPATIENT
Start: 2024-11-07 | End: 2024-11-07

## 2024-11-07 RX ORDER — IBUPROFEN 200 MG
600 TABLET ORAL
Status: DISCONTINUED | OUTPATIENT
Start: 2024-11-07 | End: 2024-11-07 | Stop reason: HOSPADM

## 2024-11-07 RX ORDER — FENTANYL CITRATE 50 UG/ML
50 INJECTION, SOLUTION INTRAMUSCULAR; INTRAVENOUS EVERY 5 MIN PRN
Status: DISCONTINUED | OUTPATIENT
Start: 2024-11-07 | End: 2024-11-07 | Stop reason: HOSPADM

## 2024-11-07 RX ADMIN — MIDAZOLAM 2 MG: 1 INJECTION INTRAMUSCULAR; INTRAVENOUS at 09:39

## 2024-11-07 RX ADMIN — MIDAZOLAM HYDROCHLORIDE 2 MG: 1 INJECTION, SOLUTION INTRAMUSCULAR; INTRAVENOUS at 09:51

## 2024-11-07 RX ADMIN — FENTANYL CITRATE 25 MCG: 50 INJECTION INTRAMUSCULAR; INTRAVENOUS at 10:00

## 2024-11-07 RX ADMIN — PROPOFOL 50 MG: 10 INJECTION, EMULSION INTRAVENOUS at 09:53

## 2024-11-07 RX ADMIN — KETOROLAC TROMETHAMINE 30 MG: 30 INJECTION, SOLUTION INTRAMUSCULAR at 10:32

## 2024-11-07 RX ADMIN — LIDOCAINE HYDROCHLORIDE 60 MG: 20 INJECTION, SOLUTION INFILTRATION; PERINEURAL at 09:53

## 2024-11-07 RX ADMIN — SODIUM CHLORIDE, POTASSIUM CHLORIDE, SODIUM LACTATE AND CALCIUM CHLORIDE: 600; 310; 30; 20 INJECTION, SOLUTION INTRAVENOUS at 09:18

## 2024-11-07 RX ADMIN — FENTANYL CITRATE 25 MCG: 50 INJECTION INTRAMUSCULAR; INTRAVENOUS at 10:02

## 2024-11-07 RX ADMIN — PROPOFOL 100 MCG/KG/MIN: 10 INJECTION, EMULSION INTRAVENOUS at 09:53

## 2024-11-07 RX ADMIN — ONDANSETRON HYDROCHLORIDE 4 MG: 2 SOLUTION INTRAMUSCULAR; INTRAVENOUS at 09:53

## 2024-11-07 RX ADMIN — Medication 2 G: at 09:43

## 2024-11-07 RX ADMIN — FENTANYL CITRATE 50 MCG: 50 INJECTION INTRAMUSCULAR; INTRAVENOUS at 09:51

## 2024-11-07 ASSESSMENT — ACTIVITIES OF DAILY LIVING (ADL)
ADLS_ACUITY_SCORE: 0

## 2024-11-07 ASSESSMENT — LIFESTYLE VARIABLES: TOBACCO_USE: 1

## 2024-11-07 NOTE — ANESTHESIA CARE TRANSFER NOTE
Patient: Jennifer Tatum    Procedure: Procedure(s):  Second Metatarsal Osteotomy with Plantar Plate Repair.       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    Anesthesia Type:   MAC     Note:    Oropharynx: oropharynx clear of all foreign objects  Level of Consciousness: awake  Oxygen Supplementation: room air    Independent Airway: airway patency satisfactory and stable  Dentition: dentition unchanged  Vital Signs Stable: post-procedure vital signs reviewed and stable  Report to RN Given: handoff report given  Patient transferred to: Phase II    Handoff Report: Identifed the Patient, Identified the Reponsible Provider, Reviewed the pertinent medical history, Discussed the surgical course, Reviewed Intra-OP anesthesia mangement and issues during anesthesia, Set expectations for post-procedure period and Allowed opportunity for questions and acknowledgement of understanding  Vitals:  Vitals Value Taken Time   /68 11/07/24 1050   Temp     Pulse 81 11/07/24 1050   Resp     SpO2 98 % 11/07/24 1052   Vitals shown include unfiled device data.    Electronically Signed By: JOHNSON Solares CRNA  November 7, 2024  10:53 AM

## 2024-11-07 NOTE — OP NOTE
SURGEON:  Alejandro Partida DPM.     ASSISTANT:  Rosalina Villela PA-C.      A skilled first assistant was necessary due to technical complexity of the procedure and for the patient's safety.  The assistant helped with positioning, retraction, visualization of the operative field and moreover helped to complete the procedure in a technically safe and efficient manner.       PREOPERATIVE DIAGNOSIS:   Right second MPJ instability and associated pain      POSTOPERATIVE DIAGNOSIS:   Same as preop     PROCEDURE:    Right second metatarsal shortening osteotomy  2.  repair of angular deformity and laxity of second MPJ right foot     ANESTHESIA: MAC with local     HEMOSTASIS: Ankle tourniquet electrocautery.     ESTIMATED BLOOD LOSS: Minimal    INDICATIONS FOR PROCEDURE: Patient has known plantar plate pathology associated pain which has been chronic at this point with clinical instability.  She would like to proceed with surgery today after detail discussion of surgery recovery associated risk potential complications.    MATERIALS: Arthrex suture tape with scorpion suture passer for plantar plate repair     PROCEDURE: Supine position utilized MAC esthesia local block performed patient's right lower extremity prepped and draped usual fashion ankle tourniquet utilized for duration of procedure.    Attention directed second MPJ incision made dorsal aspect of the joint.  Joint was carefully exposed capsular tissue lateral extensor tendon was excised in length the incision and carefully freed exposing the MPJ.  Soft tissue attachments from the metatarsal carefully lifted off the metatarsal freed with a McGlamery elevator.   at this time a osteotomy through the metatarsal head distal approximately was performed capital fragment translocated quite a bit proximally at this point secured with a temporary K wire.  K wire then thrown to the base of the metatarsal distraction device utilized to access the plantar plate.  There is very  obvious rupture of the plantar plate primarily the lateral two thirds was pulled off the base of the proximal phalanx.  Plantar plate was freed from underlying flexor tendon.  Scorpion suture passer utilized to pass suture tape in mattress fashion was passed x 2.  At this time attention was directed to the base of the proximal phalanx oblique holes were drilled through the base of the proximal phalanx from dorsal lateral to plantar medial and second hole was drilled from dorsal medial to plantar lateral.  Suture passer was utilized to gather the suture tape from the plantar plate which was pulled through these holes and the base of the proximal phalanx.  The MPJ was held in neutral position and the plantar plate was repaired by tightening these sutures on the dorsal aspect of the proximal phalanx they were tightened down under appropriate tension again repairing this plantar plate tissue.  Had good stability and alignment after.    Prior to tightening down this plantar plate repair the capital fragment of the metatarsal osteotomy was positioned to appropriate length she was shortened just a couple millimeters to offload to some degree.  She was fixed with 220 twist off screws.  The distal overhanging of the dorsal fragment was resected with a rongeur and smoothed with a hand rasp for smooth motion at the MPJ.  This was flushed with saline after the osteotomy was completed and fixed joint capsule repaired with 2-0 Vicryl and skin with nonabsorbable suture.  She was placed in sterile dressing and a cam boot she will be heel weightbearing and follow-up as scheduled.  Alejandro Partida DPM  11/7/2024

## 2024-11-07 NOTE — BRIEF OP NOTE
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  Complications: None.  Implants:   Implant Name Type Inv. Item Serial No.  Lot No. LRB No. Used Action   [employer] CPR MINI SCORPION DX AND MICRO SUTURELASSO     [id number] Right 1 Implanted

## 2024-11-07 NOTE — DISCHARGE INSTRUCTIONS
[facility]  Adult Discharge Orders & Instructions      For 24 hours after surgery:  Get plenty of rest.  A responsible adult must stay with you for at least 24 hours after you leave the hospital.   You may feel lightheaded.  IF so, sit for a few minutes before standing.  Have someone help you get up.   You may have a slight fever. Call the doctor if your fever is over 101 F (38.3 C) (taken under the tongue) or lasts longer than 24 hours.  You may have a dry mouth, a sore throat, muscle aches or trouble sleeping.  These should go away after 24 hours.  Do not make important or legal decisions.  6.   Do not drive or use heavy equipment.  If you have weakness or tingling, don't drive or use heavy equipment until this feeling goes away.                                                                                                                                                                         To contact a doctor, call    [phone]______________     If you have any questions or concerns, please call the [facility] Triage Line at [phone] during normal business hours, after hours call [phone].

## 2024-11-07 NOTE — ANESTHESIA PREPROCEDURE EVALUATION
"Anesthesia Pre-Procedure Evaluation    Patient: Jennifer Tatum   MRN: [id number] : 1990        Procedure : Procedure(s):  Second Metatarsal Osteotomy, Possible Plantar Wong Repair.          Past Medical History:   Diagnosis Date    Anxiety 2013    Feels it is going away     Attention deficit hyperactivity disorder (ADHD), predominantly inattentive type 2016    Complicated grieving 2017    Depressive disorder     Female pelvic peritoneal adhesions--Severe 2020    VINI (generalized anxiety disorder) 2021    H/O nephrolithotomy with removal of calculi     Incompetent cervix 11/10/2015    To [facility] per Dr. Evan RICKS     IUFD (intrauterine fetal death) 2016    Heart rate dropped during BPP and patient was taken for code pink c/s where baby was noted to be dead at birth     Major depressive disorder, recurrent episode, mild (H) 2017    Menorrhagia with regular cycle 2021    Mild major depression (H) 2013    Mood swings 2021    Nephrolithiasis     bilateral /complicating pregnancy- Seen Dr [facility]     Pelvic peritoneal adhesions, female 2020    PTSD (post-traumatic stress disorder) 2016    Secondary dysmenorrhea 2021    Smoker 2013    Tobacco abuse 2021      Past Surgical History:   Procedure Laterality Date    CERCLAGE CERVICAL N/A 11/10/2015    Procedure: CERCLAGE CERVICAL;  Surgeon: Tucker Barragan MD;  Location: [facility] L+D     SECTION  2012    Pregnancy full-term; \"Oleksandr\"      SECTION N/A 2016    Procedure:  SECTION;  Surgeon: Nisha Mcleod MD;  Location: [facility] OR     SECTION N/A 2018    EYE SURGERY      HC NEPHROSTOMY PERCUTUTANEOUS      left    HYSTERECTOMY, PAP NO LONGER INDICATED N/A 06/10/2024    Ovaries remain. Cervix removed per [facility] Hysterectomy path report.    HYSTERECTOMY, TOTAL, ROBOT-ASSISTED, USING DA GARY SI, WITH SALPINGECTOMY Bilateral " 06/10/2024    Care Everywhere-[facility].    impacted wisdom teeth removal      LAPAROSCOPIC TUBAL LIGATION Bilateral 08/20/2020    Procedure: LAPAROSCOPIC BILATERAL FALLOPIAN TUBE FULGRATION;  Surgeon: Frow, David Franke, MD;  Location: [facility] OR      Allergies   Allergen Reactions    Wellbutrin [Bupropion] Other (See Comments)     Mood changes    Lamictal [Lamotrigine] Rash      Social History     Tobacco Use    Smoking status: Every Day     Current packs/day: 1.00     Average packs/day: 1 pack/day for 10.0 years (10.0 ttl pk-yrs)     Types: Cigarettes, Cigars     Passive exposure: Current    Smokeless tobacco: Never    Tobacco comments:     declines quitplan referral 9/8/2020   Substance Use Topics    Alcohol use: No      Wt Readings from Last 1 Encounters:   10/31/24 103 kg (227 lb)        Anesthesia Evaluation   Pt has had prior anesthetic.         ROS/MED HX  ENT/Pulmonary:     (+) sleep apnea, mild, doesn't use CPAP,         allergic rhinitis,     tobacco use, Current use, 1 packs/day,                      Neurologic:  - neg neurologic ROS     Cardiovascular:       METS/Exercise Tolerance: >4 METS    Hematologic:  - neg hematologic  ROS     Musculoskeletal: Comment: LBP sciatica      GI/Hepatic:  - neg GI/hepatic ROS     Renal/Genitourinary:  - neg Renal ROS   (+)       Nephrolithiasis ,       Endo:  - neg endo ROS   (+)               Obesity,       Psychiatric/Substance Use:     (+) psychiatric history depression and anxiety (PTSD)       Infectious Disease:  - neg infectious disease ROS     Malignancy:       Other:  - neg other ROS          Physical Exam    Airway        Mallampati: II   TM distance: > 3 FB   Neck ROM: full   Mouth opening: > 3 cm    Respiratory Devices and Support         Dental       (+) Minor Abnormalities - some fillings, tiny chips    B=Bridge, C=Chipped, L=Loose, M=Missing    Cardiovascular   cardiovascular exam normal          Pulmonary   pulmonary exam normal                OUTSIDE  LABS:  CBC:   Lab Results   Component Value Date    WBC 6.1 10/31/2024    WBC 8.2 05/20/2024    HGB 14.2 10/31/2024    HGB 13.6 05/20/2024    HCT 41.7 10/31/2024    HCT 41.2 05/20/2024     10/31/2024     05/20/2024     BMP:   Lab Results   Component Value Date     10/31/2024     05/20/2024    POTASSIUM 3.8 10/31/2024    POTASSIUM 4.3 05/20/2024    CHLORIDE 104 10/31/2024    CHLORIDE 103 05/20/2024    CO2 23 10/31/2024    CO2 26 05/20/2024    BUN 13.2 10/31/2024    BUN 14.2 05/20/2024    CR 0.77 10/31/2024    CR 0.83 05/20/2024     (H) 10/31/2024    GLC 95 05/20/2024     COAGS:   Lab Results   Component Value Date    PTT 23 (L) 01/20/2016    INR 0.95 01/20/2016     POC:   Lab Results   Component Value Date    HCG Negative 05/01/2024    HCGS Negative 10/27/2022     HEPATIC:   Lab Results   Component Value Date    ALBUMIN 4.1 05/01/2024    PROTTOTAL 7.5 05/01/2024    ALT 30 05/01/2024    AST 24 05/01/2024    ALKPHOS 97 05/01/2024    BILITOTAL 0.3 05/01/2024     OTHER:   Lab Results   Component Value Date    LACT 1.0 01/20/2016    SOPHIE 9.2 10/31/2024    LIPASE 19 05/01/2024    TSH 1.58 03/17/2023    T4 1.04 03/17/2023    CRP <2.9 07/21/2016    SED 7 10/27/2022       Anesthesia Plan    ASA Status:  2    NPO Status:  NPO Appropriate    Anesthesia Type: MAC (agrees to GA if needed LMA ok).     - Reason for MAC: straight local not clinically adequate              Consents    Anesthesia Plan(s) and associated risks, benefits, and realistic alternatives discussed. Questions answered and patient/representative(s) expressed understanding.     - Discussed: Risks, Benefits and Alternatives for BOTH SEDATION and the PROCEDURE were discussed     - Discussed with:  Patient            Postoperative Care    Pain management: IV analgesics, Multi-modal analgesia.   PONV prophylaxis: Ondansetron (or other 5HT-3), Dexamethasone or Solumedrol     Comments:               JOHNSON LUNA CRNA    I have  "reviewed the pertinent notes and labs in the chart from the past 30 days and (re)examined the patient.  Any updates or changes from those notes are reflected in this note.                         # Obesity: Estimated body mass index is 37.77 kg/m  as calculated from the following:    Height as of 10/31/24: 1.651 m (5' 5\").    Weight as of 10/31/24: 103 kg (227 lb).             "

## 2024-11-07 NOTE — ANESTHESIA POSTPROCEDURE EVALUATION
Patient: Jennifer Tatum    Procedure: Procedure(s):  Second Metatarsal Osteotomy with Plantar Plate Repair.       Anesthesia Type:  MAC    Note:  Disposition: Outpatient   Postop Pain Control: Uneventful            Sign Out: Well controlled pain   PONV: No   Neuro/Psych: Uneventful            Sign Out: Acceptable/Baseline neuro status   Airway/Respiratory: Uneventful            Sign Out: Acceptable/Baseline resp. status   CV/Hemodynamics: Uneventful            Sign Out: Acceptable CV status; No obvious hypovolemia; No obvious fluid overload   Other NRE: NONE   DID A NON-ROUTINE EVENT OCCUR?            Last vitals:  Vitals Value Taken Time   /73 11/07/24 1100   Temp 96.7  F (35.9  C) 11/07/24 1055   Pulse 74 11/07/24 1100   Resp     SpO2 98 % 11/07/24 1108   Vitals shown include unfiled device data.    Electronically Signed By: JOHNSON LUNA CRNA  November 7, 2024  11:09 AM

## 2024-11-07 NOTE — INTERVAL H&P NOTE
"I have reviewed the surgical (or preoperative) H&P that is linked to this encounter, and examined the patient. There are no significant changes    Clinical Conditions Present on Arrival:  Clinically Significant Risk Factors Present on Admission                       # Obesity: Estimated body mass index is 37.77 kg/m  as calculated from the following:    Height as of this encounter: 1.651 m (5' 5\").    Weight as of this encounter: 103 kg (227 lb).       "

## 2024-11-07 NOTE — OR NURSING
patient has been discharged to home at 1150 via w/c accompanied by Shira REYNAGA    Written discharge instructions were provided to patient.  Prescriptions were sent to GI.  Patient states their pain is none, and denies any nausea or dizziness upon discharge.    Patient and adult caring for them verbalize understanding of discharge instructions including no driving until tomorrow and no longer taking narcotic pain medications - no operating mechanical equipment and no making any important decisions.They understand reason for discharge, and necessary follow-up appointments.

## 2024-11-12 DIAGNOSIS — M25.374 INSTABILITY OF METATARSOPHALANGEAL JOINT OF RIGHT FOOT: Primary | ICD-10-CM

## 2024-11-14 ENCOUNTER — OFFICE VISIT (OUTPATIENT)
Dept: ORTHOPEDICS | Facility: OTHER | Age: 34
End: 2024-11-14
Attending: PODIATRIST
Payer: COMMERCIAL

## 2024-11-14 ENCOUNTER — HOSPITAL ENCOUNTER (OUTPATIENT)
Dept: GENERAL RADIOLOGY | Facility: OTHER | Age: 34
Discharge: HOME | End: 2024-11-14
Attending: PODIATRIST
Payer: COMMERCIAL

## 2024-11-14 DIAGNOSIS — Z09 POSTOPERATIVE FOLLOW-UP: Primary | ICD-10-CM

## 2024-11-14 DIAGNOSIS — M25.374 INSTABILITY OF METATARSOPHALANGEAL JOINT OF RIGHT FOOT: ICD-10-CM

## 2024-11-14 PROCEDURE — G0463 HOSPITAL OUTPT CLINIC VISIT: HCPCS

## 2024-11-14 PROCEDURE — 73630 X-RAY EXAM OF FOOT: CPT | Mod: RT

## 2024-11-14 NOTE — PROGRESS NOTES
SUBJECTIVE:  Jennifer returns for 1 week follow-up of right second toe.  She is been doing very well.  She is not taking anything for pain.  She is weightbearing as tolerated in her boot trying to keep weight primarily on her heel.  No concerns today.  No acute concerns today.     ROS: Musculoskeletal and general review of systems are negative, per review of previous clinic questionnaire.  Denies SOB and calf pain.    EXAM:   PHYSICAL EXAMINATION:   CONSTITUTIONAL:  The patient is alert and oriented x 3, well appearing and in no apparent distress.  Affect is pleasant and answers questions appropriately.  VASCULAR:  Circulation is intact with palpable pedal pulses and adequate capillary fill time to all digits.  Hair growth is present and appropriate to mid foot and digits. Calf nontender.  NEUROLOGIC:  Light touch sensation is intact to digits.  There is a negative Tinel sign.    INTEGUMENT:  No abnormal dermatologic lesions are noted.  Skin has normal texture and turgor.  Incision is clean dry intact.  No signs of infection.  No erythema or ecchymosis.  MUSCULOSKELETAL:  Swelling: Minimal although slightly increased around her second toe.  Range of motion appropriate for this recovery time.     IMAGING: 3 views right foot demonstrate second metatarsal shortening with 2 screws.  Good alignment.  No concerns radiographically.    ASSESSMENT: s/p right second metatarsal shortening osteotomy, repair of the second MPJ laxity on 11/7/2024 with Dr. Partida    PLAN OF CARE: Discussed progression of treatment. Sutures removed, steri strips applied. Can get foot wet tomorrow, no soaking for a couple weeks. Steri strips can come off in one week.  At this time she will continue to weight-bear as tolerated in her boot only.  She can come out of her boot while elevating and move her ankle around.  She can continue to elevate and ice multiple times a day.  She would like to return to work on Monday.  She is able to wear her boot  and elevate.  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   Follow up 4 weeks.      Rosalina Villela PA-C

## 2024-11-17 NOTE — PROGRESS NOTES
Jennifer Tatum is presenting for follow up of medical weight management. She was initially seen for her weight on 4/24/23. Weight was 213 pounds. Last seen for her weight on 9/10/24. Weight was 227 pounds. Had gotten off track. Wegovy and Zepbound were ordered, but not covered by insurance.  Weight today is 225 pounds. Weight down 2 pounds.     Overall she feels ok. Often fatigued. Energy levels poor.   Perceived compliance with lifestyle changes has been fair.   Appetite/Hunger issues: always hungry  Goal: under 200.   New issues: She does have a sore throat with a headache and productive cough. Symptoms present for 5 days. Low grade fevers. Fatigued. She has taken Dayquil for her symptoms. No one else is ill in her family. Working in the schools. Eating and drinking well. No shortness of breath or chest pain.     She did recently have surgery on her right foot. Sutures were removed yesterday. Feeling well. No erythema or drainage around incision site. No foot pain.       Current Eating Patterns: good. Generally 3 meals per day.   Breakfast: overnight oats with a protein powder  Lunch: smoothies  Supper: meat and potatoes or rice, some pasta  Snacks: rare  Grazing: some  Processed Foods: occasional   Alcohol: none  Wasted Calories: rare   Supplements: multivitamin   Meals Prepared by: self  Nutrition: moderate carb, high protein, low fat  ?  Diet diary reviewed and estimated daily caloric intake is around 1200  ?  Activity/Exercise: Compliance has been poor due to foot and back pain.      Co-morbidities:       Patient Active Problem List   Diagnosis    Attention deficit hyperactivity disorder (ADHD), predominantly inattentive type    PTSD (post-traumatic stress disorder)    ACP (advance care planning)    Major depressive disorder, recurrent episode, mild (H)    Relationship problem between partners    Female pelvic peritoneal adhesions--Severe    Tobacco abuse    VINI (generalized anxiety disorder)    Menorrhagia  "with regular cycle    Secondary dysmenorrhea    Mood swings      Meds:       Current Outpatient Medications   Medication    sertraline (ZOLOFT) 100 MG tablet      No current facility-administered medications for this visit.      Psychologic issues/Triggers: some anxiety and depression; slightly worse given the time of year, but she does not feel suicidal; sertraline caused weight gain, hallucinated with Wellbutrin    She does have ADHD, not treated. Trouble focusing. Adderall caused her to be impulsive-had an affair on her .   ?  Family Support: good   ?  Insight/Motivation: good, wanting to feel better   ?  OBJECTIVE:   /78 (BP Location: Left arm, Patient Position: Sitting, Cuff Size: Adult Large)   Pulse 96   Temp 99.5  F (37.5  C) (Tympanic)   Ht 1.651 m (5' 5\")   Wt 102.4 kg (225 lb 12.8 oz)   LMP 05/13/2024 (Within Days)   SpO2 96%   BMI 37.58 kg/m      Obesity Stage 2  Weight today is 225 pounds. Weight was 227 on 9/10/24. Down one pound.     Exam:  Constitutional: healthy, alert, no distress and over weight   Head: Normocephalic. No masses, lesions, tenderness or abnormalities  Neck: Neck supple. No adenopathy. Thyroid symmetric, normal size.  ENT: ENT exam normal, no neck nodes or sinus tenderness  Cardiovascular: RRR. No murmurs, clicks gallops or rub  Respiratory: Good diaphragmatic excursion. Lungs clear  Skin: incision on right foot healing-no signs of infection, no erythema or drainage  Psychiatric: mentation appears normal and affect normal/bright        ?  ASSESSMENT/PLAN:   -Obesity Stage 2. Body mass index is 37.58. Will track and limit carbs to 50 or less, calories to 1200 of less.   -Did discuss the GLP-1; Wegovy and Zepbound were ordered, but not covered by her insurance. See below. Vyvanse ordered for ADHD. Should also help with weight loss. Healthy eating encouraged. Will reassess in 4 weeks.   -Glucose was normal on 10/27/22.   -TSH was normal on 3/17/23.   -Vit D was " normal on 4/24/23 at 49.   -Recently diagnosed with FREDRICK. Should improve with weight loss.   -Anxiety and Depression; slightly worse given time of year, sertraline helped, but caused weight gain. Will try fluoxetine 10 mg. She was made aware of the side effects. Will reassess in 4 weeks.   -ADHD; poorly controlled; will start Vyvanse as it may also help with weight loss. She was made aware of the side effects. Will then reassess in 4 weeks.         Encouraged discussion with trusted relative or friend to monitor for negative mood changes and change in behaviour during medication initiation and titration. Recommended immediate help if increased thoughts of suicide and call if significant side effects occur. Encouraged patient that this type of medication is not effective immediately, and to be consistant with taking the medication.  -Sore throat; most likely viral. Symptomatic cares encouraged. The lack of efficacy of antibiotics was discussed. The patient will follow up with new or worsening symptoms. Strep and Multiplex in process. Will notify patient of the results when available and intervene accordingly.       The longitudinal plan of care for the diagnosis(es)/condition(s) as documented were addressed during this visit. Due to the added complexity in care, I will continue to support Jennifer in the subsequent management and with ongoing continuity of care.

## 2024-11-19 ENCOUNTER — OFFICE VISIT (OUTPATIENT)
Dept: FAMILY MEDICINE | Facility: OTHER | Age: 34
End: 2024-11-19
Attending: NURSE PRACTITIONER
Payer: COMMERCIAL

## 2024-11-19 VITALS
HEIGHT: 65 IN | SYSTOLIC BLOOD PRESSURE: 126 MMHG | TEMPERATURE: 99.5 F | OXYGEN SATURATION: 96 % | HEART RATE: 96 BPM | BODY MASS INDEX: 37.62 KG/M2 | WEIGHT: 225.8 LBS | DIASTOLIC BLOOD PRESSURE: 78 MMHG

## 2024-11-19 DIAGNOSIS — E66.01 CLASS 2 SEVERE OBESITY DUE TO EXCESS CALORIES WITH SERIOUS COMORBIDITY IN ADULT, UNSPECIFIED BMI (H): Primary | ICD-10-CM

## 2024-11-19 DIAGNOSIS — J02.9 SORE THROAT: ICD-10-CM

## 2024-11-19 DIAGNOSIS — R51.9 ACUTE NONINTRACTABLE HEADACHE, UNSPECIFIED HEADACHE TYPE: ICD-10-CM

## 2024-11-19 DIAGNOSIS — G47.33 OSA (OBSTRUCTIVE SLEEP APNEA): ICD-10-CM

## 2024-11-19 DIAGNOSIS — E66.812 CLASS 2 SEVERE OBESITY DUE TO EXCESS CALORIES WITH SERIOUS COMORBIDITY IN ADULT, UNSPECIFIED BMI (H): Primary | ICD-10-CM

## 2024-11-19 DIAGNOSIS — F41.1 GAD (GENERALIZED ANXIETY DISORDER): ICD-10-CM

## 2024-11-19 DIAGNOSIS — F33.0 MAJOR DEPRESSIVE DISORDER, RECURRENT EPISODE, MILD (H): ICD-10-CM

## 2024-11-19 DIAGNOSIS — F90.0 ATTENTION DEFICIT HYPERACTIVITY DISORDER (ADHD), PREDOMINANTLY INATTENTIVE TYPE: ICD-10-CM

## 2024-11-19 PROCEDURE — G0463 HOSPITAL OUTPT CLINIC VISIT: HCPCS

## 2024-11-19 PROCEDURE — 87651 STREP A DNA AMP PROBE: CPT | Mod: ZL | Performed by: NURSE PRACTITIONER

## 2024-11-19 PROCEDURE — 87637 SARSCOV2&INF A&B&RSV AMP PRB: CPT | Mod: ZL | Performed by: NURSE PRACTITIONER

## 2024-11-19 RX ORDER — LISDEXAMFETAMINE DIMESYLATE 10 MG/1
10 CAPSULE ORAL EVERY MORNING
Qty: 30 CAPSULE | Refills: 0 | Status: SHIPPED | OUTPATIENT
Start: 2024-11-19

## 2024-11-19 RX ORDER — FLUOXETINE 10 MG/1
10 CAPSULE ORAL DAILY
Qty: 90 CAPSULE | Refills: 0 | Status: SHIPPED | OUTPATIENT
Start: 2024-11-19

## 2024-11-19 ASSESSMENT — PAIN SCALES - GENERAL: PAINLEVEL_OUTOF10: MODERATE PAIN (5)

## 2024-11-29 ENCOUNTER — NURSE TRIAGE (OUTPATIENT)
Dept: CARE COORDINATION | Facility: OTHER | Age: 34
End: 2024-11-29

## 2024-11-29 DIAGNOSIS — M54.9 BACK PAIN: Primary | ICD-10-CM

## 2024-11-29 DIAGNOSIS — M54.42 ACUTE BILATERAL LOW BACK PAIN WITH LEFT-SIDED SCIATICA: ICD-10-CM

## 2024-11-29 NOTE — TELEPHONE ENCOUNTER
Patient sent [employer] message requesting medication for back pain. Has been using tylenol, ibuprofen, and heat. Her back started hurting on Sunday/Monday this week. Lower back in the center and on the right side. She believes this may be related from wearing a boot. Is still able to complete normal activities. Pain is about a 7/10 when up and moving around, resolves with rest. No other symptoms. No injury to area. No fever, loss of bowel/bladder, tingling, weakness, or other concerns. Pain does not radiate. She is not looking to be seen in clinic by covering provider or go to urgent care. States she is going to her mothers to celebrate thanskgiving and has holiday things planned all weekend. Is requesting a medication without being seen. Informed patient that PCP is not in office today. She is requesting this be sent to PCP to review upon return.             Additional Information   Negative: Passed out (e.g., fainted, lost consciousness, blacked out and was not responding)   Negative: Shock suspected (e.g., cold/pale/clammy skin, too weak to stand, low BP, rapid pulse)   Negative: Sounds like a life-threatening emergency to the triager   Negative: Major injury to the back (e.g., MVA, fall > 10 feet or 3 meters, penetrating injury, etc.)   Negative: Pain in the upper back over the ribs (rib cage) that radiates (travels) into the chest   Negative: Pain in the upper back over the ribs (rib cage) and worsened by coughing (or clearly increases with breathing)   Negative: Back pain during pregnancy   Negative: SEVERE back pain of sudden onset and age > 60 years   Negative: SEVERE abdominal pain (e.g., excruciating)   Negative: Abdominal pain and age > 60 years   Negative: Unable to urinate (or only a few drops) and bladder feels very full   Negative: Loss of bladder or bowel control (urine or bowel incontinence; wetting self, leaking stool) of new-onset   Negative: Numbness (loss of sensation) in groin or rectal area    "Negative: Pain radiates into groin, scrotum   Negative: Blood in urine (red, pink, or tea-colored)   Negative: Vomiting and pain over lower ribs of back (i.e., flank - kidney area)   Negative: Weakness of a leg or foot (e.g., unable to bear weight, dragging foot)   Negative: Patient sounds very sick or weak to the triager   Negative: Fever > 100.4 F (38.0 C) and flank pain   Negative: Pain or burning with passing urine (urination)    Answer Assessment - Initial Assessment Questions  1. ONSET: \"When did the pain begin?\" (e.g., minutes, hours, days)      Days- started earlier this week.   2. LOCATION: \"Where does it hurt?\" (upper, mid or lower back)      Lower back in the middle and towards the right   3. SEVERITY: \"How bad is the pain?\"  (e.g., Scale 1-10; mild, moderate, or severe)      When up it is about a 7/10, gets better when she is resting   4. PATTERN: \"Is the pain constant?\" (e.g., yes, no; constant, intermittent)       Intermittent   5. RADIATION: \"Does the pain shoot into your legs or somewhere else?\"      No   6. CAUSE:  \"What do you think is causing the back pain?\"       Might be related to having to wear foot boot   7. BACK OVERUSE:  \"Any recent lifting of heavy objects, strenuous work or exercise?\"      No   8. MEDICINES: \"What have you taken so far for the pain?\" (e.g., nothing, acetaminophen, NSAIDS)      Tylenol and Ibuprofen and heat   9. NEUROLOGIC SYMPTOMS: \"Do you have any weakness, numbness, or problems with bowel/bladder control?\"      No   10. OTHER SYMPTOMS: \"Do you have any other symptoms?\" (e.g., fever, abdomen pain, burning with urination, blood in urine)        No   11. PREGNANCY: \"Is there any chance you are pregnant?\" \"When was your last menstrual period?\"        No    Protocols used: Back Pain-A-[place]    "

## 2024-12-02 RX ORDER — METHOCARBAMOL 500 MG/1
500 TABLET, FILM COATED ORAL 3 TIMES DAILY PRN
Qty: 30 TABLET | Refills: 0 | Status: SHIPPED | OUTPATIENT
Start: 2024-12-02

## 2024-12-11 ENCOUNTER — TRANSFERRED RECORDS (OUTPATIENT)
Dept: HEALTH INFORMATION MANAGEMENT | Facility: CLINIC | Age: 34
End: 2024-12-11
Payer: COMMERCIAL

## 2024-12-13 NOTE — PROGRESS NOTES
Assessment & Plan     (E66.812,  E66.01) Class 2 severe obesity due to excess calories with serious comorbidity in adult, unspecified BMI (H)  (primary encounter diagnosis)  Plan: Weight is 220 today, down 5 lbs from 225 on 11/19/24. Congratulated on hard work and encouraged continuation of healthy lifestyle changes.    (F33.0) Major depressive disorder, recurrent episode, mild (H)  (F41.1) VINI (generalized anxiety disorder)  Plan: Increase FLUoxetine (PROZAC) from 10 mg once daily to 20 MG. Follow up in 4 weeks or sooner if needed. No thoughts of self harm/SI/HI.      (F90.0) Attention deficit hyperactivity disorder (ADHD), predominantly inattentive type  Plan: Controlled, will continue with current dosage.   - lisdexamfetamine (VYVANSE) 10 MG capsule  - lisdexamfetamine (VYVANSE) 10 MG capsule    Pt shows no signs of diversion or abuse. Pt is aware that he/she is solely responsible for protections of the prescriptions. I will not tolerate any lost or stolen prescriptions lightly.  was reviewed.       (G47.00) Insomnia, unspecified type  Plan: Take trazodone 25 mg at night. If ineffective after a week, increase dose to 50 mg.     The longitudinal plan of care for the diagnosis(es)/condition(s) as documented were addressed during this visit. Due to the added complexity in care, I will continue to support Jennifer in the subsequent management and with ongoing continuity of care.    Return in about 4 weeks (around 1/15/2025).    Dallas Rodriguez is a 34 year old, presenting for the following health issues:  Depression, Anxiety, Weight Check, and A.D.H.D    HPI     Weight Check  Weight is 220 today, down 5 lbs from 225 on 11/19/24. Reports less snacking and more vegetables. Walks everyday with her kids.    Depression and Anxiety   How are you doing with your depression since your last visit? No change  How are you doing with your anxiety since your last visit?  No change  Are you having other symptoms that  might be associated with depression or anxiety? Yes:  not sleeping   Have you had a significant life event? OTHER: surgery     Do you have any concerns with your use of alcohol or other drugs? No    Feeling some improvement in mood, but still residual irritability.     Insomnia for 2-3 months. Delayed sleep onset, tossing and turning, early morning waking.  Vyvanse started 4 weeks ago. Feels insomnia has increased with this.   Has taken addrall in the past, felt like this also decreased sleep at the time, but adjusted over time.  Has tried reading over electronics before bed and melatonin, neither with improvement.  Has taken trazodone may years ago for insomnia and found it helpful at that time.      Social History     Tobacco Use    Smoking status: Every Day     Current packs/day: 1.00     Average packs/day: 1 pack/day for 10.0 years (10.0 ttl pk-yrs)     Types: Cigarettes, Cigars     Passive exposure: Current    Smokeless tobacco: Never    Tobacco comments:     declines quitplan referral 9/8/2020   Vaping Use    Vaping status: Never Used   Substance Use Topics    Alcohol use: No    Drug use: No         9/10/2024     2:02 PM 10/14/2024     2:56 PM 12/17/2024     7:40 PM   PHQ   PHQ-9 Total Score 12 11    11 15    Q9: Thoughts of better off dead/self-harm past 2 weeks Not at all  Not at all  Not at all        Patient-reported    Multiple values from one day are sorted in reverse-chronological order         5/20/2024     3:13 PM 9/10/2024     2:03 PM 12/18/2024     8:12 AM   VINI-7 SCORE   Total Score 16 (severe anxiety) 14 (moderate anxiety) 10 (moderate anxiety)   Total Score 16 14 10        Patient-reported         12/17/2024     7:40 PM   Last PHQ-9   1.  Little interest or pleasure in doing things 2    2.  Feeling down, depressed, or hopeless 1    3.  Trouble falling or staying asleep, or sleeping too much 3    4.  Feeling tired or having little energy 1    5.  Poor appetite or overeating 2    6.  Feeling bad  about yourself 2    7.  Trouble concentrating 2    8.  Moving slowly or restless 2    Q9: Thoughts of better off dead/self-harm past 2 weeks 0    PHQ-9 Total Score 15        Patient-reported         12/18/2024     8:12 AM   VINI-7    1. Feeling nervous, anxious, or on edge 2    2. Not being able to stop or control worrying 1    3. Worrying too much about different things 1    4. Trouble relaxing 1    5. Being so restless that it is hard to sit still 1    6. Becoming easily annoyed or irritable 3    7. Feeling afraid, as if something awful might happen 1    VINI-7 Total Score 10    If you checked any problems, how difficult have they made it for you to do your work, take care of things at home, or get along with other people? Somewhat difficult        Patient-reported       Medication Followup of Vyvanse 10MG tablet   Taking Medication as prescribed: yes  Side Effects:  breaking out and dandruff  Medication Helping Symptoms:  yes    Has noticed significant improvement in focus and concentration. Appetite slightly decreased. No palpitations. Side effects of breaking out may be related to decreased sleep and dandruff may be related to dry weather. Willing to continue Vyvanse at current dosage for now and see if there is improvement with increased sleep/symptomatic OTC management.        Review of Systems  Constitutional, HEENT, cardiovascular, pulmonary, gi and gu systems are negative, except as otherwise noted.      Objective    /76 (BP Location: Right arm, Patient Position: Chair, Cuff Size: Adult Large)   Pulse 88   Temp 97.6  F (36.4  C) (Tympanic)   Wt 99.8 kg (220 lb 1.6 oz)   LMP 05/13/2024 (Within Days)   SpO2 97%   BMI 36.63 kg/m    Body mass index is 36.63 kg/m .  Physical Exam   GENERAL: alert and no distress  NECK: no adenopathy, no asymmetry, masses, or scars  RESP: lungs clear to auscultation - no rales, rhonchi or wheezes  CV: regular rate and rhythm, no murmur, click or rub, no peripheral  edema  MS: no gross musculoskeletal defects noted, no edema  NEURO: Normal strength and tone, mentation intact and speech normal  PSYCH: mentation appears normal, affect normal/bright    YAMILETH Robles  [facility]    I was present with the student who participated in the service and in the documentation of the note. I have verified the history and personally performed the physical exam and medical decision making. I agree with the assessment and plan of care as documented in the note.    Signed Electronically by: Nadya Currie NP

## 2024-12-17 ASSESSMENT — PATIENT HEALTH QUESTIONNAIRE - PHQ9
10. IF YOU CHECKED OFF ANY PROBLEMS, HOW DIFFICULT HAVE THESE PROBLEMS MADE IT FOR YOU TO DO YOUR WORK, TAKE CARE OF THINGS AT HOME, OR GET ALONG WITH OTHER PEOPLE: SOMEWHAT DIFFICULT
SUM OF ALL RESPONSES TO PHQ QUESTIONS 1-9: 15
SUM OF ALL RESPONSES TO PHQ QUESTIONS 1-9: 15

## 2024-12-18 ENCOUNTER — OFFICE VISIT (OUTPATIENT)
Dept: FAMILY MEDICINE | Facility: OTHER | Age: 34
End: 2024-12-18
Attending: NURSE PRACTITIONER
Payer: COMMERCIAL

## 2024-12-18 VITALS
BODY MASS INDEX: 36.63 KG/M2 | HEART RATE: 88 BPM | WEIGHT: 220.1 LBS | TEMPERATURE: 97.6 F | SYSTOLIC BLOOD PRESSURE: 110 MMHG | DIASTOLIC BLOOD PRESSURE: 76 MMHG | OXYGEN SATURATION: 97 %

## 2024-12-18 DIAGNOSIS — E66.01 CLASS 2 SEVERE OBESITY DUE TO EXCESS CALORIES WITH SERIOUS COMORBIDITY IN ADULT, UNSPECIFIED BMI (H): Primary | ICD-10-CM

## 2024-12-18 DIAGNOSIS — F90.0 ATTENTION DEFICIT HYPERACTIVITY DISORDER (ADHD), PREDOMINANTLY INATTENTIVE TYPE: ICD-10-CM

## 2024-12-18 DIAGNOSIS — E66.812 CLASS 2 SEVERE OBESITY DUE TO EXCESS CALORIES WITH SERIOUS COMORBIDITY IN ADULT, UNSPECIFIED BMI (H): Primary | ICD-10-CM

## 2024-12-18 DIAGNOSIS — F41.1 GAD (GENERALIZED ANXIETY DISORDER): ICD-10-CM

## 2024-12-18 DIAGNOSIS — F33.0 MAJOR DEPRESSIVE DISORDER, RECURRENT EPISODE, MILD (H): ICD-10-CM

## 2024-12-18 DIAGNOSIS — G47.00 INSOMNIA, UNSPECIFIED TYPE: ICD-10-CM

## 2024-12-18 PROCEDURE — G0463 HOSPITAL OUTPT CLINIC VISIT: HCPCS

## 2024-12-18 RX ORDER — LISDEXAMFETAMINE DIMESYLATE 10 MG/1
10 CAPSULE ORAL EVERY MORNING
Qty: 30 CAPSULE | Refills: 0 | Status: SHIPPED | OUTPATIENT
Start: 2025-01-17

## 2024-12-18 RX ORDER — LISDEXAMFETAMINE DIMESYLATE 10 MG/1
10 CAPSULE ORAL EVERY MORNING
Qty: 30 CAPSULE | Refills: 0 | Status: SHIPPED | OUTPATIENT
Start: 2024-12-18

## 2024-12-18 RX ORDER — TRAZODONE HYDROCHLORIDE 50 MG/1
25-50 TABLET, FILM COATED ORAL AT BEDTIME
Qty: 90 TABLET | Refills: 0 | Status: SHIPPED | OUTPATIENT
Start: 2024-12-18

## 2024-12-18 ASSESSMENT — ANXIETY QUESTIONNAIRES
6. BECOMING EASILY ANNOYED OR IRRITABLE: NEARLY EVERY DAY
1. FEELING NERVOUS, ANXIOUS, OR ON EDGE: MORE THAN HALF THE DAYS
7. FEELING AFRAID AS IF SOMETHING AWFUL MIGHT HAPPEN: SEVERAL DAYS
8. IF YOU CHECKED OFF ANY PROBLEMS, HOW DIFFICULT HAVE THESE MADE IT FOR YOU TO DO YOUR WORK, TAKE CARE OF THINGS AT HOME, OR GET ALONG WITH OTHER PEOPLE?: SOMEWHAT DIFFICULT
3. WORRYING TOO MUCH ABOUT DIFFERENT THINGS: SEVERAL DAYS
IF YOU CHECKED OFF ANY PROBLEMS ON THIS QUESTIONNAIRE, HOW DIFFICULT HAVE THESE PROBLEMS MADE IT FOR YOU TO DO YOUR WORK, TAKE CARE OF THINGS AT HOME, OR GET ALONG WITH OTHER PEOPLE: SOMEWHAT DIFFICULT
2. NOT BEING ABLE TO STOP OR CONTROL WORRYING: SEVERAL DAYS
4. TROUBLE RELAXING: SEVERAL DAYS
7. FEELING AFRAID AS IF SOMETHING AWFUL MIGHT HAPPEN: SEVERAL DAYS
GAD7 TOTAL SCORE: 10
GAD7 TOTAL SCORE: 10
5. BEING SO RESTLESS THAT IT IS HARD TO SIT STILL: SEVERAL DAYS
GAD7 TOTAL SCORE: 10

## 2024-12-18 ASSESSMENT — PAIN SCALES - GENERAL: PAINLEVEL_OUTOF10: SEVERE PAIN (6)

## 2025-01-15 NOTE — PROGRESS NOTES
"  Assessment & Plan     (E66.812,  E66.01) Class 2 severe obesity due to excess calories with serious comorbidity in adult, unspecified BMI (H)  (primary encounter diagnosis)  Plan: Weight trending down with Vyvanse. Will continue. Feeling well. Congratulated. Encouraged to keep up the good work.     (F33.0) Major depressive disorder, recurrent episode, mild  (F41.1) VINI (generalized anxiety disorder)  Comment: controlled  Plan: FLUoxetine (PROZAC) 20 MG capsule        C/W current medications.     (G47.00) Insomnia, unspecified type  Comment: controlled  Plan: traZODone (DESYREL) 50 MG tablet        Continue trazodone.     (F90.0) Attention deficit hyperactivity disorder (ADHD), predominantly inattentive type  Comment: controlled  Plan: lisdexamfetamine (VYVANSE) 10 MG capsule,         lisdexamfetamine (VYVANSE) 10 MG capsule,         lisdexamfetamine (VYVANSE) 10 MG capsule        Pt shows no signs of diversion or abuse. Up to 3 month of his/her ADD/ADHD medications given today. Pt is aware that he/she is solely responsible for protections of the prescriptions. I will not tolerate any lost or stolen prescriptions lightly. Will see pt back in office in 3 months.     (J06.9) Acute URI  Comment: most likely viral, symptoms for 7 days, but improving  Plan: Symptomatic cares encouraged. The lack of efficacy of antibiotics was discussed. The patient will follow up with new or worsening symptoms. Ok to use albuterol as needed.             BMI  Estimated body mass index is 35.76 kg/m  as calculated from the following:    Height as of this encounter: 1.651 m (5' 5\").    Weight as of this encounter: 97.5 kg (214 lb 14.4 oz).   Weight management plan: Discussed healthy diet and exercise guidelines      The longitudinal plan of care for the diagnosis(es)/condition(s) as documented were addressed during this visit. Due to the added complexity in care, I will continue to support Jennifer in the subsequent management and with " ongoing continuity of care.    Dallas Rodriguez is a 34 year old, presenting for the following health issues:  Anxiety, Weight Problem, and Insomnia    HPI     Depression and Anxiety     Last seen on 12/18/24 and fluoxetine was increased to 20 mg from 10 mg.     How are you doing with your depression since your last visit? improved  How are you doing with your anxiety since your last visit?  improved  Are you having other symptoms that might be associated with depression or anxiety? No  Have you had a significant life event? No   Do you have any concerns with your use of alcohol or other drugs? No  Was struggling with insomnia, trazodone started. Helping. No side effects.   No thoughts of self harm.    Obesity; insurance will not cover GLP1. Vyvanse was ordered for ADHD. Working well. Working on diet and exercise. Weight was 220 4 weeks ago. Weight today is 214. Down 6 more pounds. Limiting sugar and carbs. Trying to exercise a litte more.     ADHD; controlled with Vyvanse. Denies chest pain, shortness of breath, dizziness, syncope, or palpitations. Able to stay on task and complete projects with the medication. Work going well.     URI; patient has had a cough and nasal congestion for one week. No fevers. Daughter with similar issues. Symptoms improving. Using her albuterol inhaler during the day.     Social History     Tobacco Use    Smoking status: Every Day     Current packs/day: 1.00     Average packs/day: 1 pack/day for 10.0 years (10.0 ttl pk-yrs)     Types: Cigarettes, Cigars     Passive exposure: Current    Smokeless tobacco: Never    Tobacco comments:     declines quitplan referral 9/8/2020   Vaping Use    Vaping status: Never Used   Substance Use Topics    Alcohol use: No    Drug use: No         10/14/2024     2:56 PM 12/17/2024     7:40 PM 1/16/2025     2:03 PM   PHQ   PHQ-9 Total Score 11    11 15  16    Q9: Thoughts of better off dead/self-harm past 2 weeks Not at all Not at all Not at all        "Patient-reported         9/10/2024     2:03 PM 12/18/2024     8:12 AM 1/16/2025     2:03 PM   VINI-7 SCORE   Total Score 14 (moderate anxiety) 10 (moderate anxiety) 17 (severe anxiety)   Total Score 14 10  17        Patient-reported         1/16/2025     2:03 PM   Last PHQ-9   1.  Little interest or pleasure in doing things 1   2.  Feeling down, depressed, or hopeless 2   3.  Trouble falling or staying asleep, or sleeping too much 2   4.  Feeling tired or having little energy 3   5.  Poor appetite or overeating 1   6.  Feeling bad about yourself 2   7.  Trouble concentrating 2   8.  Moving slowly or restless 3   Q9: Thoughts of better off dead/self-harm past 2 weeks 0   PHQ-9 Total Score 16        Patient-reported         1/16/2025     2:03 PM   VNII-7    1. Feeling nervous, anxious, or on edge 2   2. Not being able to stop or control worrying 2   3. Worrying too much about different things 2   4. Trouble relaxing 3   5. Being so restless that it is hard to sit still 3   6. Becoming easily annoyed or irritable 3   7. Feeling afraid, as if something awful might happen 2   VINI-7 Total Score 17    If you checked any problems, how difficult have they made it for you to do your work, take care of things at home, or get along with other people? Very difficult       Patient-reported             Review of Systems  Constitutional, HEENT, cardiovascular, pulmonary, gi and gu systems are negative, except as otherwise noted.      Objective    /78 (BP Location: Right arm, Patient Position: Sitting, Cuff Size: Adult Large)   Pulse 86   Temp 98.7  F (37.1  C) (Tympanic)   Ht 1.651 m (5' 5\")   Wt 97.5 kg (214 lb 14.4 oz)   LMP 05/13/2024 (Within Days)   SpO2 96%   BMI 35.76 kg/m    Body mass index is 35.76 kg/m .  Physical Exam   GENERAL: alert and no distress  EYES: Eyes grossly normal to inspection, PERRL and conjunctivae and sclerae normal  HENT: ear canals and TM's normal, nose and mouth without ulcers or " lesions  NECK: no adenopathy, no asymmetry, masses, or scars  RESP: inspiratory wheezes right lower base, otherwise clear  CV: regular rate and rhythm, normal S1 S2, no S3 or S4, no murmur, click or rub, no peripheral edema  ABDOMEN: soft, nontender, no hepatosplenomegaly, no masses and bowel sounds normal  MS: no gross musculoskeletal defects noted, no edema  NEURO: Normal strength and tone, mentation intact and speech normal  PSYCH: mentation appears normal, affect normal/bright            Signed Electronically by: Nadya Currie, NP

## 2025-01-16 ENCOUNTER — OFFICE VISIT (OUTPATIENT)
Dept: FAMILY MEDICINE | Facility: OTHER | Age: 35
End: 2025-01-16
Attending: NURSE PRACTITIONER
Payer: COMMERCIAL

## 2025-01-16 VITALS
OXYGEN SATURATION: 96 % | DIASTOLIC BLOOD PRESSURE: 78 MMHG | HEIGHT: 65 IN | HEART RATE: 86 BPM | WEIGHT: 214.9 LBS | TEMPERATURE: 98.7 F | SYSTOLIC BLOOD PRESSURE: 122 MMHG | BODY MASS INDEX: 35.81 KG/M2

## 2025-01-16 DIAGNOSIS — F41.1 GAD (GENERALIZED ANXIETY DISORDER): ICD-10-CM

## 2025-01-16 DIAGNOSIS — E66.01 CLASS 2 SEVERE OBESITY DUE TO EXCESS CALORIES WITH SERIOUS COMORBIDITY IN ADULT, UNSPECIFIED BMI (H): Primary | ICD-10-CM

## 2025-01-16 DIAGNOSIS — F33.0 MAJOR DEPRESSIVE DISORDER, RECURRENT EPISODE, MILD: ICD-10-CM

## 2025-01-16 DIAGNOSIS — G47.00 INSOMNIA, UNSPECIFIED TYPE: ICD-10-CM

## 2025-01-16 DIAGNOSIS — F90.0 ATTENTION DEFICIT HYPERACTIVITY DISORDER (ADHD), PREDOMINANTLY INATTENTIVE TYPE: ICD-10-CM

## 2025-01-16 DIAGNOSIS — J06.9 ACUTE URI: ICD-10-CM

## 2025-01-16 DIAGNOSIS — E66.812 CLASS 2 SEVERE OBESITY DUE TO EXCESS CALORIES WITH SERIOUS COMORBIDITY IN ADULT, UNSPECIFIED BMI (H): Primary | ICD-10-CM

## 2025-01-16 PROCEDURE — G0463 HOSPITAL OUTPT CLINIC VISIT: HCPCS

## 2025-01-16 RX ORDER — LISDEXAMFETAMINE DIMESYLATE 10 MG/1
10 CAPSULE ORAL EVERY MORNING
Qty: 30 CAPSULE | Refills: 0 | Status: SHIPPED | OUTPATIENT
Start: 2025-03-13

## 2025-01-16 RX ORDER — LISDEXAMFETAMINE DIMESYLATE 10 MG/1
10 CAPSULE ORAL EVERY MORNING
Qty: 30 CAPSULE | Refills: 0 | Status: SHIPPED | OUTPATIENT
Start: 2025-02-14

## 2025-01-16 RX ORDER — TRAZODONE HYDROCHLORIDE 50 MG/1
25-50 TABLET, FILM COATED ORAL AT BEDTIME
Qty: 90 TABLET | Refills: 3 | Status: SHIPPED | OUTPATIENT
Start: 2025-01-16

## 2025-01-16 ASSESSMENT — ANXIETY QUESTIONNAIRES
7. FEELING AFRAID AS IF SOMETHING AWFUL MIGHT HAPPEN: MORE THAN HALF THE DAYS
3. WORRYING TOO MUCH ABOUT DIFFERENT THINGS: MORE THAN HALF THE DAYS
GAD7 TOTAL SCORE: 17
5. BEING SO RESTLESS THAT IT IS HARD TO SIT STILL: NEARLY EVERY DAY
4. TROUBLE RELAXING: NEARLY EVERY DAY
7. FEELING AFRAID AS IF SOMETHING AWFUL MIGHT HAPPEN: MORE THAN HALF THE DAYS
1. FEELING NERVOUS, ANXIOUS, OR ON EDGE: MORE THAN HALF THE DAYS
IF YOU CHECKED OFF ANY PROBLEMS ON THIS QUESTIONNAIRE, HOW DIFFICULT HAVE THESE PROBLEMS MADE IT FOR YOU TO DO YOUR WORK, TAKE CARE OF THINGS AT HOME, OR GET ALONG WITH OTHER PEOPLE: VERY DIFFICULT
GAD7 TOTAL SCORE: 17
GAD7 TOTAL SCORE: 17
6. BECOMING EASILY ANNOYED OR IRRITABLE: NEARLY EVERY DAY
2. NOT BEING ABLE TO STOP OR CONTROL WORRYING: MORE THAN HALF THE DAYS
8. IF YOU CHECKED OFF ANY PROBLEMS, HOW DIFFICULT HAVE THESE MADE IT FOR YOU TO DO YOUR WORK, TAKE CARE OF THINGS AT HOME, OR GET ALONG WITH OTHER PEOPLE?: VERY DIFFICULT

## 2025-01-16 ASSESSMENT — PATIENT HEALTH QUESTIONNAIRE - PHQ9
SUM OF ALL RESPONSES TO PHQ QUESTIONS 1-9: 16
10. IF YOU CHECKED OFF ANY PROBLEMS, HOW DIFFICULT HAVE THESE PROBLEMS MADE IT FOR YOU TO DO YOUR WORK, TAKE CARE OF THINGS AT HOME, OR GET ALONG WITH OTHER PEOPLE: VERY DIFFICULT
SUM OF ALL RESPONSES TO PHQ QUESTIONS 1-9: 16

## 2025-01-16 ASSESSMENT — PAIN SCALES - GENERAL: PAINLEVEL_OUTOF10: NO PAIN (0)

## 2025-03-27 ENCOUNTER — MYC MEDICAL ADVICE (OUTPATIENT)
Dept: FAMILY MEDICINE | Facility: OTHER | Age: 35
End: 2025-03-27

## 2025-03-27 DIAGNOSIS — F41.1 GAD (GENERALIZED ANXIETY DISORDER): ICD-10-CM

## 2025-03-27 DIAGNOSIS — G47.00 INSOMNIA, UNSPECIFIED TYPE: ICD-10-CM

## 2025-03-27 RX ORDER — TRAZODONE HYDROCHLORIDE 50 MG/1
25-50 TABLET ORAL AT BEDTIME
Qty: 90 TABLET | Refills: 0 | Status: SHIPPED | OUTPATIENT
Start: 2025-03-27

## 2025-03-27 NOTE — TELEPHONE ENCOUNTER
Pended medication to new pharmacy per patient request due to [facility] being closed.       FLUoxetine (PROZAC) 20 MG capsule       Last Written Prescription Date:  1/16/25  Last Fill Quantity: 90,   # refills: 2  Last Office Visit: 1/16/25  Future Office visit:    Next 5 appointments (look out 90 days)      Apr 16, 2025 2:00 PM  (Arrive by 1:45 PM)  Provider Visit with Nadya Currie NP  [facility] ([facility] ) [address]  [place] [address]  [phone]             Routing refill request to provider for review/approval because:  Serotonin Modulators Failed      VINI-7 score of less than 5 in past 6 months.    Please review last VINI-7 score.        9/10/2024     2:03 PM 12/18/2024     8:12 AM 1/16/2025     2:03 PM   VINI-7 SCORE   Total Score 14 (moderate anxiety) 10 (moderate anxiety) 17 (severe anxiety)   Total Score 14 10  17        Patient-reported         traZODone (DESYREL) 50 MG tablet       Last Written Prescription Date:  1/16/25  Last Fill Quantity: 90,   # refills: 3  Last Office Visit: 1/16/25  Future Office visit:    Next 5 appointments (look out 90 days)      Apr 16, 2025 2:00 PM  (Arrive by 1:45 PM)  Provider Visit with Nadya Currie NP  [facility] ([facility] ) [address]  [place] [address]  [phone]

## 2025-04-16 ENCOUNTER — MYC MEDICAL ADVICE (OUTPATIENT)
Dept: FAMILY MEDICINE | Facility: OTHER | Age: 35
End: 2025-04-16

## 2025-04-18 ENCOUNTER — APPOINTMENT (OUTPATIENT)
Dept: GENERAL RADIOLOGY | Facility: HOSPITAL | Age: 35
End: 2025-04-18
Attending: NURSE PRACTITIONER
Payer: COMMERCIAL

## 2025-04-18 ENCOUNTER — HOSPITAL ENCOUNTER (EMERGENCY)
Facility: HOSPITAL | Age: 35
Discharge: HOME | End: 2025-04-18
Attending: NURSE PRACTITIONER | Admitting: NURSE PRACTITIONER
Payer: COMMERCIAL

## 2025-04-18 VITALS
BODY MASS INDEX: 35.17 KG/M2 | RESPIRATION RATE: 18 BRPM | OXYGEN SATURATION: 97 % | SYSTOLIC BLOOD PRESSURE: 116 MMHG | TEMPERATURE: 97.2 F | HEIGHT: 64 IN | WEIGHT: 206 LBS | DIASTOLIC BLOOD PRESSURE: 73 MMHG | HEART RATE: 66 BPM

## 2025-04-18 DIAGNOSIS — J01.90 ACUTE SINUSITIS WITH SYMPTOMS > 10 DAYS: Primary | ICD-10-CM

## 2025-04-18 LAB
FLUAV RNA SPEC QL NAA+PROBE: NEGATIVE
FLUBV RNA RESP QL NAA+PROBE: NEGATIVE
RSV RNA SPEC NAA+PROBE: NEGATIVE
S PYO DNA THROAT QL NAA+PROBE: NOT DETECTED
SARS-COV-2 RNA RESP QL NAA+PROBE: NEGATIVE

## 2025-04-18 PROCEDURE — G0463 HOSPITAL OUTPT CLINIC VISIT: HCPCS | Mod: 25

## 2025-04-18 PROCEDURE — 71045 X-RAY EXAM CHEST 1 VIEW: CPT | Mod: 26 | Performed by: RADIOLOGY

## 2025-04-18 PROCEDURE — 87651 STREP A DNA AMP PROBE: CPT | Performed by: NURSE PRACTITIONER

## 2025-04-18 PROCEDURE — 87637 SARSCOV2&INF A&B&RSV AMP PRB: CPT | Performed by: NURSE PRACTITIONER

## 2025-04-18 PROCEDURE — 99214 OFFICE O/P EST MOD 30 MIN: CPT | Performed by: NURSE PRACTITIONER

## 2025-04-18 PROCEDURE — 71045 X-RAY EXAM CHEST 1 VIEW: CPT

## 2025-04-18 ASSESSMENT — ENCOUNTER SYMPTOMS
HEADACHES: 0
SHORTNESS OF BREATH: 0
ABDOMINAL PAIN: 1
EYE REDNESS: 0
SINUS PRESSURE: 1
NECK PAIN: 0
RHINORRHEA: 1
DIARRHEA: 1
PSYCHIATRIC NEGATIVE: 1
SORE THROAT: 1
EYE DISCHARGE: 0
NAUSEA: 1
VOMITING: 1
FEVER: 1
WHEEZING: 0
SINUS PAIN: 1
CHILLS: 0
COUGH: 1
NECK STIFFNESS: 0
TROUBLE SWALLOWING: 0
MYALGIAS: 0

## 2025-04-18 ASSESSMENT — ACTIVITIES OF DAILY LIVING (ADL): ADLS_ACUITY_SCORE: 41

## 2025-04-18 NOTE — ED TRIAGE NOTES
Pt presents with cough and congestion x 1 week. Fever with highest temp unknown. Bilateral ear pain and throat pain. Pt has had n/v and diarrhea. PT has been taking tylenol, ibuprofen, dayquill, nyquill, cough and cold medications.

## 2025-04-18 NOTE — DISCHARGE INSTRUCTIONS
Augmentin as ordered  - Take entire course of antibiotic even if you start to feel better.  - Antibiotics can cause stomach upset including nausea and diarrhea. Read your bottle or ask the pharmacist if antibiotic can be taken with food to help prevent nausea. If you have symptoms of diarrhea you can take an over-the-counter probiotic and/or increase foods with probiotics such as yogurt, Entiat, sauerkraut.    Alternate Tylenol and ibuprofen as needed for pain or fever    Push fluids to stay hydrated    Over-the-counter Flonase as needed for nasal congestion    Continue nasal irrigation    Follow-up with primary care provider or return to urgent care/ED with any worsening in condition or additional concerns.

## 2025-05-02 PROBLEM — N94.5 SECONDARY DYSMENORRHEA: Status: RESOLVED | Noted: 2021-03-19 | Resolved: 2025-05-02

## 2025-05-02 PROBLEM — M54.50 CHRONIC BILATERAL LOW BACK PAIN WITHOUT SCIATICA: Status: ACTIVE | Noted: 2025-05-02

## 2025-05-02 PROBLEM — G89.29 CHRONIC BILATERAL LOW BACK PAIN WITHOUT SCIATICA: Status: ACTIVE | Noted: 2025-05-02

## 2025-05-02 PROBLEM — M54.42 ACUTE BILATERAL LOW BACK PAIN WITH LEFT-SIDED SCIATICA: Status: RESOLVED | Noted: 2023-08-17 | Resolved: 2025-05-02

## 2025-05-02 PROBLEM — N92.0 MENORRHAGIA WITH REGULAR CYCLE: Status: RESOLVED | Noted: 2021-03-12 | Resolved: 2025-05-02

## 2025-05-02 PROBLEM — J30.2 SEASONAL ALLERGIC RHINITIS, UNSPECIFIED TRIGGER: Status: ACTIVE | Noted: 2025-05-02

## 2025-05-02 PROBLEM — G47.00 INSOMNIA, UNSPECIFIED TYPE: Status: ACTIVE | Noted: 2025-05-02

## 2025-05-07 ENCOUNTER — MYC MEDICAL ADVICE (OUTPATIENT)
Dept: FAMILY MEDICINE | Facility: OTHER | Age: 35
End: 2025-05-07

## 2025-05-07 DIAGNOSIS — G89.29 ACUTE EXACERBATION OF CHRONIC LOW BACK PAIN: Primary | ICD-10-CM

## 2025-05-07 DIAGNOSIS — M54.50 ACUTE EXACERBATION OF CHRONIC LOW BACK PAIN: Primary | ICD-10-CM

## 2025-05-07 RX ORDER — OXYCODONE AND ACETAMINOPHEN 5; 325 MG/1; MG/1
1 TABLET ORAL EVERY 8 HOURS PRN
Qty: 10 TABLET | Refills: 0 | Status: SHIPPED | OUTPATIENT
Start: 2025-05-07 | End: 2025-05-10

## 2025-06-30 ENCOUNTER — MYC MEDICAL ADVICE (OUTPATIENT)
Dept: FAMILY MEDICINE | Facility: OTHER | Age: 35
End: 2025-06-30

## 2025-06-30 ASSESSMENT — ANXIETY QUESTIONNAIRES
7. FEELING AFRAID AS IF SOMETHING AWFUL MIGHT HAPPEN: SEVERAL DAYS
GAD7 TOTAL SCORE: 16
GAD7 TOTAL SCORE: 16
8. IF YOU CHECKED OFF ANY PROBLEMS, HOW DIFFICULT HAVE THESE MADE IT FOR YOU TO DO YOUR WORK, TAKE CARE OF THINGS AT HOME, OR GET ALONG WITH OTHER PEOPLE?: VERY DIFFICULT
4. TROUBLE RELAXING: NEARLY EVERY DAY
3. WORRYING TOO MUCH ABOUT DIFFERENT THINGS: MORE THAN HALF THE DAYS
1. FEELING NERVOUS, ANXIOUS, OR ON EDGE: MORE THAN HALF THE DAYS
2. NOT BEING ABLE TO STOP OR CONTROL WORRYING: MORE THAN HALF THE DAYS
IF YOU CHECKED OFF ANY PROBLEMS ON THIS QUESTIONNAIRE, HOW DIFFICULT HAVE THESE PROBLEMS MADE IT FOR YOU TO DO YOUR WORK, TAKE CARE OF THINGS AT HOME, OR GET ALONG WITH OTHER PEOPLE: VERY DIFFICULT
7. FEELING AFRAID AS IF SOMETHING AWFUL MIGHT HAPPEN: SEVERAL DAYS
6. BECOMING EASILY ANNOYED OR IRRITABLE: NEARLY EVERY DAY
5. BEING SO RESTLESS THAT IT IS HARD TO SIT STILL: NEARLY EVERY DAY
GAD7 TOTAL SCORE: 16

## 2025-06-30 ASSESSMENT — PATIENT HEALTH QUESTIONNAIRE - PHQ9
10. IF YOU CHECKED OFF ANY PROBLEMS, HOW DIFFICULT HAVE THESE PROBLEMS MADE IT FOR YOU TO DO YOUR WORK, TAKE CARE OF THINGS AT HOME, OR GET ALONG WITH OTHER PEOPLE: VERY DIFFICULT
SUM OF ALL RESPONSES TO PHQ QUESTIONS 1-9: 20
SUM OF ALL RESPONSES TO PHQ QUESTIONS 1-9: 20

## 2025-07-01 NOTE — TELEPHONE ENCOUNTER
Patient completed [facility] PHQ-9/VINI-7 on 6/30/2025 for Depression Remission quality patient outreach per [facility] guidelines. Noted most current PHQ-9 total score is increased and noted most current VINI-7 total score is increased as well when compared to last completed assessments done on 5/2/2025 at last clinic visit.    Current PHQ-9 question #9 is NEGATIVE for thoughts of self-harm or SI.           1/16/2025     2:03 PM 5/2/2025    12:26 PM 6/30/2025     9:41 AM   PHQ   PHQ-9 Total Score 16  12  20    Q9: Thoughts of better off dead/self-harm past 2 weeks Not at all Not at all Not at all       Patient-reported          1/16/2025     2:03 PM 5/2/2025    12:28 PM 6/30/2025     9:42 AM   VINI-7 SCORE   Total Score 17 (severe anxiety) 10 (moderate anxiety) 16 (severe anxiety)   Total Score 17  10  16        Patient-reported        Appointments in Next Year      Aug 05, 2025 9:00 AM  (Arrive by 8:45 AM)  Provider Visit with Nadya Currie NP  [facility] ([facility] ) [phone]        Depression Screening Follow-up        6/30/2025     9:41 AM   PHQ   PHQ-9 Total Score 20    Q9: Thoughts of better off dead/self-harm past 2 weeks Not at all       Patient-reported         6/30/2025     9:41 AM   Last PHQ-9   1.  Little interest or pleasure in doing things 2   2.  Feeling down, depressed, or hopeless 2   3.  Trouble falling or staying asleep, or sleeping too much 3   4.  Feeling tired or having little energy 3   5.  Poor appetite or overeating 2   6.  Feeling bad about yourself 2   7.  Trouble concentrating 3   8.  Moving slowly or restless 3   Q9: Thoughts of better off dead/self-harm past 2 weeks 0   PHQ-9 Total Score 20        Patient-reported         Does the patient currently have a mental health provider?  No  Follow Up Actions Taken  Patient to follow up with PCP. Clinic staff to schedule appointment if able.     Lyn Chan RN     Lyn Chan RN

## 2025-07-17 ENCOUNTER — TELEPHONE (OUTPATIENT)
Dept: FAMILY MEDICINE | Facility: OTHER | Age: 35
End: 2025-07-17

## 2025-07-17 ENCOUNTER — MYC MEDICAL ADVICE (OUTPATIENT)
Dept: FAMILY MEDICINE | Facility: OTHER | Age: 35
End: 2025-07-17

## 2025-07-17 NOTE — TELEPHONE ENCOUNTER
Attempt # 1  Outcome: Left Message   Comment: LVM for pt to call and RS 08/05/25 appt as provider is unavailable. OK to use SDS per provider.

## 2025-08-11 ENCOUNTER — RESULTS FOLLOW-UP (OUTPATIENT)
Dept: PEDIATRICS | Facility: OTHER | Age: 35
End: 2025-08-11

## 2025-08-11 ENCOUNTER — ANCILLARY PROCEDURE (OUTPATIENT)
Dept: GENERAL RADIOLOGY | Facility: OTHER | Age: 35
End: 2025-08-11
Attending: NURSE PRACTITIONER
Payer: COMMERCIAL

## 2025-08-11 ENCOUNTER — OFFICE VISIT (OUTPATIENT)
Dept: FAMILY MEDICINE | Facility: OTHER | Age: 35
End: 2025-08-11
Attending: NURSE PRACTITIONER
Payer: COMMERCIAL

## 2025-08-11 VITALS
TEMPERATURE: 97.1 F | HEART RATE: 80 BPM | RESPIRATION RATE: 18 BRPM | DIASTOLIC BLOOD PRESSURE: 70 MMHG | OXYGEN SATURATION: 98 % | WEIGHT: 212 LBS | BODY MASS INDEX: 36.39 KG/M2 | SYSTOLIC BLOOD PRESSURE: 120 MMHG

## 2025-08-11 DIAGNOSIS — F90.0 ATTENTION DEFICIT HYPERACTIVITY DISORDER (ADHD), PREDOMINANTLY INATTENTIVE TYPE: ICD-10-CM

## 2025-08-11 DIAGNOSIS — E67.3 HIGH VITAMIN D LEVEL: Primary | ICD-10-CM

## 2025-08-11 DIAGNOSIS — F33.0 MAJOR DEPRESSIVE DISORDER, RECURRENT EPISODE, MILD: ICD-10-CM

## 2025-08-11 DIAGNOSIS — M79.644 FINGER PAIN, RIGHT: ICD-10-CM

## 2025-08-11 DIAGNOSIS — F41.1 GAD (GENERALIZED ANXIETY DISORDER): Primary | ICD-10-CM

## 2025-08-11 DIAGNOSIS — R53.83 FATIGUE, UNSPECIFIED TYPE: ICD-10-CM

## 2025-08-11 DIAGNOSIS — G47.00 INSOMNIA, UNSPECIFIED TYPE: ICD-10-CM

## 2025-08-11 LAB
ALBUMIN SERPL BCG-MCNC: 4.1 G/DL (ref 3.5–5.2)
ALP SERPL-CCNC: 77 U/L (ref 40–150)
ALT SERPL W P-5'-P-CCNC: 26 U/L (ref 0–50)
ANION GAP SERPL CALCULATED.3IONS-SCNC: 9 MMOL/L (ref 7–15)
AST SERPL W P-5'-P-CCNC: 22 U/L (ref 0–45)
BASOPHILS # BLD AUTO: 0 10E3/UL (ref 0–0.2)
BASOPHILS NFR BLD AUTO: 1 %
BILIRUB SERPL-MCNC: 0.2 MG/DL
BUN SERPL-MCNC: 14.3 MG/DL (ref 6–20)
CALCIUM SERPL-MCNC: 9.3 MG/DL (ref 8.8–10.4)
CHLORIDE SERPL-SCNC: 105 MMOL/L (ref 98–107)
CREAT SERPL-MCNC: 0.72 MG/DL (ref 0.51–0.95)
EGFRCR SERPLBLD CKD-EPI 2021: >90 ML/MIN/1.73M2
EOSINOPHIL # BLD AUTO: 0.2 10E3/UL (ref 0–0.7)
EOSINOPHIL NFR BLD AUTO: 3 %
ERYTHROCYTE [DISTWIDTH] IN BLOOD BY AUTOMATED COUNT: 12.4 % (ref 10–15)
GLUCOSE SERPL-MCNC: 94 MG/DL (ref 70–99)
HCO3 SERPL-SCNC: 22 MMOL/L (ref 22–29)
HCT VFR BLD AUTO: 43.8 % (ref 35–47)
HGB BLD-MCNC: 15.1 G/DL (ref 11.7–15.7)
IMM GRANULOCYTES # BLD: 0 10E3/UL
IMM GRANULOCYTES NFR BLD: 0 %
LYMPHOCYTES # BLD AUTO: 2.2 10E3/UL (ref 0.8–5.3)
LYMPHOCYTES NFR BLD AUTO: 36 %
MCH RBC QN AUTO: 29.6 PG (ref 26.5–33)
MCHC RBC AUTO-ENTMCNC: 34.5 G/DL (ref 31.5–36.5)
MCV RBC AUTO: 86 FL (ref 78–100)
MONOCYTES # BLD AUTO: 0.6 10E3/UL (ref 0–1.3)
MONOCYTES NFR BLD AUTO: 10 %
NEUTROPHILS # BLD AUTO: 3 10E3/UL (ref 1.6–8.3)
NEUTROPHILS NFR BLD AUTO: 50 %
NRBC # BLD AUTO: 0 10E3/UL
NRBC BLD AUTO-RTO: 0 /100
PLATELET # BLD AUTO: 229 10E3/UL (ref 150–450)
POTASSIUM SERPL-SCNC: 4.5 MMOL/L (ref 3.4–5.3)
PROT SERPL-MCNC: 7 G/DL (ref 6.4–8.3)
RBC # BLD AUTO: 5.1 10E6/UL (ref 3.8–5.2)
SODIUM SERPL-SCNC: 136 MMOL/L (ref 135–145)
TSH SERPL DL<=0.005 MIU/L-ACNC: 1.5 UIU/ML (ref 0.3–4.2)
VIT B12 SERPL-MCNC: 574 PG/ML (ref 232–1245)
VIT D+METAB SERPL-MCNC: 69 NG/ML (ref 20–50)
WBC # BLD AUTO: 6 10E3/UL (ref 4–11)

## 2025-08-11 PROCEDURE — 85014 HEMATOCRIT: CPT | Mod: ZL | Performed by: NURSE PRACTITIONER

## 2025-08-11 PROCEDURE — 84132 ASSAY OF SERUM POTASSIUM: CPT | Mod: ZL | Performed by: NURSE PRACTITIONER

## 2025-08-11 PROCEDURE — 82607 VITAMIN B-12: CPT | Mod: ZL | Performed by: NURSE PRACTITIONER

## 2025-08-11 PROCEDURE — 73140 X-RAY EXAM OF FINGER(S): CPT | Mod: TC,RT

## 2025-08-11 PROCEDURE — 36415 COLL VENOUS BLD VENIPUNCTURE: CPT | Mod: ZL | Performed by: NURSE PRACTITIONER

## 2025-08-11 PROCEDURE — 84443 ASSAY THYROID STIM HORMONE: CPT | Mod: ZL | Performed by: NURSE PRACTITIONER

## 2025-08-11 PROCEDURE — 73140 X-RAY EXAM OF FINGER(S): CPT | Mod: 26 | Performed by: RADIOLOGY

## 2025-08-11 PROCEDURE — 82306 VITAMIN D 25 HYDROXY: CPT | Mod: ZL | Performed by: NURSE PRACTITIONER

## 2025-08-11 PROCEDURE — G0463 HOSPITAL OUTPT CLINIC VISIT: HCPCS

## 2025-08-11 RX ORDER — LISDEXAMFETAMINE DIMESYLATE 10 MG/1
10 CAPSULE ORAL EVERY MORNING
Qty: 30 CAPSULE | Refills: 0 | Status: SHIPPED | OUTPATIENT
Start: 2025-08-11

## 2025-08-11 RX ORDER — FLUOXETINE 10 MG/1
10 CAPSULE ORAL DAILY
Qty: 90 CAPSULE | Refills: 3 | Status: SHIPPED | OUTPATIENT
Start: 2025-08-11

## 2025-08-11 RX ORDER — LISDEXAMFETAMINE DIMESYLATE 10 MG/1
10 CAPSULE ORAL EVERY MORNING
Qty: 30 CAPSULE | Refills: 0 | Status: SHIPPED | OUTPATIENT
Start: 2025-09-10

## 2025-08-11 RX ORDER — LISDEXAMFETAMINE DIMESYLATE 10 MG/1
10 CAPSULE ORAL EVERY MORNING
Qty: 30 CAPSULE | Refills: 0 | Status: SHIPPED | OUTPATIENT
Start: 2025-10-09

## 2025-08-11 ASSESSMENT — ANXIETY QUESTIONNAIRES
GAD7 TOTAL SCORE: 14
GAD7 TOTAL SCORE: 14
1. FEELING NERVOUS, ANXIOUS, OR ON EDGE: MORE THAN HALF THE DAYS
4. TROUBLE RELAXING: MORE THAN HALF THE DAYS
6. BECOMING EASILY ANNOYED OR IRRITABLE: NEARLY EVERY DAY
7. FEELING AFRAID AS IF SOMETHING AWFUL MIGHT HAPPEN: SEVERAL DAYS
5. BEING SO RESTLESS THAT IT IS HARD TO SIT STILL: MORE THAN HALF THE DAYS
2. NOT BEING ABLE TO STOP OR CONTROL WORRYING: MORE THAN HALF THE DAYS
8. IF YOU CHECKED OFF ANY PROBLEMS, HOW DIFFICULT HAVE THESE MADE IT FOR YOU TO DO YOUR WORK, TAKE CARE OF THINGS AT HOME, OR GET ALONG WITH OTHER PEOPLE?: EXTREMELY DIFFICULT
3. WORRYING TOO MUCH ABOUT DIFFERENT THINGS: MORE THAN HALF THE DAYS
GAD7 TOTAL SCORE: 14
7. FEELING AFRAID AS IF SOMETHING AWFUL MIGHT HAPPEN: SEVERAL DAYS
IF YOU CHECKED OFF ANY PROBLEMS ON THIS QUESTIONNAIRE, HOW DIFFICULT HAVE THESE PROBLEMS MADE IT FOR YOU TO DO YOUR WORK, TAKE CARE OF THINGS AT HOME, OR GET ALONG WITH OTHER PEOPLE: EXTREMELY DIFFICULT

## 2025-08-11 ASSESSMENT — PATIENT HEALTH QUESTIONNAIRE - PHQ9
SUM OF ALL RESPONSES TO PHQ QUESTIONS 1-9: 15
SUM OF ALL RESPONSES TO PHQ QUESTIONS 1-9: 15
10. IF YOU CHECKED OFF ANY PROBLEMS, HOW DIFFICULT HAVE THESE PROBLEMS MADE IT FOR YOU TO DO YOUR WORK, TAKE CARE OF THINGS AT HOME, OR GET ALONG WITH OTHER PEOPLE: EXTREMELY DIFFICULT

## 2025-08-11 ASSESSMENT — PAIN SCALES - GENERAL: PAINLEVEL_OUTOF10: MODERATE PAIN (5)

## (undated) DEVICE — BLADE SAW SAGITTAL STRK MICRO 9.0X25X0.38MM 2296-003-511

## (undated) DEVICE — NDL-INSUFFLATION 120MM

## (undated) DEVICE — GLOVE BIOGEL INDICATOR 7.5 LF 41675

## (undated) DEVICE — PACK MAJOR EXTREMITY SOP15MEFCA

## (undated) DEVICE — LIGASURE-5MM BLUNT TIP LAPAROSCOPIC

## (undated) DEVICE — GEL ULTRASOUND AQUASONIC 20GM 01-01

## (undated) DEVICE — NDL-BLUNT FILL 18G 1.5"

## (undated) DEVICE — SU PROLENE 3-0 FS-1 18" 8684G

## (undated) DEVICE — DRAPE STERI TOWEL LG 1010

## (undated) DEVICE — GLOVE PROTEXIS PI ORTHO PF 7.5 2D73HT75

## (undated) DEVICE — BNDG ELASTIC 4"X5YDS UNSTERILE 6611-40

## (undated) DEVICE — PENCIL MEGADYNE TELESCOPING SMOKE EVACUATION 10 FT 251010J

## (undated) DEVICE — GLOVE BIOGEL PI INDICATOR 8.0 LF 41680

## (undated) DEVICE — DRSG GAUZE 4X4" TRAY 6939

## (undated) DEVICE — SYRINGE-10CC LUER LOCK

## (undated) DEVICE — GLOVE PROTEXIS PI ORTHO PF 8.5 2D73HT76

## (undated) DEVICE — SPONGE-LAPAROTOMY PADS 18 X 18

## (undated) DEVICE — NDL 22GA 1.5"

## (undated) DEVICE — TOPICAL SKIN ADHESIVE EXOFIN

## (undated) DEVICE — GUIDE WIRE ARTHREX W/TROCAR TIP .062" AR-8941K

## (undated) DEVICE — DRAPE C-ARM PACK 9"

## (undated) DEVICE — DRSG JUMPSTART ANTIMICROBIAL 1.5X8" ABS-4005

## (undated) DEVICE — APPLICATOR-CHLORAPREP 26ML TINTED CHG 2%+ 70% IPA-SURGICAL

## (undated) DEVICE — TUBING-SEECLEAR LAPAROSCOPIC SMOKE EVACUATION

## (undated) DEVICE — PACK-BASIN SET-UP

## (undated) DEVICE — TUBING-SUCTION 20FT

## (undated) DEVICE — PREP CHLORAPREP 26ML TINTED ORANGE  260815

## (undated) DEVICE — LIGHT HANDLE COVER

## (undated) DEVICE — TROCAR-5X100MM BLADED W/FIXATION

## (undated) DEVICE — SYRINGE-10CC SLIP TIP

## (undated) DEVICE — BLADE KNIFE SURG 15 371115

## (undated) DEVICE — SOL WATER 1500ML

## (undated) DEVICE — NDL-22G X 1 1/2" NON-SAFETY

## (undated) DEVICE — SYRINGE-30CC LUER LOCK

## (undated) DEVICE — CANISTER-SUCTION 2000CC

## (undated) DEVICE — PACK-LAPAROTOMY-CUSTOM

## (undated) DEVICE — IRRIGATION-H2O 1000ML

## (undated) DEVICE — SCD SLEEVE-KNEE REG.

## (undated) DEVICE — TROCAR SLEEVE-KII 5X100MM

## (undated) DEVICE — TOURNIQUET SGL BLADDER 18"X4" RED 5921-218-135

## (undated) DEVICE — DRSG ABDOMINAL PAD UNSTERILE 5X9" 9190

## (undated) DEVICE — GLV-8.0 PROTEXIS PI CLASSIC LF/PF

## (undated) DEVICE — LABEL-STERILE PREPRINTED FOR OR

## (undated) DEVICE — TUBING-INSUFFLATION/LAPAROFLATOR W/FILTER

## (undated) DEVICE — SU VICRYL 2-0 SH 27" UND J417H

## (undated) DEVICE — DRSG KERLIX 4 1/2"X4YDS ROLL 6715

## (undated) RX ORDER — PROPOFOL 10 MG/ML
INJECTION, EMULSION INTRAVENOUS
Status: DISPENSED
Start: 2020-08-20

## (undated) RX ORDER — CEFAZOLIN SODIUM/WATER 2 G/20 ML
SYRINGE (ML) INTRAVENOUS
Status: DISPENSED
Start: 2024-11-07

## (undated) RX ORDER — FENTANYL CITRATE 50 UG/ML
INJECTION, SOLUTION INTRAMUSCULAR; INTRAVENOUS
Status: DISPENSED
Start: 2020-08-20

## (undated) RX ORDER — BUPIVACAINE HYDROCHLORIDE 5 MG/ML
INJECTION, SOLUTION EPIDURAL; INTRACAUDAL
Status: DISPENSED
Start: 2024-11-07

## (undated) RX ORDER — LIDOCAINE HYDROCHLORIDE 20 MG/ML
INJECTION, SOLUTION EPIDURAL; INFILTRATION; INTRACAUDAL; PERINEURAL
Status: DISPENSED
Start: 2020-08-20

## (undated) RX ORDER — PROPOFOL 10 MG/ML
INJECTION, EMULSION INTRAVENOUS
Status: DISPENSED
Start: 2024-11-07

## (undated) RX ORDER — GLYCOPYRROLATE 0.2 MG/ML
INJECTION, SOLUTION INTRAMUSCULAR; INTRAVENOUS
Status: DISPENSED
Start: 2020-08-20

## (undated) RX ORDER — FENTANYL CITRATE 50 UG/ML
INJECTION, SOLUTION INTRAMUSCULAR; INTRAVENOUS
Status: DISPENSED
Start: 2024-11-07

## (undated) RX ORDER — NEOSTIGMINE METHYLSULFATE 1 MG/ML
VIAL (ML) INJECTION
Status: DISPENSED
Start: 2020-08-20

## (undated) RX ORDER — KETOROLAC TROMETHAMINE 30 MG/ML
INJECTION, SOLUTION INTRAMUSCULAR; INTRAVENOUS
Status: DISPENSED
Start: 2024-11-07

## (undated) RX ORDER — ONDANSETRON 2 MG/ML
INJECTION INTRAMUSCULAR; INTRAVENOUS
Status: DISPENSED
Start: 2024-11-07

## (undated) RX ORDER — DEXAMETHASONE SODIUM PHOSPHATE 10 MG/ML
INJECTION, SOLUTION INTRAMUSCULAR; INTRAVENOUS
Status: DISPENSED
Start: 2020-08-20

## (undated) RX ORDER — LIDOCAINE HYDROCHLORIDE 10 MG/ML
INJECTION, SOLUTION INFILTRATION; PERINEURAL
Status: DISPENSED
Start: 2024-11-07

## (undated) RX ORDER — HYDROMORPHONE HYDROCHLORIDE 2 MG/ML
INJECTION, SOLUTION INTRAMUSCULAR; INTRAVENOUS; SUBCUTANEOUS
Status: DISPENSED
Start: 2020-08-20

## (undated) RX ORDER — ONDANSETRON 2 MG/ML
INJECTION INTRAMUSCULAR; INTRAVENOUS
Status: DISPENSED
Start: 2020-08-20

## (undated) RX ORDER — FENTANYL CITRATE-0.9 % NACL/PF 10 MCG/ML
PLASTIC BAG, INJECTION (ML) INTRAVENOUS
Status: DISPENSED
Start: 2020-08-20